# Patient Record
Sex: MALE | Race: WHITE | NOT HISPANIC OR LATINO | Employment: OTHER | URBAN - METROPOLITAN AREA
[De-identification: names, ages, dates, MRNs, and addresses within clinical notes are randomized per-mention and may not be internally consistent; named-entity substitution may affect disease eponyms.]

---

## 2018-02-23 ENCOUNTER — HOSPITAL ENCOUNTER (EMERGENCY)
Facility: HOSPITAL | Age: 83
Discharge: HOME | End: 2018-02-23
Attending: EMERGENCY MEDICINE
Payer: MEDICARE

## 2018-02-23 VITALS
HEART RATE: 70 BPM | DIASTOLIC BLOOD PRESSURE: 81 MMHG | RESPIRATION RATE: 20 BRPM | HEIGHT: 67 IN | OXYGEN SATURATION: 99 % | TEMPERATURE: 98 F | WEIGHT: 160 LBS | SYSTOLIC BLOOD PRESSURE: 169 MMHG | BODY MASS INDEX: 25.11 KG/M2

## 2018-02-23 DIAGNOSIS — L03.113 CELLULITIS OF RIGHT HAND: Primary | ICD-10-CM

## 2018-02-23 LAB
ALBUMIN SERPL BCP-MCNC: 3.4 G/DL
ALP SERPL-CCNC: 84 U/L
ALT SERPL W/O P-5'-P-CCNC: 8 U/L
ANION GAP SERPL CALC-SCNC: 9 MMOL/L
AST SERPL-CCNC: 13 U/L
BASOPHILS # BLD AUTO: 0 K/UL
BASOPHILS NFR BLD: 0.1 %
BILIRUB SERPL-MCNC: 0.8 MG/DL
BUN SERPL-MCNC: 24 MG/DL
CALCIUM SERPL-MCNC: 9 MG/DL
CHLORIDE SERPL-SCNC: 108 MMOL/L
CO2 SERPL-SCNC: 24 MMOL/L
CREAT SERPL-MCNC: 1.2 MG/DL
DIFFERENTIAL METHOD: ABNORMAL
EOSINOPHIL # BLD AUTO: 0.3 K/UL
EOSINOPHIL NFR BLD: 2.7 %
ERYTHROCYTE [DISTWIDTH] IN BLOOD BY AUTOMATED COUNT: 14.6 %
EST. GFR  (AFRICAN AMERICAN): >60 ML/MIN/1.73 M^2
EST. GFR  (NON AFRICAN AMERICAN): 56 ML/MIN/1.73 M^2
GLUCOSE SERPL-MCNC: 108 MG/DL
HCT VFR BLD AUTO: 40.6 %
HGB BLD-MCNC: 13.2 G/DL
LYMPHOCYTES # BLD AUTO: 1.3 K/UL
LYMPHOCYTES NFR BLD: 14.3 %
MCH RBC QN AUTO: 28.8 PG
MCHC RBC AUTO-ENTMCNC: 32.6 G/DL
MCV RBC AUTO: 88 FL
MONOCYTES # BLD AUTO: 0.6 K/UL
MONOCYTES NFR BLD: 6.7 %
NEUTROPHILS # BLD AUTO: 7.1 K/UL
NEUTROPHILS NFR BLD: 76.2 %
PLATELET # BLD AUTO: 252 K/UL
PMV BLD AUTO: 8 FL
POTASSIUM SERPL-SCNC: 4.1 MMOL/L
PROT SERPL-MCNC: 7.2 G/DL
RBC # BLD AUTO: 4.59 M/UL
SODIUM SERPL-SCNC: 141 MMOL/L
WBC # BLD AUTO: 9.4 K/UL

## 2018-02-23 PROCEDURE — 96365 THER/PROPH/DIAG IV INF INIT: CPT

## 2018-02-23 PROCEDURE — S0077 INJECTION, CLINDAMYCIN PHOSP: HCPCS | Performed by: EMERGENCY MEDICINE

## 2018-02-23 PROCEDURE — 36415 COLL VENOUS BLD VENIPUNCTURE: CPT

## 2018-02-23 PROCEDURE — 85025 COMPLETE CBC W/AUTO DIFF WBC: CPT

## 2018-02-23 PROCEDURE — 99283 EMERGENCY DEPT VISIT LOW MDM: CPT | Mod: 25

## 2018-02-23 PROCEDURE — 80053 COMPREHEN METABOLIC PANEL: CPT

## 2018-02-23 PROCEDURE — 25000003 PHARM REV CODE 250: Performed by: EMERGENCY MEDICINE

## 2018-02-23 PROCEDURE — 87040 BLOOD CULTURE FOR BACTERIA: CPT

## 2018-02-23 RX ORDER — POTASSIUM CHLORIDE 750 MG/1
10 TABLET, EXTENDED RELEASE ORAL
Status: ON HOLD | COMMUNITY
End: 2018-04-19 | Stop reason: HOSPADM

## 2018-02-23 RX ORDER — CLOPIDOGREL BISULFATE 75 MG/1
75 TABLET ORAL DAILY
COMMUNITY
End: 2018-10-28 | Stop reason: SDUPTHER

## 2018-02-23 RX ORDER — METOPROLOL SUCCINATE 100 MG/1
100 TABLET, EXTENDED RELEASE ORAL DAILY
COMMUNITY
End: 2018-08-02 | Stop reason: SDUPTHER

## 2018-02-23 RX ORDER — PANTOPRAZOLE SODIUM 40 MG/1
40 TABLET, DELAYED RELEASE ORAL DAILY PRN
COMMUNITY

## 2018-02-23 RX ORDER — DOXYCYCLINE HYCLATE 100 MG/1
100 TABLET, DELAYED RELEASE ORAL 2 TIMES DAILY
Status: ON HOLD | COMMUNITY
End: 2018-04-12 | Stop reason: HOSPADM

## 2018-02-23 RX ORDER — AMLODIPINE BESYLATE 5 MG/1
5 TABLET ORAL DAILY
Status: ON HOLD | COMMUNITY
End: 2018-04-19 | Stop reason: HOSPADM

## 2018-02-23 RX ORDER — ASPIRIN 81 MG/1
81 TABLET ORAL DAILY
COMMUNITY

## 2018-02-23 RX ORDER — LOSARTAN POTASSIUM 100 MG/1
100 TABLET ORAL DAILY
COMMUNITY
End: 2020-01-01 | Stop reason: SDUPTHER

## 2018-02-23 RX ORDER — FUROSEMIDE 20 MG/1
10 TABLET ORAL DAILY
COMMUNITY
End: 2020-01-01

## 2018-02-23 RX ORDER — CLINDAMYCIN PHOSPHATE 900 MG/50ML
900 INJECTION, SOLUTION INTRAVENOUS
Status: COMPLETED | OUTPATIENT
Start: 2018-02-23 | End: 2018-02-23

## 2018-02-23 RX ORDER — LOVASTATIN 40 MG/1
40 TABLET ORAL NIGHTLY
COMMUNITY
End: 2019-01-03 | Stop reason: SDUPTHER

## 2018-02-23 RX ORDER — COLCHICINE 0.6 MG/1
0.6 TABLET, FILM COATED ORAL DAILY
COMMUNITY
End: 2019-06-10 | Stop reason: ALTCHOICE

## 2018-02-23 RX ORDER — CLINDAMYCIN HYDROCHLORIDE 150 MG/1
300 CAPSULE ORAL 4 TIMES DAILY
Qty: 56 CAPSULE | Refills: 0 | Status: SHIPPED | OUTPATIENT
Start: 2018-02-23 | End: 2018-03-02

## 2018-02-23 RX ADMIN — CLINDAMYCIN IN 5 PERCENT DEXTROSE 900 MG: 18 INJECTION, SOLUTION INTRAVENOUS at 02:02

## 2018-02-23 NOTE — ED PROVIDER NOTES
Encounter Date: 2/23/2018    SCRIBE #1 NOTE: ISkyla, am scribing for, and in the presence of, Dr. Gtz.       History     Chief Complaint   Patient presents with    Hand Pain     swelling / redness increasing this week  / seen at [facility] x-ray , uric acid done        02/23/2018 1:00 PM     Chief complaint: Hand Pain      Frederick Kong Jr, Jr. is a 82 y.o. male with PMHx of gout, HTN, CAD, and hyperlipemia who presents to the ED with complaints of right hand redness and swelling that started Wednesday (2/21). Patient states symptoms started immediately after picking a leaves from the yard. Per spouse, they visited Dhiraj Dempsey who performed an x-ray on the patient's right hand. The patient was prescribed antibiotics, doxycycline, and has taken 1 dose of the PO antibiotics. He denies any known allergies. SHx includes cardiac surgery and brain surgery.       The history is provided by the patient.     Review of patient's allergies indicates:  No Known Allergies  Past Medical History:   Diagnosis Date    Cancer     Coronary artery disease     Gout     Hyperlipemia     Hypertension      Past Surgical History:   Procedure Laterality Date    APPENDECTOMY      BRAIN SURGERY      CARDIAC SURGERY      TONSILLECTOMY       History reviewed. No pertinent family history.  Social History   Substance Use Topics    Smoking status: Former Smoker    Smokeless tobacco: Not on file    Alcohol use No     Review of Systems   Constitutional: Negative for fever.   HENT: Negative for congestion.    Respiratory: Negative for cough and shortness of breath.    Cardiovascular: Negative for chest pain.   Gastrointestinal: Negative for abdominal distention and nausea.   Musculoskeletal: Negative for joint swelling.   Skin: Positive for color change (right hand).   Neurological: Negative for dizziness.   Hematological: Does not bruise/bleed easily.       Physical Exam     Initial Vitals [02/23/18 1236]   BP Pulse Resp Temp  SpO2   (!) 204/83 72 20 98.2 °F (36.8 °C) 97 %      MAP       123.33         Physical Exam    Nursing note and vitals reviewed.  Constitutional: He appears well-developed and well-nourished. He is not diaphoretic. No distress.   HENT:   Head: Normocephalic and atraumatic.   Right Ear: External ear normal.   Left Ear: External ear normal.   Mouth/Throat: Oropharynx is clear and moist. No oropharyngeal exudate.   Eyes: Conjunctivae and EOM are normal.   Neck: Normal range of motion. Neck supple.   Cardiovascular: Normal rate, regular rhythm and normal heart sounds. Exam reveals no gallop and no friction rub.    No murmur heard.  Pulmonary/Chest: Breath sounds normal. No respiratory distress. He has no wheezes. He has no rhonchi. He has no rales.   Abdominal: Soft.   Musculoskeletal: Normal range of motion.   Right hand redness, tenderness, and edema noted to dorsal right hand.    Neurological: He is alert and oriented to person, place, and time.   Skin: Skin is warm and dry.   Psychiatric: He has a normal mood and affect.         ED Course   Procedures  Labs Reviewed   CBC W/ AUTO DIFFERENTIAL - Abnormal; Notable for the following:        Result Value    RBC 4.59 (*)     Hemoglobin 13.2 (*)     RDW 14.6 (*)     MPV 8.0 (*)     Gran% 76.2 (*)     Lymph% 14.3 (*)     All other components within normal limits   CULTURE, BLOOD   COMPREHENSIVE METABOLIC PANEL             Medical Decision Making:   History:   Old Medical Records: I decided to obtain old medical records.  Initial Assessment:   82-year-old male presented with a chief complaint of right hand pain, edema, and redness.  Differential Diagnosis:   Initial differential diagnosis includes but is not limited to: cellulitis, abscess, and allergic reaction.  Clinical Tests:   Lab Tests: Reviewed and Ordered  ED Management:  The patient was emergently evaluated in the emergency Department, his evaluation was significant for an elderly male with right hand edema,  tenderness, and redness.  The patient's labs showed no acute abnormalities.  The patient was aggressively treated with a dose of IV clindamycin in the emergency Department, with improvement in his symptoms.  The patient has only had 1 dose of by mouth antibiotics as an outpatient, and has not yet failed outpatient antibiotic therapy.  The patient likely has a cellulitis of the hand.  I will change his antibiotics to by mouth clindamycin.  The patient has been instructed that if his hand is not better in the next 3 days or that he has severe diarrhea that he should return to the emergency department for likely admission for further IV antibiotics, otherwise I believe that he is stable for discharge to home with by mouth clindamycin.  He is also to follow-up with his PCP.  Other:   I have discussed this case with another health care provider.            Scribe Attestation:   Scribe #1: I performed the above scribed service and the documentation accurately describes the services I performed. I attest to the accuracy of the note.      I, Dr. Pardeep Gtz, personally performed the services described in this documentation. All medical record entries made by the scribe were at my direction and in my presence.  I have reviewed the chart and agree that the record reflects my personal performance and is accurate and complete. Pardeep Gtz MD.  3:05 PM 02/23/2018             Clinical Impression:   The encounter diagnosis was Cellulitis of right hand.                           Pardeep Gtz MD  02/23/18 [id number]

## 2018-02-28 LAB — BACTERIA BLD CULT: NORMAL

## 2018-04-07 ENCOUNTER — HOSPITAL ENCOUNTER (INPATIENT)
Facility: HOSPITAL | Age: 83
LOS: 5 days | Discharge: HOME HEALTH | DRG: 853 | End: 2018-04-12
Attending: EMERGENCY MEDICINE | Admitting: HOSPITALIST
Payer: MEDICARE

## 2018-04-07 DIAGNOSIS — J69.0 ASPIRATION PNEUMONIA OF BOTH LOWER LOBES, UNSPECIFIED ASPIRATION PNEUMONIA TYPE: ICD-10-CM

## 2018-04-07 DIAGNOSIS — J96.01 ACUTE RESPIRATORY FAILURE WITH HYPOXIA: ICD-10-CM

## 2018-04-07 DIAGNOSIS — R06.02 SOB (SHORTNESS OF BREATH): ICD-10-CM

## 2018-04-07 DIAGNOSIS — J96.21 ACUTE ON CHRONIC RESPIRATORY FAILURE WITH HYPOXIA: ICD-10-CM

## 2018-04-07 DIAGNOSIS — J96.00 ACUTE RESPIRATORY FAILURE, UNSPECIFIED WHETHER WITH HYPOXIA OR HYPERCAPNIA: Primary | ICD-10-CM

## 2018-04-07 DIAGNOSIS — R79.89 ELEVATED TROPONIN: ICD-10-CM

## 2018-04-07 DIAGNOSIS — R33.9 URINARY RETENTION: ICD-10-CM

## 2018-04-07 DIAGNOSIS — J44.0 CHRONIC OBSTRUCTIVE PULMONARY DISEASE WITH ACUTE LOWER RESPIRATORY INFECTION: ICD-10-CM

## 2018-04-07 DIAGNOSIS — N35.9 URETHRAL STRICTURE, UNSPECIFIED STRICTURE TYPE: ICD-10-CM

## 2018-04-07 DIAGNOSIS — I10 ESSENTIAL HYPERTENSION: ICD-10-CM

## 2018-04-07 PROBLEM — J96.02 ACUTE RESPIRATORY FAILURE WITH HYPOXIA AND HYPERCAPNIA: Status: ACTIVE | Noted: 2018-04-07

## 2018-04-07 PROBLEM — E87.20 METABOLIC ACIDOSIS, NORMAL ANION GAP (NAG): Status: ACTIVE | Noted: 2018-04-07

## 2018-04-07 PROBLEM — A41.9 SEVERE SEPSIS: Status: ACTIVE | Noted: 2018-04-07

## 2018-04-07 PROBLEM — Z95.2 H/O AORTIC VALVE REPLACEMENT: Status: ACTIVE | Noted: 2018-04-07

## 2018-04-07 PROBLEM — N17.9 AKI (ACUTE KIDNEY INJURY): Status: ACTIVE | Noted: 2018-04-07

## 2018-04-07 PROBLEM — R65.20 SEVERE SEPSIS: Status: ACTIVE | Noted: 2018-04-07

## 2018-04-07 LAB
ALBUMIN SERPL BCP-MCNC: 3.8 G/DL
ALLENS TEST: ABNORMAL
ALP SERPL-CCNC: 118 U/L
ALT SERPL W/O P-5'-P-CCNC: 19 U/L
ANION GAP SERPL CALC-SCNC: 13 MMOL/L
ANION GAP SERPL CALC-SCNC: 9 MMOL/L
AST SERPL-CCNC: 29 U/L
BASOPHILS # BLD AUTO: 0.1 K/UL
BASOPHILS NFR BLD: 0.6 %
BILIRUB SERPL-MCNC: 0.5 MG/DL
BNP SERPL-MCNC: 797 PG/ML
BUN SERPL-MCNC: 27 MG/DL
BUN SERPL-MCNC: 31 MG/DL
CALCIUM SERPL-MCNC: 8.3 MG/DL
CALCIUM SERPL-MCNC: 9.2 MG/DL
CHLORIDE SERPL-SCNC: 109 MMOL/L
CHLORIDE SERPL-SCNC: 114 MMOL/L
CO2 SERPL-SCNC: 17 MMOL/L
CO2 SERPL-SCNC: 22 MMOL/L
CREAT SERPL-MCNC: 1.6 MG/DL
CREAT SERPL-MCNC: 1.8 MG/DL
DELSYS: ABNORMAL
DIFFERENTIAL METHOD: ABNORMAL
EOSINOPHIL # BLD AUTO: 1 K/UL
EOSINOPHIL NFR BLD: 4.4 %
ERYTHROCYTE [DISTWIDTH] IN BLOOD BY AUTOMATED COUNT: 14.9 %
ERYTHROCYTE [SEDIMENTATION RATE] IN BLOOD BY WESTERGREN METHOD: 14 MM/H
ERYTHROCYTE [SEDIMENTATION RATE] IN BLOOD BY WESTERGREN METHOD: 22 MM/H
ERYTHROCYTE [SEDIMENTATION RATE] IN BLOOD BY WESTERGREN METHOD: 22 MM/H
EST. GFR  (AFRICAN AMERICAN): 40 ML/MIN/1.73 M^2
EST. GFR  (AFRICAN AMERICAN): 46 ML/MIN/1.73 M^2
EST. GFR  (NON AFRICAN AMERICAN): 34 ML/MIN/1.73 M^2
EST. GFR  (NON AFRICAN AMERICAN): 40 ML/MIN/1.73 M^2
FIO2: 50
GLUCOSE SERPL-MCNC: 127 MG/DL
GLUCOSE SERPL-MCNC: 246 MG/DL
HCO3 UR-SCNC: 21.3 MMOL/L (ref 24–28)
HCO3 UR-SCNC: 22.1 MMOL/L (ref 24–28)
HCO3 UR-SCNC: 22.9 MMOL/L (ref 24–28)
HCT VFR BLD AUTO: 49.9 %
HGB BLD-MCNC: 16.3 G/DL
LACTATE SERPL-SCNC: 1.2 MMOL/L
LACTATE SERPL-SCNC: 1.8 MMOL/L
LACTATE SERPL-SCNC: 2.9 MMOL/L
LYMPHOCYTES # BLD AUTO: 7.3 K/UL
LYMPHOCYTES NFR BLD: 33.6 %
MAGNESIUM SERPL-MCNC: 2.2 MG/DL
MCH RBC QN AUTO: 29.2 PG
MCHC RBC AUTO-ENTMCNC: 32.6 G/DL
MCV RBC AUTO: 90 FL
MODE: ABNORMAL
MONOCYTES # BLD AUTO: 2 K/UL
MONOCYTES NFR BLD: 9.1 %
NEUTROPHILS # BLD AUTO: 11.4 K/UL
NEUTROPHILS NFR BLD: 52.3 %
PCO2 BLDA: 35 MMHG (ref 35–45)
PCO2 BLDA: 38.4 MMHG (ref 35–45)
PCO2 BLDA: 58.5 MMHG (ref 35–45)
PEEP: 5
PH SMN: 7.2 [PH] (ref 7.35–7.45)
PH SMN: 7.35 [PH] (ref 7.35–7.45)
PH SMN: 7.41 [PH] (ref 7.35–7.45)
PHOSPHATE SERPL-MCNC: 3.1 MG/DL
PIP: 31
PLATELET # BLD AUTO: 403 K/UL
PMV BLD AUTO: 8.5 FL
PO2 BLDA: 107 MMHG (ref 80–100)
PO2 BLDA: 121 MMHG (ref 80–100)
PO2 BLDA: 90 MMHG (ref 80–100)
POC BE: -3 MMOL/L
POC BE: -4 MMOL/L
POC BE: -5 MMOL/L
POC SATURATED O2: 94 % (ref 95–100)
POC SATURATED O2: 98 % (ref 95–100)
POC SATURATED O2: 99 % (ref 95–100)
POC TCO2: 22 MMOL/L (ref 23–27)
POC TCO2: 23 MMOL/L (ref 23–27)
POC TCO2: 25 MMOL/L (ref 23–27)
POTASSIUM SERPL-SCNC: 4.5 MMOL/L
POTASSIUM SERPL-SCNC: 4.7 MMOL/L
PROT SERPL-MCNC: 8.1 G/DL
RBC # BLD AUTO: 5.58 M/UL
SAMPLE: ABNORMAL
SITE: ABNORMAL
SODIUM SERPL-SCNC: 140 MMOL/L
SODIUM SERPL-SCNC: 144 MMOL/L
TROPONIN I SERPL DL<=0.01 NG/ML-MCNC: 0.16 NG/ML
TROPONIN I SERPL DL<=0.01 NG/ML-MCNC: 0.21 NG/ML
VT: 500
WBC # BLD AUTO: 21.8 K/UL

## 2018-04-07 PROCEDURE — 80053 COMPREHEN METABOLIC PANEL: CPT

## 2018-04-07 PROCEDURE — 99900035 HC TECH TIME PER 15 MIN (STAT)

## 2018-04-07 PROCEDURE — 85025 COMPLETE CBC W/AUTO DIFF WBC: CPT

## 2018-04-07 PROCEDURE — 63600175 PHARM REV CODE 636 W HCPCS

## 2018-04-07 PROCEDURE — 27000221 HC OXYGEN, UP TO 24 HOURS

## 2018-04-07 PROCEDURE — 37799 UNLISTED PX VASCULAR SURGERY: CPT

## 2018-04-07 PROCEDURE — C9113 INJ PANTOPRAZOLE SODIUM, VIA: HCPCS | Performed by: HOSPITALIST

## 2018-04-07 PROCEDURE — 0T9B70Z DRAINAGE OF BLADDER WITH DRAINAGE DEVICE, VIA NATURAL OR ARTIFICIAL OPENING: ICD-10-PCS | Performed by: UROLOGY

## 2018-04-07 PROCEDURE — 87070 CULTURE OTHR SPECIMN AEROBIC: CPT

## 2018-04-07 PROCEDURE — 84484 ASSAY OF TROPONIN QUANT: CPT | Mod: 91

## 2018-04-07 PROCEDURE — 31500 INSERT EMERGENCY AIRWAY: CPT

## 2018-04-07 PROCEDURE — 63600175 PHARM REV CODE 636 W HCPCS: Performed by: EMERGENCY MEDICINE

## 2018-04-07 PROCEDURE — 94002 VENT MGMT INPAT INIT DAY: CPT

## 2018-04-07 PROCEDURE — 96365 THER/PROPH/DIAG IV INF INIT: CPT

## 2018-04-07 PROCEDURE — 82803 BLOOD GASES ANY COMBINATION: CPT

## 2018-04-07 PROCEDURE — 5A1945Z RESPIRATORY VENTILATION, 24-96 CONSECUTIVE HOURS: ICD-10-PCS | Performed by: HOSPITALIST

## 2018-04-07 PROCEDURE — 80048 BASIC METABOLIC PNL TOTAL CA: CPT

## 2018-04-07 PROCEDURE — 87040 BLOOD CULTURE FOR BACTERIA: CPT

## 2018-04-07 PROCEDURE — 99223 1ST HOSP IP/OBS HIGH 75: CPT | Mod: 25,,, | Performed by: UROLOGY

## 2018-04-07 PROCEDURE — 83735 ASSAY OF MAGNESIUM: CPT

## 2018-04-07 PROCEDURE — 0BH17EZ INSERTION OF ENDOTRACHEAL AIRWAY INTO TRACHEA, VIA NATURAL OR ARTIFICIAL OPENING: ICD-10-PCS | Performed by: HOSPITALIST

## 2018-04-07 PROCEDURE — 83605 ASSAY OF LACTIC ACID: CPT | Mod: 91

## 2018-04-07 PROCEDURE — 84100 ASSAY OF PHOSPHORUS: CPT

## 2018-04-07 PROCEDURE — 36415 COLL VENOUS BLD VENIPUNCTURE: CPT

## 2018-04-07 PROCEDURE — 51703 INSERT BLADDER CATH COMPLEX: CPT | Mod: ,,, | Performed by: UROLOGY

## 2018-04-07 PROCEDURE — 51798 US URINE CAPACITY MEASURE: CPT

## 2018-04-07 PROCEDURE — 63600175 PHARM REV CODE 636 W HCPCS: Performed by: HOSPITALIST

## 2018-04-07 PROCEDURE — 20000000 HC ICU ROOM

## 2018-04-07 PROCEDURE — 96366 THER/PROPH/DIAG IV INF ADDON: CPT

## 2018-04-07 PROCEDURE — 84484 ASSAY OF TROPONIN QUANT: CPT

## 2018-04-07 PROCEDURE — 83605 ASSAY OF LACTIC ACID: CPT

## 2018-04-07 PROCEDURE — 83880 ASSAY OF NATRIURETIC PEPTIDE: CPT

## 2018-04-07 PROCEDURE — 63600175 PHARM REV CODE 636 W HCPCS: Performed by: UROLOGY

## 2018-04-07 PROCEDURE — 93005 ELECTROCARDIOGRAM TRACING: CPT

## 2018-04-07 PROCEDURE — 96375 TX/PRO/DX INJ NEW DRUG ADDON: CPT

## 2018-04-07 PROCEDURE — 99285 EMERGENCY DEPT VISIT HI MDM: CPT | Mod: 25

## 2018-04-07 PROCEDURE — 25000003 PHARM REV CODE 250: Performed by: EMERGENCY MEDICINE

## 2018-04-07 PROCEDURE — 36620 INSERTION CATHETER ARTERY: CPT

## 2018-04-07 PROCEDURE — 25000003 PHARM REV CODE 250: Performed by: HOSPITALIST

## 2018-04-07 PROCEDURE — 0T7D3ZZ DILATION OF URETHRA, PERCUTANEOUS APPROACH: ICD-10-PCS | Performed by: UROLOGY

## 2018-04-07 PROCEDURE — 87205 SMEAR GRAM STAIN: CPT

## 2018-04-07 PROCEDURE — 94761 N-INVAS EAR/PLS OXIMETRY MLT: CPT

## 2018-04-07 PROCEDURE — 51702 INSERT TEMP BLADDER CATH: CPT

## 2018-04-07 PROCEDURE — 83036 HEMOGLOBIN GLYCOSYLATED A1C: CPT

## 2018-04-07 PROCEDURE — 36600 WITHDRAWAL OF ARTERIAL BLOOD: CPT

## 2018-04-07 PROCEDURE — 94770 HC EXHALED C02 TEST: CPT

## 2018-04-07 RX ORDER — MORPHINE SULFATE 4 MG/ML
INJECTION, SOLUTION INTRAMUSCULAR; INTRAVENOUS
Status: COMPLETED
Start: 2018-04-07 | End: 2018-04-07

## 2018-04-07 RX ORDER — LIDOCAINE HYDROCHLORIDE 20 MG/ML
INJECTION, SOLUTION EPIDURAL; INFILTRATION; INTRACAUDAL; PERINEURAL
Status: DISCONTINUED
Start: 2018-04-07 | End: 2018-04-07 | Stop reason: WASHOUT

## 2018-04-07 RX ORDER — IPRATROPIUM BROMIDE AND ALBUTEROL SULFATE 2.5; .5 MG/3ML; MG/3ML
3 SOLUTION RESPIRATORY (INHALATION) EVERY 6 HOURS
Status: DISCONTINUED | OUTPATIENT
Start: 2018-04-07 | End: 2018-04-07

## 2018-04-07 RX ORDER — ENOXAPARIN SODIUM 100 MG/ML
30 INJECTION SUBCUTANEOUS EVERY 24 HOURS
Status: DISCONTINUED | OUTPATIENT
Start: 2018-04-07 | End: 2018-04-09

## 2018-04-07 RX ORDER — MORPHINE SULFATE 4 MG/ML
4 INJECTION, SOLUTION INTRAMUSCULAR; INTRAVENOUS ONCE
Status: COMPLETED | OUTPATIENT
Start: 2018-04-07 | End: 2018-04-07

## 2018-04-07 RX ORDER — IPRATROPIUM BROMIDE AND ALBUTEROL SULFATE 2.5; .5 MG/3ML; MG/3ML
3 SOLUTION RESPIRATORY (INHALATION) EVERY 6 HOURS
Status: DISCONTINUED | OUTPATIENT
Start: 2018-04-07 | End: 2018-04-12 | Stop reason: HOSPADM

## 2018-04-07 RX ORDER — PANTOPRAZOLE SODIUM 40 MG/10ML
40 INJECTION, POWDER, LYOPHILIZED, FOR SOLUTION INTRAVENOUS DAILY
Status: DISCONTINUED | OUTPATIENT
Start: 2018-04-07 | End: 2018-04-09

## 2018-04-07 RX ORDER — ASPIRIN 81 MG/1
81 TABLET ORAL DAILY
Status: DISCONTINUED | OUTPATIENT
Start: 2018-04-07 | End: 2018-04-12 | Stop reason: HOSPADM

## 2018-04-07 RX ORDER — CLOPIDOGREL BISULFATE 75 MG/1
75 TABLET ORAL DAILY
Status: DISCONTINUED | OUTPATIENT
Start: 2018-04-07 | End: 2018-04-12 | Stop reason: HOSPADM

## 2018-04-07 RX ORDER — PROPOFOL 10 MG/ML
INJECTION, EMULSION INTRAVENOUS
Status: COMPLETED
Start: 2018-04-07 | End: 2018-04-07

## 2018-04-07 RX ORDER — PROPOFOL 10 MG/ML
5 INJECTION, EMULSION INTRAVENOUS DAILY
Status: DISCONTINUED | OUTPATIENT
Start: 2018-04-07 | End: 2018-04-09

## 2018-04-07 RX ORDER — GLUCAGON 1 MG
1 KIT INJECTION
Status: DISCONTINUED | OUTPATIENT
Start: 2018-04-07 | End: 2018-04-12 | Stop reason: HOSPADM

## 2018-04-07 RX ORDER — IBUPROFEN 200 MG
16 TABLET ORAL
Status: DISCONTINUED | OUTPATIENT
Start: 2018-04-07 | End: 2018-04-12 | Stop reason: HOSPADM

## 2018-04-07 RX ORDER — ACETAMINOPHEN 650 MG/1
650 SUPPOSITORY RECTAL EVERY 6 HOURS PRN
Status: DISCONTINUED | OUTPATIENT
Start: 2018-04-07 | End: 2018-04-09

## 2018-04-07 RX ORDER — LORAZEPAM 2 MG/ML
1 INJECTION INTRAMUSCULAR
Status: COMPLETED | OUTPATIENT
Start: 2018-04-07 | End: 2018-04-07

## 2018-04-07 RX ORDER — VANCOMYCIN HCL IN 5 % DEXTROSE 1G/250ML
15 PLASTIC BAG, INJECTION (ML) INTRAVENOUS
Status: DISCONTINUED | OUTPATIENT
Start: 2018-04-07 | End: 2018-04-07

## 2018-04-07 RX ORDER — VANCOMYCIN HCL IN 5 % DEXTROSE 1G/250ML
15 PLASTIC BAG, INJECTION (ML) INTRAVENOUS
Status: COMPLETED | OUTPATIENT
Start: 2018-04-07 | End: 2018-04-07

## 2018-04-07 RX ORDER — SODIUM CHLORIDE 9 MG/ML
INJECTION, SOLUTION INTRAVENOUS CONTINUOUS
Status: DISCONTINUED | OUTPATIENT
Start: 2018-04-07 | End: 2018-04-09

## 2018-04-07 RX ORDER — VANCOMYCIN HCL IN 5 % DEXTROSE 1G/250ML
15 PLASTIC BAG, INJECTION (ML) INTRAVENOUS ONCE
Status: COMPLETED | OUTPATIENT
Start: 2018-04-08 | End: 2018-04-08

## 2018-04-07 RX ORDER — IBUPROFEN 200 MG
24 TABLET ORAL
Status: DISCONTINUED | OUTPATIENT
Start: 2018-04-07 | End: 2018-04-12 | Stop reason: HOSPADM

## 2018-04-07 RX ADMIN — PIPERACILLIN SODIUM AND TAZOBACTAM SODIUM 4.5 G: 4; .5 INJECTION, POWDER, FOR SOLUTION INTRAVENOUS at 09:04

## 2018-04-07 RX ADMIN — ENOXAPARIN SODIUM 30 MG: 100 INJECTION SUBCUTANEOUS at 01:04

## 2018-04-07 RX ADMIN — PIPERACILLIN SODIUM AND TAZOBACTAM SODIUM 4.5 G: 4; .5 INJECTION, POWDER, LYOPHILIZED, FOR SOLUTION INTRAVENOUS at 05:04

## 2018-04-07 RX ADMIN — ASPIRIN 81 MG: 81 TABLET, COATED ORAL at 01:04

## 2018-04-07 RX ADMIN — PROPOFOL 35 MCG/KG/MIN: 10 INJECTION, EMULSION INTRAVENOUS at 08:04

## 2018-04-07 RX ADMIN — PROPOFOL 1000 MG: 10 INJECTION, EMULSION INTRAVENOUS at 08:04

## 2018-04-07 RX ADMIN — PROPOFOL 50 MCG/KG/MIN: 10 INJECTION, EMULSION INTRAVENOUS at 02:04

## 2018-04-07 RX ADMIN — MORPHINE SULFATE 4 MG: 4 INJECTION INTRAVENOUS at 08:04

## 2018-04-07 RX ADMIN — VANCOMYCIN HYDROCHLORIDE 1000 MG: 1 INJECTION, POWDER, LYOPHILIZED, FOR SOLUTION INTRAVENOUS at 09:04

## 2018-04-07 RX ADMIN — SODIUM CHLORIDE 2178 ML: 0.9 INJECTION, SOLUTION INTRAVENOUS at 08:04

## 2018-04-07 RX ADMIN — CLOPIDOGREL BISULFATE 75 MG: 75 TABLET, FILM COATED ORAL at 01:04

## 2018-04-07 RX ADMIN — PROPOFOL 1000 MG: 10 INJECTION, EMULSION INTRAVENOUS at 06:04

## 2018-04-07 RX ADMIN — SODIUM CHLORIDE: 0.9 INJECTION, SOLUTION INTRAVENOUS at 11:04

## 2018-04-07 RX ADMIN — PANTOPRAZOLE SODIUM 40 MG: 40 INJECTION, POWDER, FOR SOLUTION INTRAVENOUS at 01:04

## 2018-04-07 RX ADMIN — LORAZEPAM 1 MG: 2 INJECTION, SOLUTION INTRAMUSCULAR; INTRAVENOUS at 07:04

## 2018-04-07 NOTE — NURSING
1200  Eyes open and follows simple commands.  Moves all exts.   Gagging on ETT.   BP elevated now.  Resumed Diprivan.        1215  Dr. Gutierrez on unit.  Spoke with family at bedside.  New orders received.

## 2018-04-07 NOTE — CONSULTS
HISTORY OF PRESENT ILLNESS:  Mr. Kong is an 82-year-old gentleman who has had a   cough for the last 3-4 weeks.  It has been nonproductive and there has been no   fever, chills or diaphoresis associated with it.  This morning around 4:00 a.m.,   he got up and was coughing and instead of coming back to bed went to lie down   on the sofa.  When his wife awakened an hour later, he was lying on the sofa and   stated he was too short of breath to breathe.  He was diaphoretic at that time   and gasping for breath.  She called 911.  He was brought here and intubated.    PAST MEDICAL HISTORY:  Significant for bilateral pneumonia as an infant.  He had   a fall with rib fractures on the left and a traumatic pneumothorax requiring   thoracostomy tube drainage approximately 2 years ago.  In 2000, he had his   aortic valve replaced, the wife believes it was a mechanical valve and two   stents were placed at that time.  In 1975, he had a meningioma resected.  He is   also status post TURP, herniorrhaphy.  He has hypertension,   hypercholesterolemia.    SOCIAL HISTORY:  He is .  He is a retired .  He smoked from   age [age] 1 pack a day.  He drinks alcohol infrequently.    HOME MEDICATIONS:  Amlodipine, aspirin, Plavix, colchicine, doxycycline, Lasix,   Cozaar, Mevacor, Toprol-XL, Protonix and K-Dur.    ALLERGIES:  Listed as none.    PHYSICAL EXAMINATION:  GENERAL:  He is an older male in no acute distress.  VITAL SIGNS:  Show a temperature of 97.3, heart rate 60, respiratory rate 20,   blood pressure 156/65, sats of 100% on the ventilator.  He is 163 pounds or 74   kilos.  HEENT:  The pupils are 2 mm and reactive.  There is bilateral arcus senilis.    The pharynx is intubated with a 7.5 endotracheal tube and OG tube.  HEART:  Has a regular rate and rhythm.  LUNGS:  Clear to auscultation anteriorly and posteriorly.  ABDOMEN:  Soft, nontender.  GENITOURINARY:  There is a Condom catheter in place.  It should be  noted that   there were attempts to place a Leslie catheter, which were unsuccessful.  EXTREMITIES:  There is no cyanosis, clubbing or edema.  There are bilateral,   hammertoes.  The pulses are present with Doppler bilaterally distally.  SKIN:  Intact with no lesions or rashes.  NEUROLOGIC:  The patient is sedated.    LABORATORY DATA:  The white count is 21.8, hemoglobin 16.3, hematocrit 49.9,   platelets 403.  Chemistry shows sodium of 140, potassium 4.5, chloride 114, CO2   of 17, BUN 31, creatinine 1.6, glucose 127, calcium 8.3, albumin 3.8.  Troponin   0.21.  .  Venous lactate 1.2.  The ABG has a pH of 7.35, pCO2 of 38, pO2   of 107, bicarbonate of 21 and sat of 98% on an AC rate of 22, tidal volume 500,   FiO2 of 0.5 and PEEP of 5.  The chest x-ray shows the endotracheal tube below   the level of the clavicular head, prior CABG NG tube in the stomach.  Cardiac   silhouette is enlarged.  The patient was rotated.  There is COPD.  There is a   calcified granuloma in the mid lung zone.  There is fibrotic scarring in the   upper lobes bilaterally.  There is patchy segmental airspace opacity and acinar   pattern present in the right lower lung zone.  There are left posterior rib   fractures in the fourth and fifth ribs.  The echocardiogram shows a decreased EF   globally and some mitral and aortic valve changes.    IMPRESSION:  An 82-year-old gentleman with a right lower lobe pneumonia who has   elevated troponins and BNPs, abnormal renal function and oliguria.    PLAN:  The plan is to wean the ventilator as tolerated, continue his antibiotics   pending his culture results.  Cardiology is evaluating his cardiac function and   renal and urology have been consulted as well.  We will make sure he is   receiving PPIs and Lovenox for anticoagulation, judicious IV fluids and   bronchodilators.      ALFIE/IN  dd: 04/07/2018 15:09:37 (CDT)  td: 04/07/2018 16:44:46 (CDT)  Doc ID   #[id number]  Job ID #[id number]    CC:

## 2018-04-07 NOTE — NURSING
1105  Received from ED via stretcher.  ETT secure.  Vent settings: AC 22, Fio2 50%, Peep 5.  OGT in place.  Diprivan at 50mcgs.  Placed on hold for assessment.  SB on monitor.      1115  Pulls all exts away with nailbed pressure.  Not attempting to open eyes.  Condom cath placed.

## 2018-04-07 NOTE — CONSULTS
[facility]  Cardiology  Consult Note    Patient Name: Frederick Kong Jr.  MRN: [id number]  Admission Date: 4/7/2018  Hospital Length of Stay: 0 days  Code Status: Full Code   Attending Provider: Cheryl Gutierrez MD   Consulting Provider: Marisel Soliman MD  Primary Care Physician: Dhiraj Dempsey III, MD  Principal Problem:<principal problem not specified>    Patient information was obtained from past medical records and ER records.     Consults   Subjective:     Chief Complaint:  Shortness of breath     HPI: 82-year-old male with a past medical history significant for mechanical aortic valve replacement, coronary artery disease status post CABG was brought into the hospital secondary to shortness of breath. He reportedly was noted to have his saturations in the 60s and was intubated in the ER. He reportedly has been having cough for the past 3-4 weeks. This morning he started coughing and laid down on the sofa. His wife called 911 as he was gasping for breath. Currently the patient is intubated and no meaningful history could be obtained from the patient. History was obtained from review of records and from the nurse. Family was not available at the time of my interview.     Past Medical History:   Diagnosis Date    Cancer     Coronary artery disease     Gout     Hyperlipemia     Hypertension        Past Surgical History:   Procedure Laterality Date    APPENDECTOMY      BRAIN SURGERY      CARDIAC SURGERY      TONSILLECTOMY         Review of patient's allergies indicates:  No Known Allergies    No current facility-administered medications on file prior to encounter.      Current Outpatient Prescriptions on File Prior to Encounter   Medication Sig    amLODIPine (NORVASC) 5 MG tablet Take 5 mg by mouth once daily.    aspirin (ECOTRIN) 81 MG EC tablet Take 81 mg by mouth once daily.    clopidogrel (PLAVIX) 75 mg tablet Take 75 mg by mouth once daily.    colchicine 0.6 mg capsule/tablet  Take 0.6 mg by mouth once daily.    doxycycline (DORYX) 100 MG EC tablet Take 100 mg by mouth 2 (two) times daily.    furosemide (LASIX) 20 MG tablet Take 10 mg by mouth every other day.    losartan (COZAAR) 100 MG tablet Take 100 mg by mouth once daily.    lovastatin (MEVACOR) 40 MG tablet Take 40 mg by mouth every evening.    metoprolol succinate (TOPROL-XL) 100 MG 24 hr tablet Take 100 mg by mouth once daily.    pantoprazole (PROTONIX) 40 MG tablet Take 40 mg by mouth once daily.    potassium chloride SA (K-DUR,KLOR-CON) 10 MEQ tablet Take 10 mEq by mouth 2 (two) times daily.     Scheduled Meds:   albuterol-ipratropium 2.5mg-0.5mg/3mL  3 mL Nebulization Q6H    aspirin  81 mg Oral Daily    clopidogrel  75 mg Oral Daily    enoxaparin  30 mg Subcutaneous Daily    pantoprazole  40 mg Intravenous Daily    piperacillin-tazobactam (ZOSYN) IVPB  4.5 g Intravenous Q8H    propofol  5 mcg/kg/min (Dosing Weight) Intravenous Daily    [START ON 4/8/2018] vancomycin (VANCOCIN) IVPB  15 mg/kg (Dosing Weight) Intravenous Once     Continuous Infusions:   sodium chloride 0.9% 75 mL/hr at 04/07/18 1514     PRN Meds:.acetaminophen, dextrose 50%, dextrose 50%, glucagon (human recombinant), glucose, glucose      Family History     None        Social History Main Topics    Smoking status: Former Smoker    Smokeless tobacco: Not on file    Alcohol use No    Drug use: Unknown    Sexual activity: Not on file     ROS  Objective:     Vital Signs (Most Recent):  Temp: 98.3 °F (36.8 °C) (04/07/18 1600)  Pulse: 75 (04/07/18 1700)  Resp: (!) 22 (04/07/18 1700)  BP: (!) 142/65 (04/07/18 1700)  SpO2: 100 % (04/07/18 1700) Vital Signs (24h Range):  Temp:  [97.3 °F (36.3 °C)-98.3 °F (36.8 °C)] 98.3 °F (36.8 °C)  Pulse:  [] 75  Resp:  [20-30] 22  SpO2:  [96 %-100 %] 100 %  BP: ()/() 142/65     Weight: 73.9 kg (163 lb)  Body mass index is 25.53 kg/m².    SpO2: 100 %  O2 Device (Oxygen Therapy): ventilator    No  intake or output data in the 24 hours ending 04/07/18 1735    Lines/Drains/Airways     Drain                 NG/OG Tube 04/07/18 0642 Pratt sump 18 Fr. Left mouth less than 1 day          Airway                 Airway - Non-Surgical 04/07/18 0637 Endotracheal Tube less than 1 day          Arterial Line                 Arterial Line less than 1 day          Peripheral Intravenous Line                 Peripheral IV - Single Lumen Right Hand -- days         Peripheral IV - Single Lumen 04/07/18 0646 Left Antecubital less than 1 day                Physical Exam   HEENT: Normocephalic, atraumatic, PERRL, Conjunctiva pink, no scleral icterus. ET tube in place.  CVS: S1S2+, RRR, no murmurs, rubs or gallops, JVP: Normal. Click of AVR+  LUNGS: Clear  ABDOMEN: Soft, NT, BS+  EXTREMITIES: No cyanosis, clubbing or edema  NEURO: Intubated and does not obey simple commands       Significant Labs:   ABG:   Recent Labs  Lab 04/07/18  0824 04/07/18  1232   PH 7.200* 7.353   PCO2 58.5* 38.4   HCO3 22.9* 21.3*   POCSATURATED 94* 98   BE -5 -4   , Blood Culture: No results for input(s): LABBLOO in the last 48 hours., BMP:   Recent Labs  Lab 04/07/18  0630 04/07/18  1233   * 127*    140   K 4.7 4.5    114*   CO2 22* 17*   BUN 27* 31*   CREATININE 1.8* 1.6*   CALCIUM 9.2 8.3*   MG  --  2.2   , CMP   Recent Labs  Lab 04/07/18  0630 04/07/18  1233    140   K 4.7 4.5    114*   CO2 22* 17*   * 127*   BUN 27* 31*   CREATININE 1.8* 1.6*   CALCIUM 9.2 8.3*   PROT 8.1  --    ALBUMIN 3.8  --    BILITOT 0.5  --    ALKPHOS 118  --    AST 29  --    ALT 19  --    ANIONGAP 13 9   ESTGFRAFRICA 40* 46*   EGFRNONAA 34* 40*   , CBC   Recent Labs  Lab 04/07/18  0630   WBC 21.80*   HGB 16.3   HCT 49.9   *   , INR No results for input(s): INR, PROTIME in the last 48 hours., Lipid Panel No results for input(s): CHOL, HDL, LDLCALC, TRIG, CHOLHDL in the last 48 hours. and Troponin   Recent Labs  Lab 04/07/18  0630  04/07/18  1233   TROPONINI 0.159* 0.211*       Significant Imaging: X-Ray: CXR: X-Ray Chest 1 View (CXR):   Results for orders placed or performed during the hospital encounter of 04/07/18   X-Ray Chest 1 View    Narrative    EXAMINATION:  XR CHEST 1 VIEW    CLINICAL HISTORY:  pneumonia;    TECHNIQUE:  A portable semi upright AP view of the chest was acquired.    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  There is an endotracheal tube present with its tip below the level of the clavicular heads.  A nasogastric tube remains in place.  There is atherosclerotic calcification present within the aortic arch.   There has been no interval detrimental change in the radiographic appearance of the chest as compared to the previous examination with continued demonstration of patchy segmental airspace opacity/consolidative change in the right lower lung zone..  No pneumothorax.      Impression    No significant interval detrimental change in the imaging appearance of the chest when compared with the prior study.      Electronically signed by: Ozzie Simental MD  Date:    04/07/2018  Time:    13:58     Assessment and Plan:     Impression:  1. Acute hypoxemic hypercapnic respiratory failure. Currently on vent support. Likely secondary to right lower lobe pneumonia.  2. Mildly elevated troponin. Likely secondary to #1.  3. History of mechanical aortic valve prosthesis. Functioning well per last echocardiogram done in September 2017. Patient not therapeutically anticoagulated and was kept on only aspirin and Plavix reportedly secondary to high risk of bleeding and previous history of intracranial bleeds.  4. History of coronary artery disease status post CABG. Currently with mildly elevated troponin. No dynamic ECG changes.  5. History of atrial flutter. Paroxysmal.  6. Acute on chronic renal failure  7 History of hypertension.    Recommendations:  1. Decrease IV fluids to about  75 cc an hour for now.  2. Continue aspirin and Plavix for now.  3. Follow-up on the echocardiogram.  4. Further decisions to follow based upon the hospital course.  5. Discussed the case with Dr. Dunn and the patient's nurse     Thank you for your consult. I will follow-up with patient. Please contact us if you have any additional questions.    Marisel Soliman MD  Cardiology   [facility]

## 2018-04-07 NOTE — ED NOTES
Attempted Multiple times by multiple nurses Unsuccessfully to insert urinary catheter.  ER MD notified.

## 2018-04-07 NOTE — SIGNIFICANT EVENT
04/07/18 0824   Patient Assessment/Suction   Level of Consciousness (AVPU) responds to pain   PRE-TX-O2-ETCO2   O2 Device (Oxygen Therapy) ventilator   $ Is the patient on Low Flow Oxygen? Yes   Oxygen Concentration (%) 50   SpO2 100 %   Pulse 79   BP (!) 117/58       Airway - Non-Surgical 04/07/18 0637 Endotracheal Tube   Placement Date/Time: 04/07/18 0637   Present Prior to Hospital Arrival?: No  Method of Intubation: Direct laryngoscopy  Inserted by: MD  Staff/Resident Name(s): Dr. Balbuena  Airway Device: Endotracheal Tube  Mask Ventilation: Easy  Blade: Vickey #4 ...   Secured at 23 cm   Measured At Lips   Secured Location Center   Secured by Commercial tube olson   Bite Block none   Site Condition Cool;Dry   Status Intact;Secured;Patent   Site Assessment Clean;Dry   Cuff Pressure 30 cm H2O   Vent Select   Charged w/in last 24h YES   Preset Conventional Ventilator Settings   Vent Type    Ventilation Type VC   Vent Mode A/C   Humidity HME   Set Rate 14 bmp   Vt Set 500 mL   PEEP/CPAP 5 cmH20   Pressure Support 0 cmH20   Waveform RAMP   Peak Flow 60 L/min   Set Inspiratory Pressure 0 cmH20   Insp Time 0 Sec(s)   Plateau Set/Insp. Hold (sec) 0   Insp Rise Time  0 %   Trigger Sensitivity Flow/I-Trigger 2 L/min   P High 0 cm H2O   P Low 0 cm H2O   T High 0 sec   T Low 0 sec   Patient Ventilator Parameters   Resp Rate Total 15 br/min   Peak Airway Pressure 30 cmH2O   Mean Airway Pressure 10 cmH20   Plateau Pressure 0 cmH20   Exhaled Vt 537 mL   Total Ve 7.65 mL   Spont Ve 0 L   I:E Ratio Measured 1:3.80   Conventional Ventilator Alarms   Ve High Alarm 27 L/min   Resp Rate High Alarm 0 br/min   Press High Alarm 80 cmH2O   Apnea Rate 10   Apnea Volume (mL) 440 mL   Apnea Oxygen Concentration  100   Apnea Flow Rate (L/min) 52   T Apnea 20 sec(s)   Ready to Wean/Extubation Screen   FIO2<=50 (chart decimal) 0.5   MV<16L (chart vol.) 7.65   PEEP <=8 (chart #) 5   Labs   $ Was an ABG obtained? Arterial  Puncture;ISTAT - Blood gas   $ Labs Tech Time 15 min   Critical Value Communication   Notified Physician/Designee Dr. Sutherland   Date Result Received 04/07/18   Time Result Received 0824   Resulting Department of Critical Value resp   Who communicated critical value from resulting department? JOSE LUIS Alexis   Critical Test #1 pH   Critical Test #1 Result 7.20   Critical Test #2 PCO2   Critical Test #2 Result 58.5   Physician Directive increased Resp rate

## 2018-04-07 NOTE — ED PROVIDER NOTES
Encounter Date: 4/7/2018       History     Chief Complaint   Patient presents with    Shortness of Breath     arrived on CPAP; awoke this am with difficulty breathing     Chief complaint: Shortness of breath    History of present illness:Frederick Kong Jr. is a 82 y.o. male who presents with  acute onset of shortness of breath that began tonight.  He is unable to answer extensive questioning but denies any chest pain, fever or cough.  His past medical history is significant for hypertension, hyperlipidemia and coronary artery disease.          Review of patient's allergies indicates:  No Known Allergies  Past Medical History:   Diagnosis Date    Cancer     Coronary artery disease     Gout     Hyperlipemia     Hypertension      Past Surgical History:   Procedure Laterality Date    APPENDECTOMY      BRAIN SURGERY      CARDIAC SURGERY      TONSILLECTOMY       No family history on file.  Social History   Substance Use Topics    Smoking status: Former Smoker    Smokeless tobacco: Not on file    Alcohol use No     Review of Systems   Constitutional: Negative for activity change, appetite change, chills, fatigue and fever.   Eyes: Negative for visual disturbance.   Respiratory: Negative for apnea and shortness of breath.    Cardiovascular: Negative for chest pain and palpitations.   Gastrointestinal: Negative for abdominal distention and abdominal pain.   Genitourinary: Negative for difficulty urinating.   Musculoskeletal: Negative for neck pain.   Skin: Negative for pallor and rash.   Neurological: Negative for headaches.   Hematological: Does not bruise/bleed easily.   Psychiatric/Behavioral: Negative for agitation.       Physical Exam     Initial Vitals [04/07/18 0628]   BP Pulse Resp Temp SpO2   (!) 232/118 (!) 123 (!) 30 -- 100 %      MAP       156         Physical Exam    Nursing note and vitals reviewed.  Constitutional: He appears well-developed and well-nourished.   HENT:   Head: Normocephalic and  atraumatic.   Eyes: Conjunctivae are normal.   Neck: Normal range of motion. Neck supple.   Cardiovascular: Normal rate, regular rhythm and normal heart sounds. Exam reveals no gallop and no friction rub.    No murmur heard.  Pulmonary/Chest: He is in respiratory distress. He has wheezes. He has rhonchi. He has no rales.   Abdominal: Soft. Bowel sounds are normal. He exhibits no distension. There is no tenderness. There is no rebound.   Hepatomegaly   Musculoskeletal: Normal range of motion.   Neurological: He is alert and oriented to person, place, and time.   Skin: Skin is warm and dry.   Psychiatric: He has a normal mood and affect.         ED Course   Intubation  Date/Time: 4/7/2018 6:50 AM  Performed by: ANIA HENSLEY III  Authorized by: ANIA HENSLEY III   Consent Done: Emergent Situation  Indications: respiratory failure  Intubation method: direct  Patient status: paralyzed (RSI)  Sedatives: propofol  Paralytic: succinylcholine  Laryngoscope size: Mac 4  Tube size: 7.5 mm  Tube type: cuffed  Number of attempts: 1  Cords visualized: yes  Breath sounds: rales/crackles  Cuff inflated: yes  ETT to lip: 22 cm  Tube secured with: adhesive tape  Chest x-ray interpreted by me.    Critical Care  Date/Time: 4/7/2018 7:39 PM  Performed by: ANIA HENSLEY III  Authorized by: ALONZO ZAPATA   Direct patient critical care time: 30 minutes  Additional history critical care time: 5 minutes  Ordering / reviewing critical care time: 5 minutes  Documentation critical care time: 10 minutes  Total critical care time (exclusive of procedural time) : 50 minutes  Critical care was necessary to treat or prevent imminent or life-threatening deterioration of the following conditions: respiratory failure.  Critical care was time spent personally by me on the following activities: review of old charts, pulse oximetry, ordering and review of laboratory studies, obtaining history from patient or surrogate, evaluation of  patient's response to treatment, ventilator management, re-evaluation of patient's condition, ordering and review of radiographic studies, ordering and performing treatments and interventions, examination of patient and development of treatment plan with patient or surrogate.  Subsequent provider of critical care: I assumed direction of critical care for this patient from another provider of my specialty.        Labs Reviewed   B-TYPE NATRIURETIC PEPTIDE - Abnormal; Notable for the following:        Result Value     (*)     All other components within normal limits   CBC W/ AUTO DIFFERENTIAL - Abnormal; Notable for the following:     WBC 21.80 (*)     RDW 14.9 (*)     Platelets 403 (*)     MPV 8.5 (*)     Gran # (ANC) 11.4 (*)     Lymph # 7.3 (*)     Mono # 2.0 (*)     Eos # 1.0 (*)     All other components within normal limits   COMPREHENSIVE METABOLIC PANEL - Abnormal; Notable for the following:     CO2 22 (*)     Glucose 246 (*)     BUN, Bld 27 (*)     Creatinine 1.8 (*)     eGFR if  40 (*)     eGFR if non  34 (*)     All other components within normal limits   TROPONIN I - Abnormal; Notable for the following:     Troponin I 0.159 (*)     All other components within normal limits   LACTIC ACID, PLASMA - Abnormal; Notable for the following:     Lactate (Lactic Acid) 2.9 (*)     All other components within normal limits   ISTAT PROCEDURE - Abnormal; Notable for the following:     POC PH 7.200 (*)     POC PCO2 58.5 (*)     POC HCO3 22.9 (*)     POC SATURATED O2 94 (*)     All other components within normal limits   LACTIC ACID, PLASMA     EKG Readings: (Independently Interpreted)   Rhythm: Atrial Fibrillation. Heart Rate: 124. Ectopy: No Ectopy. ST Segments: Non-Specific ST Segment Depression. T Waves: Normal. Axis: Normal. Clinical Impression: Atrial Fibrillation with RVR          Medical Decision Making:   Independently Interpreted Test(s):   I have ordered and independently  interpreted X-rays - see summary below.       <> Summary of X-Ray Reading(s): I x-ray independently interpreted by me demonstrates good distal endotracheal tube placement.  There is a predominantly right lower lobe infiltrate with a differential that includes pulmonary edema, pneumonia or intrinsic lung disease  Clinical Tests:   Lab Tests: Ordered and Reviewed  ED Management:  Frederick Kong Jr. is a 82 y.o. male who presents with  abrupt onset of shortness of breath with severe respiratory distress that required intubation.  Physical exam is suggestive of congestive heart failure.  EKG fails to demonstrate any evidence of STEMI.  His workup was incomplete at 7 AM; therefore, his care was turned over to Dr. Gtz.                      Clinical Impression:   The primary encounter diagnosis was Acute respiratory failure, unspecified whether with hypoxia or hypercapnia. Diagnoses of SOB (shortness of breath) and Elevated troponin were also pertinent to this visit.                           Gustavo Balbuena III, MD  04/07/18 1941

## 2018-04-07 NOTE — NURSING
1555  Consult called to Dr Kyle's office.  Dr. Jerry on call.  Informed of inability to place henderson cath this am in ED.  Condom cath on, No urine output.  Bladder scan done at 1300 with 140cc urine noted.   States to attempt to place #10F silicone or #12F silicone henderson.    1620  #10F silcone cath placed with only 3 cc of bloody urine return once balloon inflated with 4cc.   Flushed slowly with 10cc sterile saline, scant return.  Will monitor.     1710  Spoke with Dr. Jerry regarding henderson placement and scant bloody return.   States to leave in place and she will be in soon assess.

## 2018-04-07 NOTE — HPI
82-year-old male presented with a chief complaint of shortness of breath.  Per history taken from his wife he got up in the middle of the night and when he didn't return to bed she found him suddenly struggling to breath so called the ambulance and when they arrived his oxygen sat was in the 60's. On arrival to ER evaluation revealed   The patient's chest x-ray was significant for a large  infiltrate on the right and required intubation.  .  The patient's labs were significant for an elevated white blood cell count, elevated troponin, elevated creatinine, elevated BNP, and elevated lactic acid.  The patient's EKG showed no acute abnormalities per my independent interpretation.  The patient likely has a mixed picture causing his respiratory decline.  The patient was aggressively treated per sepsis protocol with  IV fluids and broad-spectrum IV antibiotics for pneumonia --likely aspiration   He has hx of stent placement at the time of his aortic valve replacement in 2001 and was on coumadin for many years however has been changed to asa and plavix within the last year. His cardiologist is Dr Gonzalez. He has never had an MI or heart failure. He has a remote history of smoking and is otherwise alert and active.

## 2018-04-07 NOTE — NURSING
1450  Dr. Tucker on unit.   States Dr Finn is on call for Dr. Gonzalez (pt's cardiologist) and to notify Dr. Finn of consult.      1510  Dr. Finn on unit.  Aware of consult.  No new orders at present.      1520  Dr. Dunn on unit.  Spoke with family at bedside.  New orders received.  IVF's decreased to 75cc hour.   Rupal RT notified of need for ABG at 1700.

## 2018-04-07 NOTE — CONSULTS
Date: 4/7/2018   Frederick Kong Jr. [id number] is a 82 y.o. male who has been consulted for vancomycin dosing.    The patient has the following labs:     Creatinine (mg/dl)  WBC Count  Serum creatinine: 1.8 mg/dL (H) 04/07/18 0630  Estimated creatinine clearance: 29.6 mL/min (A)  Lab Results  Component  Value  Date  WBC  21.80 (H)  04/07/2018    Current weight is 74 kg (163 lb 2.3 oz)      Vancomycin being given for pneumonia.    Pt's renal function is not stable.  Pharmacy will dose by level.  Vanc 1000 mg given initially 04/07 at 0950.  Target goal is 15-20 mg/dL.   A vancomycin level will be ordered on 04/08 at 0600.        Patient will be followed by pharmacy for changes in renal function, toxicity, and efficacy.    Thank you for allowing us to participate in this patient's care.     Shruthi Dutton, OsielD

## 2018-04-07 NOTE — PROVIDER PROGRESS NOTES - EMERGENCY DEPT.
Encounter Date: 4/7/2018    ED Physician Progress Notes        Physician Note:   82-year-old male presented with a chief complaint of shortness of breath.  The patient was received from Dr. Rousseau at the change of shift.  The patient's chest x-ray was significant for a likely infiltrate on the right.  The patient's labs were significant for an elevated white blood cell count, elevated troponin, elevated creatinine, elevated BNP, and elevated lactic acid.  The patient's EKG showed no acute abnormalities per my independent interpretation.  The patient likely has a mixed picture causing his respiratory decline.  The patient was aggressively treated with IV fluids and broad-spectrum IV antibiotics.  He will be admitted to the hospitalist service for further care.  I have discussed the case with the hospitalist on-call, Dr. Gutierrez.  She has accepted the patient for admission.

## 2018-04-08 PROBLEM — R31.0 GROSS HEMATURIA: Status: ACTIVE | Noted: 2018-04-08

## 2018-04-08 PROBLEM — N35.919 URETHRAL STRICTURE, UNSPECIFIED: Status: ACTIVE | Noted: 2018-04-08

## 2018-04-08 LAB
ALBUMIN SERPL BCP-MCNC: 2.6 G/DL
ALLENS TEST: ABNORMAL
ALLENS TEST: ABNORMAL
ALP SERPL-CCNC: 70 U/L
ALT SERPL W/O P-5'-P-CCNC: 13 U/L
ANION GAP SERPL CALC-SCNC: 7 MMOL/L
AST SERPL-CCNC: 17 U/L
BASOPHILS # BLD AUTO: 0 K/UL
BASOPHILS NFR BLD: 0.2 %
BILIRUB SERPL-MCNC: 0.7 MG/DL
BUN SERPL-MCNC: 32 MG/DL
CALCIUM SERPL-MCNC: 8 MG/DL
CHLORIDE SERPL-SCNC: 113 MMOL/L
CO2 SERPL-SCNC: 20 MMOL/L
CREAT SERPL-MCNC: 1.3 MG/DL
DELSYS: ABNORMAL
DELSYS: ABNORMAL
DIFFERENTIAL METHOD: ABNORMAL
EOSINOPHIL # BLD AUTO: 0.1 K/UL
EOSINOPHIL NFR BLD: 1.1 %
ERYTHROCYTE [DISTWIDTH] IN BLOOD BY AUTOMATED COUNT: 14.9 %
ERYTHROCYTE [SEDIMENTATION RATE] IN BLOOD BY WESTERGREN METHOD: 22 MM/H
EST. GFR  (AFRICAN AMERICAN): 59 ML/MIN/1.73 M^2
EST. GFR  (NON AFRICAN AMERICAN): 51 ML/MIN/1.73 M^2
ESTIMATED AVG GLUCOSE: 103 MG/DL
ETCO2: 20
FIO2: 35
FIO2: 50
GLUCOSE SERPL-MCNC: 125 MG/DL
HBA1C MFR BLD HPLC: 5.2 %
HCO3 UR-SCNC: 22 MMOL/L (ref 24–28)
HCO3 UR-SCNC: 23.7 MMOL/L (ref 24–28)
HCT VFR BLD AUTO: 34.3 %
HGB BLD-MCNC: 11.4 G/DL
LYMPHOCYTES # BLD AUTO: 0.9 K/UL
LYMPHOCYTES NFR BLD: 9.6 %
MAGNESIUM SERPL-MCNC: 2 MG/DL
MCH RBC QN AUTO: 29 PG
MCHC RBC AUTO-ENTMCNC: 33.1 G/DL
MCV RBC AUTO: 88 FL
MIN VOL: 11.4
MODE: ABNORMAL
MODE: ABNORMAL
MONOCYTES # BLD AUTO: 0.6 K/UL
MONOCYTES NFR BLD: 6.4 %
NEUTROPHILS # BLD AUTO: 7.8 K/UL
NEUTROPHILS NFR BLD: 82.7 %
PCO2 BLDA: 34.4 MMHG (ref 35–45)
PCO2 BLDA: 37.9 MMHG (ref 35–45)
PEEP: 5
PEEP: 5
PH SMN: 7.37 [PH] (ref 7.35–7.45)
PH SMN: 7.45 [PH] (ref 7.35–7.45)
PHOSPHATE SERPL-MCNC: 2.7 MG/DL
PIP: 23
PLATELET # BLD AUTO: 194 K/UL
PLATELET BLD QL SMEAR: ABNORMAL
PMV BLD AUTO: 7.9 FL
PO2 BLDA: 108 MMHG (ref 80–100)
PO2 BLDA: 155 MMHG (ref 80–100)
POC BE: -3 MMOL/L
POC BE: 0 MMOL/L
POC SATURATED O2: 98 % (ref 95–100)
POC SATURATED O2: 99 % (ref 95–100)
POC TCO2: 23 MMOL/L (ref 23–27)
POC TCO2: 25 MMOL/L (ref 23–27)
POCT GLUCOSE: 108 MG/DL (ref 70–110)
POCT GLUCOSE: 170 MG/DL (ref 70–110)
POTASSIUM SERPL-SCNC: 3.8 MMOL/L
PROT SERPL-MCNC: 5.4 G/DL
RBC # BLD AUTO: 3.91 M/UL
SAMPLE: ABNORMAL
SAMPLE: ABNORMAL
SITE: ABNORMAL
SITE: ABNORMAL
SODIUM SERPL-SCNC: 140 MMOL/L
SP02: 100
SPONT RATE: 14
VANCOMYCIN SERPL-MCNC: 8.6 UG/ML
VT: 500
WBC # BLD AUTO: 9.4 K/UL

## 2018-04-08 PROCEDURE — 37799 UNLISTED PX VASCULAR SURGERY: CPT

## 2018-04-08 PROCEDURE — 51702 INSERT TEMP BLADDER CATH: CPT

## 2018-04-08 PROCEDURE — 51701 INSERT BLADDER CATHETER: CPT

## 2018-04-08 PROCEDURE — 25000003 PHARM REV CODE 250: Performed by: INTERNAL MEDICINE

## 2018-04-08 PROCEDURE — C9113 INJ PANTOPRAZOLE SODIUM, VIA: HCPCS | Performed by: HOSPITALIST

## 2018-04-08 PROCEDURE — 94770 HC EXHALED C02 TEST: CPT

## 2018-04-08 PROCEDURE — 27000221 HC OXYGEN, UP TO 24 HOURS

## 2018-04-08 PROCEDURE — 25000003 PHARM REV CODE 250

## 2018-04-08 PROCEDURE — 51798 US URINE CAPACITY MEASURE: CPT

## 2018-04-08 PROCEDURE — 80053 COMPREHEN METABOLIC PANEL: CPT

## 2018-04-08 PROCEDURE — 63600175 PHARM REV CODE 636 W HCPCS: Performed by: INTERNAL MEDICINE

## 2018-04-08 PROCEDURE — 63600175 PHARM REV CODE 636 W HCPCS: Performed by: HOSPITALIST

## 2018-04-08 PROCEDURE — 94640 AIRWAY INHALATION TREATMENT: CPT

## 2018-04-08 PROCEDURE — 94003 VENT MGMT INPAT SUBQ DAY: CPT

## 2018-04-08 PROCEDURE — 99900026 HC AIRWAY MAINTENANCE (STAT)

## 2018-04-08 PROCEDURE — 25000003 PHARM REV CODE 250: Performed by: EMERGENCY MEDICINE

## 2018-04-08 PROCEDURE — 87086 URINE CULTURE/COLONY COUNT: CPT

## 2018-04-08 PROCEDURE — 20000000 HC ICU ROOM

## 2018-04-08 PROCEDURE — 25000242 PHARM REV CODE 250 ALT 637 W/ HCPCS: Performed by: HOSPITALIST

## 2018-04-08 PROCEDURE — 63600175 PHARM REV CODE 636 W HCPCS

## 2018-04-08 PROCEDURE — 82803 BLOOD GASES ANY COMBINATION: CPT

## 2018-04-08 PROCEDURE — 36415 COLL VENOUS BLD VENIPUNCTURE: CPT

## 2018-04-08 PROCEDURE — 83735 ASSAY OF MAGNESIUM: CPT

## 2018-04-08 PROCEDURE — 25000003 PHARM REV CODE 250: Performed by: HOSPITALIST

## 2018-04-08 PROCEDURE — 99900035 HC TECH TIME PER 15 MIN (STAT)

## 2018-04-08 PROCEDURE — 84100 ASSAY OF PHOSPHORUS: CPT

## 2018-04-08 PROCEDURE — 85025 COMPLETE CBC W/AUTO DIFF WBC: CPT

## 2018-04-08 PROCEDURE — 80202 ASSAY OF VANCOMYCIN: CPT

## 2018-04-08 PROCEDURE — 63600175 PHARM REV CODE 636 W HCPCS: Performed by: EMERGENCY MEDICINE

## 2018-04-08 RX ORDER — METOPROLOL SUCCINATE 50 MG/1
100 TABLET, EXTENDED RELEASE ORAL DAILY
Status: DISCONTINUED | OUTPATIENT
Start: 2018-04-09 | End: 2018-04-12 | Stop reason: HOSPADM

## 2018-04-08 RX ORDER — LOSARTAN POTASSIUM 25 MG/1
100 TABLET ORAL DAILY
Status: DISCONTINUED | OUTPATIENT
Start: 2018-04-09 | End: 2018-04-12 | Stop reason: HOSPADM

## 2018-04-08 RX ORDER — METOPROLOL TARTRATE 1 MG/ML
INJECTION, SOLUTION INTRAVENOUS
Status: COMPLETED
Start: 2018-04-08 | End: 2018-04-08

## 2018-04-08 RX ORDER — METOPROLOL TARTRATE 50 MG/1
50 TABLET ORAL 2 TIMES DAILY
Status: ACTIVE | OUTPATIENT
Start: 2018-04-08 | End: 2018-04-08

## 2018-04-08 RX ORDER — METOPROLOL SUCCINATE 50 MG/1
100 TABLET, EXTENDED RELEASE ORAL DAILY
Status: DISCONTINUED | OUTPATIENT
Start: 2018-04-08 | End: 2018-04-08

## 2018-04-08 RX ORDER — METOPROLOL TARTRATE 1 MG/ML
5 INJECTION, SOLUTION INTRAVENOUS EVERY 6 HOURS PRN
Status: DISCONTINUED | OUTPATIENT
Start: 2018-04-08 | End: 2018-04-09

## 2018-04-08 RX ORDER — HYDRALAZINE HYDROCHLORIDE 20 MG/ML
10 INJECTION INTRAMUSCULAR; INTRAVENOUS EVERY 4 HOURS PRN
Status: DISCONTINUED | OUTPATIENT
Start: 2018-04-08 | End: 2018-04-12 | Stop reason: HOSPADM

## 2018-04-08 RX ORDER — AMLODIPINE BESYLATE 5 MG/1
5 TABLET ORAL DAILY
Status: DISCONTINUED | OUTPATIENT
Start: 2018-04-08 | End: 2018-04-12 | Stop reason: HOSPADM

## 2018-04-08 RX ADMIN — PANTOPRAZOLE SODIUM 40 MG: 40 INJECTION, POWDER, FOR SOLUTION INTRAVENOUS at 09:04

## 2018-04-08 RX ADMIN — METOPROLOL TARTRATE 5 MG: 5 INJECTION, SOLUTION INTRAVENOUS at 02:04

## 2018-04-08 RX ADMIN — METOPROLOL TARTRATE 50 MG: 50 TABLET ORAL at 10:04

## 2018-04-08 RX ADMIN — ASPIRIN 81 MG: 81 TABLET, COATED ORAL at 09:04

## 2018-04-08 RX ADMIN — PIPERACILLIN SODIUM AND TAZOBACTAM SODIUM 4.5 G: 4; .5 INJECTION, POWDER, LYOPHILIZED, FOR SOLUTION INTRAVENOUS at 06:04

## 2018-04-08 RX ADMIN — PIPERACILLIN SODIUM AND TAZOBACTAM SODIUM 4.5 G: 4; .5 INJECTION, POWDER, LYOPHILIZED, FOR SOLUTION INTRAVENOUS at 11:04

## 2018-04-08 RX ADMIN — ENOXAPARIN SODIUM 30 MG: 100 INJECTION SUBCUTANEOUS at 09:04

## 2018-04-08 RX ADMIN — CLOPIDOGREL BISULFATE 75 MG: 75 TABLET, FILM COATED ORAL at 09:04

## 2018-04-08 RX ADMIN — IPRATROPIUM BROMIDE AND ALBUTEROL SULFATE 3 ML: .5; 3 SOLUTION RESPIRATORY (INHALATION) at 07:04

## 2018-04-08 RX ADMIN — SODIUM CHLORIDE: 0.9 INJECTION, SOLUTION INTRAVENOUS at 11:04

## 2018-04-08 RX ADMIN — PIPERACILLIN SODIUM AND TAZOBACTAM SODIUM 4.5 G: 4; .5 INJECTION, POWDER, LYOPHILIZED, FOR SOLUTION INTRAVENOUS at 02:04

## 2018-04-08 RX ADMIN — HYDRALAZINE HYDROCHLORIDE 10 MG: 20 INJECTION INTRAMUSCULAR; INTRAVENOUS at 10:04

## 2018-04-08 RX ADMIN — IPRATROPIUM BROMIDE AND ALBUTEROL SULFATE 3 ML: .5; 3 SOLUTION RESPIRATORY (INHALATION) at 12:04

## 2018-04-08 RX ADMIN — HYDRALAZINE HYDROCHLORIDE 10 MG: 20 INJECTION INTRAMUSCULAR; INTRAVENOUS at 06:04

## 2018-04-08 RX ADMIN — IPRATROPIUM BROMIDE AND ALBUTEROL SULFATE 3 ML: .5; 3 SOLUTION RESPIRATORY (INHALATION) at 01:04

## 2018-04-08 RX ADMIN — PROPOFOL 50 MCG/KG/MIN: 10 INJECTION, EMULSION INTRAVENOUS at 09:04

## 2018-04-08 RX ADMIN — AMLODIPINE BESYLATE 5 MG: 5 TABLET ORAL at 06:04

## 2018-04-08 RX ADMIN — PROPOFOL 5 MCG/KG/MIN: 10 INJECTION, EMULSION INTRAVENOUS at 03:04

## 2018-04-08 RX ADMIN — VANCOMYCIN HYDROCHLORIDE 1000 MG: 1 INJECTION, POWDER, LYOPHILIZED, FOR SOLUTION INTRAVENOUS at 09:04

## 2018-04-08 RX ADMIN — METOPROLOL TARTRATE 5 MG: 1 INJECTION, SOLUTION INTRAVENOUS at 02:04

## 2018-04-08 NOTE — H&P
[facility] Hospital Medicine  History & Physical    Patient Name: Frederick Kong Jr.  MRN: [id number]  Admission Date: 4/7/2018  Attending Physician: Cheryl Gutierrez MD   Primary Care Provider: Dhiraj Dempsey III, MD         Patient information was obtained from spouse/SO, past medical records and ER records.     Subjective:     Principal Problem:Acute respiratory failure with hypoxia    Chief Complaint:   Chief Complaint   Patient presents with    Shortness of Breath     arrived on CPAP; awoke this am with difficulty breathing        HPI: 82-year-old male presented with a chief complaint of shortness of breath.   Per history taken from his wife he got up in the middle of the night and when he didn't return to bed she found him suddenly struggling to breath so called the ambulance and when they arrived his oxygen sat was in the 60's. On arrival to ER evaluation revealed   The patient's chest x-ray was significant for a large  infiltrate on the right and required intubation.  .  The patient's labs were significant for an elevated white blood cell count, elevated troponin, elevated creatinine, elevated BNP, and elevated lactic acid.  The patient's EKG showed no acute abnormalities per my independent interpretation.  The patient likely has a mixed picture causing his respiratory decline.  The patient was aggressively treated per sepsis protocol with  IV fluids and broad-spectrum IV antibiotics for pneumonia --likely aspiration   He has hx of stent placement at the time of his aortic valve replacement in 2001 and was on coumadin for many years however has been changed to asa and plavix within the last year. His cardiologist is Dr Gonzalez. He has never had an MI or heart failure. He has a remote history of smoking and is otherwise alert and active.     Past Medical History:   Diagnosis Date    Cancer     Coronary artery disease     Gout     Hyperlipemia     Hypertension        Past Surgical History:    Procedure Laterality Date    APPENDECTOMY      BRAIN SURGERY      CARDIAC SURGERY      TONSILLECTOMY         Review of patient's allergies indicates:  No Known Allergies    No current facility-administered medications on file prior to encounter.      Current Outpatient Prescriptions on File Prior to Encounter   Medication Sig    amLODIPine (NORVASC) 5 MG tablet Take 5 mg by mouth once daily.    aspirin (ECOTRIN) 81 MG EC tablet Take 81 mg by mouth once daily.    clopidogrel (PLAVIX) 75 mg tablet Take 75 mg by mouth once daily.    colchicine 0.6 mg capsule/tablet Take 0.6 mg by mouth once daily.    doxycycline (DORYX) 100 MG EC tablet Take 100 mg by mouth 2 (two) times daily.    furosemide (LASIX) 20 MG tablet Take 10 mg by mouth every other day.    losartan (COZAAR) 100 MG tablet Take 100 mg by mouth once daily.    lovastatin (MEVACOR) 40 MG tablet Take 40 mg by mouth every evening.    metoprolol succinate (TOPROL-XL) 100 MG 24 hr tablet Take 100 mg by mouth once daily.    pantoprazole (PROTONIX) 40 MG tablet Take 40 mg by mouth once daily.    potassium chloride SA (K-DUR,KLOR-CON) 10 MEQ tablet Take 10 mEq by mouth 2 (two) times daily.     Family History     None        Social History Main Topics    Smoking status: Former Smoker    Smokeless tobacco: Not on file    Alcohol use No    Drug use: Unknown    Sexual activity: Not on file     Review of Systems   Unable to perform ROS: Intubated     Objective:     Vital Signs (Most Recent):  Temp: 98.3 °F (36.8 °C) (04/07/18 1600)  Pulse: 61 (04/07/18 1915)  Resp: (!) 22 (04/07/18 1915)  BP: (!) 147/65 (04/07/18 1915)  SpO2: 100 % (04/07/18 1915) Vital Signs (24h Range):  Temp:  [97.3 °F (36.3 °C)-98.3 °F (36.8 °C)] 98.3 °F (36.8 °C)  Pulse:  [] 61  Resp:  [19-30] 22  SpO2:  [96 %-100 %] 100 %  BP: ()/() 147/65     Weight: 73.9 kg (163 lb)  Body mass index is 25.53 kg/m².    Physical Exam   Constitutional: He appears well-developed  and well-nourished.   Intubated, sedated.      HENT:   Head: Normocephalic and atraumatic.   Right Ear: External ear normal.   Left Ear: External ear normal.   Mouth/Throat: No oropharyngeal exudate.   ogtube in place    Eyes: Conjunctivae and EOM are normal. Right eye exhibits no discharge. Left eye exhibits no discharge. No scleral icterus.   Neck: Normal range of motion. Neck supple. No thyromegaly present.   Cardiovascular: Normal rate, regular rhythm, normal heart sounds and intact distal pulses.  Exam reveals no gallop and no friction rub.    No murmur heard.  Pulmonary/Chest: No respiratory distress. He has no wheezes. He has rales in the right lower field.   On mech vent    Abdominal: Soft. Normal appearance. He exhibits no distension and no mass. Bowel sounds are decreased. There is no tenderness. There is no rebound and no guarding.   Genitourinary: Penis normal.   Genitourinary Comments: Condom cath  Bladder scan 175 cc mid afternoon    Musculoskeletal: Normal range of motion. He exhibits no edema or tenderness.   Lymphadenopathy:     He has no cervical adenopathy.   Neurological: No cranial nerve deficit. Coordination normal.   Awakens and follows commands/moves all extrem with sedation vacation    Skin: Skin is warm and dry. Capillary refill takes less than 2 seconds. No rash noted. No erythema.   Psychiatric:   Unable to assess -no hx per wife    Nursing note and vitals reviewed.        CRANIAL NERVES     CN III, IV, VI   Extraocular motions are normal.        Significant Labs:   CBC:   Recent Labs  Lab 04/07/18  0630   WBC 21.80*   HGB 16.3   HCT 49.9   *     CMP:   Recent Labs  Lab 04/07/18  0630 04/07/18  1233    140   K 4.7 4.5    114*   CO2 22* 17*   * 127*   BUN 27* 31*   CREATININE 1.8* 1.6*   CALCIUM 9.2 8.3*   PROT 8.1  --    ALBUMIN 3.8  --    BILITOT 0.5  --    ALKPHOS 118  --    AST 29  --    ALT 19  --    ANIONGAP 13 9   EGFRNONAA 34* 40*     Cardiac Markers:    Recent Labs  Lab 04/07/18  0630   *     Lactic Acid:   Recent Labs  Lab 04/07/18  0630 04/07/18  0956 04/07/18  1233   LACTATE 2.9* 1.8 1.2     Magnesium:   Recent Labs  Lab 04/07/18  1233   MG 2.2       Recent Labs  Lab 04/07/18  1233   TROPONINI 0.211*     Significant Imaging: CXR: I have reviewed all pertinent results/findings within the past 24 hours and my personal findings are:  Rll infiltrate; cardiomegaly     Assessment/Plan:     * Acute respiratory failure with hypoxia    Acute -due to RLL   Patient's vent settings, CXR and last ABG were reviewed.    Vent Mode: A/C  Oxygen Concentration (%):  [50] 50  Resp Rate Total:  [14 br/min-23 br/min] 22 br/min  Vt Set:  [500 mL] 500 mL  PEEP/CPAP:  [5 cmH20] 5 cmH20  Pressure Support:  [0 cmH20] 0 cmH20  Mean Airway Pressure:  [10 uoL16-48 cmH20] 12 cmH20    ABG    Recent Labs  Lab 04/07/18  1823   PH 7.408   PO2 121*   PCO2 35.0   HCO3 22.1*   BE -3       Will adjust vent management as follows- abg daily  Wean oxygen  Daily wean trial   Consult to pulm         Urinary retention      Acute, likely some chronic  Leslie -required urology placement  Add flomax   Check ua/culture  Renal ultrasound am         H/O aortic valve replacement    Per cardiology note     . History of mechanical aortic valve prosthesis. Functioning well per last echocardiogram done in September 2017. Patient not therapeutically anticoagulated and was kept on only aspirin and Plavix reportedly secondary to high risk of bleeding and previous history of intracranial bleeds.  Echo done         Metabolic acidosis, normal anion gap (NAG)    Acute, likely volume depletion tho has melly; will recheck after hydration   Trend -no need for bicarb           Severe sepsis      This patient does have evidence of infective focus  My overall impression is severe sepsis.  Antibiotics given-   Antibiotics     Start     Stop Route Frequency Ordered    04/08/18 1000  vancomycin in dextrose 5 % 1 gram/250 mL  IVPB 1,000 mg      -- IV Once 04/07/18 1217    04/07/18 1800  piperacillin-tazobactam 4.5 g in dextrose 5 % 100 mL IVPB (ready to mix system)      -- IV Every 8 hours (non-standard times) 04/07/18 1135          Latest lactate reviewed, they are-    Recent Labs  Lab 04/07/18  0630 04/07/18  0956 04/07/18  1233   LACTATE 2.9* 1.8 1.2       Organ dysfunction indicated by Acute kidney injury and Acute respiratory failure  Source- pneumonia  [facility] ivabx, nebulizer , ivvr  Treatment of elevated troponin  Need ua  Blood cult  Sputum  Urine culture         Elevated troponin      Acute, no ekg changes   Likely due to sepsis  Trend, echo, cardiology consult  Continuing b blocker, asa, plavix -see AVR           YUAN (acute kidney injury)      Avoid non-essential nephrotoxins and  dose medications according to GFR  Avoid aceI, Monitor I/O, daily weight/keep Map >65   Check urine sodium, creatinine ,-if no improvement after ivvr     Check renal us if indicated  Check C3, C4, CHRISTIAN, and urine eosinophils if indicated.             VTE Risk Mitigation         Ordered     enoxaparin injection 30 mg  Daily     Route:  Subcutaneous        04/07/18 1235     IP VTE HIGH RISK PATIENT  Once      04/07/18 1235     Place CJ hose  Until discontinued      04/07/18 1135     Place sequential compression device  Until discontinued      04/07/18 1135        Critical care time spent on the evaluation and treatment of severe organ dysfunction, review of pertinent labs and imaging studies, discussions with consulting providers and discussions with patient/family: 48  minutes.     Cheryl Gutierrez MD  Department of Hospital Medicine   [facility]

## 2018-04-08 NOTE — ASSESSMENT & PLAN NOTE
Avoid non-essential nephrotoxins and  dose medications according to GFR  Avoid aceI, Monitor I/O, daily weight/keep Map >65   Check urine sodium, creatinine ,-if no improvement after ivvr     Check renal us if indicated  Check C3, C4, CHRISTIAN, and urine eosinophils if indicated.

## 2018-04-08 NOTE — PROGRESS NOTES
04/08/18 0126   Patient Assessment/Suction   Level of Consciousness (AVPU) responds to voice   All Lung Fields Breath Sounds diminished   Suction Method in-line suction catheter (closed)   $ Suction Charges Inline Suction Procedure Stat Charge   Sputum Amount scant   Sputum Color clear   Sputum Consistency thin   PRE-TX-O2-ETCO2   O2 Device (Oxygen Therapy) ventilator   Oxygen Concentration (%) 50   SpO2 100 %   Pulse Oximetry Type Continuous   Pulse (!) 56   Resp (!) 22   Aerosol Therapy   $ Aerosol Therapy Charges Aerosol Treatment   Respiratory Treatment Status given   SVN/Inhaler Treatment Route in-line;ET tube   Position During Treatment HOB at 30 degrees   Patient Tolerance good   Post-Treatment   Post-treatment Heart Rate (beats/min) 58   Post-treatment Resp Rate (breaths/min) 22   All Fields Breath Sounds unchanged   Ready to Wean/Extubation Screen   FIO2<=50 (chart decimal) 0.5

## 2018-04-08 NOTE — PLAN OF CARE
Problem: Patient Care Overview  Goal: Plan of Care Review  Outcome: Ongoing (interventions implemented as appropriate)  CPAP trial this afternoon.  Tolerated well.  Drowsy but easily aroused and follows commands. Extubated and placed on nasal cannula.  Respirations unlabored.  SBP elevated with sedation off.  Home bp meds reviewed and ordered.   Leslie intact.  Urine clear yellow.  Generalized edema.  Rotation bed in use. Safety maintained.

## 2018-04-08 NOTE — PROGRESS NOTES
[facility]  Cardiology  Progress Note    Patient Name: Frederick Kong Jr.  MRN: [id number]  Admission Date: 4/7/2018  Hospital Length of Stay: 1 days  Code Status: Full Code   Attending Physician: Cheryl Gutierrez MD   Primary Care Physician: Dhiraj Dempsey III, MD  Expected Discharge Date:   Principal Problem:Acute respiratory failure with hypoxia    Subjective:     Hospital Course: Patient was extubated this afternoon.     Interval History:   Denies any significant pain. Shortness of breath is better.     ROS   No significant headaches or sore throat or runny nose.   No recent changes in vision.   No recent changes in hearing.  No dysphagia or odynophagia.  Reports cough    Denies any abdominal pain, nausea, vomiting, diarrhea or constipation.   Denies any dysuria or polyuria.       Objective:     Vital Signs (Most Recent):  Temp: 97.3 °F (36.3 °C) (04/08/18 0800)  Pulse: 91 (04/08/18 1610)  Resp: (!) 31 (04/08/18 1610)  BP: (!) 163/73 (04/08/18 1610)  SpO2: 100 % (04/08/18 1610) Vital Signs (24h Range):  Temp:  [97.3 °F (36.3 °C)-97.5 °F (36.4 °C)] 97.3 °F (36.3 °C)  Pulse:  [52-91] 91  Resp:  [14-31] 31  SpO2:  [100 %] 100 %  BP: ()/(57-82) 163/73  Arterial Line BP: (130-206)/(44-70) 197/64     Weight: 76.1 kg (167 lb 12.3 oz)  Body mass index is 26.28 kg/m².    SpO2: 100 %  O2 Device (Oxygen Therapy): ventilator      Intake/Output Summary (Last 24 hours) at 04/08/18 1619  Last data filed at 04/08/18 1300   Gross per 24 hour   Intake          2173.55 ml   Output              652 ml   Net          1521.55 ml       Lines/Drains/Airways     Drain                 NG/OG Tube 04/07/18 0642 [place] sump 18 Fr. Left mouth 1 day         Urethral Catheter 04/07/18 2100 less than 1 day          Airway                 Airway - Non-Surgical 04/07/18 0637 Endotracheal Tube 1 day          Arterial Line                 Arterial Line less than 1 day          Peripheral Intravenous Line                  Peripheral IV - Single Lumen Right Hand -- days         Peripheral IV - Single Lumen 04/07/18 0646 Left Antecubital 1 day         Peripheral IV - Single Lumen 04/07/18 1230 Left Antecubital 1 day                Physical Exam  HEENT: Normocephalic, atraumatic, PERRL, Conjunctiva pink, no scleral icterus.   CVS: S1S2+, RRR, no murmurs, rubs or gallops, JVP: Normal.  LUNGS: +Wheezes  ABDOMEN: Soft, NT, BS+  EXTREMITIES: No cyanosis, clubbing or edema  NEURO: AAO X 3.       Significant Labs:   Blood Culture:   Recent Labs  Lab 04/07/18  0722 04/07/18  0723   LABBLOO No Growth to date  No Growth to date No Growth to date  No Growth to date   , BMP:   Recent Labs  Lab 04/07/18  0630 04/07/18  1233 04/08/18  0510   * 127* 125*    140 140   K 4.7 4.5 3.8    114* 113*   CO2 22* 17* 20*   BUN 27* 31* 32*   CREATININE 1.8* 1.6* 1.3   CALCIUM 9.2 8.3* 8.0*   MG  --  2.2 2.0   , CMP   Recent Labs  Lab 04/07/18  0630 04/07/18  1233 04/08/18  0510    140 140   K 4.7 4.5 3.8    114* 113*   CO2 22* 17* 20*   * 127* 125*   BUN 27* 31* 32*   CREATININE 1.8* 1.6* 1.3   CALCIUM 9.2 8.3* 8.0*   PROT 8.1  --  5.4*   ALBUMIN 3.8  --  2.6*   BILITOT 0.5  --  0.7   ALKPHOS 118  --  70   AST 29  --  17   ALT 19  --  13   ANIONGAP 13 9 7*   ESTGFRAFRICA 40* 46* 59*   EGFRNONAA 34* 40* 51*   , CBC   Recent Labs  Lab 04/07/18  0630 04/08/18  0510   WBC 21.80* 9.40   HGB 16.3 11.4*   HCT 49.9 34.3*   * 194   , INR No results for input(s): INR, PROTIME in the last 48 hours., Lipid Panel No results for input(s): CHOL, HDL, LDLCALC, TRIG, CHOLHDL in the last 48 hours. and Troponin   Recent Labs  Lab 04/07/18  0630 04/07/18  1233   TROPONINI 0.159* 0.211*       Significant Imaging: X-Ray: CXR: X-Ray Chest 1 View (CXR):   Results for orders placed or performed during the hospital encounter of 04/07/18   X-Ray Chest 1 View    Narrative    EXAMINATION:  XR CHEST 1 VIEW    CLINICAL  HISTORY:  pneumonia;    TECHNIQUE:  A portable semi upright AP view of the chest was acquired.    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  There is an endotracheal tube present with its tip below the level of the clavicular heads.  A nasogastric tube remains in place.  There is atherosclerotic calcification present within the aortic arch.   There has been no interval detrimental change in the radiographic appearance of the chest as compared to the previous examination with continued demonstration of patchy segmental airspace opacity/consolidative change in the right lower lung zone..  No pneumothorax.      Impression    No significant interval detrimental change in the imaging appearance of the chest when compared with the prior study.      Electronically signed by: Ozzie Simental MD  Date:    04/07/2018  Time:    13:58     Assessment and Plan:     Impression:  1. Acute hypoxemic hypercapnic respiratory failure. Extubated on 4/8/2018. Likely secondary to right lower lobe pneumonia.  2. Mildly elevated troponin. Likely secondary to #1.  3. History of mechanical aortic valve prosthesis. Functioning well per last echocardiogram done in September 2017. Patient not therapeutically anticoagulated and was kept on only aspirin and Plavix reportedly secondary to high risk of bleeding and previous history of intracranial bleeds.  4. History of coronary artery disease status post CABG. Currently with mildly elevated troponin. No dynamic ECG changes.  5. History of atrial flutter. Paroxysmal.  6. Acute on chronic renal failure  7 History of hypertension. Not well controlled.      Recommendations:  1. Resume norvasc.   2. If  Creatinine remains stable will resume losartan tomrrow.   3. Use hydaralazine PRN.   4. Continue current regimen otherwise.   5. Discussed with Dr Dunn, family and nurse.      Thank you for your consult. I will follow-up with patient. Please contact  us if you have any additional questions.  Marisel Soliman MD  Cardiology  [facility]

## 2018-04-08 NOTE — ASSESSMENT & PLAN NOTE
This patient does have evidence of infective focus  My overall impression is severe sepsis.  Antibiotics given-   Antibiotics     Start     Stop Route Frequency Ordered    04/08/18 1000  vancomycin in dextrose 5 % 1 gram/250 mL IVPB 1,000 mg      -- IV Once 04/07/18 1217    04/07/18 1800  piperacillin-tazobactam 4.5 g in dextrose 5 % 100 mL IVPB (ready to mix system)      -- IV Every 8 hours (non-standard times) 04/07/18 1135          Latest lactate reviewed, they are-    Recent Labs  Lab 04/07/18  0630 04/07/18  0956 04/07/18  1233   LACTATE 2.9* 1.8 1.2       Organ dysfunction indicated by Acute kidney injury and Acute respiratory failure  Source- pneumonia  Source control Achieved by- mech vent, ivabx, nebulizer , ivvr  Treatment of elevated troponin  Need ua  Blood cult  Sputum  Urine culture

## 2018-04-08 NOTE — ASSESSMENT & PLAN NOTE
Acute -due to RLL   Patient's vent settings, CXR and last ABG were reviewed.    Vent Mode: A/C  Oxygen Concentration (%):  [50] 50  Resp Rate Total:  [14 br/min-23 br/min] 22 br/min  Vt Set:  [500 mL] 500 mL  PEEP/CPAP:  [5 cmH20] 5 cmH20  Pressure Support:  [0 cmH20] 0 cmH20  Mean Airway Pressure:  [10 vpI49-13 cmH20] 12 cmH20    ABG    Recent Labs  Lab 04/07/18  1823   PH 7.408   PO2 121*   PCO2 35.0   HCO3 22.1*   BE -3       Will adjust vent management as follows- abg daily  Wean oxygen  Daily wean trial   Consult to pulm

## 2018-04-08 NOTE — CONSULTS
[facility] Consult Note - [place]  Staff: MD Rosalino    Referring provider and please cc: Matt  PCP: [facility]    Chief Complaint: difficult henderson placement      Subjective:        History obtained from wife.       HPI: Frederick Kong Jr. is a 82 y.o. male presents with h/o CAD, htn, DLD and SOB admitted today for sepsis secondary to pneumonia. They attempted to place multiple catheters in the ER but were unable to. He was admitted to ICU and intubated. Urology consulted for henderson placement to monitor UOP.     His PVR was 140cc and I asked them to place 10fr silicone henderson which went easily but minimal output. Repeat PVR 90cc. He's had no catheters in the past 5 years.     He was a previous patient of , has not see him for over 5 years. He had BPH and Green light laser TURP. Per his wife pt does not have a weak stream, nocturia 2x a night, denies straining, no gross hematuria, no UTIs.     ECOG Status: 0    REVIEW OF SYSTEMS:  General ROS: no fevers, no chills  Psychological ROS: no depression  Endocrine ROS: no heat or cold  Respiratory ROS: + SOB  Cardiovascular ROS: no CP  Gastrointestinal ROS: no abdominal pain, no constipation, no diarrhea, no BRBPR  Musculoskeletal ROS: no muscle pain  Neurological ROS: no headaches  Dermatological ROS: no rashes  HEENT: glasses, no sinus   ROS: per HPI     PMHx:  Past Medical History:   Diagnosis Date    Cancer, skin     Coronary artery disease     Gout     Hyperlipemia     Hypertension    Brain Tumor (meningioma) s/p resection with no residual deficits.  Kidney stones: No    PSHx:  Past Surgical History:   Procedure Laterality Date    APPENDECTOMY      BRAIN SURGERY      CARDIAC SURGERY      TONSILLECTOMY     Aortic Valve Replacement and 2 cardiac stents 2001 -   LLE bypass for LE clot > 20 years ago    Stents/Valves/Foreign Bodies: Yes - 2 placed in 2001  Cardiac Evaluation: Yes -      Screening Studies  Colonoscopy: 10 years ago, normal     Fam Hx:   malignancies: No  . Gyn malignancies: none.   kidney stones: No     Soc Hx:  Former tobacco, quit 20 years ago.  1pk per day x 30-40 year  Social alcohol  Lives in [place]  :yes, wife is Cristin- history obtained from her  Children: 2  Occupation:retired  with [employer]     Allergies:  Patient has no known allergies.    Medications: reviewed   Anticoagulation: Yes - plavix for stents 2001, asa 81mg     Objective:     Vitals:    04/07/18 1845   BP: (!) 146/65   Pulse: 62   Resp: 19   Temp:        General:WDWN in NAD  Eyes: PERRLA, normal conjunctiva  Respiratory: No increased work on breathing.   Cardiovascular: No obvious extremity edema. Warm and well perfused.   GI: palpation of masses. No tenderness. No hepatosplenomegaly to palpation.  Musculoskeletal: normal range of motion of bilateral upper extremities. Normal muscle strength and tone.  Skin: no obvious rashes or lesions. No tightening of skin noted.  Neurologic: CN grossly normal. Normal sensation.   Psychiatric: awake, alert and oriented x 3. Mood and affect normal. Cooperative.    :  Inspection of anus normal  No scrotal rashes, cysts or lesions  Epididymis normal in size, no tenderness  Testes normal and size, equal size bilaterally  Urethral meatus normal without blood at meatus. 10fr silicone catheter without any fluid.   Penis is circumcised   DUC: deferred    LABS REVIEW:  UA today: none  UCx:  none    Cr:   Lab Results   Component Value Date    CREATININE 1.6 (H) 04/07/2018   baseline 1.2    PSA:   Lab Results   Component Value Date    PSA 1.7 05/23/2011     Testosterone: No results found for: TESTOSTERONE    PATHOLOGY REVIEW:  none    RADIOGRAPHIC REVIEW:  none      Assessment:       1. Acute respiratory failure, unspecified whether with hypoxia or hypercapnia    2. SOB (shortness of breath)    3. Elevated troponin          Difficult henderson  catheter placement   Urethral stricture    Plan:     Likely has scar tissue from his previous laser turp. Explained we do not want to do anything to invasive while he is here septic from his pneumonia. He is on plavix. He is emptying mostly (pvr only 90cc right now).  If unable to place and pt able to empty may keep condom catheter for now and schedule outpt dilation once pt stable.     See separate procedure note for urethral dilation and catheter placement.       Nurse to irrigate bladder with 120cc or more until clear every 3 hours. Once urine remains clear for 2 irrigations can stop.     DO NOT REMOVE GAMINO UNTIL 4/15/18. DIFFICULT GAMINO PLACEMENT AND DILATION OF STRICTURE PERFORMED ON 4/7/18. Call  prior to discharge or if pt still here on 4/15/18 for further directions.       1. Gamino will remain x 1 week (4/15/18)- we  Will arrange f/u for him on 4/16/18 for nurse visit fill and pull in am. And then f/u with me in 4 to 6 weeks.   2. Scar tissue likely to return unless he does CIC or has a definitve treatment, explained this to wife.  3. Expect hematuria for next few days.  4. Follow -up with me in 3 to 4 weeks.       Kimmie Jerry MD

## 2018-04-08 NOTE — CONSULTS
Frederick Kong Jr. [id number] is a 82 y.o. male who has been consulted for vancomycin dosing.    The patient has the following labs:     Date Creatinine (mg/dl)    BUN WBC Count   4/8/2018 Estimated Creatinine Clearance: 41 mL/min (based on SCr of 1.3 mg/dL). Lab Results   Component Value Date    BUN 32 (H) 04/08/2018     Lab Results   Component Value Date    WBC 9.40 04/08/2018        Current weight is 76.1 kg (167 lb 12.3 oz)    Pt is receiving vancomycin 1000 mg one time dose.  Vancomycin trough from 04/08/2017 at 0316 was 8.6 mg/dL.  Target trough range is 15-20 mg/dL.   Trough was drawn on time  and anticipate it is subtherapeutic. Pharmacy will increase current dose.   The patient will be changed to a vancomycin dose of 1250 mg every 24 hours. A vancomycin trough has been ordered prior to 3rf dose due 04/10/2018 at 0930.      Patient will be followed by pharmacy for changes in renal function, toxicity, and efficacy.  Thank you for allowing us to participate in this patient's care.     Ava Marino

## 2018-04-08 NOTE — ASSESSMENT & PLAN NOTE
Per cardiology note     . History of mechanical aortic valve prosthesis. Functioning well per last echocardiogram done in September 2017. Patient not therapeutically anticoagulated and was kept on only aspirin and Plavix reportedly secondary to high risk of bleeding and previous history of intracranial bleeds.  Echo done

## 2018-04-08 NOTE — PLAN OF CARE
04/08/18 0726   Patient Assessment/Suction   Level of Consciousness (AVPU) responds to voice   Respiratory Effort Unlabored   All Lung Fields Breath Sounds clear   Rhythm/Pattern, Respiratory ventilator assisted   Suction Method in-line suction catheter (closed)   $ Suction Charges Inline Suction Procedure Stat Charge   Sputum Amount scant   Sputum Color clear   Sputum Consistency thin   PRE-TX-O2-ETCO2   O2 Device (Oxygen Therapy) ventilator   $ Is the patient on Low Flow Oxygen? Yes   Oxygen Concentration (%) 50   SpO2 100 %   ETCO2 (mmHg) 19 mmHg   $ ETCO2 Charge Exhaled CO2 Monitoring   $ ETCO2 Usage Currently wearing   Pulse (!) 53   Resp (!) 22   Aerosol Therapy   $ Aerosol Therapy Charges Aerosol Treatment   Respiratory Treatment Status given   SVN/Inhaler Treatment Route in-line   Position During Treatment HOB at 30 degrees   Patient Tolerance good   Post-Treatment   Post-treatment Heart Rate (beats/min) 57   Post-treatment Resp Rate (breaths/min) 20   All Fields Breath Sounds unchanged       Airway - Non-Surgical 04/07/18 0637 Endotracheal Tube   Placement Date/Time: 04/07/18 0637   Present Prior to Hospital Arrival?: No  Method of Intubation: Direct laryngoscopy  Inserted by: MD  Staff/Resident Name(s): Dr. Balbuena  Airway Device: Endotracheal Tube  Mask Ventilation: Easy  Blade: Vickey #4 ...   Secured at 23 cm   Measured At Lips   Secured Location Center   Secured by Commercial tube olson   Bite Block none   Site Condition Cool;Dry   Status Secured;Intact   Site Assessment Clean;Dry   Cuff Pressure 30 cm H2O   Respiratory Interventions   Airway/Ventilation Management airway patency maintained   Vent Select   $ Ventilator Subsequent 1   Charged w/in last 24h YES   IHI Ventilator Associated Pneumonia Bundle   Head of Bed Elevated  HOB 30   Oral Care Mouth suctioned   Preset Conventional Ventilator Settings   Vent Type    Ventilation Type VC   Vent Mode A/C   Humidity HME   Set Rate 22 bmp   Vt  Set 500 mL   PEEP/CPAP 5 cmH20   Pressure Support 0 cmH20   Waveform RAMP   Peak Flow 60 L/min   Set Inspiratory Pressure 0 cmH20   Insp Time 0 Sec(s)   Plateau Set/Insp. Hold (sec) 0   Insp Rise Time  0 %   Trigger Sensitivity Flow/I-Trigger 2 L/min   P High 0 cm H2O   P Low 0 cm H2O   T High 0 sec   T Low 0 sec   Patient Ventilator Parameters   Resp Rate Total 22 br/min   Peak Airway Pressure 23 cmH2O   Mean Airway Pressure 11 cmH20   Plateau Pressure 0 cmH20   Exhaled Vt 504 mL   Total Ve 11.1 mL   Spont Ve 0 L   I:E Ratio Measured 1:2.00   Conventional Ventilator Alarms   Ve High Alarm 27 L/min   Resp Rate High Alarm 0 br/min   Press High Alarm 70 cmH2O   Apnea Rate 10   Apnea Volume (mL) 440 mL   Apnea Oxygen Concentration  100   Apnea Flow Rate (L/min) 52   T Apnea 20 sec(s)   Ready to Wean/Extubation Screen   FIO2<=50 (chart decimal) 0.5   MV<16L (chart vol.) 11.1   PEEP <=8 (chart #) 5   Ready to Wean Parameters   F/VT Ratio<105 (RSBI) (!) 43.65

## 2018-04-08 NOTE — SUBJECTIVE & OBJECTIVE
Interval History:   Remains intubated and sedated. -bp becomes quite elevated when trying to wean sedation so nurse unable to complete sedation vacation     Urology had to place henderson catheter yesterday with difficulty  -had small amt blood but after irrigation with 300cc saline no furrther blood since       Review of Systems   Unable to perform ROS: Intubated     Objective:     Vital Signs (Most Recent):  Temp: 97.4 °F (36.3 °C) (04/08/18 0300)  Pulse: (!) 53 (04/08/18 0726)  Resp: (!) 22 (04/08/18 0726)  BP: (!) 167/74 (04/08/18 0500)  SpO2: 100 % (04/08/18 0726) Vital Signs (24h Range):  Temp:  [97.3 °F (36.3 °C)-98.3 °F (36.8 °C)] 97.4 °F (36.3 °C)  Pulse:  [53-78] 53  Resp:  [19-26] 22  SpO2:  [100 %] 100 %  BP: ()/(48-84) 167/74  Arterial Line BP: (130-179)/(48-65) 178/60     Weight: 76.1 kg (167 lb 12.3 oz)  Body mass index is 26.28 kg/m².    Intake/Output Summary (Last 24 hours) at 04/08/18 0925  Last data filed at 04/08/18 0500   Gross per 24 hour   Intake          2173.55 ml   Output              503 ml   Net          1670.55 ml      Physical Exam   Constitutional: He appears well-developed and well-nourished.   Intubated, sedated.      HENT:   Head: Normocephalic and atraumatic.   Right Ear: External ear normal.   Left Ear: External ear normal.   Mouth/Throat: No oropharyngeal exudate.   ogtube in place    Eyes: Conjunctivae and EOM are normal. Right eye exhibits no discharge. Left eye exhibits no discharge. No scleral icterus.   Neck: Normal range of motion. Neck supple. No thyromegaly present.   Cardiovascular: Normal rate, regular rhythm, normal heart sounds and intact distal pulses.  Exam reveals no gallop and no friction rub.    No murmur heard.  Pulmonary/Chest: No respiratory distress. He has no wheezes. He has rales in the right lower field.   On mech vent    Abdominal: Soft. Normal appearance. He exhibits no distension and no mass. Bowel sounds are decreased. There is no tenderness. There  is no rebound and no guarding.   Genitourinary: Penis normal.   Genitourinary Comments: Condom cath  Bladder scan 175 cc mid afternoon    Musculoskeletal: Normal range of motion. He exhibits no edema or tenderness.   Lymphadenopathy:     He has no cervical adenopathy.   Neurological: No cranial nerve deficit. Coordination normal.   Awakens and follows commands/moves all extrem with sedation vacation    Skin: Skin is warm and dry. Capillary refill takes less than 2 seconds. No rash noted. No erythema.   Psychiatric:   Unable to assess -no hx per wife    Nursing note and vitals reviewed.      Significant Labs:   BMP:   Recent Labs  Lab 04/09/18  0433   *      K 4.0   *   CO2 20*   BUN 23   CREATININE 1.4   CALCIUM 8.4*   MG 2.1     CBC:   Recent Labs  Lab 04/08/18  0510 04/09/18  0433   WBC 9.40 10.80   HGB 11.4* 11.8*   HCT 34.3* 35.6*    254       Significant Imaging: CXR: I have reviewed all pertinent results/findings within the past 24 hours and my personal findings are:  stable, improved

## 2018-04-08 NOTE — PROGRESS NOTES
04/08/18 1606   Patient Assessment/Suction   Level of Consciousness (AVPU) alert   Respiratory Effort Unlabored   PRE-TX-O2-ETCO2   O2 Device (Oxygen Therapy) nasal cannula   $ Is the patient on Low Flow Oxygen? Yes   Flow (L/min) 4   Oxygen Concentration (%) 36   SpO2 100 %   Pulse Oximetry Type Continuous   ETCO2 (mmHg) 15 mmHg   Pulse 89   Resp (!) 35   Ready to Wean/Extubation Screen   FIO2<=50 (chart decimal) 0.36   Ready to Wean Parameters   $ Extubation Tech Time Tech Time 15 min   Extubated? Yes   Reason for Extubation Extubated   Ventilator Discontinued Yes

## 2018-04-08 NOTE — NURSING
New henderson inserted per Dr Jerry with 250 of pink tinged urine with clots noted. Tolerated well.

## 2018-04-08 NOTE — SUBJECTIVE & OBJECTIVE
Past Medical History:   Diagnosis Date    Cancer     Coronary artery disease     Gout     Hyperlipemia     Hypertension        Past Surgical History:   Procedure Laterality Date    APPENDECTOMY      BRAIN SURGERY      CARDIAC SURGERY      TONSILLECTOMY         Review of patient's allergies indicates:  No Known Allergies    No current facility-administered medications on file prior to encounter.      Current Outpatient Prescriptions on File Prior to Encounter   Medication Sig    amLODIPine (NORVASC) 5 MG tablet Take 5 mg by mouth once daily.    aspirin (ECOTRIN) 81 MG EC tablet Take 81 mg by mouth once daily.    clopidogrel (PLAVIX) 75 mg tablet Take 75 mg by mouth once daily.    colchicine 0.6 mg capsule/tablet Take 0.6 mg by mouth once daily.    doxycycline (DORYX) 100 MG EC tablet Take 100 mg by mouth 2 (two) times daily.    furosemide (LASIX) 20 MG tablet Take 10 mg by mouth every other day.    losartan (COZAAR) 100 MG tablet Take 100 mg by mouth once daily.    lovastatin (MEVACOR) 40 MG tablet Take 40 mg by mouth every evening.    metoprolol succinate (TOPROL-XL) 100 MG 24 hr tablet Take 100 mg by mouth once daily.    pantoprazole (PROTONIX) 40 MG tablet Take 40 mg by mouth once daily.    potassium chloride SA (K-DUR,KLOR-CON) 10 MEQ tablet Take 10 mEq by mouth 2 (two) times daily.     Family History     None        Social History Main Topics    Smoking status: Former Smoker    Smokeless tobacco: Not on file    Alcohol use No    Drug use: Unknown    Sexual activity: Not on file     Review of Systems   Unable to perform ROS: Intubated     Objective:     Vital Signs (Most Recent):  Temp: 98.3 °F (36.8 °C) (04/07/18 1600)  Pulse: 61 (04/07/18 1915)  Resp: (!) 22 (04/07/18 1915)  BP: (!) 147/65 (04/07/18 1915)  SpO2: 100 % (04/07/18 1915) Vital Signs (24h Range):  Temp:  [97.3 °F (36.3 °C)-98.3 °F (36.8 °C)] 98.3 °F (36.8 °C)  Pulse:  [] 61  Resp:  [19-30] 22  SpO2:  [96 %-100 %] 100  %  BP: ()/() 147/65     Weight: 73.9 kg (163 lb)  Body mass index is 25.53 kg/m².    Physical Exam   Constitutional: He appears well-developed and well-nourished.   Intubated, sedated.      HENT:   Head: Normocephalic and atraumatic.   Right Ear: External ear normal.   Left Ear: External ear normal.   Mouth/Throat: No oropharyngeal exudate.   ogtube in place    Eyes: Conjunctivae and EOM are normal. Right eye exhibits no discharge. Left eye exhibits no discharge. No scleral icterus.   Neck: Normal range of motion. Neck supple. No thyromegaly present.   Cardiovascular: Normal rate, regular rhythm, normal heart sounds and intact distal pulses.  Exam reveals no gallop and no friction rub.    No murmur heard.  Pulmonary/Chest: No respiratory distress. He has no wheezes. He has rales in the right lower field.   On mech vent    Abdominal: Soft. Normal appearance. He exhibits no distension and no mass. Bowel sounds are decreased. There is no tenderness. There is no rebound and no guarding.   Genitourinary: Penis normal.   Genitourinary Comments: Condom cath  Bladder scan 175 cc mid afternoon    Musculoskeletal: Normal range of motion. He exhibits no edema or tenderness.   Lymphadenopathy:     He has no cervical adenopathy.   Neurological: No cranial nerve deficit. Coordination normal.   Awakens and follows commands/moves all extrem with sedation vacation    Skin: Skin is warm and dry. Capillary refill takes less than 2 seconds. No rash noted. No erythema.   Psychiatric:   Unable to assess -no hx per wife    Nursing note and vitals reviewed.        CRANIAL NERVES     CN III, IV, VI   Extraocular motions are normal.        Significant Labs:   CBC:   Recent Labs  Lab 04/07/18  0630   WBC 21.80*   HGB 16.3   HCT 49.9   *     CMP:   Recent Labs  Lab 04/07/18  0630 04/07/18  1233    140   K 4.7 4.5    114*   CO2 22* 17*   * 127*   BUN 27* 31*   CREATININE 1.8* 1.6*   CALCIUM 9.2 8.3*   PROT  8.1  --    ALBUMIN 3.8  --    BILITOT 0.5  --    ALKPHOS 118  --    AST 29  --    ALT 19  --    ANIONGAP 13 9   EGFRNONAA 34* 40*     Cardiac Markers:   Recent Labs  Lab 04/07/18  0630   *     Lactic Acid:   Recent Labs  Lab 04/07/18  0630 04/07/18  0956 04/07/18  1233   LACTATE 2.9* 1.8 1.2     Magnesium:   Recent Labs  Lab 04/07/18  1233   MG 2.2       Recent Labs  Lab 04/07/18  1233   TROPONINI 0.211*     Significant Imaging: CXR: I have reviewed all pertinent results/findings within the past 24 hours and my personal findings are:  Rll infiltrate; cardiomegaly

## 2018-04-08 NOTE — PROCEDURES
[facility]  Operative Note    Date: 04/07/2018    Pre-Op Diagnosis: urethral stricture    Post-Op Diagnosis: same    Procedure(s) Performed:   1.  Urethral dilation   2.  Difficult henderson placement    Specimen(s): none    Staff Surgeon: Kimmie Jerry MD    Anesthesia: General endotracheal anesthesia    Indications:.Frederick Kong Jr. is a 82 y.o. male admitted with SOB and found to have sepsis secondary to pneumonia, now in ICU intubated.   Primary team and ER had difficulty placing difficult henderson catheters. He has h/o Greenlight laser. No difficulty voiding prior to this admission per wife.     Findings:   1.  10fr silicone catheter which had been placed in ICU was not draining. Attemped to place 12fr silicone and met resistance, came out bloody. Attempted 14fr coude and 16fr coude without success. Suspected scar tissue.  2. Able to place wire into bladder and dilated to 18fr. Could not dilate to 20fr huyen. Too difficult.   3. Placed 18fr [place] tip coude and 260cc red urine drained. Prior to placement bladder scan showed 250cc was in bladder.   4. Irrigated to clear with 300cc easily.      Estimated Blood Loss: min    Drains: 18fr [place] tip catheter placed.     Procedure in Detail:   The patient was prepped and draped in the normal sterile fashion.      I advanced a glidewire into the urethra and this went easily. I then placed a 5 [place] open ended ureteral catheter over the glidewire and removed the glidewire. I then aspirated urine to confirm I was in the correct position. I then placed an amplatz super stiff through the 5 [place] open ended ureteral catheter and removed the 5 [place] open ended ureteral catheter. I then dilated the urethra over the amplatz using Huyen dilators from 10fr to 18fr. You could feel the tissue popping through the stricture. When I tried to advance with 20fr huyen dilator over the wire could feel the wire bending and the dilator would not advance. I  removed the huyen and tried to place an 18fr [place] tip which would not go. I then placed a 5 Nigerien open ended ureteral catheter over the wire and removed the super stiff, which was bent. I confirmed I was in the bladder still by aspirating fluid.  I then put a new super stiff wire through the 5 Nigerien open ended ureteral catheter and removed the 5 Nigerien open ended ureteral catheter. I then redilated with 16 and 18fr huyen and then placed an 18fr [place] tip henderson over the wire.  The wire was removed and pink red urine was seen draining.   10cc water was placed in balloon.   250cc urine drained.     I then irrigated the bladder to clear.    Disposition:      Nurse to irrigate bladder with 120cc or more until clear every 3 hours. Once urine remains clear for 2 irrigations can stop.     DO NOT REMOVE HENDERSON UNTIL 4/15/18. DIFFICULT HENDERSON PLACEMENT AND DILATION OF STRICTURE PERFORMED ON 4/7/18. Call  prior to discharge or if pt still here on 4/15/18 for further directions.       1. Henderson will remain x 1 week  2. Scar tissue likely to return unless he does CIC or has a definitve treatment, explained this to wife.  3. Expect hematuria for next few days.  4. Follow -up with me in 3 to 4 weeks.     Kimmie Jerry MD

## 2018-04-08 NOTE — ASSESSMENT & PLAN NOTE
Acute, likely volume depletion tho has melly; will recheck after hydration   Trend -no need for bicarb

## 2018-04-08 NOTE — NURSING
SB 50's on the monitor.  's now.  Weaning Diprivan down.  Leslie intact with clear yellow urine.  Small amount of bloody drainage around meatus.  Gauze in place.  Family at bedside and aware of plan of care.

## 2018-04-08 NOTE — ASSESSMENT & PLAN NOTE
Acute, no ekg changes   Likely due to sepsis  Trend, echo, cardiology consult  Continuing b blocker, asa, plavix -see AVR

## 2018-04-08 NOTE — NURSING
[id number]   Remains intubated and sedated.  Diprivan placed on hold for assessment.  SBP elevated to 190's-200.   Resumed sedation.  Continue to monitor.    0920  Dr. Gutierrez on unit.  Informed of elevated BP with attempts to wean sedation   Home meds reviewed and plans to resume metoprolol.

## 2018-04-08 NOTE — ASSESSMENT & PLAN NOTE
Acute, likely some chronic  Leslie -required urology placement  Add flomax   Check ua/culture  Renal ultrasound am

## 2018-04-08 NOTE — PLAN OF CARE
Problem: Patient Care Overview  Goal: Plan of Care Review  Intubated and sedated.  Follows commands and moves all exts with sedation on hold.  No urine output.  Unable to insert henderson in ED.  Dr Jerry consulted.  Orders to place # 10F silicon cath.  Placed this pm with only 3cc scant bloody urine.  Cardiology and pulmonology consulted also.  Safety maintained.

## 2018-04-08 NOTE — PLAN OF CARE
Problem: Patient Care Overview  Goal: Plan of Care Review  Outcome: Ongoing (interventions implemented as appropriate)  Patient free of falls and injuries this shift.Dr Jerry dilated him and was able to obtain a 16fr catheter. Some bloody drainage noted with small clots. Irrigated catheter X3 with minimal pink tinged urine noted, no clots. Tolerated well

## 2018-04-08 NOTE — NURSING
1440  Dr. Dunn on unit.  Informed of elevated bp with sedation off.  Lopressor 2.5 mg IV given x 2 doses per orders.  SBP remains 160-180's.   Placed on CPAP trial.  RR 20's.  Continue to monitor.     [id number]  Remains on CPAP.  ABG done.  Opens eyes and follows simple commands.  Extubated and placed on NC.  No distress noted.

## 2018-04-08 NOTE — NURSING
Irrigated bladder with 120ml of sterile water. No clots noted. Clear, yellow urine noted. Tapering off last couple hours. See flow sheet/I&O's

## 2018-04-08 NOTE — PROGRESS NOTES
Progress Note  Pulmonary/Critical Care Medicine    Admit Date: 4/7/2018    SUBJECTIVE:     Follow-up For:  RLL pneumonia with respiratory failure    Review of Systems:  Review of systems not obtained due to patient factors intubated and sedated.    OBJECTIVE:     Vital Signs (Most Recent)  Temp: 97.3 °F (36.3 °C) (04/08/18 0800)  Pulse: 73 (04/08/18 0900)  Resp: (!) 22 (04/08/18 0900)  BP: (!) 154/66 (04/08/18 0900)  SpO2: 100 % (04/08/18 0900)    I & O (Last 24H):  Intake/Output Summary (Last 24 hours) at 04/08/18 1014  Last data filed at 04/08/18 0500   Gross per 24 hour   Intake          2173.55 ml   Output              503 ml   Net          1670.55 ml       Physical Exam:  GENERAL:  He is an older male in no acute distress, resting on the ventilator.  HEENT:  The pupils are 2 mm and reactive.  There is bilateral arcus senilis.    The pharynx is intubated with a 7.5 endotracheal tube and OG tube.  HEART:  Has a regular rate and rhythm.  LUNGS:  Clear to auscultation anteriorly and posteriorly.  ABDOMEN:  Soft, nontender.BS+.  GENITOURINARY:  There is a henderson catheter in place.    EXTREMITIES:  There is no cyanosis, clubbing or edema.  There are bilateral,   hammertoes.  The pulses are present with Doppler bilaterally distally.  SKIN:  Intact with no lesions or rashes.  NEUROLOGIC:  The patient is sedated    Laboratory:  CBC:    Recent Labs  Lab 04/08/18  0510   WBC 9.40   RBC 3.91*   HGB 11.4*   HCT 34.3*      MCV 88   MCH 29.0   MCHC 33.1     CMP    Recent Labs  Lab 04/08/18  0510   CALCIUM 8.0*   ALBUMIN 2.6*   PROT 5.4*      K 3.8   CO2 20*   *   BUN 32*   CREATININE 1.3   ALKPHOS 70   ALT 13   AST 17   BILITOT 0.7     ABG    Recent Labs  Lab 04/08/18  0547   PH 7.446   PCO2 34.4*   PO2 155*   HCO3 23.7*   POCSATURATED 99   BE 0        Vent Mode: A/C  Oxygen Concentration (%):  [50] 50  Resp Rate Total:  [22 br/min-23 br/min] 22 br/min  Vt Set:  [500 mL] 500 mL  PEEP/CPAP:  [5 cmH20] 5  cmH20  Pressure Support:  [0 cmH20] 0 cmH20  Mean Airway Pressure:  [11 cvN61-02 cmH20] 11 cmH20    Blood no growth  Sputum stain:  Gram Stain (Respiratory) <10 epithelial cells per low power field.     Gram Stain (Respiratory) Rare WBC's    Gram Stain (Respiratory) Rare Gram positive cocci    Gram Stain (Respiratory) Rare Gram positive rods         Diagnostic Results:  Labs: Reviewed  X-Ray: Reviewed  Interval improvement in aeration of the right lower lung zone as compared to the previous study  ASSESSMENT/PLAN:   RLL pneumonia  Respiratory failure with mechanical ventilation   HTN, uncontrolled    Awaken and d/c from ventilator  Continue broad spectrum antibiotics pending culture results  Lopressor for hypertension

## 2018-04-08 NOTE — NURSING
Dr Jerry here at bedside. Henderson inserted at bedside per MD. Irrigated and flushed per MD, henderson with some clots noted.

## 2018-04-09 ENCOUNTER — TELEPHONE (OUTPATIENT)
Dept: UROLOGY | Facility: CLINIC | Age: 83
End: 2018-04-09

## 2018-04-09 PROBLEM — J69.0 ASPIRATION PNEUMONIA OF BOTH LOWER LOBES: Status: ACTIVE | Noted: 2018-04-09

## 2018-04-09 PROBLEM — R65.20 SEVERE SEPSIS: Status: RESOLVED | Noted: 2018-04-07 | Resolved: 2018-04-09

## 2018-04-09 PROBLEM — E87.20 METABOLIC ACIDOSIS, NORMAL ANION GAP (NAG): Status: RESOLVED | Noted: 2018-04-07 | Resolved: 2018-04-09

## 2018-04-09 PROBLEM — I10 ESSENTIAL HYPERTENSION: Status: ACTIVE | Noted: 2018-04-09

## 2018-04-09 PROBLEM — R31.0 GROSS HEMATURIA: Status: RESOLVED | Noted: 2018-04-08 | Resolved: 2018-04-09

## 2018-04-09 PROBLEM — A41.9 SEVERE SEPSIS: Status: RESOLVED | Noted: 2018-04-07 | Resolved: 2018-04-09

## 2018-04-09 LAB
ALBUMIN SERPL BCP-MCNC: 2.7 G/DL
ALP SERPL-CCNC: 68 U/L
ALT SERPL W/O P-5'-P-CCNC: 11 U/L
ANION GAP SERPL CALC-SCNC: 9 MMOL/L
AST SERPL-CCNC: 15 U/L
BACTERIA #/AREA URNS HPF: ABNORMAL /HPF
BACTERIA UR CULT: NO GROWTH
BASOPHILS # BLD AUTO: 0 K/UL
BASOPHILS NFR BLD: 0.2 %
BILIRUB SERPL-MCNC: 0.7 MG/DL
BILIRUB UR QL STRIP: NEGATIVE
BUN SERPL-MCNC: 23 MG/DL
CALCIUM SERPL-MCNC: 8.4 MG/DL
CHLORIDE SERPL-SCNC: 114 MMOL/L
CLARITY UR: ABNORMAL
CO2 SERPL-SCNC: 20 MMOL/L
COLOR UR: YELLOW
CREAT SERPL-MCNC: 1.4 MG/DL
DIFFERENTIAL METHOD: ABNORMAL
EOSINOPHIL # BLD AUTO: 0 K/UL
EOSINOPHIL NFR BLD: 0.1 %
ERYTHROCYTE [DISTWIDTH] IN BLOOD BY AUTOMATED COUNT: 14.7 %
EST. GFR  (AFRICAN AMERICAN): 54 ML/MIN/1.73 M^2
EST. GFR  (NON AFRICAN AMERICAN): 46 ML/MIN/1.73 M^2
GLUCOSE SERPL-MCNC: 123 MG/DL
GLUCOSE UR QL STRIP: NEGATIVE
HCT VFR BLD AUTO: 35.6 %
HGB BLD-MCNC: 11.8 G/DL
HGB UR QL STRIP: ABNORMAL
HYALINE CASTS #/AREA URNS LPF: 0 /LPF
KETONES UR QL STRIP: NEGATIVE
LEUKOCYTE ESTERASE UR QL STRIP: NEGATIVE
LYMPHOCYTES # BLD AUTO: 0.8 K/UL
LYMPHOCYTES NFR BLD: 7.1 %
MAGNESIUM SERPL-MCNC: 2.1 MG/DL
MCH RBC QN AUTO: 29 PG
MCHC RBC AUTO-ENTMCNC: 33.1 G/DL
MCV RBC AUTO: 88 FL
MICROSCOPIC COMMENT: ABNORMAL
MONOCYTES # BLD AUTO: 1 K/UL
MONOCYTES NFR BLD: 9.5 %
NEUTROPHILS # BLD AUTO: 9 K/UL
NEUTROPHILS NFR BLD: 83.1 %
NITRITE UR QL STRIP: NEGATIVE
NON-SQ EPI CELLS #/AREA URNS HPF: 2 /HPF
PH UR STRIP: 6 [PH] (ref 5–8)
PHOSPHATE SERPL-MCNC: 3 MG/DL
PLATELET # BLD AUTO: 254 K/UL
PMV BLD AUTO: 8.5 FL
POTASSIUM SERPL-SCNC: 4 MMOL/L
PROT SERPL-MCNC: 5.8 G/DL
PROT UR QL STRIP: ABNORMAL
RBC # BLD AUTO: 4.07 M/UL
RBC #/AREA URNS HPF: >100 /HPF (ref 0–4)
SODIUM SERPL-SCNC: 143 MMOL/L
SP GR UR STRIP: 1.02 (ref 1–1.03)
URATE CRY URNS QL MICRO: ABNORMAL
URN SPEC COLLECT METH UR: ABNORMAL
UROBILINOGEN UR STRIP-ACNC: NEGATIVE EU/DL
WBC # BLD AUTO: 10.8 K/UL
WBC #/AREA URNS HPF: 3 /HPF (ref 0–5)

## 2018-04-09 PROCEDURE — 20000000 HC ICU ROOM

## 2018-04-09 PROCEDURE — 94640 AIRWAY INHALATION TREATMENT: CPT

## 2018-04-09 PROCEDURE — 27000221 HC OXYGEN, UP TO 24 HOURS

## 2018-04-09 PROCEDURE — 63600175 PHARM REV CODE 636 W HCPCS: Performed by: HOSPITALIST

## 2018-04-09 PROCEDURE — 63600175 PHARM REV CODE 636 W HCPCS: Performed by: EMERGENCY MEDICINE

## 2018-04-09 PROCEDURE — 92610 EVALUATE SWALLOWING FUNCTION: CPT

## 2018-04-09 PROCEDURE — 84100 ASSAY OF PHOSPHORUS: CPT

## 2018-04-09 PROCEDURE — 80053 COMPREHEN METABOLIC PANEL: CPT

## 2018-04-09 PROCEDURE — 36415 COLL VENOUS BLD VENIPUNCTURE: CPT

## 2018-04-09 PROCEDURE — 25000003 PHARM REV CODE 250: Performed by: NURSE PRACTITIONER

## 2018-04-09 PROCEDURE — G8996 SWALLOW CURRENT STATUS: HCPCS | Mod: CI

## 2018-04-09 PROCEDURE — G8997 SWALLOW GOAL STATUS: HCPCS | Mod: CH

## 2018-04-09 PROCEDURE — 25000242 PHARM REV CODE 250 ALT 637 W/ HCPCS: Performed by: HOSPITALIST

## 2018-04-09 PROCEDURE — 83735 ASSAY OF MAGNESIUM: CPT

## 2018-04-09 PROCEDURE — 99232 SBSQ HOSP IP/OBS MODERATE 35: CPT | Mod: ,,, | Performed by: INTERNAL MEDICINE

## 2018-04-09 PROCEDURE — 25000003 PHARM REV CODE 250: Performed by: HOSPITALIST

## 2018-04-09 PROCEDURE — C9113 INJ PANTOPRAZOLE SODIUM, VIA: HCPCS | Performed by: HOSPITALIST

## 2018-04-09 PROCEDURE — 85025 COMPLETE CBC W/AUTO DIFF WBC: CPT

## 2018-04-09 PROCEDURE — 87086 URINE CULTURE/COLONY COUNT: CPT

## 2018-04-09 PROCEDURE — 94761 N-INVAS EAR/PLS OXIMETRY MLT: CPT

## 2018-04-09 PROCEDURE — 25000003 PHARM REV CODE 250: Performed by: INTERNAL MEDICINE

## 2018-04-09 PROCEDURE — 81000 URINALYSIS NONAUTO W/SCOPE: CPT

## 2018-04-09 RX ORDER — ACETAMINOPHEN 650 MG/20.3ML
650 LIQUID ORAL EVERY 6 HOURS PRN
Status: DISCONTINUED | OUTPATIENT
Start: 2018-04-09 | End: 2018-04-12 | Stop reason: HOSPADM

## 2018-04-09 RX ORDER — ENOXAPARIN SODIUM 100 MG/ML
40 INJECTION SUBCUTANEOUS EVERY 24 HOURS
Status: DISCONTINUED | OUTPATIENT
Start: 2018-04-10 | End: 2018-04-10

## 2018-04-09 RX ORDER — METOPROLOL TARTRATE 1 MG/ML
5 INJECTION, SOLUTION INTRAVENOUS EVERY 6 HOURS PRN
Status: DISCONTINUED | OUTPATIENT
Start: 2018-04-09 | End: 2018-04-12 | Stop reason: HOSPADM

## 2018-04-09 RX ADMIN — VANCOMYCIN HYDROCHLORIDE 1250 MG: 1 INJECTION, POWDER, LYOPHILIZED, FOR SOLUTION INTRAVENOUS at 11:04

## 2018-04-09 RX ADMIN — METOPROLOL TARTRATE 5 MG: 5 INJECTION, SOLUTION INTRAVENOUS at 02:04

## 2018-04-09 RX ADMIN — PIPERACILLIN SODIUM AND TAZOBACTAM SODIUM 4.5 G: 4; .5 INJECTION, POWDER, LYOPHILIZED, FOR SOLUTION INTRAVENOUS at 05:04

## 2018-04-09 RX ADMIN — ENOXAPARIN SODIUM 30 MG: 100 INJECTION SUBCUTANEOUS at 10:04

## 2018-04-09 RX ADMIN — AMLODIPINE BESYLATE 5 MG: 5 TABLET ORAL at 10:04

## 2018-04-09 RX ADMIN — CLOPIDOGREL BISULFATE 75 MG: 75 TABLET, FILM COATED ORAL at 10:04

## 2018-04-09 RX ADMIN — PANTOPRAZOLE SODIUM 40 MG: 40 INJECTION, POWDER, FOR SOLUTION INTRAVENOUS at 10:04

## 2018-04-09 RX ADMIN — IPRATROPIUM BROMIDE AND ALBUTEROL SULFATE 3 ML: .5; 3 SOLUTION RESPIRATORY (INHALATION) at 12:04

## 2018-04-09 RX ADMIN — ACETAMINOPHEN 650 MG: 650 SOLUTION ORAL at 01:04

## 2018-04-09 RX ADMIN — PIPERACILLIN SODIUM AND TAZOBACTAM SODIUM 4.5 G: 4; .5 INJECTION, POWDER, LYOPHILIZED, FOR SOLUTION INTRAVENOUS at 10:04

## 2018-04-09 RX ADMIN — LOSARTAN POTASSIUM 100 MG: 25 TABLET, FILM COATED ORAL at 10:04

## 2018-04-09 RX ADMIN — IPRATROPIUM BROMIDE AND ALBUTEROL SULFATE 3 ML: .5; 3 SOLUTION RESPIRATORY (INHALATION) at 07:04

## 2018-04-09 RX ADMIN — PIPERACILLIN SODIUM AND TAZOBACTAM SODIUM 4.5 G: 4; .5 INJECTION, POWDER, LYOPHILIZED, FOR SOLUTION INTRAVENOUS at 02:04

## 2018-04-09 RX ADMIN — ASPIRIN 81 MG: 81 TABLET, COATED ORAL at 10:04

## 2018-04-09 RX ADMIN — METOPROLOL SUCCINATE 100 MG: 50 TABLET, EXTENDED RELEASE ORAL at 10:04

## 2018-04-09 NOTE — NURSING
Awake and oriented to person and place.  Respirations unlabored.  Frequent productive cough, thick white secretions.  Sometimes slow to swallow secretions.  Continue to monitor.

## 2018-04-09 NOTE — ASSESSMENT & PLAN NOTE
Due to multiple henderson attempts -finally henderson placed by urology and hematuria resolved after irrigation -none since; fu urine culture

## 2018-04-09 NOTE — PROGRESS NOTES
[facility]  Cardiology  Progress Note    Patient Name: Frederick Kong Jr.  MRN: [id number]  Admission Date: 4/7/2018  Hospital Length of Stay: 2 days  Code Status: Full Code   Attending Physician: Harsha Dan MD   Primary Care Physician: Dhiraj Dempsey III, MD  Expected Discharge Date:   Principal Problem:Acute respiratory failure with hypoxia    Subjective:     Hospital Course: Patient was extubated this afternoon.     Interval History:   Denies any significant pain. Shortness of breath is better.     ROS   No significant headaches or sore throat or runny nose.   No recent changes in vision.   No recent changes in hearing.  No dysphagia or odynophagia.  Reports cough    Denies any abdominal pain, nausea, vomiting, diarrhea or constipation.   Denies any dysuria or polyuria.       Objective:     Vital Signs (Most Recent):  Temp: (!) 100.4 °F (38 °C) (04/09/18 1323)  Pulse: 93 (04/09/18 1300)  Resp: 19 (04/09/18 1300)  BP: (!) 150/67 (04/09/18 1300)  SpO2: 100 % (04/09/18 1300) Vital Signs (24h Range):  Temp:  [97.3 °F (36.3 °C)-100.4 °F (38 °C)] 100.4 °F (38 °C)  Pulse:  [] 93  Resp:  [14-43] 19  SpO2:  [99 %-100 %] 100 %  BP: (145-187)/(65-79) 150/67  Arterial Line BP: (115-206)/(34-73) 136/34     Weight: 76.1 kg (167 lb 12.3 oz)  Body mass index is 26.28 kg/m².    SpO2: 100 %  O2 Device (Oxygen Therapy): nasal cannula      Intake/Output Summary (Last 24 hours) at 04/09/18 1404  Last data filed at 04/09/18 1200   Gross per 24 hour   Intake          2663.51 ml   Output             1875 ml   Net           788.51 ml       Lines/Drains/Airways     Drain                 Urethral Catheter 04/07/18 2100 1 day          Arterial Line                 Arterial Line 1 day          Peripheral Intravenous Line                 Peripheral IV - Single Lumen Right Hand -- days         Peripheral IV - Single Lumen 04/07/18 0646 Left Antecubital 2 days         Peripheral IV - Single Lumen 04/07/18 1230 Left  Antecubital 2 days              Scheduled Meds:   albuterol-ipratropium 2.5mg-0.5mg/3mL  3 mL Nebulization Q6H    amLODIPine  5 mg Oral Daily    aspirin  81 mg Oral Daily    clopidogrel  75 mg Oral Daily    enoxaparin  30 mg Subcutaneous Daily    losartan  100 mg Oral Daily    metoprolol succinate  100 mg Oral Daily    pantoprazole  40 mg Intravenous Daily    piperacillin-tazobactam (ZOSYN) IVPB  4.5 g Intravenous Q8H    propofol  5 mcg/kg/min (Dosing Weight) Intravenous Daily    vancomycin (VANCOCIN) IVPB  1,250 mg Intravenous Q24H     Continuous Infusions:   sodium chloride 0.9% 75 mL/hr at 04/08/18 1100     PRN Meds:.acetaminophen, dextrose 50%, dextrose 50%, glucagon (human recombinant), glucose, glucose, hydrALAZINE, metoprolol      Physical Exam  HEENT: Normocephalic, atraumatic, PERRL, Conjunctiva pink, no scleral icterus.   CVS: S1S2+, RRR, no murmurs, rubs or gallops, JVP: Normal.  LUNGS: +Wheezes  ABDOMEN: Soft, NT, BS+  EXTREMITIES: No cyanosis, clubbing or edema  NEURO: AAO X 3.       Significant Labs:   Blood Culture: No results for input(s): LABBLOO in the last 48 hours., BMP:     Recent Labs  Lab 04/08/18  0510 04/09/18  0433   * 123*    143   K 3.8 4.0   * 114*   CO2 20* 20*   BUN 32* 23   CREATININE 1.3 1.4   CALCIUM 8.0* 8.4*   MG 2.0 2.1   , CMP     Recent Labs  Lab 04/08/18  0510 04/09/18  0433    143   K 3.8 4.0   * 114*   CO2 20* 20*   * 123*   BUN 32* 23   CREATININE 1.3 1.4   CALCIUM 8.0* 8.4*   PROT 5.4* 5.8*   ALBUMIN 2.6* 2.7*   BILITOT 0.7 0.7   ALKPHOS 70 68   AST 17 15   ALT 13 11   ANIONGAP 7* 9   ESTGFRAFRICA 59* 54*   EGFRNONAA 51* 46*   , CBC     Recent Labs  Lab 04/08/18  0510 04/09/18  0433   WBC 9.40 10.80   HGB 11.4* 11.8*   HCT 34.3* 35.6*    254   , INR No results for input(s): INR, PROTIME in the last 48 hours., Lipid Panel No results for input(s): CHOL, HDL, LDLCALC, TRIG, CHOLHDL in the last 48 hours. and Troponin No  results for input(s): TROPONINI in the last 48 hours.    Significant Imaging: X-Ray: CXR: X-Ray Chest 1 View (CXR):   Results for orders placed or performed during the hospital encounter of 04/07/18   X-Ray Chest 1 View    Narrative    EXAMINATION:  XR CHEST 1 VIEW    CLINICAL HISTORY:  pneumonia;    TECHNIQUE:  A portable semi upright AP view of the chest was acquired.    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  There is an endotracheal tube present with its tip below the level of the clavicular heads.  A nasogastric tube remains in place.  There is atherosclerotic calcification present within the aortic arch.   There has been no interval detrimental change in the radiographic appearance of the chest as compared to the previous examination with continued demonstration of patchy segmental airspace opacity/consolidative change in the right lower lung zone..  No pneumothorax.      Impression    No significant interval detrimental change in the imaging appearance of the chest when compared with the prior study.      Electronically signed by: Ozzie Simental MD  Date:    04/07/2018  Time:    13:58     Assessment and Plan:     Impression:  1. Acute hypoxemic hypercapnic respiratory failure. Extubated on 4/8/2018. Likely secondary to right lower lobe pneumonia.  2. Mildly elevated troponin. Likely secondary to #1.  3. History of mechanical aortic valve prosthesis. Functioning well per last echocardiogram done in September 2017. Patient not therapeutically anticoagulated and was kept on only aspirin and Plavix reportedly secondary to high risk of bleeding and previous history of intracranial bleeds.  4. History of coronary artery disease status post CABG. Currently with mildly elevated troponin. No dynamic ECG changes.  5. History of atrial flutter. Paroxysmal.  6. Acute on chronic renal failure  7 History of hypertension. Not well controlled.   8. 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      Recommendations:  1. Continue current management.   2. He will need repeat limited echo to assess LVEF in a few days (hypoxemic cardiomyopathy is expected to improve).   3. I will be out of town till Thursday. Please call Dr Gonzalez if needed. If discharged before then follow up with Dr Gonzalez in clinic in 1 week.      Thank you for your consult. I will follow-up with patient. Please contact us if you have any additional questions.  Marisel Soliman MD  Cardiology  [facility]

## 2018-04-09 NOTE — PROGRESS NOTES
04/08/18 1939   Patient Assessment/Suction   Level of Consciousness (AVPU) alert   Respiratory Effort Normal;Unlabored   All Lung Fields Breath Sounds coarse;wheezes, expiratory   PRE-TX-O2-ETCO2   O2 Device (Oxygen Therapy) nasal cannula   Flow (L/min) 4   Oxygen Concentration (%) 36   SpO2 100 %   Pulse Oximetry Type Continuous   Pulse 104   Resp 20   Aerosol Therapy   $ Aerosol Therapy Charges Aerosol Treatment   Respiratory Treatment Status given   SVN/Inhaler Treatment Route mask   Position During Treatment HOB at 45 degrees   Patient Tolerance good   Post-Treatment   Post-treatment Heart Rate (beats/min) 102   Post-treatment Resp Rate (breaths/min) 22   All Fields Breath Sounds unchanged   Ready to Wean/Extubation Screen   FIO2<=50 (chart decimal) 0.36

## 2018-04-09 NOTE — ASSESSMENT & PLAN NOTE
Chronic  bp meds held on admit for sepsis and pt sedated  Resume lopressor -  May need other medicaaitons -prn meds to wean sedation     Hypertension Medications             amLODIPine (NORVASC) 5 MG tablet Take 5 mg by mouth once daily.    furosemide (LASIX) 20 MG tablet Take 10 mg by mouth every other day.    losartan (COZAAR) 100 MG tablet Take 100 mg by mouth once daily.    metoprolol succinate (TOPROL-XL) 100 MG 24 hr tablet Take 100 mg by mouth once daily.      Hospital Medications             amLODIPine tablet 5 mg Take 1 tablet (5 mg total) by mouth once daily.    hydrALAZINE injection 10 mg Inject 0.5 mLs (10 mg total) into the vein every 4 (four) hours as needed for SBP greater than (SBP . 160).    losartan tablet 100 mg Take 4 tablets (100 mg total) by mouth once daily.    metoprolol injection 5 mg Inject 5 mLs (5 mg total) into the vein every 6 (six) hours as needed for SBP greater than (>160).    metoprolol succinate (TOPROL-XL) 24 hr tablet 100 mg Take 2 tablets (100 mg total) by mouth once daily.    metoprolol tartrate (LOPRESSOR) tablet 50 mg Take 1 tablet (50 mg total) by mouth 2 (two) times daily.    metoprolol injection 5 mg (Discontinued) Inject 5 mLs (5 mg total) into the vein every 6 (six) hours as needed for SBP greater than (>160).    metoprolol succinate (TOPROL-XL) 24 hr tablet 100 mg (Discontinued) Take 2 tablets (100 mg total) by mouth once daily.

## 2018-04-09 NOTE — PROGRESS NOTES
[facility] Hospital Medicine  Progress Note    Patient Name: Frederick Kong Jr.  MRN: [id number]  Patient Class: IP- Inpatient   Admission Date: 4/7/2018  Length of Stay: 2 days  Attending Physician: Harsha Dan MD  Primary Care Provider: Dhiraj Dempsey III, MD        Subjective:     Principal Problem:Acute respiratory failure with hypoxia    HPI:  82-year-old male presented with a chief complaint of shortness of breath.   Per history taken from his wife he got up in the middle of the night and when he didn't return to bed she found him suddenly struggling to breath so called the ambulance and when they arrived his oxygen sat was in the 60's. On arrival to ER evaluation revealed   The patient's chest x-ray was significant for a large  infiltrate on the right and required intubation.  .  The patient's labs were significant for an elevated white blood cell count, elevated troponin, elevated creatinine, elevated BNP, and elevated lactic acid.  The patient's EKG showed no acute abnormalities per my independent interpretation.  The patient likely has a mixed picture causing his respiratory decline.  The patient was aggressively treated per sepsis protocol with  IV fluids and broad-spectrum IV antibiotics for pneumonia --likely aspiration   He has hx of stent placement at the time of his aortic valve replacement in 2001 and was on coumadin for many years however has been changed to asa and plavix within the last year. His cardiologist is Dr Gonzalez. He has never had an MI or heart failure. He has a remote history of smoking and is otherwise alert and active.     Hospital Course:  No notes on file    Interval History:   Remains intubated and sedated. -bp becomes quite elevated when trying to wean sedation so nurse unable to complete sedation vacation     Urology had to place henderson catheter yesterday with difficulty  -had small amt blood but after irrigation with 300cc saline no furrther blood since        Review of Systems   Unable to perform ROS: Intubated     Objective:     Vital Signs (Most Recent):  Temp: 97.4 °F (36.3 °C) (04/08/18 0300)  Pulse: (!) 53 (04/08/18 0726)  Resp: (!) 22 (04/08/18 0726)  BP: (!) 167/74 (04/08/18 0500)  SpO2: 100 % (04/08/18 0726) Vital Signs (24h Range):  Temp:  [97.3 °F (36.3 °C)-98.3 °F (36.8 °C)] 97.4 °F (36.3 °C)  Pulse:  [53-78] 53  Resp:  [19-26] 22  SpO2:  [100 %] 100 %  BP: ()/(48-84) 167/74  Arterial Line BP: (130-179)/(48-65) 178/60     Weight: 76.1 kg (167 lb 12.3 oz)  Body mass index is 26.28 kg/m².    Intake/Output Summary (Last 24 hours) at 04/08/18 0925  Last data filed at 04/08/18 0500   Gross per 24 hour   Intake          2173.55 ml   Output              503 ml   Net          1670.55 ml      Physical Exam   Constitutional: He appears well-developed and well-nourished.   Intubated, sedated.      HENT:   Head: Normocephalic and atraumatic.   Right Ear: External ear normal.   Left Ear: External ear normal.   Mouth/Throat: No oropharyngeal exudate.   ogtube in place    Eyes: Conjunctivae and EOM are normal. Right eye exhibits no discharge. Left eye exhibits no discharge. No scleral icterus.   Neck: Normal range of motion. Neck supple. No thyromegaly present.   Cardiovascular: Normal rate, regular rhythm, normal heart sounds and intact distal pulses.  Exam reveals no gallop and no friction rub.    No murmur heard.  Pulmonary/Chest: No respiratory distress. He has no wheezes. He has rales in the right lower field.   [facility]    Abdominal: Soft. Normal appearance. He exhibits no distension and no mass. Bowel sounds are decreased. There is no tenderness. There is no rebound and no guarding.   Genitourinary: Penis normal.   Genitourinary Comments: Condom cath  Bladder scan 175 cc mid afternoon    Musculoskeletal: Normal range of motion. He exhibits no edema or tenderness.   Lymphadenopathy:     He has no cervical adenopathy.   Neurological: No cranial nerve  deficit. Coordination normal.   Awakens and follows commands/moves all extrem with sedation vacation    Skin: Skin is warm and dry. Capillary refill takes less than 2 seconds. No rash noted. No erythema.   Psychiatric:   Unable to assess -no hx per wife    Nursing note and vitals reviewed.      Significant Labs:   BMP:   Recent Labs  Lab 04/09/18  0433   *      K 4.0   *   CO2 20*   BUN 23   CREATININE 1.4   CALCIUM 8.4*   MG 2.1     CBC:   Recent Labs  Lab 04/08/18  0510 04/09/18  0433   WBC 9.40 10.80   HGB 11.4* 11.8*   HCT 34.3* 35.6*    254       Significant Imaging: CXR: I have reviewed all pertinent results/findings within the past 24 hours and my personal findings are:  stable, improved    Assessment/Plan:      * Acute respiratory failure with hypoxia    Acute -due to RLL   Patient's vent settings, CXR and last ABG were reviewed.-much improved     Vent Mode: A/C  Oxygen Concentration (%):  [32-50] 32  Resp Rate Total:  [18 br/min-26 br/min] 19 br/min  Vt Set:  [500 mL] 500 mL  PEEP/CPAP:  [5 cmH20] 5 cmH20  Pressure Support:  [0 cmH20] 0 cmH20  Mean Airway Pressure:  [11 cmH20] 11 cmH20    ABG    Recent Labs  Lab 04/08/18  1557   PH 7.372   PO2 108*   PCO2 37.9   HCO3 22.0*   BE -3       Will adjust vent management as follows- abg daily  Wean oxygen  Daily wean trial   Consult to pulm         Gross hematuria      Due to multiple henderson attempts -finally henderson placed by urology and hematuria resolved after irrigation -none since; fu urine culture         Urinary retention      Acute, likely some chronic  Henderson -required urology placement  Add flomax   Check ua/culture  Renal ultrasound am         H/O aortic valve replacement    Per cardiology note     . History of mechanical aortic valve prosthesis. Functioning well per last echocardiogram done in September 2017. Patient not therapeutically anticoagulated and was kept on only aspirin and Plavix reportedly secondary to high risk of  bleeding and previous history of intracranial bleeds.  Echo done         Metabolic acidosis, normal anion gap (NAG)    Acute, likely volume depletion as  has yuan; improved  after hydration and yuan resolved   Trend -no need for bicarb           Severe sepsis      This patient does have evidence of infective focus  My overall impression is severe sepsis.  Antibiotics given-   Antibiotics     Start     Stop Route Frequency Ordered    04/09/18 1000  vancomycin (VANCOCIN) 1,250 mg in sodium chloride 0.9% 250 mL IVPB      -- IV Every 24 hours (non-standard times) 04/08/18 1428    04/07/18 1800  piperacillin-tazobactam 4.5 g in dextrose 5 % 100 mL IVPB (ready to mix system)      -- IV Every 8 hours (non-standard times) 04/07/18 1135          Latest lactate reviewed, they are-  No results for input(s): LACTATE in the last 24 hours.    Organ dysfunction indicated by Acute kidney injury and Acute respiratory failure  Source- pneumonia  Source control Achieved by- mech vent, ivabx, nebulizer , ivvr  Treatment of elevated troponin    Blood cult ng  Sputum cult pend; few gm +cocci  Urine culture -pend        Elevated troponin      Acute, no ekg changes   Likely due to sepsis/demand ischemia  Trend, echo,-pending report -   cardiology following   Continuing b blocker, asa, plavix -see AVR           YUAN (acute kidney injury)    Improved after IVVR- egfr c/w ckd3?   Had urethral stricture -difficult henderson placed by urology -bladder scan did not otherwise indicated severe urinary retention     Avoid non-essential nephrotoxins and  dose medications according to GFR  Avoid aceI, Monitor I/O, daily weight/keep Map >65   Check urine sodium, creatinine ,-if no improvement after ivvr   Check renal us if indicated-no need   Continue to trend            VTE Risk Mitigation         Ordered     enoxaparin injection 30 mg  Daily     Route:  Subcutaneous        04/07/18 1235     IP VTE HIGH RISK PATIENT  Once      04/07/18 1235     [facility]  hose  Until discontinued      04/07/18 1135     Place sequential compression device  Until discontinued      04/07/18 1135          Cheryl Gutierrez MD  Department of Hospital Medicine   [facility]

## 2018-04-09 NOTE — PT/OT/SLP EVAL
Speech Language Pathology Evaluation  Bedside Swallow    Patient Name:  Frederick Kong Jr.   MRN:  [id number]  Admitting Diagnosis: Acute respiratory failure with hypoxia    Recommendations:                 General Recommendations:  ongoing BSS  Diet recommendations:  Regular (no fresh or canned fruit except bananas), Nectar Thick   Aspiration Precautions: Assistance with meals and Assistance with thickening liquids, Chin tuck, Meds whole buried in puree, Monitor for s/s of aspiration and Standard aspiration precautions   General Precautions: Standard, aspiration, hearing impaired, nectar thick  Communication strategies:  provide increased time to answer    History:     Past Medical History:   Diagnosis Date    Cancer     Coronary artery disease     Gout     Hyperlipemia     Hypertension        Past Surgical History:   Procedure Laterality Date    APPENDECTOMY      BRAIN SURGERY      CARDIAC SURGERY      TONSILLECTOMY         Social History: Patient lives with spouse.    Prior Intubation HX:  Extubated yesterday after 1 day    Modified Barium Swallow: none in EPIC hx    Chest X-Rays: Bilateral lower lobe infiltrates right greater than left.    Prior diet: regular textures & liquids.      Subjective     My wife is Cristin.  Patient goals: get better       Objective:   Pt seen for BSS due to extubation, pneumonia & noted throat clearing after thin liquids.  Per RN, he ate applesauce & ice this morning without s/s pharyngeal dysphagia.  Pt a questionable historian, stated no hx neuro, pulmonary or GI diagnoses or neck/throat surgery.    Oral Musculature Evaluation  · Oral Musculature: WNL  · Dentition: present and adequate (missing a few molars lower left)  · Mucosal Quality: adequate  · Mandibular Strength and Mobility: WNL  · Oral Labial Strength and Mobility: WNL  · Lingual Strength and Mobility: WNL  · Velar Elevation: WNL  · Buccal Strength and Mobility: WNL  · Voice Prior to PO Intake: wet, cleared given cues  to perform throat clear   · DDK/AMRs - imprecise artic, voiced all voiceless consonants    Bedside Swallow Eval:   Consistencies Assessed:  · Thin liquids via ice chip & cup sip  · Nectar thick liquids via cup sip & single straw sip  · Puree via spoon  · Mixed consistencies peach piece with & without thin juice  · Solids 1/4 cookie     Oral Phase:   · WFL, mild coating on tongue after swallow of cookie    Pharyngeal Phase:   · Throat clearing & wet vocal quality after thin liquids, peaches & nectar thick liquids; cough after cup sip of thin liquids.   · No s/s pharyngeal dysphagia or aspiration on puree or cookie    Compensatory Strategies  · Chin tuck with straight straw eliminated throat clearing; cued throat clear eliminated wet voicing     Treatment: Swallowing Guidelines posted, reviewed with pt.    Assessment:     Frederick Kong Jr. is a 82 y.o. male with an SLP diagnosis of Dysphagia.  He presented with s/s pharyngeal dysphagia on liquids.  Recommend regular textures & nectar thick liquids, chin tuck using straight straw for nectar thick liquids, meds whole in puree, thicken all liquids; no fresh or canned fruit except bananas.  Will follow.     Goals:    SLP Goals        Problem: SLP Goal    Goal Priority Disciplines Outcome   SLP Goal     SLP    Description:  Consume regular textures & liquids with no s/s pharyngeal dysphagia                    Plan:     · Patient to be seen:  5 x/week   · Plan of Care expires:     · Plan of Care reviewed with:  patient   · SLP Follow-Up:  Yes       Discharge recommendations:      Barriers to Discharge:  None    Time Tracking:     SLP Treatment Date:   04/09/18  Speech Start Time:  1102  Speech Stop Time:  1117     Speech Total Time (min):  15 min    Billable Minutes: Eval Swallow and Oral Function 15    Myrna Dykes CCC-SLP/A  04/09/2018

## 2018-04-09 NOTE — ASSESSMENT & PLAN NOTE
Acute -due to RLL   Patient's vent settings, CXR and last ABG were reviewed.-much improved ; likely extubate today   Needs bp control off sedaton     Vent Mode: A/C  Oxygen Concentration (%):  [32-50] 32  Resp Rate Total:  [18 br/min-26 br/min] 19 br/min  Vt Set:  [500 mL] 500 mL  PEEP/CPAP:  [5 cmH20] 5 cmH20  Pressure Support:  [0 cmH20] 0 cmH20  Mean Airway Pressure:  [11 cmH20] 11 cmH20    ABG    Recent Labs  Lab 04/08/18  1557   PH 7.372   PO2 108*   PCO2 37.9   HCO3 22.0*   BE -3       Will adjust vent management as follows- abg daily  Wean oxygen  Daily wean trial   Consult to pulm

## 2018-04-09 NOTE — ASSESSMENT & PLAN NOTE
Acute, no ekg changes   Likely due to sepsis/demand ischemia  Trend, echo,-pending report -   cardiology following   Continuing b blocker, asa, plavix -see AVR

## 2018-04-09 NOTE — TELEPHONE ENCOUNTER
----- Message from Kimmie Jerry MD sent at 4/7/2018  8:56 PM CDT -----  Pt can f/u on 4/16/18 for fill and pull nurse visit, 9am. No pm visit needed for pvr.    F/u with me in 4-6 weeks at next avail

## 2018-04-09 NOTE — ASSESSMENT & PLAN NOTE
Improved after IVVR- egfr c/w ckd3?   Had urethral stricture -difficult henderson placed by urology -bladder scan did not otherwise indicated severe urinary retention     Avoid non-essential nephrotoxins and  dose medications according to GFR  Avoid aceI, Monitor I/O, daily weight/keep Map >65   Check urine sodium, creatinine ,-if no improvement after ivvr   Check renal us if indicated-no need   Continue to trend

## 2018-04-09 NOTE — PLAN OF CARE
Problem: Patient Care Overview  Goal: Plan of Care Review  Outcome: Ongoing (interventions implemented as appropriate)  Patient free of falls and injuries this shift. Tolerating O2 nasal cannula well. Coughing up thick, clear to white secretions. Leslie remains patent to gravity with no clots noted.

## 2018-04-09 NOTE — NURSING
[id number]  Took Norvasc po in applesauce without difficulty.  Gave ice chips with slight cough after.   Speech therapy consulted for bedside swallow study in am.   Hydralazine 10 mg ivp given for elevated bp.  Report to oncoming shift.

## 2018-04-09 NOTE — PROGRESS NOTES
Progress Note  PULMONARY    Admit Date: 4/7/2018 04/09/2018      SUBJECTIVE:     April 9, blank look and slow to interact      PFSH and Allergies reviewed.    OBJECTIVE:     Vitals (Most recent):  Vitals:    04/09/18 0746   BP:    Pulse: 110   Resp: (!) 21   Temp:        Vitals (24 hour range):  Temp:  [97.3 °F (36.3 °C)-99.5 °F (37.5 °C)]   Pulse:  []   Resp:  [14-43]   BP: (145-200)/(65-81)   SpO2:  [99 %-100 %]   Arterial Line BP: (134-206)/(34-73)       Intake/Output Summary (Last 24 hours) at 04/09/18 0823  Last data filed at 04/09/18 0600   Gross per 24 hour   Intake          2603.51 ml   Output             1789 ml   Net           814.51 ml          Physical Exam:  The patient's neuro status (alertness,orientation,cognitive function,motor skills,), pharyngeal exam (oral lesions, hygiene, abn dentition,), Neck (jvd,mass,thyroid,nodes in neck and above/below clavicle),RESPIRATORY(symmetry,effort,fremitus,percussion,auscultation),  Cor(rhythm,heart tones including gallops,perfusion,edema)ABD(distention,hepatic&splenomegaly,tenderness,masses), Skin(rash,cyanosis),Psyc(affect,judgement,).  Exam negative except for these pertinent findings:    Pharynx good, voice good, slow and not interacting well. Chest clear with some bronchial sounds post right, no edema     Radiographs reviewed: view by direct vision  Right lung better  Results for orders placed during the hospital encounter of 04/07/18   X-Ray Chest 1 View    Narrative EXAMINATION:  XR CHEST 1 VIEW    CLINICAL HISTORY:  pneumonia;    TECHNIQUE:  A portable semi upright AP view of the chest was acquired.    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  There is an endotracheal tube present with its tip below the level of the clavicular heads.  A nasogastric tube remains in place.  There is atherosclerotic calcification present within the aortic arch.   There has been no interval detrimental change in  the radiographic appearance of the chest as compared to the previous examination with continued demonstration of patchy segmental airspace opacity/consolidative change in the right lower lung zone..  No pneumothorax.      Impression No significant interval detrimental change in the imaging appearance of the chest when compared with the prior study.      Electronically signed by: Ozzie Simental MD  Date:    04/07/2018  Time:    13:58   ]    Labs       Recent Labs  Lab 04/09/18  0433   WBC 10.80   HGB 11.8*   HCT 35.6*        Recent Labs  Lab 04/09/18  0433      K 4.0   *   CO2 20*   BUN 23   CREATININE 1.4   *   CALCIUM 8.4*   MG 2.1   PHOS 3.0   AST 15   ALT 11   ALKPHOS 68   BILITOT 0.7   PROT 5.8*   ALBUMIN 2.7*     Recent Labs  Lab 04/08/18  1557   PH 7.372   PCO2 37.9   PO2 108*   HCO3 22.0*     Microbiology Results (last 7 days)     Procedure Component Value Units Date/Time    Blood culture [[id number]] Collected:  04/07/18 0723    Order Status:  Completed Specimen:  Blood Updated:  04/08/18 1212     Blood Culture, Routine No Growth to date     Blood Culture, Routine No Growth to date    Blood culture [[id number]] Collected:  04/07/18 0722    Order Status:  Completed Specimen:  Blood Updated:  04/08/18 1212     Blood Culture, Routine No Growth to date     Blood Culture, Routine No Growth to date    Urine culture [[id number]] Collected:  04/08/18 0424    Order Status:  Sent Specimen:  Urine from Urine, Catheterized Updated:  04/08/18 1157    Culture, Respiratory with Gram Stain [[id number]] Collected:  04/07/18 1240    Order Status:  Completed Specimen:  Respiratory from Endotracheal Aspirate Updated:  04/08/18 0549     Gram Stain (Respiratory) <10 epithelial cells per low power field.     Gram Stain (Respiratory) Rare WBC's     Gram Stain (Respiratory) Rare Gram positive cocci     Gram Stain (Respiratory) Rare Gram positive rods          Impression:  Active Hospital Problems    Diagnosis   POA    *Acute respiratory failure with hypoxia [J96.01]  Yes    Essential hypertension [I10]  Yes    Urethral stricture, unspecified [N35.9]  Yes    Gross hematuria [R31.0]  Yes     Due to uretheral stricture       YUAN (acute kidney injury) [N17.9]  Yes    Elevated troponin [R74.8]  Yes    Severe sepsis [A41.9, R65.20]  Yes    Metabolic acidosis, normal anion gap (NAG) [E87.2]  Yes    H/O aortic valve replacement [Z95.2]  Not Applicable     ? Mechanical      Urinary retention [R33.9]  Yes      Resolved Hospital Problems    Diagnosis Date Resolved POA   No resolved problems to display.               Plan:   April 9, 2018  Wbc 10.8 from 21.8 4/7, sputum gpc and gpr,  cxr clearing rll  Expect aspiration part of problem with dementia and presentation ..   Could go to floor.                                    .

## 2018-04-09 NOTE — PLAN OF CARE
Met with pt and his wife who was at bedside.  Wife stated that pt did not have his hearing aides in and had difficulty hearing.  Pt's wife provided the information for the assessment.  Pt, who is independent with his self care, lives with his wife and the wife provides transportation.  Pt has a rolling walker that he uses PRN, a home nebulizer and built in shower chair.  Pt did not have home health services prior to his hospitalization but did have a history of services with [facility] and wife would like pt to discharge with their services.  Pt' sPCP is Dr. Dhiraj Dempsey, 111, cardiologist is Dr. Aguila and insurance is [facility].  Pt's discharge disposition is home with [facility].  Obtained signature for the disclosure form.      04/09/18 4274   Discharge Assessment   Assessment Type Discharge Planning Assessment   Confirmed/corrected address and phone number on facesheet? Yes   Assessment information obtained from? Other  (Pt's wife who was at bedside.)   Prior to hospitilization cognitive status: Alert/Oriented   Prior to hospitalization functional status: Independent   Current Functional Status: Independent   Lives With spouse   Able to Return to Prior Arrangements yes   Is patient able to care for self after discharge? Unable to determine at this time (comments)   Who are your caregiver(s) and their phone number(s)? (wife Cristin Kong, cell [phone] [phone])   Patient's perception of discharge disposition home health   Readmission Within The Last 30 Days no previous admission in last 30 days   Patient currently being followed by outpatient case management? No   Patient currently receives any other outside agency services? No   Equipment Currently Used at Home nebulizer;walker, rolling;other (see comments)  (built in shower chair)   Do you have any problems affording any of your prescribed medications? No  (pharmacy is [facility] on [address])   Is the patient taking medications as prescribed?  yes   Does the patient have transportation home? Yes   Transportation Available family or friend will provide   Does the patient receive services at the [facility]? No   Discharge Plan A Home Health   Patient/Family In Agreement With Plan yes

## 2018-04-09 NOTE — CONSULTS
Frederick Winchesterjuan Fernando. [id number] is a 82 y.o. male who has been consulted for vancomycin dosing.    Pharmacy consult for vancomycin dosing in no longer required.  Vancomycin was discontinued.    Thank you for allowing us to participate in this patient's care.     Jarocho Rizo, Pharmacist

## 2018-04-09 NOTE — ASSESSMENT & PLAN NOTE
Acute, likely volume depletion as  has melly; improved  after hydration and melly resolved   Trend -no need for bicarb

## 2018-04-09 NOTE — ASSESSMENT & PLAN NOTE
This patient does have evidence of infective focus  My overall impression is severe sepsis.  Antibiotics given-   Antibiotics     Start     Stop Route Frequency Ordered    04/09/18 1000  vancomycin (VANCOCIN) 1,250 mg in sodium chloride 0.9% 250 mL IVPB      -- IV Every 24 hours (non-standard times) 04/08/18 1428    04/07/18 1800  piperacillin-tazobactam 4.5 g in dextrose 5 % 100 mL IVPB (ready to mix system)      -- IV Every 8 hours (non-standard times) 04/07/18 1135          Latest lactate reviewed, they are-  No results for input(s): LACTATE in the last 24 hours.    Organ dysfunction indicated by Acute kidney injury and Acute respiratory failure  Source- pneumonia  Source control Achieved by- mech vent, ivabx, nebulizer , ivvr  Treatment of elevated troponin    Blood cult ng  Sputum cult pend; few gm +cocci  Urine culture -pend

## 2018-04-09 NOTE — PLAN OF CARE
04/09/18 0746   Patient Assessment/Suction   Level of Consciousness (AVPU) alert   Respiratory Effort Unlabored   ANASTASIA Breath Sounds clear   LLL Breath Sounds clear;diminished   RUL Breath Sounds clear   RML Breath Sounds diminished   RLL Breath Sounds crackles fine;diminished   PRE-TX-O2-ETCO2   O2 Device (Oxygen Therapy) nasal cannula   $ Is the patient on Low Flow Oxygen? Yes   Flow (L/min) 3   Oxygen Concentration (%) 32   SpO2 100 %   Pulse Oximetry Type Continuous   $ Pulse Oximetry - Multiple Charge Pulse Oximetry - Multiple   Pulse 110   Resp (!) 21   Aerosol Therapy   $ Aerosol Therapy Charges Aerosol Treatment   Respiratory Treatment Status given   SVN/Inhaler Treatment Route mask   Position During Treatment HOB at 45 degrees   Patient Tolerance good   Post-Treatment   Post-treatment Heart Rate (beats/min) 112   Post-treatment Resp Rate (breaths/min) 24   All Fields Breath Sounds unchanged   Ready to Wean/Extubation Screen   FIO2<=50 (chart decimal) 0.32

## 2018-04-10 PROBLEM — R41.0 DELIRIUM: Status: RESOLVED | Noted: 2018-04-10 | Resolved: 2018-04-10

## 2018-04-10 PROBLEM — R41.0 DELIRIUM: Status: ACTIVE | Noted: 2018-04-10

## 2018-04-10 LAB
ALBUMIN SERPL BCP-MCNC: 2.5 G/DL
ALP SERPL-CCNC: 61 U/L
ALT SERPL W/O P-5'-P-CCNC: 14 U/L
ANION GAP SERPL CALC-SCNC: 8 MMOL/L
AST SERPL-CCNC: 18 U/L
BACTERIA SPEC AEROBE CULT: NORMAL
BACTERIA UR CULT: NO GROWTH
BILIRUB SERPL-MCNC: 0.7 MG/DL
BUN SERPL-MCNC: 20 MG/DL
CALCIUM SERPL-MCNC: 8.3 MG/DL
CHLORIDE SERPL-SCNC: 115 MMOL/L
CO2 SERPL-SCNC: 21 MMOL/L
CREAT SERPL-MCNC: 1.3 MG/DL
EST. GFR  (AFRICAN AMERICAN): 59 ML/MIN/1.73 M^2
EST. GFR  (NON AFRICAN AMERICAN): 51 ML/MIN/1.73 M^2
GLUCOSE SERPL-MCNC: 104 MG/DL
GRAM STN SPEC: NORMAL
POTASSIUM SERPL-SCNC: 3.8 MMOL/L
PROT SERPL-MCNC: 5.6 G/DL
SODIUM SERPL-SCNC: 144 MMOL/L

## 2018-04-10 PROCEDURE — 25000003 PHARM REV CODE 250: Performed by: INTERNAL MEDICINE

## 2018-04-10 PROCEDURE — G8988 SELF CARE GOAL STATUS: HCPCS | Mod: CJ

## 2018-04-10 PROCEDURE — 63600175 PHARM REV CODE 636 W HCPCS: Performed by: HOSPITALIST

## 2018-04-10 PROCEDURE — 99232 SBSQ HOSP IP/OBS MODERATE 35: CPT | Mod: ,,, | Performed by: INTERNAL MEDICINE

## 2018-04-10 PROCEDURE — 97530 THERAPEUTIC ACTIVITIES: CPT

## 2018-04-10 PROCEDURE — 94640 AIRWAY INHALATION TREATMENT: CPT

## 2018-04-10 PROCEDURE — 63600175 PHARM REV CODE 636 W HCPCS: Performed by: EMERGENCY MEDICINE

## 2018-04-10 PROCEDURE — 63600175 PHARM REV CODE 636 W HCPCS: Performed by: INTERNAL MEDICINE

## 2018-04-10 PROCEDURE — 94760 N-INVAS EAR/PLS OXIMETRY 1: CPT

## 2018-04-10 PROCEDURE — 97535 SELF CARE MNGMENT TRAINING: CPT

## 2018-04-10 PROCEDURE — 97165 OT EVAL LOW COMPLEX 30 MIN: CPT

## 2018-04-10 PROCEDURE — 25000003 PHARM REV CODE 250: Performed by: HOSPITALIST

## 2018-04-10 PROCEDURE — 80053 COMPREHEN METABOLIC PANEL: CPT

## 2018-04-10 PROCEDURE — 94761 N-INVAS EAR/PLS OXIMETRY MLT: CPT

## 2018-04-10 PROCEDURE — 27000221 HC OXYGEN, UP TO 24 HOURS

## 2018-04-10 PROCEDURE — G8987 SELF CARE CURRENT STATUS: HCPCS | Mod: CK

## 2018-04-10 PROCEDURE — 25000242 PHARM REV CODE 250 ALT 637 W/ HCPCS: Performed by: HOSPITALIST

## 2018-04-10 PROCEDURE — 25000003 PHARM REV CODE 250: Performed by: EMERGENCY MEDICINE

## 2018-04-10 PROCEDURE — 97162 PT EVAL MOD COMPLEX 30 MIN: CPT

## 2018-04-10 PROCEDURE — 11000001 HC ACUTE MED/SURG PRIVATE ROOM

## 2018-04-10 RX ORDER — ENOXAPARIN SODIUM 100 MG/ML
40 INJECTION SUBCUTANEOUS EVERY 24 HOURS
Status: DISCONTINUED | OUTPATIENT
Start: 2018-04-10 | End: 2018-04-12 | Stop reason: HOSPADM

## 2018-04-10 RX ADMIN — ENOXAPARIN SODIUM 40 MG: 100 INJECTION SUBCUTANEOUS at 05:04

## 2018-04-10 RX ADMIN — METOPROLOL SUCCINATE 100 MG: 50 TABLET, EXTENDED RELEASE ORAL at 09:04

## 2018-04-10 RX ADMIN — ASPIRIN 81 MG: 81 TABLET, COATED ORAL at 09:04

## 2018-04-10 RX ADMIN — IPRATROPIUM BROMIDE AND ALBUTEROL SULFATE 3 ML: .5; 3 SOLUTION RESPIRATORY (INHALATION) at 12:04

## 2018-04-10 RX ADMIN — LOSARTAN POTASSIUM 100 MG: 25 TABLET, FILM COATED ORAL at 09:04

## 2018-04-10 RX ADMIN — IPRATROPIUM BROMIDE AND ALBUTEROL SULFATE 3 ML: .5; 3 SOLUTION RESPIRATORY (INHALATION) at 07:04

## 2018-04-10 RX ADMIN — AMLODIPINE BESYLATE 5 MG: 5 TABLET ORAL at 09:04

## 2018-04-10 RX ADMIN — IPRATROPIUM BROMIDE AND ALBUTEROL SULFATE 3 ML: .5; 3 SOLUTION RESPIRATORY (INHALATION) at 01:04

## 2018-04-10 RX ADMIN — PIPERACILLIN SODIUM AND TAZOBACTAM SODIUM 4.5 G: 4; .5 INJECTION, POWDER, LYOPHILIZED, FOR SOLUTION INTRAVENOUS at 01:04

## 2018-04-10 RX ADMIN — PIPERACILLIN SODIUM AND TAZOBACTAM SODIUM 4.5 G: 4; .5 INJECTION, POWDER, LYOPHILIZED, FOR SOLUTION INTRAVENOUS at 12:04

## 2018-04-10 RX ADMIN — CLOPIDOGREL BISULFATE 75 MG: 75 TABLET, FILM COATED ORAL at 09:04

## 2018-04-10 RX ADMIN — PIPERACILLIN SODIUM AND TAZOBACTAM SODIUM 4.5 G: 4; .5 INJECTION, POWDER, LYOPHILIZED, FOR SOLUTION INTRAVENOUS at 05:04

## 2018-04-10 RX ADMIN — HYDRALAZINE HYDROCHLORIDE 10 MG: 20 INJECTION INTRAMUSCULAR; INTRAVENOUS at 06:04

## 2018-04-10 RX ADMIN — HYDRALAZINE HYDROCHLORIDE 10 MG: 20 INJECTION INTRAMUSCULAR; INTRAVENOUS at 01:04

## 2018-04-10 NOTE — PROGRESS NOTES
" [facility]  Adult Nutrition  Progress Note    SUMMARY       Recommendations  Recommendation/Intervention: 1.) Continue with Adult regular diet, texture per SLP 2.) Recommend pudding TID  Goals: 1.) PO intake >/= 50% while admitted  Nutrition Goal Status: new  Communication of RD Recs: reviewed with RN     1. Acute respiratory failure, unspecified whether with hypoxia or hypercapnia    2. SOB (shortness of breath)    3. Elevated troponin    4. Urinary retention    5. Urethral stricture, unspecified stricture type    6. Acute respiratory failure with hypoxia      Past Medical History:   Diagnosis Date    Cancer     Coronary artery disease     Gout     Hyperlipemia     Hypertension          Reason for Assessment  Reason for Assessment: identified at risk by screening criteria  Interdisciplinary Rounds: attended  General Information Comments: Admits with acute respiratory failure. Alert, wife at bedside. Consumed approx 50% at breakfast. Tolerated well. Denies nausea/vomiting.     Nutrition Risk Screen  Nutrition Risk Screen: tube feeding or parenteral nutrition    Nutrition/Diet History  Do you have any cultural, spiritual, Mormon conflicts, given your current situation?: RENE  Food Allergies: NKFA  Factors Affecting Nutritional Intake: None identified at this time    Anthropometrics  Temp: 98.8 °F (37.1 °C)  Height Method: Stated  Height: 5' 7"  Height (inches): 67 in  Weight Method: Bed Scale  Weight: 77.1 kg (169 lb 15.6 oz)  Weight (lb): 169.98 lb  Ideal Body Weight (IBW), Male: 148 lb  % Ideal Body Weight, Male (lb): 114.85 lb  BMI (Calculated): 26.7  BMI Grade: 25 - 29.9 - overweight  Usual Body Weight (UBW), k.72 kg  % Usual Body Weight: 106.24  % Weight Change From Usual Weight: 6.02 %      Lab/Procedures/Meds  Pertinent Labs Reviewed: reviewed  BMP  Lab Results   Component Value Date     04/10/2018    K 3.8 04/10/2018     (H) 04/10/2018    CO2 21 (L) 04/10/2018    " BUN 20 04/10/2018    CREATININE 1.3 04/10/2018    CALCIUM 8.3 (L) 04/10/2018    ANIONGAP 8 04/10/2018    ESTGFRAFRICA 59 (A) 04/10/2018    EGFRNONAA 51 (A) 04/10/2018     Lab Results   Component Value Date    ALBUMIN 2.5 (L) 04/10/2018     Lab Results   Component Value Date    CALCIUM 8.3 (L) 04/10/2018    PHOS 3.0 04/09/2018     No results for input(s): POCTGLUCOSE in the last 24 hours.    Pertinent Medications Reviewed: reviewed  Scheduled Meds:   albuterol-ipratropium 2.5mg-0.5mg/3mL  3 mL Nebulization Q6H    amLODIPine  5 mg Oral Daily    aspirin  81 mg Oral Daily    clopidogrel  75 mg Oral Daily    enoxaparin  40 mg Subcutaneous Daily    losartan  100 mg Oral Daily    metoprolol succinate  100 mg Oral Daily    piperacillin-tazobactam (ZOSYN) IVPB  4.5 g Intravenous Q8H         Physical Findings/Assessment  Oral/Mouth Cavity: tooth/teeth missing  Skin: intact (aletha score 14)    Estimated/Assessed Needs  Weight Used For Calorie Calculations: 77.1 kg (169 lb 15.6 oz)  Energy Calorie Requirements (kcal): 9188-9605  Energy Need Method: [place] (x1.2)  Protein Requirements: 77-92  Weight Used For Protein Calculations: 77.1 kg (169 lb 15.6 oz) (1-1.2 g/kg)  Fluid Need Method: RDA Method  RDA Method (mL): 1700      Nutrition Prescription Ordered  Current Diet Order: Adult regular diet   Nutrition Order Comments: nectar thick liquids    Evaluation of Received Nutrient/Fluid Intake  Oral Fluid (mL): 610  Tolerance: tolerating  % Intake of Estimated Energy Needs: 25 - 50 %  % Meal Intake: 25 - 50 %    Nutrition Risk  Level of Risk/Frequency of Follow-up:  (x2 weekly)     Assessment and Plan  Nutrition Problem  Inadequate energy intake    Related to (etiology):   Difficulty swallowing     Signs and Symptoms (as evidenced by):   Nectar thick liquids     Interventions/Recommendations (treatment strategy):  Continue with diet     Nutrition Diagnosis Status:   New         Monitor and Evaluation  Food and  Nutrient Intake: energy intake, food and beverage intake  Food and Nutrient Adminstration: diet order  Anthropometric Measurements: weight, weight change, body mass index  Biochemical Data, Medical Tests and Procedures: electrolyte and renal panel, glucose/endocrine profile, lipid profile  Nutrition-Focused Physical Findings: overall appearance     Nutrition Follow-Up  RD Follow-up?: Yes    Nutrition discharge planning: regular diet , texture per SLP

## 2018-04-10 NOTE — ASSESSMENT & PLAN NOTE
"Was present on admission.  On following abx:  Antibiotics     Start     Stop Route Frequency Ordered    04/07/18 1800  piperacillin-tazobactam 4.5 g in dextrose 5 % 100 mL IVPB (ready to mix system)      -- IV Every 8 hours (non-standard times) 04/07/18 1135      Follow SLP's recommendations:  "Diet recommendations:  Regular (no fresh or canned fruit except bananas), Nectar Thick    Aspiration Precautions: Assistance with meals and Assistance with thickening liquids, Chin tuck, Meds whole buried in puree, Monitor for s/s of aspiration and Standard aspiration precautions"    "

## 2018-04-10 NOTE — PT/OT/SLP EVAL
"Physical Therapy Evaluation    Patient Name:  Frederick Kong Jr.   MRN:  [id number]    Recommendations:     Discharge Recommendations:  nursing facility, skilled   Discharge Equipment Recommendations: none   Barriers to discharge: Decreased caregiver support    Assessment:     Frederick Kong Jr. is a 82 y.o. male admitted with a medical diagnosis of Acute respiratory failure with hypoxia.  He presents with the following impairments/functional limitations:  weakness, impaired endurance, impaired functional mobilty, gait instability, impaired balance, decreased upper extremity function, decreased lower extremity function, impaired cardiopulmonary response to activity. Pt is deconditioned from hospital stay and immobility and requires assist for all mobility. He would benefit from SNF at discharge.    Rehab Prognosis:  good; patient would benefit from acute skilled PT services to address these deficits and reach maximum level of function.      Recent Surgery: * No surgery found *      Plan:     During this hospitalization, patient to be seen 6 x/week to address the above listed problems via gait training, therapeutic activities, therapeutic exercises  · Plan of Care Expires:  05/09/18   Plan of Care Reviewed with: patient, spouse    Subjective     Communicated with RN prior to session.  Patient found supine in bed upon PT entry to room, agreeable to evaluation.      Chief Complaint: Need to cough  Patient comments/goals: "I feel weak."  Pain/Comfort:  · Pain Rating 1: 0/10    Patients cultural, spiritual, Anglican conflicts given the current situation: None    Living Environment:  Pt lives with his wife in a [facility] with 2 MACIEL in front, a threshold in the garage.   Prior to admission, patients level of function was independent, occasionally using a rolling walker.  Patient has the following equipment: walker, rolling, cane, straight, grab bar. Upon discharge, patient will have assistance from her .    Objective: "     Patient found with: oxygen, pulse ox (continuous), telemetry, blood pressure cuff, arterial line, henderson catheter, peripheral IV     General Precautions: Standard, fall, nectar thick, hearing impaired, aspiration   Orthopedic Precautions:N/A   Braces: N/A     Exams:  · Cognitive Exam:  Patient is oriented to Person, Place, Time (year) and Situation   · Gross Motor Coordination:  WFL  · Postural Exam:  Patient presented with the following abnormalities:    · -       Rounded shoulders  · -       Forward head  · -       Posterior pelvic tilt  · Sensation:    · -       Intact  · RLE ROM: WFL  · RLE Strength: 3+/5 throughout  · LLE ROM: WFL  · LLE Strength: 3+/5 throughout    Functional Mobility:  · Bed Mobility:  · Scooting: contact guard assistance seated EOB  · Supine to Sit: minimum assistance  · Transfers  · Sit to Stand:  moderate assistance with rolling walker  · Bed to Chair: moderate assistance with  rolling walker  using  Stand Pivot   · Cues for upright posture and anterior weight shift    AM-PAC 6 CLICK MOBILITY  Total Score:10     Patient left up in chair with all lines intact, call button in reach and RN notified.    GOALS:    Physical Therapy Goals        Problem: Physical Therapy Goal    Goal Priority Disciplines Outcome Goal Variances Interventions   Physical Therapy Goal     PT/OT, PT Ongoing (interventions implemented as appropriate)     Description:  Goals to be met by: 2018     Patient will increase functional independence with mobility by performin. Supine to sit with Stand-by Assistance  2. Sit to supine with Stand-by Assistance  3. Sit to stand transfer with Contact Guard Assistance  4. Bed to chair transfer with Contact Guard Assistance using Rolling Walker  5. Gait  x 50 feet with Contact Guard Assistance using Rolling Walker.   6. Lower extremity exercise program x10 reps, with supervision                      History:     Past Medical History:   Diagnosis Date    Cancer      Coronary artery disease     Gout     Hyperlipemia     Hypertension        Past Surgical History:   Procedure Laterality Date    APPENDECTOMY      BRAIN SURGERY      CARDIAC SURGERY      TONSILLECTOMY         Clinical Decision Making:     History  Co-morbidities and personal factors that may impact the plan of care Examination  Body Structures and Functions, activity limitations and participation restrictions that may impact the plan of care Clinical Presentation   Decision Making/ Complexity Score   Co-morbidities:   [] Time since onset of injury / illness / exacerbation  [] Status of current condition  []Patient's cognitive status and safety concerns    [] Multiple Medical Problems (see med hx)  Personal Factors:   [] Patient's age  [] Prior Level of function   [] Patient's home situation (environment and family support)  [] Patient's level of motivation  [] Expected progression of patient      HISTORY:(criteria)    [] 45450 - no personal factors/history    [] 93759 - has 1-2 personal factor/comorbidity     [] 73984 - has >3 personal factor/comorbidity     Body Regions:  [] Objective examination findings  [] Head     []  Neck  [] Trunk   [] Upper Extremity  [] Lower Extremity    Body Systems:  [] For communication ability, affect, cognition, language, and learning style: the assessment of the ability to make needs known, consciousness, orientation (person, place, and time), expected emotional /behavioral responses, and learning preferences (eg, learning barriers, education  needs)  [] For the neuromuscular system: a general assessment of gross coordinated movement (eg, balance, gait, locomotion, transfers, and transitions) and motor function  (motor control and motor learning)  [] For the musculoskeletal system: the assessment of gross symmetry, gross range of motion, gross strength, height, and weight  [] For the integumentary system: the assessment of pliability(texture), presence of scar formation, skin  color, and skin integrity  [] For cardiovascular/pulmonary system: the assessment of heart rate, respiratory rate, blood pressure, and edema     Activity limitations:    [] Patient's cognitive status and saf ety concerns          [] Status of current condition      [] Weight bearing restriction  [] Cardiopulmunary Restriction    Participation Restrictions:   [] Goals and goal agreement with the patient     [] Rehab potential (prognosis) and probable outcome      Examination of Body System: (criteria)    [] 26283 - addressing 1-2 elements    [] 18081 - addressing a total of 3 or more elements     [] 16317 -  Addressing a total of 4 or more elements         Clinical Presentation: (criteria)  Choose one     On examination of body system using standardized tests and measures patient presents with (CHOOSE ONE) elements from any of the following: body structures and functions, activity limitations, and/or participation restrictions.  Leading to a clinical presentation that is considered (CHOOSE ONE)                              Clinical Decision Making  (Eval Complexity):  Choose One     Time Tracking:     PT Received On: 04/10/18  PT Start Time: 1131     PT Stop Time: 1153  PT Total Time (min): 22 min     Billable Minutes: Evaluation 12 and Therapeutic Activity 10      Jessica LeJeune, PT, DPT

## 2018-04-10 NOTE — PT/OT/SLP EVAL
"Occupational Therapy   Evaluation    Name: Frederick Kong Jr.  MRN: [id number]  Admitting Diagnosis:  Acute respiratory failure with hypoxia      Recommendations:     Discharge Recommendations: nursing facility, skilled  Discharge Equipment Recommendations:  none  Barriers to discharge:  None    History:     Occupational Profile:  Living Environment: Pt lives with wife in a [facility] with 2 MACIEL.   Previous level of function: Pt was independent with ADLs and functional mobility.  Equipment Owned:  nebulizer, other (see comments), walker, rolling, cane, straight, grab bar (built in shower chair)  Assistance upon Discharge: Pt will receive assistance from wife and home health nurse.    Past Medical History:   Diagnosis Date    Cancer     Coronary artery disease     Gout     Hyperlipemia     Hypertension        Past Surgical History:   Procedure Laterality Date    APPENDECTOMY      BRAIN SURGERY      CARDIAC SURGERY      TONSILLECTOMY         Subjective     Chief Complaint: SOB upon exertion.   Patient comments/goals: "I had no problems getting around at home."  Communicated with: nurse [place] prior to session.  Pain/Comfort:  · Pain Rating 1: 0/10  · Pain Rating Post-Intervention 1: 0/10    Patients cultural, spiritual, Jewish conflicts given the current situation:      Objective:     Patient found with: SCD, oxygen, peripheral IV, arterial line, henderson catheter, telemetry, pulse ox (continuous)    General Precautions: Standard, aspiration, hearing impaired, fall, nectar thick   Orthopedic Precautions:N/A   Braces: N/A     Occupational Performance:    Bed Mobility:    · Patient completed Scooting/Bridging with contact guard assistance  · Patient completed Supine to Sit with moderate assistance with UB & LB support  · Patient completed Sit to Supine with moderate assistance with UB & LB support    Functional Mobility/Transfers:  · Patient completed Sit <> Stand Transfer with minimum assistance  with  rolling walker "   · Functional Mobility: Pt stood from EOB with min A & Rw. Pt demonstrated some instability with static standing, requiring min A to maintain balance. Pt took 6 steps forward & backward with RW from EOB with minimal LOB, requiring CGA > min A for balance during ambulation.     Activities of Daily Living:  · LB Dressing: maximal assistance to don socks while supine.    Cognitive/Visual Perceptual:  Cognitive/Psychosocial Skills:     -       Oriented to: Person, Place and Time (pt knew year, but not month)  -       Follows Commands/attention:Follows two-step commands  -       Communication: clear/fluent; pt slightly K[place]  -       Safety awareness/insight to disability: impaired   -       Mood/Affect/Coping skills/emotional control: Appropriate to situation  Visual/Perceptual:      -Intact    Physical Exam:  Postural examination/scapula alignment:    -       Rounded shoulders  -       Forward head  -       Posterior pelvic tilt  Upper Extremity Range of Motion:     -       Right Upper Extremity: WFL  -       Left Upper Extremity: WFL  Upper Extremity Strength:    -       Right Upper Extremity: 3+/5  -       Left Upper Extremity: 3+/5   Strength:    -       Right Upper Extremity: WFL  -       Left Upper Extremity: WFL  Fine Motor Coordination:    -       Intact  Gross motor coordination:   WFL    Patient left HOB elevated with all lines intact, nurse  notified and wife present    AMPA 6 Click:  AMPA Total Score: 16    Treatment & Education:  OT educated patient on safety with walker use for functional mobility with cues for hand placement & sequencing.   OT educated patient on energy conservation techniques to reduce demand on cardiovascular system with performance of ADL tasks. Pt required rest breaks after supine>sit and ambulation.   After moving sit>supine, patient required total A x 2 to reposition to HOB.        Education:    Assessment:     Frederick Kong Jr. is a 82 y.o. male with a medical diagnosis of  "Acute respiratory failure with hypoxia.  He presents with decreased activity tolerance with bed mobility and ambulation 2/2 respiratory impairment.  Performance deficits affecting function are weakness, impaired endurance, impaired self care skills, gait instability, impaired functional mobilty, impaired balance, decreased safety awareness, impaired cardiopulmonary response to activity, decreased lower extremity function.      Rehab Prognosis:  good; patient would benefit from acute skilled OT services to address these deficits and reach maximum level of function.         Clinical Decision Makin.  OT Low:  "Pt evaluation falls under low complexity for evaluation coding due to performance deficits noted in 1-3 areas as stated above and 0 co-morbities affecting current functional status. Data obtained from problem focused assessments. No modifications or assistance was required for completion of evaluation. Only brief occupational profile and history review completed."     Plan:     Patient to be seen 4 x/week to address the above listed problems via self-care/home management, therapeutic activities, therapeutic exercises  · Plan of Care Expires: 18  · Plan of Care Reviewed with: patient, spouse    This Plan of care has been discussed with the patient who was involved in its development and understands and is in agreement with the identified goals and treatment plan    GOALS:    Occupational Therapy Goals        Problem: Occupational Therapy Goal    Goal Priority Disciplines Outcome Interventions   Occupational Therapy Goal     OT, PT/OT Ongoing (interventions implemented as appropriate)    Description:  Goals to be met by: 2018     Patient will increase functional independence with ADLs by performing:    UE Dressing with Contact Guard Assistance.  LE Dressing with Contact Guard Assistance.  Grooming while standing at sink with Contact Guard Assistance.  Toileting from toilet with Stand-by " Assistance for hygiene and clothing management.   Supine to sit with Contact Guard Assistance.  Stand pivot transfers with Contact Guard Assistance.  Toilet transfer to toilet with Contact Guard Assistance.  Upper extremity exercise program x15 reps, with supervision.                      Time Tracking:     OT Date of Treatment: 04/10/18  OT Start Time: 0822  OT Stop Time: 0900  OT Total Time (min): 38 min    Billable Minutes:Evaluation 10  Self Care/Home Management 20  Therapeutic Activity 8    Juan Manuel Jesus, OT  4/10/2018

## 2018-04-10 NOTE — PROGRESS NOTES
[facility] Hospital Medicine  Progress Note    Patient Name: Frederick Kong Jr.  MRN: [id number]  Patient Class: IP- Inpatient   Admission Date: 4/7/2018  Length of Stay: 2 days  Attending Physician: Harsha Dan MD  Primary Care Provider: Dhiraj Dempsey III, MD        Subjective:     Principal Problem:Acute respiratory failure with hypoxia    HPI:  82-year-old male presented with a chief complaint of shortness of breath.   Per history taken from his wife he got up in the middle of the night and when he didn't return to bed she found him suddenly struggling to breath so called the ambulance and when they arrived his oxygen sat was in the 60's. On arrival to ER evaluation revealed   The patient's chest x-ray was significant for a large  infiltrate on the right and required intubation.  .  The patient's labs were significant for an elevated white blood cell count, elevated troponin, elevated creatinine, elevated BNP, and elevated lactic acid.  The patient's EKG showed no acute abnormalities per my independent interpretation.  The patient likely has a mixed picture causing his respiratory decline.  The patient was aggressively treated per sepsis protocol with  IV fluids and broad-spectrum IV antibiotics for pneumonia --likely aspiration   He has hx of stent placement at the time of his aortic valve replacement in 2001 and was on coumadin for many years however has been changed to asa and plavix within the last year. His cardiologist is Dr Gonzalez. He has never had an MI or heart failure. He has a remote history of smoking and is otherwise alert and active.     Hospital Course:  No notes on file    Interval History:  Has a blunt affect.  Low grade temps today.  Frequent coughing with white secretions.  Breathing is improved.    Review of Systems   Constitutional: Negative for chills and fever.   Respiratory: Positive for cough and shortness of breath.    Cardiovascular: Negative for chest pain.    Gastrointestinal: Negative for abdominal pain.     Objective:     Vital Signs (Most Recent):  Temp: 99.6 °F (37.6 °C) (04/09/18 1600)  Pulse: 93 (04/09/18 2145)  Resp: (!) 22 (04/09/18 2145)  BP: (!) 172/72 (04/09/18 2100)  SpO2: 100 % (04/09/18 2145) Vital Signs (24h Range):  Temp:  [98.7 °F (37.1 °C)-100.4 °F (38 °C)] 99.6 °F (37.6 °C)  Pulse:  [] 93  Resp:  [18-35] 22  SpO2:  [100 %] 100 %  BP: (150-186)/(67-90) 172/72  Arterial Line BP: (115-171)/(33-50) 150/40     Weight: 76.1 kg (167 lb 12.3 oz)  Body mass index is 26.28 kg/m².    Intake/Output Summary (Last 24 hours) at 04/09/18 2221  Last data filed at 04/09/18 1726   Gross per 24 hour   Intake           1772.5 ml   Output             1875 ml   Net           -102.5 ml      Physical Exam   Constitutional: He is oriented to person, place, and time. No distress.   HENT:   Mouth/Throat: Oropharynx is clear and moist.   Eyes: Right eye exhibits no discharge. Left eye exhibits no discharge. No scleral icterus.   Neck: Neck supple. No JVD present.   Cardiovascular: Normal rate and regular rhythm.    Pulmonary/Chest: Effort normal. No stridor. He has decreased breath sounds in the right lower field. He has rhonchi in the right lower field.   Abdominal: Soft. Bowel sounds are normal. He exhibits no distension. There is no tenderness.   Musculoskeletal: He exhibits no edema or deformity.   Neurological: He is alert and oriented to person, place, and time.   Skin: Skin is warm and dry. No rash noted. He is not diaphoretic.   Psychiatric: His affect is blunt. His speech is delayed. He is slowed. Cognition and memory are impaired.       Significant Labs: All pertinent labs within the past 24 hours have been reviewed.    Significant Imaging: I have reviewed all pertinent imaging results/findings within the past 24 hours.    Assessment/Plan:      * Acute respiratory failure with hypoxia    Due to pneumonia.  Improving.  Monitor o2 sats and effort of  "breathing.  Pulmonologist following.  Extubated 4/8.          Aspiration pneumonia of both lower lobes    Was present on admission.  On following abx:  Antibiotics     Start     Stop Route Frequency Ordered    04/07/18 1800  piperacillin-tazobactam 4.5 g in dextrose 5 % 100 mL IVPB (ready to mix system)      -- IV Every 8 hours (non-standard times) 04/07/18 1135      Follow SLP's recommendations:  "Diet recommendations:  Regular (no fresh or canned fruit except bananas), Nectar Thick    Aspiration Precautions: Assistance with meals and Assistance with thickening liquids, Chin tuck, Meds whole buried in puree, Monitor for s/s of aspiration and Standard aspiration precautions"          Essential hypertension    Chronic, controlled.  Monitor BP closely.  Will continue BP medications as needed for sustained BP control.  Hypertension Medications             amLODIPine (NORVASC) 5 MG tablet Take 5 mg by mouth once daily.    furosemide (LASIX) 20 MG tablet Take 10 mg by mouth every other day.    losartan (COZAAR) 100 MG tablet Take 100 mg by mouth once daily.    metoprolol succinate (TOPROL-XL) 100 MG 24 hr tablet Take 100 mg by mouth once daily.      Hospital Medications             amLODIPine tablet 5 mg Take 1 tablet (5 mg total) by mouth once daily.    hydrALAZINE injection 10 mg Inject 0.5 mLs (10 mg total) into the vein every 4 (four) hours as needed for SBP greater than (SBP . 160).    losartan tablet 100 mg Take 4 tablets (100 mg total) by mouth once daily.    metoprolol injection 5 mg Inject 5 mLs (5 mg total) into the vein every 6 (six) hours as needed for SBP greater than (>160).    metoprolol succinate (TOPROL-XL) 24 hr tablet 100 mg Take 1 tablet (100 mg total) by mouth once daily.    metoprolol injection 5 mg (Discontinued) Inject 5 mLs (5 mg total) into the vein every 6 (six) hours as needed for SBP greater than (>160).                      Urethral stricture, unspecified    See "urinary retention"        " "  Urinary retention    Acute.  Required dilation of ureter by series of Leslie insertions of increasing diameter.  Will maintain Leslie for now.  Dr. Jerry made following rec's:  "1.  Leslie will remain x 1 week (4/15/18)- we  Will arrange f/u for him on 4/16/18 for nurse visit fill and pull in am. And then f/u with me in 4 to 6 weeks.    2. Scar tissue likely to return unless he does CIC or has a definitve treatment, explained this to wife."        H/O aortic valve replacement    Has mechanical AV.  Only on antiplatelets; not anticoagulants.  He has history of bleeding.  High risk of intracranial bleeding.        Elevated troponin    Due to severe sepsis.  Not NSTEMI.          YUAN (acute kidney injury)    Due to severe sepsis.  Improved as patient has been given IVF.  Monitor cr and UOP.  Lab Results   Component Value Date    CREATININE 1.4 04/09/2018                 VTE Risk Mitigation         Ordered     enoxaparin injection 40 mg  Daily     Route:  Subcutaneous        04/09/18 1501     IP VTE HIGH RISK PATIENT  Once      04/07/18 1235     Place CJ hose  Until discontinued      04/07/18 1135     Place sequential compression device  Until discontinued      04/07/18 1135          Harsha Dan MD  Department of Hospital Medicine   [facility]  "

## 2018-04-10 NOTE — PLAN OF CARE
Problem: Patient Care Overview  Goal: Plan of Care Review  Outcome: Ongoing (interventions implemented as appropriate)  Recommendations  Recommendation/Intervention: 1.) Continue with Adult regular diet, texture per SLP 2.) Recommend pudding TID  Goals: 1.) PO intake >/= 50% while admitted  Nutrition Goal Status: new  Communication of RD Recs: reviewed with RN

## 2018-04-10 NOTE — ASSESSMENT & PLAN NOTE
Due to severe sepsis.  Improved as patient has been given IVF.  Monitor cr and UOP.  Lab Results   Component Value Date    CREATININE 1.4 04/09/2018

## 2018-04-10 NOTE — PLAN OF CARE
04/10/18 0748   Patient Assessment/Suction   Level of Consciousness (AVPU) alert   All Lung Fields Breath Sounds coarse;crackles   PRE-TX-O2-ETCO2   O2 Device (Oxygen Therapy) nasal cannula   Flow (L/min) 3   Oxygen Concentration (%) 32   SpO2 100 %   Pulse Oximetry Type Continuous   $ Pulse Oximetry - Single Charge Pulse Oximetry - Single   Pulse 98   Resp 18   Aerosol Therapy   $ Aerosol Therapy Charges Aerosol Treatment   Respiratory Treatment Status given   SVN/Inhaler Treatment Route mask   Position During Treatment HOB at 45 degrees   Patient Tolerance good   Post-Treatment   Post-treatment Heart Rate (beats/min) 101   Post-treatment Resp Rate (breaths/min) 18   All Fields Breath Sounds unchanged   Ready to Wean/Extubation Screen   FIO2<=50 (chart decimal) 0.32

## 2018-04-10 NOTE — PLAN OF CARE
04/09/18 1956   Patient Assessment/Suction   Level of Consciousness (AVPU) alert   Respiratory Effort Unlabored   All Lung Fields Breath Sounds crackles   ANASTASIA Breath Sounds crackles   LLL Breath Sounds crackles   RUL Breath Sounds crackles   RML Breath Sounds crackles   RLL Breath Sounds crackles   PRE-TX-O2-ETCO2   O2 Device (Oxygen Therapy) nasal cannula w/ humidification   $ Is the patient on Low Flow Oxygen? Yes   Flow (L/min) 3   SpO2 100 %   Pulse Oximetry Type Continuous   $ Pulse Oximetry - Multiple Charge Pulse Oximetry - Multiple   Pulse 90   Resp 18   Aerosol Therapy   $ Aerosol Therapy Charges Aerosol Treatment   Respiratory Treatment Status given   SVN/Inhaler Treatment Route with oxygen;mask   Position During Treatment HOB at 45 degrees   Patient Tolerance good   Post-Treatment   Post-treatment Heart Rate (beats/min) 87   Post-treatment Resp Rate (breaths/min) 20   All Fields Breath Sounds unchanged

## 2018-04-10 NOTE — ASSESSMENT & PLAN NOTE
"Acute.  Required dilation of ureter by series of Leslie insertions of increasing diameter.  Will maintain Leslie for now.  Dr. Jerry made following rec's:  "1.  Leslie will remain x 1 week (4/15/18)- we  Will arrange f/u for him on 4/16/18 for nurse visit fill and pull in am. And then f/u with me in 4 to 6 weeks.    2. Scar tissue likely to return unless he does CIC or has a definitve treatment, explained this to wife."  "

## 2018-04-10 NOTE — PLAN OF CARE
"Problem: Patient Care Overview  Goal: Plan of Care Review  Outcome: Ongoing (interventions implemented as appropriate)  Patient free of falls or injury this shift. Coughing up secretions and with assistance able to use yanker to assist evacuation of secrections. Leslie remains patent with good urine output and no clots noted. Frequent PVC noted. Did not sleep well last night states "Don't sleep well in hospital, not sleeping with my wife". PRN antihypertensives given,restarted po antihypertensive home meds.      "

## 2018-04-10 NOTE — ASSESSMENT & PLAN NOTE
Has mechanical AV.  Only on antiplatelets; not anticoagulants.  He has history of bleeding.  High risk of intracranial bleeding.

## 2018-04-10 NOTE — PLAN OF CARE
Problem: Occupational Therapy Goal  Goal: Occupational Therapy Goal  Goals to be met by: 4/24/2018     Patient will increase functional independence with ADLs by performing:    UE Dressing with Contact Guard Assistance.  LE Dressing with Contact Guard Assistance.  Grooming while standing at sink with Contact Guard Assistance.  Toileting from toilet with Stand-by Assistance for hygiene and clothing management.   Supine to sit with Contact Guard Assistance.  Stand pivot transfers with Contact Guard Assistance.  Toilet transfer to toilet with Contact Guard Assistance.  Upper extremity exercise program x15 reps, with supervision.    Outcome: Ongoing (interventions implemented as appropriate)  OT evaluation completed today. Goals & care plan established.    JULIO Goodman  4/10/2018

## 2018-04-10 NOTE — ASSESSMENT & PLAN NOTE
Chronic, controlled.  Monitor BP closely.  Will continue BP medications as needed for sustained BP control.  Hypertension Medications             amLODIPine (NORVASC) 5 MG tablet Take 5 mg by mouth once daily.    furosemide (LASIX) 20 MG tablet Take 10 mg by mouth every other day.    losartan (COZAAR) 100 MG tablet Take 100 mg by mouth once daily.    metoprolol succinate (TOPROL-XL) 100 MG 24 hr tablet Take 100 mg by mouth once daily.      Hospital Medications             amLODIPine tablet 5 mg Take 1 tablet (5 mg total) by mouth once daily.    hydrALAZINE injection 10 mg Inject 0.5 mLs (10 mg total) into the vein every 4 (four) hours as needed for SBP greater than (SBP . 160).    losartan tablet 100 mg Take 4 tablets (100 mg total) by mouth once daily.    metoprolol injection 5 mg Inject 5 mLs (5 mg total) into the vein every 6 (six) hours as needed for SBP greater than (>160).    metoprolol succinate (TOPROL-XL) 24 hr tablet 100 mg Take 1 tablet (100 mg total) by mouth once daily.    metoprolol injection 5 mg (Discontinued) Inject 5 mLs (5 mg total) into the vein every 6 (six) hours as needed for SBP greater than (>160).

## 2018-04-10 NOTE — PROGRESS NOTES
Progress Note  PULMONARY    Admit Date: 4/7/2018   04/10/2018      SUBJECTIVE:     April 9, blank look and slow to interact  April 10,more animated and interactive,no c/o.    PFSH and Allergies reviewed.    OBJECTIVE:     Vitals (Most recent):  Vitals:    04/10/18 0748   BP:    Pulse: 98   Resp: 18   Temp:        Vitals (24 hour range):  Temp:  [98.7 °F (37.1 °C)-100.4 °F (38 °C)]   Pulse:  []   Resp:  [18-35]   BP: (150-190)/()   SpO2:  [100 %]   Arterial Line BP: (115-184)/(30-50)       Intake/Output Summary (Last 24 hours) at 04/10/18 0823  Last data filed at 04/10/18 0500   Gross per 24 hour   Intake              810 ml   Output             1225 ml   Net             -415 ml          Physical Exam:  The patient's neuro status (alertness,orientation,cognitive function,motor skills,), pharyngeal exam (oral lesions, hygiene, abn dentition,), Neck (jvd,mass,thyroid,nodes in neck and above/below clavicle),RESPIRATORY(symmetry,effort,fremitus,percussion,auscultation),  Cor(rhythm,heart tones including gallops,perfusion,edema)ABD(distention,hepatic&splenomegaly,tenderness,masses), Skin(rash,cyanosis),Psyc(affect,judgement,).  Exam negative except for these pertinent findings:    Pharynx good, voice good, interacting well. Chest clear with some bronchial sounds post right, no edema     Radiographs reviewed: view by direct vision  Right lung better  Results for orders placed during the hospital encounter of 04/07/18   X-Ray Chest 1 View    Narrative EXAMINATION:  XR CHEST 1 VIEW    CLINICAL HISTORY:  pneumonia;    TECHNIQUE:  A portable semi upright AP view of the chest was acquired.    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  There is an endotracheal tube present with its tip below the level of the clavicular heads.  A nasogastric tube remains in place.  There is atherosclerotic calcification present within the aortic arch.   There has been no interval  detrimental change in the radiographic appearance of the chest as compared to the previous examination with continued demonstration of patchy segmental airspace opacity/consolidative change in the right lower lung zone..  No pneumothorax.      Impression No significant interval detrimental change in the imaging appearance of the chest when compared with the prior study.      Electronically signed by: Ozzie Simental MD  Date:    04/07/2018  Time:    13:58   ]    Labs     No results for input(s): WBC, HGB, HCT, PLT, BAND, METAMYELOCYT, MYELOPCT, HGBA1C in the last 24 hours.    Recent Labs  Lab 04/10/18  0345      K 3.8   *   CO2 21*   BUN 20   CREATININE 1.3      CALCIUM 8.3*   AST 18   ALT 14   ALKPHOS 61   BILITOT 0.7   PROT 5.6*   ALBUMIN 2.5*   No results for input(s): PH, PCO2, PO2, HCO3 in the last 24 hours.  Microbiology Results (last 7 days)     Procedure Component Value Units Date/Time    Urine culture [[id number]] Collected:  04/09/18 1631    Order Status:  Sent Specimen:  Urine from Urine, Catheterized Updated:  04/09/18 2140    Urine culture [[id number]] Collected:  04/08/18 0424    Order Status:  Completed Specimen:  Urine from Urine, Catheterized Updated:  04/09/18 1408     Urine Culture, Routine No growth    Blood culture [[id number]] Collected:  04/07/18 0723    Order Status:  Completed Specimen:  Blood Updated:  04/09/18 1212     Blood Culture, Routine No Growth to date     Blood Culture, Routine No Growth to date     Blood Culture, Routine No Growth to date    Blood culture [[id number]] Collected:  04/07/18 0722    Order Status:  Completed Specimen:  Blood Updated:  04/09/18 1212     Blood Culture, Routine No Growth to date     Blood Culture, Routine No Growth to date     Blood Culture, Routine No Growth to date    Culture, Respiratory with Gram Stain [[id number]] Collected:  04/07/18 1240    Order Status:  Completed Specimen:  Respiratory from Endotracheal Aspirate Updated:  04/09/18  1023     Respiratory Culture Normal respiratory marcelo     Gram Stain (Respiratory) <10 epithelial cells per low power field.     Gram Stain (Respiratory) Rare WBC's     Gram Stain (Respiratory) Rare Gram positive cocci     Gram Stain (Respiratory) Rare Gram positive rods          Impression:  Active Hospital Problems    Diagnosis  POA    *Acute respiratory failure with hypoxia [J96.01]  Yes    Delirium [R41.0]  Yes    Essential hypertension [I10]  Yes    Aspiration pneumonia of both lower lobes [J69.0]  Yes    Urethral stricture, unspecified [N35.9]  Yes    YUAN (acute kidney injury) [N17.9]  Yes    Elevated troponin [R74.8]  Yes    H/O aortic valve replacement [Z95.2]  Not Applicable     ? Mechanical      Urinary retention [R33.9]  Yes      Resolved Hospital Problems    Diagnosis Date Resolved POA    Gross hematuria [R31.0] 04/09/2018 Yes     Due to uretheral stricture       Severe sepsis [A41.9, R65.20] 04/09/2018 Yes    Metabolic acidosis, normal anion gap (NAG) [E87.2] 04/09/2018 Yes               Plan:   April 9, 2018  Wbc 10.8 from 21.8 4/7, sputum gpc and gpr,  cxr clearing rll  Expect aspiration part of problem with dementia and presentation ..   Could go to floor.    April 10, cxr slow better-  Delirium better,    Sputum nl marcelo.  To floor ,  Arrange disposition would be good.                                .

## 2018-04-10 NOTE — PLAN OF CARE
Problem: Physical Therapy Goal  Goal: Physical Therapy Goal  Goals to be met by: 2018     Patient will increase functional independence with mobility by performin. Supine to sit with Stand-by Assistance  2. Sit to supine with Stand-by Assistance  3. Sit to stand transfer with Contact Guard Assistance  4. Bed to chair transfer with Contact Guard Assistance using Rolling Walker  5. Gait  x 50 feet with Contact Guard Assistance using Rolling Walker.   6. Lower extremity exercise program x10 reps, with supervision    Outcome: Ongoing (interventions implemented as appropriate)  PT evaluation complete. Recommend SNF at discharge.

## 2018-04-10 NOTE — SUBJECTIVE & OBJECTIVE
Interval History:  Has a blunt affect.  Low grade temps today.  Frequent coughing with white secretions.  Breathing is improved.    Review of Systems   Constitutional: Negative for chills and fever.   Respiratory: Positive for cough and shortness of breath.    Cardiovascular: Negative for chest pain.   Gastrointestinal: Negative for abdominal pain.     Objective:     Vital Signs (Most Recent):  Temp: 99.6 °F (37.6 °C) (04/09/18 1600)  Pulse: 93 (04/09/18 2145)  Resp: (!) 22 (04/09/18 2145)  BP: (!) 172/72 (04/09/18 2100)  SpO2: 100 % (04/09/18 2145) Vital Signs (24h Range):  Temp:  [98.7 °F (37.1 °C)-100.4 °F (38 °C)] 99.6 °F (37.6 °C)  Pulse:  [] 93  Resp:  [18-35] 22  SpO2:  [100 %] 100 %  BP: (150-186)/(67-90) 172/72  Arterial Line BP: (115-171)/(33-50) 150/40     Weight: 76.1 kg (167 lb 12.3 oz)  Body mass index is 26.28 kg/m².    Intake/Output Summary (Last 24 hours) at 04/09/18 2221  Last data filed at 04/09/18 1726   Gross per 24 hour   Intake           1772.5 ml   Output             1875 ml   Net           -102.5 ml      Physical Exam   Constitutional: He is oriented to person, place, and time. No distress.   HENT:   Mouth/Throat: Oropharynx is clear and moist.   Eyes: Right eye exhibits no discharge. Left eye exhibits no discharge. No scleral icterus.   Neck: Neck supple. No JVD present.   Cardiovascular: Normal rate and regular rhythm.    Pulmonary/Chest: Effort normal. No stridor. He has decreased breath sounds in the right lower field. He has rhonchi in the right lower field.   Abdominal: Soft. Bowel sounds are normal. He exhibits no distension. There is no tenderness.   Musculoskeletal: He exhibits no edema or deformity.   Neurological: He is alert and oriented to person, place, and time.   Skin: Skin is warm and dry. No rash noted. He is not diaphoretic.   Psychiatric: His affect is blunt. His speech is delayed. He is slowed. Cognition and memory are impaired.       Significant Labs: All  pertinent labs within the past 24 hours have been reviewed.    Significant Imaging: I have reviewed all pertinent imaging results/findings within the past 24 hours.

## 2018-04-10 NOTE — ASSESSMENT & PLAN NOTE
Due to pneumonia.  Improving.  Monitor o2 sats and effort of breathing.  Pulmonologist following.  Extubated 4/8.

## 2018-04-11 PROBLEM — R79.89 ELEVATED TROPONIN: Status: RESOLVED | Noted: 2018-04-07 | Resolved: 2018-04-11

## 2018-04-11 LAB
ALBUMIN SERPL BCP-MCNC: 2.6 G/DL
ALP SERPL-CCNC: 57 U/L
ALT SERPL W/O P-5'-P-CCNC: 15 U/L
ANION GAP SERPL CALC-SCNC: 7 MMOL/L
AST SERPL-CCNC: 16 U/L
BILIRUB SERPL-MCNC: 0.5 MG/DL
BUN SERPL-MCNC: 22 MG/DL
CALCIUM SERPL-MCNC: 8.7 MG/DL
CHLORIDE SERPL-SCNC: 113 MMOL/L
CO2 SERPL-SCNC: 23 MMOL/L
CREAT SERPL-MCNC: 1.3 MG/DL
EST. GFR  (AFRICAN AMERICAN): 59 ML/MIN/1.73 M^2
EST. GFR  (NON AFRICAN AMERICAN): 51 ML/MIN/1.73 M^2
GLUCOSE SERPL-MCNC: 122 MG/DL
POTASSIUM SERPL-SCNC: 4 MMOL/L
PROT SERPL-MCNC: 6 G/DL
SODIUM SERPL-SCNC: 143 MMOL/L

## 2018-04-11 PROCEDURE — G8998 SWALLOW D/C STATUS: HCPCS | Mod: CI

## 2018-04-11 PROCEDURE — 99232 SBSQ HOSP IP/OBS MODERATE 35: CPT | Mod: ,,, | Performed by: INTERNAL MEDICINE

## 2018-04-11 PROCEDURE — 97116 GAIT TRAINING THERAPY: CPT

## 2018-04-11 PROCEDURE — 97110 THERAPEUTIC EXERCISES: CPT

## 2018-04-11 PROCEDURE — 25000003 PHARM REV CODE 250: Performed by: HOSPITALIST

## 2018-04-11 PROCEDURE — 97535 SELF CARE MNGMENT TRAINING: CPT

## 2018-04-11 PROCEDURE — 94761 N-INVAS EAR/PLS OXIMETRY MLT: CPT

## 2018-04-11 PROCEDURE — 80053 COMPREHEN METABOLIC PANEL: CPT

## 2018-04-11 PROCEDURE — G8988 SELF CARE GOAL STATUS: HCPCS | Mod: CK

## 2018-04-11 PROCEDURE — 25000003 PHARM REV CODE 250: Performed by: INTERNAL MEDICINE

## 2018-04-11 PROCEDURE — 92610 EVALUATE SWALLOWING FUNCTION: CPT

## 2018-04-11 PROCEDURE — 97165 OT EVAL LOW COMPLEX 30 MIN: CPT

## 2018-04-11 PROCEDURE — 25000003 PHARM REV CODE 250: Performed by: EMERGENCY MEDICINE

## 2018-04-11 PROCEDURE — 27000221 HC OXYGEN, UP TO 24 HOURS

## 2018-04-11 PROCEDURE — 63600175 PHARM REV CODE 636 W HCPCS: Performed by: HOSPITALIST

## 2018-04-11 PROCEDURE — G8997 SWALLOW GOAL STATUS: HCPCS | Mod: CI

## 2018-04-11 PROCEDURE — 11000001 HC ACUTE MED/SURG PRIVATE ROOM

## 2018-04-11 PROCEDURE — 25000242 PHARM REV CODE 250 ALT 637 W/ HCPCS: Performed by: HOSPITALIST

## 2018-04-11 PROCEDURE — 99233 SBSQ HOSP IP/OBS HIGH 50: CPT | Mod: ,,, | Performed by: UROLOGY

## 2018-04-11 PROCEDURE — 36415 COLL VENOUS BLD VENIPUNCTURE: CPT

## 2018-04-11 PROCEDURE — 63600175 PHARM REV CODE 636 W HCPCS: Performed by: EMERGENCY MEDICINE

## 2018-04-11 PROCEDURE — 86580 TB INTRADERMAL TEST: CPT | Performed by: HOSPITALIST

## 2018-04-11 PROCEDURE — 94640 AIRWAY INHALATION TREATMENT: CPT

## 2018-04-11 PROCEDURE — G8996 SWALLOW CURRENT STATUS: HCPCS | Mod: CI

## 2018-04-11 PROCEDURE — G8987 SELF CARE CURRENT STATUS: HCPCS | Mod: CK

## 2018-04-11 RX ORDER — PANTOPRAZOLE SODIUM 40 MG/1
40 TABLET, DELAYED RELEASE ORAL DAILY
Status: DISCONTINUED | OUTPATIENT
Start: 2018-04-12 | End: 2018-04-12 | Stop reason: HOSPADM

## 2018-04-11 RX ADMIN — IPRATROPIUM BROMIDE AND ALBUTEROL SULFATE 3 ML: .5; 3 SOLUTION RESPIRATORY (INHALATION) at 02:04

## 2018-04-11 RX ADMIN — CLOPIDOGREL BISULFATE 75 MG: 75 TABLET, FILM COATED ORAL at 09:04

## 2018-04-11 RX ADMIN — IPRATROPIUM BROMIDE AND ALBUTEROL SULFATE 3 ML: .5; 3 SOLUTION RESPIRATORY (INHALATION) at 01:04

## 2018-04-11 RX ADMIN — AMLODIPINE BESYLATE 5 MG: 5 TABLET ORAL at 09:04

## 2018-04-11 RX ADMIN — IPRATROPIUM BROMIDE AND ALBUTEROL SULFATE 3 ML: .5; 3 SOLUTION RESPIRATORY (INHALATION) at 07:04

## 2018-04-11 RX ADMIN — METOPROLOL SUCCINATE 100 MG: 50 TABLET, EXTENDED RELEASE ORAL at 09:04

## 2018-04-11 RX ADMIN — ASPIRIN 81 MG: 81 TABLET, COATED ORAL at 08:04

## 2018-04-11 RX ADMIN — PIPERACILLIN SODIUM AND TAZOBACTAM SODIUM 4.5 G: 4; .5 INJECTION, POWDER, LYOPHILIZED, FOR SOLUTION INTRAVENOUS at 08:04

## 2018-04-11 RX ADMIN — Medication 5 UNITS: at 01:04

## 2018-04-11 RX ADMIN — ENOXAPARIN SODIUM 40 MG: 100 INJECTION SUBCUTANEOUS at 04:04

## 2018-04-11 RX ADMIN — LOSARTAN POTASSIUM 100 MG: 25 TABLET, FILM COATED ORAL at 09:04

## 2018-04-11 RX ADMIN — PIPERACILLIN SODIUM AND TAZOBACTAM SODIUM 4.5 G: 4; .5 INJECTION, POWDER, LYOPHILIZED, FOR SOLUTION INTRAVENOUS at 05:04

## 2018-04-11 RX ADMIN — PIPERACILLIN SODIUM AND TAZOBACTAM SODIUM 4.5 G: 4; .5 INJECTION, POWDER, LYOPHILIZED, FOR SOLUTION INTRAVENOUS at 01:04

## 2018-04-11 NOTE — PROGRESS NOTES
04/10/18 1945   Patient Assessment/Suction   Level of Consciousness (AVPU) alert   All Lung Fields Breath Sounds coarse;diminished   PRE-TX-O2-ETCO2   O2 Device (Oxygen Therapy) nasal cannula   Flow (L/min) 3   Oxygen Concentration (%) 32   SpO2 100 %   Pulse Oximetry Type Continuous   Pulse 91   Resp 20   Aerosol Therapy   $ Aerosol Therapy Charges Aerosol Treatment   Respiratory Treatment Status given   SVN/Inhaler Treatment Route mask   Position During Treatment HOB at 45 degrees   Patient Tolerance good   Post-Treatment   Post-treatment Heart Rate (beats/min) 91   Post-treatment Resp Rate (breaths/min) 20   All Fields Breath Sounds unchanged   Ready to Wean/Extubation Screen   FIO2<=50 (chart decimal) 0.32

## 2018-04-11 NOTE — ASSESSMENT & PLAN NOTE
Due to severe sepsis.  Improved as patient has been given IVF.  Monitor cr and UOP.  Lab Results   Component Value Date    CREATININE 1.3 04/10/2018

## 2018-04-11 NOTE — PROGRESS NOTES
Progress Note  PULMONARY    Admit Date: 4/7/2018 04/11/2018      SUBJECTIVE:     April 9, blank look and slow to interact  April 10,more animated and interactive,no c/o.  April 11, no c/o, jovial, appropriate    PFSH and Allergies reviewed.    OBJECTIVE:     Vitals (Most recent):  Vitals:    04/11/18 1609   BP: (!) 176/75   Pulse: 91   Resp: 20   Temp: 98.6 °F (37 °C)       Vitals (24 hour range):  Temp:  [97.4 °F (36.3 °C)-99.4 °F (37.4 °C)]   Pulse:  []   Resp:  [16-28]   BP: (132-197)/(63-84)   SpO2:  [95 %-100 %]       Intake/Output Summary (Last 24 hours) at 04/11/18 1700  Last data filed at 04/10/18 1800   Gross per 24 hour   Intake              340 ml   Output               50 ml   Net              290 ml          Physical Exam:  The patient's neuro status (alertness,orientation,cognitive function,motor skills,), pharyngeal exam (oral lesions, hygiene, abn dentition,), Neck (jvd,mass,thyroid,nodes in neck and above/below clavicle),RESPIRATORY(symmetry,effort,fremitus,percussion,auscultation),  Cor(rhythm,heart tones including gallops,perfusion,edema)ABD(distention,hepatic&splenomegaly,tenderness,masses), Skin(rash,cyanosis),Psyc(affect,judgement,).  Exam negative except for these pertinent findings:    Pharynx good, voice good, interacting well. Chest clear with some bronchial sounds post right, no edema     Radiographs reviewed: view by direct vision  Right lung better  Results for orders placed during the hospital encounter of 04/07/18   X-Ray Chest 1 View    Narrative EXAMINATION:  XR CHEST 1 VIEW    CLINICAL HISTORY:  pneumonia;    TECHNIQUE:  A portable semi upright AP view of the chest was acquired.    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  There is an endotracheal tube present with its tip below the level of the clavicular heads.  A nasogastric tube remains in place.  There is atherosclerotic calcification present within the aortic  arch.   There has been no interval detrimental change in the radiographic appearance of the chest as compared to the previous examination with continued demonstration of patchy segmental airspace opacity/consolidative change in the right lower lung zone..  No pneumothorax.      Impression No significant interval detrimental change in the imaging appearance of the chest when compared with the prior study.      Electronically signed by: Ozzie Simental MD  Date:    04/07/2018  Time:    13:58   ]    Labs     No results for input(s): WBC, HGB, HCT, PLT, BAND, METAMYELOCYT, MYELOPCT, HGBA1C in the last 24 hours.    Recent Labs  Lab 04/11/18  0450      K 4.0   *   CO2 23   BUN 22   CREATININE 1.3   *   CALCIUM 8.7   AST 16   ALT 15   ALKPHOS 57   BILITOT 0.5   PROT 6.0   ALBUMIN 2.6*   No results for input(s): PH, PCO2, PO2, HCO3 in the last 24 hours.  Microbiology Results (last 7 days)     Procedure Component Value Units Date/Time    Blood culture [[id number]] Collected:  04/07/18 0723    Order Status:  Completed Specimen:  Blood Updated:  04/11/18 1212     Blood Culture, Routine No Growth to date     Blood Culture, Routine No Growth to date     Blood Culture, Routine No Growth to date     Blood Culture, Routine No Growth to date     Blood Culture, Routine No Growth to date    Blood culture [[id number]] Collected:  04/07/18 0722    Order Status:  Completed Specimen:  Blood Updated:  04/11/18 1212     Blood Culture, Routine No Growth to date     Blood Culture, Routine No Growth to date     Blood Culture, Routine No Growth to date     Blood Culture, Routine No Growth to date     Blood Culture, Routine No Growth to date    Urine culture [[id number]] Collected:  04/09/18 1631    Order Status:  Completed Specimen:  Urine from Urine, Catheterized Updated:  04/10/18 2223     Urine Culture, Routine No growth    Culture, Respiratory with Gram Stain [[id number]] Collected:  04/07/18 1240    Order Status:  Completed  Specimen:  Respiratory from Endotracheal Aspirate Updated:  04/10/18 0925     Respiratory Culture Normal respiratory marcelo     Gram Stain (Respiratory) <10 epithelial cells per low power field.     Gram Stain (Respiratory) Rare WBC's     Gram Stain (Respiratory) Rare Gram positive cocci     Gram Stain (Respiratory) Rare Gram positive rods    Urine culture [[id number]] Collected:  04/08/18 0424    Order Status:  Completed Specimen:  Urine from Urine, Catheterized Updated:  04/09/18 1408     Urine Culture, Routine No growth          Impression:  Active Hospital Problems    Diagnosis  POA    *Acute respiratory failure with hypoxia [J96.01]  Yes    Essential hypertension [I10]  Yes    Aspiration pneumonia of both lower lobes [J69.0]  Yes    Urethral stricture, unspecified [N35.9]  Yes    UYAN (acute kidney injury) [N17.9]  Yes    Elevated troponin [R74.8]  Yes    H/O aortic valve replacement [Z95.2]  Not Applicable     ? Mechanical      Urinary retention [R33.9]  Yes      Resolved Hospital Problems    Diagnosis Date Resolved POA    Delirium [R41.0] 04/10/2018 Yes    Gross hematuria [R31.0] 04/09/2018 Yes     Due to uretheral stricture       Severe sepsis [A41.9, R65.20] 04/09/2018 Yes    Metabolic acidosis, normal anion gap (NAG) [E87.2] 04/09/2018 Yes               Plan:   April 9, 2018  Wbc 10.8 from 21.8 4/7, sputum gpc and gpr,  cxr clearing rll  Expect aspiration part of problem with dementia and presentation ..   Could go to floor.    April 10, cxr slow better-  Delirium better,    Sputum nl marcelo.  To floor ,  Arrange disposition would be good.      April 11,daily thriving and better, more animated, no c/o.  HH - could f/u office if needed.    Would use levaquin 500 /d x 7 at d/c - failed doxy                          .

## 2018-04-11 NOTE — PLAN OF CARE
Problem: Patient Care Overview  Goal: Plan of Care Review  Outcome: Ongoing (interventions implemented as appropriate)  Plan of care reviewed with patient. Patient verbalized complete understanding. Antibiotics administered as ordered. SR on telemetry with no ectopy or alarms noted. All fall precautions maintained. Bed in lowest position, locked, call light within reach. Side rails up x's 2. Slip resistant socks maintained.

## 2018-04-11 NOTE — PROGRESS NOTES
[facility] Hospital Medicine  Progress Note    Patient Name: Frederick Kong Jr.  MRN: [id number]  Patient Class: IP- Inpatient   Admission Date: 4/7/2018  Length of Stay: 3 days  Attending Physician: Harsha Dan MD  Primary Care Provider: Dhiraj Dempsey III, MD        Subjective:     Principal Problem:Acute respiratory failure with hypoxia    HPI:  82-year-old male presented with a chief complaint of shortness of breath.   Per history taken from his wife he got up in the middle of the night and when he didn't return to bed she found him suddenly struggling to breath so called the ambulance and when they arrived his oxygen sat was in the 60's. On arrival to ER evaluation revealed   The patient's chest x-ray was significant for a large  infiltrate on the right and required intubation.  .  The patient's labs were significant for an elevated white blood cell count, elevated troponin, elevated creatinine, elevated BNP, and elevated lactic acid.  The patient's EKG showed no acute abnormalities per my independent interpretation.  The patient likely has a mixed picture causing his respiratory decline.  The patient was aggressively treated per sepsis protocol with  IV fluids and broad-spectrum IV antibiotics for pneumonia --likely aspiration   He has hx of stent placement at the time of his aortic valve replacement in 2001 and was on coumadin for many years however has been changed to asa and plavix within the last year. His cardiologist is Dr Gonzalez. He has never had an MI or heart failure. He has a remote history of smoking and is otherwise alert and active.     Hospital Course:  No notes on file    Interval History:  More alert and interactive today.  Afebrile.    Review of Systems   Constitutional: Negative for chills and fever.   Respiratory: Positive for cough and shortness of breath.    Cardiovascular: Negative for chest pain.   Gastrointestinal: Negative for abdominal pain.     Objective:      Vital Signs (Most Recent):  Temp: 99.4 °F (37.4 °C) (04/10/18 1800)  Pulse: 94 (04/10/18 1900)  Resp: (!) 28 (04/10/18 1900)  BP: (!) 163/71 (04/10/18 1900)  SpO2: 100 % (04/10/18 1900) Vital Signs (24h Range):  Temp:  [98 °F (36.7 °C)-99.4 °F (37.4 °C)] 99.4 °F (37.4 °C)  Pulse:  [] 94  Resp:  [16-36] 28  SpO2:  [100 %] 100 %  BP: (150-196)/() 163/71  Arterial Line BP: (129-187)/(30-50) 187/50     Weight: 77.1 kg (169 lb 15.6 oz)  Body mass index is 26.62 kg/m².    Intake/Output Summary (Last 24 hours) at 04/10/18 1921  Last data filed at 04/10/18 0500   Gross per 24 hour   Intake                0 ml   Output              500 ml   Net             -500 ml      Physical Exam   Constitutional: He is oriented to person, place, and time. No distress.   HENT:   Mouth/Throat: Oropharynx is clear and moist.   Eyes: Right eye exhibits no discharge. Left eye exhibits no discharge. No scleral icterus.   Neck: Neck supple. No JVD present.   Cardiovascular: Normal rate and regular rhythm.    Pulmonary/Chest: Effort normal. No stridor. He has decreased breath sounds in the right lower field. He has rhonchi in the right lower field.   Abdominal: Soft. Bowel sounds are normal. He exhibits no distension. There is no tenderness.   Musculoskeletal: He exhibits no edema or deformity.   Neurological: He is alert and oriented to person, place, and time.   Skin: Skin is warm and dry. No rash noted. He is not diaphoretic.   Psychiatric: He has a normal mood and affect. His speech is normal. He is slowed. Cognition and memory are impaired.       Significant Labs: All pertinent labs within the past 24 hours have been reviewed.    Significant Imaging: I have reviewed all pertinent imaging results/findings within the past 24 hours.    Assessment/Plan:      * Acute respiratory failure with hypoxia    Due to pneumonia.  Improving.  Monitor o2 sats and effort of breathing.  Pulmonologist following.  Extubated 4/8.         "  Aspiration pneumonia of both lower lobes    Was present on admission.  Normal marcelo in sputum.  On following abx:  Antibiotics     Start     Stop Route Frequency Ordered    04/07/18 1800  piperacillin-tazobactam 4.5 g in dextrose 5 % 100 mL IVPB (ready to mix system)      -- IV Every 8 hours (non-standard times) 04/07/18 1135      Follow SLP's recommendations:  "Diet recommendations:  Regular (no fresh or canned fruit except bananas), Nectar Thick    Aspiration Precautions: Assistance with meals and Assistance with thickening liquids, Chin tuck, Meds whole buried in puree, Monitor for s/s of aspiration and Standard aspiration precautions"          Essential hypertension    Chronic, controlled.  Monitor BP closely.  Will continue BP medications as needed for sustained BP control.  Hypertension Medications             amLODIPine (NORVASC) 5 MG tablet Take 5 mg by mouth once daily.    furosemide (LASIX) 20 MG tablet Take 10 mg by mouth every other day.    losartan (COZAAR) 100 MG tablet Take 100 mg by mouth once daily.    metoprolol succinate (TOPROL-XL) 100 MG 24 hr tablet Take 100 mg by mouth once daily.      Hospital Medications             amLODIPine tablet 5 mg Take 1 tablet (5 mg total) by mouth once daily.    hydrALAZINE injection 10 mg Inject 0.5 mLs (10 mg total) into the vein every 4 (four) hours as needed for SBP greater than (SBP . 160).    losartan tablet 100 mg Take 4 tablets (100 mg total) by mouth once daily.    metoprolol injection 5 mg Inject 5 mLs (5 mg total) into the vein every 6 (six) hours as needed for SBP greater than (>160).    metoprolol succinate (TOPROL-XL) 24 hr tablet 100 mg Take 1 tablet (100 mg total) by mouth once daily.    metoprolol injection 5 mg (Discontinued) Inject 5 mLs (5 mg total) into the vein every 6 (six) hours as needed for SBP greater than (>160).                      Urethral stricture, unspecified    See "urinary retention"          Urinary retention    " "Acute.  Required dilation of ureter by series of Leslie insertions of increasing diameter.  Will maintain Leslie for now.  Dr. Jerry made following rec's:  "1.  Leslie will remain x 1 week (4/15/18)- we  Will arrange f/u for him on 4/16/18 for nurse visit fill and pull in am. And then f/u with me in 4 to 6 weeks.    2. Scar tissue likely to return unless he does CIC or has a definitve treatment, explained this to wife."        H/O aortic valve replacement    Has mechanical AV.  Only on antiplatelets; not anticoagulants.  He has history of bleeding.  High risk of intracranial bleeding.        Elevated troponin    Due to severe sepsis.  Not NSTEMI.          YUAN (acute kidney injury)    Due to severe sepsis.  Improved as patient has been given IVF.  Monitor cr and UOP.  Lab Results   Component Value Date    CREATININE 1.3 04/10/2018                 VTE Risk Mitigation         Ordered     enoxaparin injection 40 mg  Daily     Route:  Subcutaneous        04/10/18 0958     IP VTE HIGH RISK PATIENT  Once      04/07/18 1235     Place CJ hose  Until discontinued      04/07/18 1135     Place sequential compression device  Until discontinued      04/07/18 1135          Harsha Dan MD  Department of Hospital Medicine   [facility]  "

## 2018-04-11 NOTE — SUBJECTIVE & OBJECTIVE
Interval History:  More alert and interactive today.  Afebrile.    Review of Systems   Constitutional: Negative for chills and fever.   Respiratory: Positive for cough and shortness of breath.    Cardiovascular: Negative for chest pain.   Gastrointestinal: Negative for abdominal pain.     Objective:     Vital Signs (Most Recent):  Temp: 99.4 °F (37.4 °C) (04/10/18 1800)  Pulse: 94 (04/10/18 1900)  Resp: (!) 28 (04/10/18 1900)  BP: (!) 163/71 (04/10/18 1900)  SpO2: 100 % (04/10/18 1900) Vital Signs (24h Range):  Temp:  [98 °F (36.7 °C)-99.4 °F (37.4 °C)] 99.4 °F (37.4 °C)  Pulse:  [] 94  Resp:  [16-36] 28  SpO2:  [100 %] 100 %  BP: (150-196)/() 163/71  Arterial Line BP: (129-187)/(30-50) 187/50     Weight: 77.1 kg (169 lb 15.6 oz)  Body mass index is 26.62 kg/m².    Intake/Output Summary (Last 24 hours) at 04/10/18 1921  Last data filed at 04/10/18 0500   Gross per 24 hour   Intake                0 ml   Output              500 ml   Net             -500 ml      Physical Exam   Constitutional: He is oriented to person, place, and time. No distress.   HENT:   Mouth/Throat: Oropharynx is clear and moist.   Eyes: Right eye exhibits no discharge. Left eye exhibits no discharge. No scleral icterus.   Neck: Neck supple. No JVD present.   Cardiovascular: Normal rate and regular rhythm.    Pulmonary/Chest: Effort normal. No stridor. He has decreased breath sounds in the right lower field. He has rhonchi in the right lower field.   Abdominal: Soft. Bowel sounds are normal. He exhibits no distension. There is no tenderness.   Musculoskeletal: He exhibits no edema or deformity.   Neurological: He is alert and oriented to person, place, and time.   Skin: Skin is warm and dry. No rash noted. He is not diaphoretic.   Psychiatric: He has a normal mood and affect. His speech is normal. He is slowed. Cognition and memory are impaired.       Significant Labs: All pertinent labs within the past 24 hours have been  reviewed.    Significant Imaging: I have reviewed all pertinent imaging results/findings within the past 24 hours.

## 2018-04-11 NOTE — ASSESSMENT & PLAN NOTE
"Was present on admission.  Normal marcelo in sputum.  On following abx:  Antibiotics     Start     Stop Route Frequency Ordered    04/07/18 1800  piperacillin-tazobactam 4.5 g in dextrose 5 % 100 mL IVPB (ready to mix system)      -- IV Every 8 hours (non-standard times) 04/07/18 1135      Follow SLP's recommendations:  "Diet recommendations:  Regular (no fresh or canned fruit except bananas), Nectar Thick    Aspiration Precautions: Assistance with meals and Assistance with thickening liquids, Chin tuck, Meds whole buried in puree, Monitor for s/s of aspiration and Standard aspiration precautions"    "

## 2018-04-11 NOTE — PLAN OF CARE
Problem: Physical Therapy Goal  Goal: Physical Therapy Goal  Goals to be met by: 2018     Patient will increase functional independence with mobility by performin. Supine to sit with Stand-by Assistance  2. Sit to supine with Stand-by Assistance  3. Sit to stand transfer with Contact Guard Assistance  4. Bed to chair transfer with Contact Guard Assistance using Rolling Walker  5. Gait  x 100 feet with Standby Assistance using Rolling Walker.   6. Lower extremity exercise program x 20 reps, with supervision     Outcome: Revised  Revised Goals #5 & 6

## 2018-04-11 NOTE — PLAN OF CARE
Problem: Occupational Therapy Goal  Goal: Occupational Therapy Goal  Goals to be met by: 4/24/2018     Patient will increase functional independence with ADLs by performing:    UE Dressing with Contact Guard Assistance.  LE Dressing with Contact Guard Assistance.  Grooming while standing at sink with Contact Guard Assistance.  Toileting from toilet with Stand-by Assistance for hygiene and clothing management.   Supine to sit with Contact Guard Assistance.  Stand pivot transfers with Contact Guard Assistance.  Toilet transfer to toilet with Contact Guard Assistance.  Upper extremity exercise program x15 reps, with supervision.     Outcome: Ongoing (interventions implemented as appropriate)  Pt has met grooming goal. Pt is progressing toward remaining goals.    Revised grooming goal:  Grooming while standing at sink with Supervision.

## 2018-04-11 NOTE — PLAN OF CARE
Problem: Patient Care Overview  Goal: Plan of Care Review  Outcome: Ongoing (interventions implemented as appropriate)  Patient receiving aerosol tx via duoneb now and q6hr.  Hr 96 and 02 saturation 96% on nc at 1lpm.

## 2018-04-11 NOTE — PLAN OF CARE
Problem: SLP Goal  Goal: SLP Goal  Consume regular textures & liquids with no s/s pharyngeal dysphagia   Outcome: Outcome(s) achieved Date Met: 04/11/18  Pt consumed thin liquids & mixed bolus with no s/s oropharyngeal dysphagia.  Cued to use chin tuck.  Education to pt & wife re guidelines.

## 2018-04-11 NOTE — PROGRESS NOTES
Urology Consult Progress Note-[place]  Staff: Rosalino/Gary/Saroj/Ursula    Referring physician or department: Sy    Subjective:      Frederick Kong Jr. is a 82 y.o. male admitted for pneumonia with h/o BPH s/p laser turp 5 years ago and urethral stricture dilated by me at bedside on 4/7/18.     Leslie has been in place since then.     No more hematuria     Objective:     Temp:  [97.4 °F (36.3 °C)-99.4 °F (37.4 °C)] 98.6 °F (37 °C)  Pulse:  [] 91  Resp:  [16-28] 20  SpO2:  [95 %-100 %] 95 %  BP: (132-197)/(63-84) 176/75  I/O last 3 completed shifts:  In: 1020 [P.O.:720; IV Piggyback:300]  Out: 950 [Urine:950]  No intake/output data recorded.    UOP - 00/150/nr    Physical Exam   Nursing note and vitals reviewed.  Constitutional: Pt is oriented to person, place, and time. Pt appears well-developed and well-nourished.   HENT:   Head: Normocephalic and atraumatic.   Eyes: Pupils are equal, round, and reactive to light.   Cardiovascular: no pallor  Pulmonary/Chest: Effort normal.   Abdominal: no distension  Musculoskeletal: Normal range of motion.   Neurological: Pt is alert and oriented to person, place, and time.   Skin: Skin is intact.     Psychiatric: Pt has a normal mood and affect.     18 fr [place] tip Leslie with clear yellow urine    Labs:       Recent Labs  Lab 04/09/18  0433 04/10/18  0345 04/11/18  0450    144 143   K 4.0 3.8 4.0   * 115* 113*   CO2 20* 21* 23   BUN 23 20 22   CREATININE 1.4 1.3 1.3   CALCIUM 8.4* 8.3* 8.7       Recent Labs  Lab 04/07/18  0630 04/08/18  0510 04/09/18  0433   WBC 21.80* 9.40 10.80   HGB 16.3 11.4* 11.8*   HCT 49.9 34.3* 35.6*   * 194 254     Urine culture  4/9/18 Ng, cath: 3+ blood  4/8/18 Ng    Blood culture  4/7/18 ng    Assessment:     Frederick LIBRADO Kong Jr. is a 82 y.o. male admitted for pneumonia with h/o BPH s/p laser turp 5 years ago and urethral stricture dilated by me at bedside on 4/7/18.        Plan:     1. Plan for voiding trial in  my clinic on 4/16/18 and then f/u 4 to 6 weeks after this. Currently on Zosyn.   2. Explained scar tissue likely to recur to him and his wife who is here with him today. Would benefit from CIC.   3. He may need CIC       Kimmie Jerry MD  [facility] ([facility])   Office: [phone]

## 2018-04-11 NOTE — PT/OT/SLP EVAL
Speech Language Pathology Evaluation  Bedside Swallow    Patient Name:  Frederick Kong Jr.   MRN:  [id number]  Admitting Diagnosis: Acute respiratory failure with hypoxia    Recommendations:                 General Recommendations:  Follow-up not indicated  Diet recommendations:  Regular, Thin (use chin tuck)   Aspiration Precautions: Chin tuck   General Precautions: Standard, fall, hearing impaired, aspiration  Communication strategies:  provide increased time to answer    History:     Past Medical History:   Diagnosis Date    Cancer     Coronary artery disease     Gout     Hyperlipemia     Hypertension        Past Surgical History:   Procedure Laterality Date    APPENDECTOMY      BRAIN SURGERY      CARDIAC SURGERY      TONSILLECTOMY         Social History: Patient lives with spouse    Prior diet: regular textures & liquids.      Subjective     I remember you  Patient goals: home         Objective:     Oral Musculature Evaluation  · Oral Musculature: WNL  · Dentition: present and adequate (missing a few molars lower left)  · Mucosal Quality: adequate  · Mandibular Strength and Mobility: WNL  · Oral Labial Strength and Mobility: WNL  · Lingual Strength and Mobility: WNL  · Velar Elevation: WNL  · Buccal Strength and Mobility: WNL  · Voice Prior to PO Intake: clear    Bedside Swallow Eval:   Consistencies Assessed:  · Thin liquids via cup & straw sips & 3 oz water challenge via straw  · Nectar thick liquids via straw  · Mixed consistencies self-fed peaches in thin juice     Oral Phase:   · WNL    Pharyngeal Phase:   · no overt clinical signs/symptoms of aspiration  · no overt clinical signs/symptoms of pharyngeal dysphagia    Compensatory Strategies  · Chin tuck    Treatment: Education to pt & wife re inpressions & recommendations    Assessment:     Frederick Kong Jr. is a 82 y.o. male with an SLP diagnosis of no s/s oropharyngeal dysphagia when using chin tuck while drinking liquids. Recommend diet change to  regular textures & liquids, perform chin tuck while swallowing thin liquids.  No tx.    Goals:    SLP Goals     Not on file          Multidisciplinary Problems (Resolved)        Problem: SLP Goal    Goal Priority Disciplines Outcome   SLP Goal   (Resolved)     SLP Outcome(s) achieved   Description:  Consume regular textures & liquids with no s/s pharyngeal dysphagia                    Plan:     · Plan of Care reviewed with:  patient, wife  · SLP Follow-Up:  No       Discharge recommendations:      Barriers to Discharge:  None    Time Tracking:     SLP Treatment Date:   04/11/18  Speech Start Time:  1602  Speech Stop Time:  1614     Speech Total Time (min):  12 min    Billable Minutes: Eval Swallow and Oral Function 12    Mynra Dykes CCC-SLP/A  04/11/2018

## 2018-04-11 NOTE — PT/OT/SLP PROGRESS
"Physical Therapy Treatment    Patient Name:  Frederick Kong Jr.   MRN:  [id number]    Recommendations:     Discharge Recommendations:  nursing facility, skilled   Discharge Equipment Recommendations: none   Barriers to discharge: Decreased caregiver support    Assessment:     Frederick Kong Jr. is a 82 y.o. male admitted with a medical diagnosis of Acute respiratory failure with hypoxia.  He presents with the following impairments/functional limitations:  weakness, impaired endurance, impaired functional mobilty, decreased lower extremity function, impaired coordination, decreased safety awareness, impaired cardiopulmonary response to activity, impaired balance, gait instability.    Rehab Prognosis:  Good; patient would benefit from acute skilled PT services to address these deficits and reach maximum level of function.      Recent Surgery: * No surgery found *      Plan:     During this hospitalization, patient to be seen 6 x/week to address the above listed problems via gait training, therapeutic activities, therapeutic exercises, neuromuscular re-education  · Plan of Care Expires:  05/09/18   Plan of Care Reviewed with: patient, spouse    Subjective     Communicated with RN prior to session.  Patient found supine in bed with wife at bedside upon PT entry to room, agreeable to treatment.      Chief Complaint: weakness  Patient comments/goals: "I don't feel tired, I feel weak"  Pain/Comfort:  · Pain Rating 1: 0/10    Patients cultural, spiritual, Roman Catholic conflicts given the current situation: none    Objective:     Patient found with: peripheral IV, telemetry, oxygen     General Precautions: Standard, fall, nectar thick, hearing impaired, aspiration   Orthopedic Precautions:N/A   Braces: N/A     Functional Mobility:  · Bed Mobility:     · Rolling Right: minimum assistance  · Supine to Sit: minimum assistance  · Transfers:     · Sit to Stand:  minimum assistance with rolling walker  · Gait: 60 ft with min/CGA, stooped " posture, min labored      AM-PAC 6 CLICK MOBILITY  Turning over in bed (including adjusting bedclothes, sheets and blankets)?: 3  Sitting down on and standing up from a chair with arms (e.g., wheelchair, bedside commode, etc.): 2  Moving from lying on back to sitting on the side of the bed?: 3  Moving to and from a bed to a chair (including a wheelchair)?: 2  Need to walk in hospital room?: 3  Climbing 3-5 steps with a railing?: 1  Total Score: 14       Therapeutic Activities and Exercises:   bed mob and transfers as above; performed standing ex: High marching in place, 20 reps each side; mini squats, 20 reps; standing, hip abd, 20 reps each LE.     Patient left up in chair with all lines intact, call button in reach, RN notified and Doctor and wife present..    GOALS:    Physical Therapy Goals        Problem: Physical Therapy Goal    Goal Priority Disciplines Outcome Goal Variances Interventions   Physical Therapy Goal     PT/OT, PT Revised     Description:  Goals to be met by: 2018     Patient will increase functional independence with mobility by performin. Supine to sit with Stand-by Assistance  2. Sit to supine with Stand-by Assistance  3. Sit to stand transfer with Contact Guard Assistance  4. Bed to chair transfer with Contact Guard Assistance using Rolling Walker  5. Gait  x 100 feet with Standby Assistance using Rolling Walker.   6. Lower extremity exercise program x 20 reps, with supervision                       Time Tracking:     PT Received On: 18  PT Start Time: 1556     PT Stop Time: 1628  PT Total Time (min): 32 min     Billable Minutes: Gait Training 16 and Therapeutic Exercise 16    Treatment Type: Treatment  PT/PTA: PT           Garret Spann, PT  2018

## 2018-04-11 NOTE — PLAN OF CARE
Received CM consult for SNF.  Met with pt and pt's spouse to discuss the referral. Pt's wife stated that they would prefer for pt to discharge home with [facility], the agency pt currently has and continue PT and OT at home.  The wife stated that she discussed it with Dr. Dan and that she feels she can care for pt at home secondary to pt getting stronger daily.       04/11/18 1203   Discharge Reassessment   Assessment Type Discharge Planning Reassessment

## 2018-04-11 NOTE — PT/OT/SLP PROGRESS
Occupational Therapy   Treatment    Name: Frederick Kong Jr.  MRN: [id number]  Admitting Diagnosis:  Acute respiratory failure with hypoxia       Recommendations:     Discharge Recommendations: nursing facility, skilled  Discharge Equipment Recommendations:  none  Barriers to discharge:  None    Subjective   Pt supine and awake upon arrival.   Pt agreeable to OT treatment.    Communicated with: nurse Porras prior to session.  Pain/Comfort:  · Pain Rating 1: 0/10  · Pain Rating Post-Intervention 1: 0/10    Patients cultural, spiritual, Moravian conflicts given the current situation:      Objective:     Patient found with: peripheral IV, telemetry, oxygen    General Precautions: Standard, fall, nectar thick, hearing impaired, aspiration   Orthopedic Precautions:N/A   Braces: N/A     Occupational Performance:    Bed Mobility:    · Patient completed Scooting/Bridging with stand by assistance  · Patient completed Supine to Sit with minimum assistance  · Patient completed Sit to Supine with minimum assistance     Functional Mobility/Transfers:  · Patient completed Sit <> Stand Transfer with minimum assistance  with  rolling walker   · Patient completed Bed <> Chair Transfer using Stand Pivot technique with minimum assistance with rolling walker  · Patient completed Bedside Commode transfer using Stand Pivot technique with minimum assistance with  rolling walker  · Functional Mobility:   · Pt ambulated from EOB to sink with CGA and RW, then to BSC 4 ft from bed, and back to EOB.   · Pt exhibited minimal LOB posteriorly when moving sit>stand from EOB, requiring min A to regain balance.  · Prior to sit>stand from EOB, pt's Sp02 was 96 with NC. OT removed NC prior to ambulating to sink. Once at sink, pt's Sp02 dropped to 90. Pt's NC was re-applied and Sp02 increased to 93.    Activities of Daily Living:  · Grooming: contact guard assistance with hair grooming at sink. No LOB while standing at sink.    Patient left HOB elevated  with wife present    [facility] 6 Click:  [facility] Total Score: 16    Treatment & Education:  OT ed patient on safety with walker use for functional mobility with cues for hand placement & sequencing.     OT educated patient on energy conservation techniques to reduce demand on cardiovascular system with performance of ADL tasks. OT issued an instructional handout to patient & focused on activity pacing, environmental modifications, work simplification & use of adaptive equipment to reduce fatigue & SOB.     OT issued yellow theraband to pt & educated pt on B UE resistive HEP. Pt performed B UE resistive therapeutic exercise x 5 reps each horizontal ad/abduction, shoulder flexion/extension & elbow flexion/extension with rest breaks taken between sets. Pt able to perform all exercises with Min A & cues after demonstration provided.      Education:    Assessment:     Frederick Kong Jr. is a 82 y.o. male with a medical diagnosis of Acute respiratory failure with hypoxia.  He presents with some unsteadiness with functional mobility and impaired respiration, limiting participation in ADLs.  Performance deficits affecting function are weakness, impaired endurance, impaired self care skills, gait instability, impaired functional mobilty, impaired balance, decreased lower extremity function, decreased upper extremity function, decreased safety awareness, impaired cardiopulmonary response to activity.      Rehab Prognosis:  fair; patient would benefit from acute skilled OT services to address these deficits and reach maximum level of function.       Plan:     Patient to be seen 4 x/week to address the above listed problems via self-care/home management, therapeutic activities, therapeutic exercises  · Plan of Care Expires: 04/24/18  · Plan of Care Reviewed with: patient    This Plan of care has been discussed with the patient who was involved in its development and understands and is in agreement with the identified goals and  treatment plan    GOALS:    Occupational Therapy Goals        Problem: Occupational Therapy Goal    Goal Priority Disciplines Outcome Interventions   Occupational Therapy Goal     OT, PT/OT Ongoing (interventions implemented as appropriate)    Description:  Goals to be met by: 4/24/2018     Patient will increase functional independence with ADLs by performing:    UE Dressing with Contact Guard Assistance.  LE Dressing with Contact Guard Assistance.  Grooming while standing at sink with Contact Guard Assistance.  Toileting from toilet with Stand-by Assistance for hygiene and clothing management.   Supine to sit with Contact Guard Assistance.  Stand pivot transfers with Contact Guard Assistance.  Toilet transfer to toilet with Contact Guard Assistance.  Upper extremity exercise program x15 reps, with supervision.                      Time Tracking:     OT Date of Treatment: 04/11/18  OT Start Time: 1047  OT Stop Time: 1120  OT Total Time (min): 33 min    Billable Minutes:Self Care/Home Management 23  Therapeutic Exercise 10    Juan Manuel Jesus, ELVIN  4/11/2018

## 2018-04-11 NOTE — PLAN OF CARE
Problem: Patient Care Overview  Goal: Plan of Care Review  Outcome: Ongoing (interventions implemented as appropriate)  Patient AAO, VSS. Pt intermittently confused but easily redirected.  IV abx infusing as ordered.  Pt in NAD.  Pt verbalized understanding of POC.  Purposeful rounding done during shift to promote patient safety. Patient free from falls and injury during shift.  Non skid socks on when OOB.  Bed in lowest position, brakes locked, and call light within reach.  Will continue to monitor.

## 2018-04-11 NOTE — NURSING
Full report called to [facility] 3rd floor. Transferred to floor bed with some assistance.Tele box applied.

## 2018-04-12 ENCOUNTER — TELEPHONE (OUTPATIENT)
Dept: PULMONOLOGY | Facility: CLINIC | Age: 83
End: 2018-04-12

## 2018-04-12 VITALS
HEART RATE: 90 BPM | BODY MASS INDEX: 26.68 KG/M2 | RESPIRATION RATE: 18 BRPM | OXYGEN SATURATION: 96 % | DIASTOLIC BLOOD PRESSURE: 78 MMHG | SYSTOLIC BLOOD PRESSURE: 197 MMHG | WEIGHT: 170 LBS | TEMPERATURE: 98 F | HEIGHT: 67 IN

## 2018-04-12 PROBLEM — J44.0 CHRONIC OBSTRUCTIVE PULMONARY DISEASE WITH ACUTE LOWER RESPIRATORY INFECTION: Status: ACTIVE | Noted: 2018-04-12

## 2018-04-12 PROBLEM — J96.21 ACUTE ON CHRONIC RESPIRATORY FAILURE WITH HYPOXIA: Status: ACTIVE | Noted: 2018-04-07

## 2018-04-12 PROBLEM — N17.9 AKI (ACUTE KIDNEY INJURY): Status: RESOLVED | Noted: 2018-04-07 | Resolved: 2018-04-12

## 2018-04-12 LAB
BACTERIA BLD CULT: NORMAL
BACTERIA BLD CULT: NORMAL

## 2018-04-12 PROCEDURE — 25000003 PHARM REV CODE 250: Performed by: HOSPITALIST

## 2018-04-12 PROCEDURE — 25000242 PHARM REV CODE 250 ALT 637 W/ HCPCS: Performed by: HOSPITALIST

## 2018-04-12 PROCEDURE — 25000003 PHARM REV CODE 250: Performed by: EMERGENCY MEDICINE

## 2018-04-12 PROCEDURE — G8987 SELF CARE CURRENT STATUS: HCPCS | Mod: CK

## 2018-04-12 PROCEDURE — 97110 THERAPEUTIC EXERCISES: CPT

## 2018-04-12 PROCEDURE — G8988 SELF CARE GOAL STATUS: HCPCS | Mod: CJ

## 2018-04-12 PROCEDURE — 97165 OT EVAL LOW COMPLEX 30 MIN: CPT

## 2018-04-12 PROCEDURE — 94761 N-INVAS EAR/PLS OXIMETRY MLT: CPT

## 2018-04-12 PROCEDURE — 97535 SELF CARE MNGMENT TRAINING: CPT

## 2018-04-12 PROCEDURE — 25000003 PHARM REV CODE 250: Performed by: INTERNAL MEDICINE

## 2018-04-12 PROCEDURE — 94640 AIRWAY INHALATION TREATMENT: CPT

## 2018-04-12 PROCEDURE — 97116 GAIT TRAINING THERAPY: CPT

## 2018-04-12 PROCEDURE — 63600175 PHARM REV CODE 636 W HCPCS: Performed by: EMERGENCY MEDICINE

## 2018-04-12 PROCEDURE — 27000221 HC OXYGEN, UP TO 24 HOURS

## 2018-04-12 RX ORDER — AMOXICILLIN AND CLAVULANATE POTASSIUM 875; 125 MG/1; MG/1
1 TABLET, FILM COATED ORAL 2 TIMES DAILY
Qty: 10 TABLET | Refills: 0 | Status: ON HOLD | OUTPATIENT
Start: 2018-04-12 | End: 2018-04-19 | Stop reason: HOSPADM

## 2018-04-12 RX ADMIN — LOSARTAN POTASSIUM 100 MG: 25 TABLET, FILM COATED ORAL at 08:04

## 2018-04-12 RX ADMIN — CLOPIDOGREL BISULFATE 75 MG: 75 TABLET, FILM COATED ORAL at 08:04

## 2018-04-12 RX ADMIN — AMLODIPINE BESYLATE 5 MG: 5 TABLET ORAL at 08:04

## 2018-04-12 RX ADMIN — IPRATROPIUM BROMIDE AND ALBUTEROL SULFATE 3 ML: .5; 3 SOLUTION RESPIRATORY (INHALATION) at 07:04

## 2018-04-12 RX ADMIN — PIPERACILLIN SODIUM AND TAZOBACTAM SODIUM 4.5 G: 4; .5 INJECTION, POWDER, LYOPHILIZED, FOR SOLUTION INTRAVENOUS at 05:04

## 2018-04-12 RX ADMIN — METOPROLOL SUCCINATE 100 MG: 50 TABLET, EXTENDED RELEASE ORAL at 08:04

## 2018-04-12 RX ADMIN — PIPERACILLIN SODIUM AND TAZOBACTAM SODIUM 4.5 G: 4; .5 INJECTION, POWDER, LYOPHILIZED, FOR SOLUTION INTRAVENOUS at 12:04

## 2018-04-12 RX ADMIN — IPRATROPIUM BROMIDE AND ALBUTEROL SULFATE 3 ML: .5; 3 SOLUTION RESPIRATORY (INHALATION) at 01:04

## 2018-04-12 RX ADMIN — PANTOPRAZOLE SODIUM 40 MG: 40 TABLET, DELAYED RELEASE ORAL at 08:04

## 2018-04-12 RX ADMIN — IPRATROPIUM BROMIDE AND ALBUTEROL SULFATE 3 ML: .5; 3 SOLUTION RESPIRATORY (INHALATION) at 12:04

## 2018-04-12 NOTE — PLAN OF CARE
04/11/18 1923   Patient Assessment/Suction   Level of Consciousness (AVPU) alert   Respiratory Effort Normal;Unlabored   Expansion/Accessory Muscles/Retractions no retractions;no use of accessory muscles   All Lung Fields Breath Sounds diminished   PRE-TX-O2-ETCO2   O2 Device (Oxygen Therapy) nasal cannula   Flow (L/min) 1   SpO2 99 %   Pulse Oximetry Type Intermittent   Pulse 91   Resp 19   Aerosol Therapy   $ Aerosol Therapy Charges Aerosol Treatment   Respiratory Treatment Status given   SVN/Inhaler Treatment Route mask   Position During Treatment HOB at 30 degrees   Patient Tolerance good   Post-Treatment   Post-treatment Heart Rate (beats/min) 95   Post-treatment Resp Rate (breaths/min) 19   All Fields Breath Sounds unchanged

## 2018-04-12 NOTE — PT/OT/SLP PROGRESS
Occupational Therapy   Treatment    Name: Frederick Kong Jr.  MRN: [id number]  Admitting Diagnosis:  Acute respiratory failure with hypoxia       Recommendations:     Discharge Recommendations: home health PT  Discharge Equipment Recommendations:  shower chair  Barriers to discharge:  None    Subjective   Pt was seated in chair with wife present upon arrival.  Pt agreeable to OT treatment.    Communicated with: nurse Carole prior to session.  Pain/Comfort:  · Pain Rating 1: 0/10  · Pain Rating Post-Intervention 1: 0/10    Patients cultural, spiritual, Druze conflicts given the current situation:      Objective:     Patient found with: peripheral IV, oxygen, telemetry    General Precautions: Standard, fall, hearing impaired   Orthopedic Precautions:N/A   Braces: N/A     Occupational Performance:    Functional Mobility/Transfers:  · Patient completed Sit <> Stand Transfer with minimum assistance  with  rolling walker   · Patient completed Bed <> Chair Transfer using Stand Pivot technique with minimum assistance with rolling walker  · Patient completed Toilet Transfer Stand Pivot technique with minimum assistance with  rolling walker  · Functional Mobility: Pt ambulated with CGA and RW from the chair to the sink, bathroom, bed and back to chair. No LOB noted. OT attached an extension to pt's NC prior to ambulation. Pt's O2 was at 1L.    Activities of Daily Living:  · Grooming: stand by assistance with oral & facial hygiene while standing at sink. No LOB.    Patient left up in chair with all lines intact and wife present    [facility] 6 Click:  [facility] Total Score: 17    Treatment & Education:  OT issued yellow theraband to pt & educated pt on B UE resistive HEP. Pt performed B UE resistive therapeutic exercise x 10 reps each horizontal ad/abduction, shoulder flexion/extension & elbow flexion/extension with rest breaks taken between sets. Pt able to perform all exercises with Min A & cues after demonstration  provided.  Education:    Assessment:     Frederick Kong Jr. is a 82 y.o. male with a medical diagnosis of Acute respiratory failure with hypoxia.  He presents with increased independence with balance during static standing and ambulation with RW. Performance deficits affecting function are weakness, impaired endurance, impaired self care skills, gait instability, impaired cardiopulmonary response to activity.      Rehab Prognosis:  good; patient would benefit from acute skilled OT services to address these deficits and reach maximum level of function.       Plan:     Patient to be seen 4 x/week to address the above listed problems via self-care/home management, therapeutic activities, therapeutic exercises  · Plan of Care Expires: 04/24/18  · Plan of Care Reviewed with: patient, spouse    This Plan of care has been discussed with the patient who was involved in its development and understands and is in agreement with the identified goals and treatment plan    GOALS:    Occupational Therapy Goals        Problem: Occupational Therapy Goal    Goal Priority Disciplines Outcome Interventions   Occupational Therapy Goal     OT, PT/OT Ongoing (interventions implemented as appropriate)    Description:  Goals to be met by: 4/24/2018     Patient will increase functional independence with ADLs by performing:    UE Dressing with Contact Guard Assistance.  LE Dressing with Contact Guard Assistance.  Grooming while standing at sink with Contact Guard Assistance.  Toileting from toilet with Stand-by Assistance for hygiene and clothing management.   Supine to sit with Contact Guard Assistance.  Stand pivot transfers with Contact Guard Assistance.  Toilet transfer to toilet with Contact Guard Assistance.  Upper extremity exercise program x15 reps, with supervision.                      Time Tracking:     OT Date of Treatment: 04/12/18  OT Start Time: 1020  OT Stop Time: 1053  OT Total Time (min): 33 min    Billable Minutes:Self  Care/Home Management 24  Therapeutic Exercise 11    Juan Manuel Jesus, OT  4/12/2018

## 2018-04-12 NOTE — PLAN OF CARE
Problem: Occupational Therapy Goal  Goal: Occupational Therapy Goal  Goals to be met by: 4/24/2018     Patient will increase functional independence with ADLs by performing:    UE Dressing with Contact Guard Assistance.  LE Dressing with Contact Guard Assistance.  Grooming while standing at sink with Contact Guard Assistance.  Toileting from toilet with Stand-by Assistance for hygiene and clothing management.   Supine to sit with Contact Guard Assistance.  Stand pivot transfers with Contact Guard Assistance.  Toilet transfer to toilet with Contact Guard Assistance.  Upper extremity exercise program x15 reps, with supervision.     Outcome: Ongoing (interventions implemented as appropriate)  Pt has met grooming, UE exercise, and transfer goals. Pt is progressing toward remaining goals.    Revised grooming and transfer goals:  Stand pivot transfers with Supervision.  Toilet transfer to toilet with Supervision.

## 2018-04-12 NOTE — PLAN OF CARE
Problem: Patient Care Overview  Goal: Plan of Care Review  Outcome: Ongoing (interventions implemented as appropriate)  Plan of care reviewed with patient. IV antibiotics given as per orders. SR on telemetry. Leslie catheter intact & patent. No complaints of pain during night. Remains free from falls/injury. Instructed to call for assistance as needed during night. Verbalized understanding. Call light in reach. Bed in low & locked position. Bed alarm in use.        15

## 2018-04-12 NOTE — PROGRESS NOTES
04/12/18 1422   Home Oxygen Qualification   Room Air SpO2 At Rest 93 %   Room Air SpO2 on Exertion (!) 87 %   SpO2 During Exertion on O2 94 %   Heart Rate on O2 89 bpm   Exertion O2 LPM 4 LPM   SpO2 on Recovery 94 %   Recovery Heart Rate 85 bpm   Recovery O2 LPM 3 LPM

## 2018-04-12 NOTE — SUBJECTIVE & OBJECTIVE
Interval History:  Still doing well.  Working with PT.    Review of Systems   Constitutional: Negative for chills and fever.   Respiratory: Positive for cough and shortness of breath.    Cardiovascular: Negative for chest pain.   Gastrointestinal: Negative for abdominal pain.     Objective:     Vital Signs (Most Recent):  Temp: 96.5 °F (35.8 °C) (04/11/18 2016)  Pulse: 94 (04/11/18 2016)  Resp: 18 (04/11/18 2016)  BP: (!) 171/73 (04/11/18 2016)  SpO2: 98 % (04/11/18 2016) Vital Signs (24h Range):  Temp:  [96.5 °F (35.8 °C)-98.7 °F (37.1 °C)] 96.5 °F (35.8 °C)  Pulse:  [] 94  Resp:  [16-24] 18  SpO2:  [95 %-100 %] 98 %  BP: (152-197)/(69-84) 171/73     Weight: 77.1 kg (169 lb 15.6 oz)  Body mass index is 26.62 kg/m².  No intake or output data in the 24 hours ending 04/11/18 2030   Physical Exam   Constitutional: He is oriented to person, place, and time. No distress.   HENT:   Mouth/Throat: Oropharynx is clear and moist.   Eyes: Right eye exhibits no discharge. Left eye exhibits no discharge. No scleral icterus.   Neck: Neck supple. No JVD present.   Cardiovascular: Normal rate and regular rhythm.    Pulmonary/Chest: Effort normal. No stridor. He has decreased breath sounds in the right lower field. He has rhonchi in the right lower field.   Abdominal: Soft. Bowel sounds are normal. He exhibits no distension. There is no tenderness.   Musculoskeletal: He exhibits no edema or deformity.   Neurological: He is alert and oriented to person, place, and time.   Skin: Skin is warm and dry. No rash noted. He is not diaphoretic.   Psychiatric: He has a normal mood and affect. His speech is normal. He is slowed. Cognition and memory are impaired.       Significant Labs: All pertinent labs within the past 24 hours have been reviewed.    Significant Imaging: none

## 2018-04-12 NOTE — PLAN OF CARE
Pending home health orders, sent h&p, pt/ot evals, and avs to [facility] via [employer] system for processing    . The referral for the patient in [facility], room 316, bed 316 A admitted at 4/7/2018 6:25 AM has been updated by [email or web address].  Update: Accepted by [facility]

## 2018-04-12 NOTE — PROGRESS NOTES
SSC acknowledge home O2 evaluation orders for patient.  SSC awaiting results.  SSC informed patient's RN.SURI hernandez    3:06 per Susana with [facility] [phone] patient do not have qualifying diagnosis.  SSC informed TN Kacie.      3:45 Per Susana she will submit for authorization to patient's insurance.  She will return call once she receives and answer.  SSC informed TN Kacie.    4:25pm Per Kacie request this [facility] [place] patient information (facesheet, orders, h&p, home O2 evaluation) to [facility] [phone].  Fax confirmation received.SURI Hernandez    4:40pm per Shelby with [facility] [phone], they are not in network with patient's insurance.  [facility] faxed patient information to [facility] [phone].  Fax confirmation received.  SURI Hernandez    5:07pm This [facility] faxed CMN to [facility].  Fax confirmation received.  [facility] contacted [facility] for ETA.  SSC awaiting return call.SURI Hernandez    5:19pm SSC spoke to Aretha with [facility] 30 mins.  SSC informed Carole patient's nurse.SURI Hernandez

## 2018-04-12 NOTE — PLAN OF CARE
Problem: Physical Therapy Goal  Goal: Physical Therapy Goal  Goals to be met by: 2018     Patient will increase functional independence with mobility by performin. Supine to sit with Stand-by Assistance  2. Sit to supine with Stand-by Assistance  3. Sit to stand transfer with Contact Guard Assistance  4. Bed to chair transfer with Contact Guard Assistance using Rolling Walker  5. Gait  x 100 feet with Standby Assistance using Rolling Walker.   6. Lower extremity exercise program x 20 reps, with supervision      Outcome: Ongoing (interventions implemented as appropriate)  Pt is progressing toward goals.

## 2018-04-12 NOTE — ASSESSMENT & PLAN NOTE
Was present on admission.  Normal marcelo in sputum.  On following abx:  Antibiotics     Start     Stop Route Frequency Ordered    04/07/18 1800  piperacillin-tazobactam 4.5 g in dextrose 5 % 100 mL IVPB (ready to mix system)      -- IV Every 8 hours (non-standard times) 04/07/18 1135      On discharge will use levofloxacin.  Follow SLP's recommendations:  Regular with thin liquids.

## 2018-04-12 NOTE — PT/OT/SLP PROGRESS
"Physical Therapy Treatment    Patient Name:  Frederick Kong Jr.   MRN:  [id number]    Recommendations:     Discharge Recommendations:  home health PT   Discharge Equipment Recommendations: none   Barriers to discharge: None    Assessment:     Frederick Kong Jr. is a 82 y.o. male admitted with a medical diagnosis of Acute respiratory failure with hypoxia.  He presents with the following impairments/functional limitations:  weakness, impaired endurance, impaired functional mobilty, gait instability, impaired balance, impaired self care skills, impaired cardiopulmonary response to activity. Pt has shown significant improvements with transfer independence and gait endurance. He would benefit from continued PT to progress mobility.    Rehab Prognosis:  good; patient would benefit from acute skilled PT services to address these deficits and reach maximum level of function.      Recent Surgery: * No surgery found *      Plan:     During this hospitalization, patient to be seen 6 x/week to address the above listed problems via gait training, therapeutic activities, therapeutic exercises  · Plan of Care Expires:  05/09/18   Plan of Care Reviewed with: patient, spouse    Subjective     Communicated with RN prior to session.  Patient found seated in chair upon PT entry to room, agreeable to treatment.      Chief Complaint: Fatigue with gait  Patient comments/goals: "I am feeling stronger."  Pain/Comfort:  · Pain Rating 1: 0/10    Patients cultural, spiritual, Sikh conflicts given the current situation: none    Objective:     Patient found with: peripheral IV, telemetry, oxygen     General Precautions: Standard, fall, hearing impaired   Orthopedic Precautions:N/A   Braces: N/A     Functional Mobility:  · Transfers  · Sit to Stand:  minimum assistance with rolling walker from bedside chair; CGA from elevated EOB  · Bed to Chair: contact guard assistance with  rolling walker  using  Stand Pivot  · Gait:  · Pt ambulated 100 feet " with contact guard assistance using a rolling walker  · Pt with slow gait speed and decreased step length bilaterally    Therapeutic Activities and Exercises:   LE exercises including:  - STS from elevated EOB x 5 using walker  - Seated marching, LAQ and ankle pumps x 10 each (cues to slow speed)    Patient left up in chair with all lines intact and call button in reach. Pt and wife educated re: role of HHPT and using a pillow or folded blanket to elevated chair heights so pt can be more independent with standing from a chair.     GOALS:    Physical Therapy Goals        Problem: Physical Therapy Goal    Goal Priority Disciplines Outcome Goal Variances Interventions   Physical Therapy Goal     PT/OT, PT Ongoing (interventions implemented as appropriate)     Description:  Goals to be met by: 2018     Patient will increase functional independence with mobility by performin. Supine to sit with Stand-by Assistance  2. Sit to supine with Stand-by Assistance  3. Sit to stand transfer with Contact Guard Assistance  4. Bed to chair transfer with Contact Guard Assistance using Rolling Walker  5. Gait  x 100 feet with Standby Assistance using Rolling Walker.   6. Lower extremity exercise program x 20 reps, with supervision                       Time Tracking:     PT Received On: 18  PT Start Time: 1100     PT Stop Time: 1123  PT Total Time (min): 23 min     Billable Minutes: Gait Training 15 and Therapeutic Exercise 8    Treatment Type: Treatment  PT/PTA: PT           Jessica LeJeune, PT, DPT

## 2018-04-12 NOTE — PROGRESS NOTES
[facility] Hospital Medicine  Progress Note    Patient Name: Frederick Kong Jr.  MRN: [id number]  Patient Class: IP- Inpatient   Admission Date: 4/7/2018  Length of Stay: 4 days  Attending Physician: Harsha Dan MD  Primary Care Provider: Dhiraj Dempsey III, MD        Subjective:     Principal Problem:Acute respiratory failure with hypoxia    HPI:  82-year-old male presented with a chief complaint of shortness of breath.   Per history taken from his wife he got up in the middle of the night and when he didn't return to bed she found him suddenly struggling to breath so called the ambulance and when they arrived his oxygen sat was in the 60's. On arrival to ER evaluation revealed   The patient's chest x-ray was significant for a large  infiltrate on the right and required intubation.  .  The patient's labs were significant for an elevated white blood cell count, elevated troponin, elevated creatinine, elevated BNP, and elevated lactic acid.  The patient's EKG showed no acute abnormalities per my independent interpretation.  The patient likely has a mixed picture causing his respiratory decline.  The patient was aggressively treated per sepsis protocol with  IV fluids and broad-spectrum IV antibiotics for pneumonia --likely aspiration   He has hx of stent placement at the time of his aortic valve replacement in 2001 and was on coumadin for many years however has been changed to asa and plavix within the last year. His cardiologist is Dr Gonzalez. He has never had an MI or heart failure. He has a remote history of smoking and is otherwise alert and active.     Hospital Course:  No notes on file    Interval History:  Still doing well.  Working with PT.    Review of Systems   Constitutional: Negative for chills and fever.   Respiratory: Positive for cough and shortness of breath.    Cardiovascular: Negative for chest pain.   Gastrointestinal: Negative for abdominal pain.     Objective:     Vital  Signs (Most Recent):  Temp: 96.5 °F (35.8 °C) (04/11/18 2016)  Pulse: 94 (04/11/18 2016)  Resp: 18 (04/11/18 2016)  BP: (!) 171/73 (04/11/18 2016)  SpO2: 98 % (04/11/18 2016) Vital Signs (24h Range):  Temp:  [96.5 °F (35.8 °C)-98.7 °F (37.1 °C)] 96.5 °F (35.8 °C)  Pulse:  [] 94  Resp:  [16-24] 18  SpO2:  [95 %-100 %] 98 %  BP: (152-197)/(69-84) 171/73     Weight: 77.1 kg (169 lb 15.6 oz)  Body mass index is 26.62 kg/m².  No intake or output data in the 24 hours ending 04/11/18 2030   Physical Exam   Constitutional: He is oriented to person, place, and time. No distress.   HENT:   Mouth/Throat: Oropharynx is clear and moist.   Eyes: Right eye exhibits no discharge. Left eye exhibits no discharge. No scleral icterus.   Neck: Neck supple. No JVD present.   Cardiovascular: Normal rate and regular rhythm.    Pulmonary/Chest: Effort normal. No stridor. He has decreased breath sounds in the right lower field. He has rhonchi in the right lower field.   Abdominal: Soft. Bowel sounds are normal. He exhibits no distension. There is no tenderness.   Musculoskeletal: He exhibits no edema or deformity.   Neurological: He is alert and oriented to person, place, and time.   Skin: Skin is warm and dry. No rash noted. He is not diaphoretic.   Psychiatric: He has a normal mood and affect. His speech is normal. He is slowed. Cognition and memory are impaired.       Significant Labs: All pertinent labs within the past 24 hours have been reviewed.    Significant Imaging: none    Assessment/Plan:      * Acute respiratory failure with hypoxia    Due to pneumonia.  Improving.  Monitor o2 sats and effort of breathing.  Pulmonologist following.  Extubated 4/8.          Aspiration pneumonia of both lower lobes    Was present on admission.  Normal marcelo in sputum.  On following abx:  Antibiotics     Start     Stop Route Frequency Ordered    04/07/18 1800  piperacillin-tazobactam 4.5 g in dextrose 5 % 100 mL IVPB (ready to mix system)       "-- IV Every 8 hours (non-standard times) 04/07/18 1135      On discharge will use levofloxacin.  Follow SLP's recommendations:  Regular with thin liquids.        Essential hypertension    Chronic, controlled.  Monitor BP closely.  Will continue BP medications as needed for sustained BP control.  Hypertension Medications             amLODIPine (NORVASC) 5 MG tablet Take 5 mg by mouth once daily.    furosemide (LASIX) 20 MG tablet Take 10 mg by mouth every other day.    losartan (COZAAR) 100 MG tablet Take 100 mg by mouth once daily.    metoprolol succinate (TOPROL-XL) 100 MG 24 hr tablet Take 100 mg by mouth once daily.      Hospital Medications             amLODIPine tablet 5 mg Take 1 tablet (5 mg total) by mouth once daily.    hydrALAZINE injection 10 mg Inject 0.5 mLs (10 mg total) into the vein every 4 (four) hours as needed for SBP greater than (SBP . 160).    losartan tablet 100 mg Take 4 tablets (100 mg total) by mouth once daily.    metoprolol injection 5 mg Inject 5 mLs (5 mg total) into the vein every 6 (six) hours as needed for SBP greater than (>160).    metoprolol succinate (TOPROL-XL) 24 hr tablet 100 mg Take 1 tablet (100 mg total) by mouth once daily.    metoprolol injection 5 mg (Discontinued) Inject 5 mLs (5 mg total) into the vein every 6 (six) hours as needed for SBP greater than (>160).                      Urethral stricture, unspecified    See "urinary retention"          Urinary retention    Acute.  Required dilation of ureter by series of Leslie insertions of increasing diameter.  Will maintain Leslie for now.  Dr. Jerry made following rec's:  "1.  Leslie will remain x 1 week (4/15/18)- we  Will arrange f/u for him on 4/16/18 for nurse visit fill and pull in am. And then f/u with me in 4 to 6 weeks.    2. Scar tissue likely to return unless he does CIC or has a definitve treatment, explained this to wife."        H/O aortic valve replacement    Has mechanical AV.  Only on antiplatelets; " not anticoagulants.  He has history of bleeding.  High risk of intracranial bleeding.        YUAN (acute kidney injury)    Resolved.              VTE Risk Mitigation         Ordered     enoxaparin injection 40 mg  Daily     Route:  Subcutaneous        04/10/18 0958     IP VTE HIGH RISK PATIENT  Once      04/07/18 1235     Place CJ hose  Until discontinued      04/07/18 1135     Place sequential compression device  Until discontinued      04/07/18 1135              Harsha Dan MD  Department of Hospital Medicine   [facility]

## 2018-04-12 NOTE — PLAN OF CARE
04/12/18 1120   Final Note   Assessment Type Final Discharge Note   Discharge Disposition Home-Health  ([facility] )   Hospital Follow Up  Appt(s) scheduled? Yes

## 2018-04-12 NOTE — DISCHARGE SUMMARY
[facility] Hospital Medicine  Discharge Summary      Patient Name: Frederick Kong Jr.  MRN: [id number]  Admission Date: 4/7/2018  Hospital Length of Stay: 5 days  Discharge Date and Time:  04/12/2018 6:16 PM  Attending Physician: Harsha Dan MD   Discharging Provider: Harsha Dan MD  Primary Care Provider: Dhiraj Dempsey III, MD        HPI: 82-year-old male presented with a chief complaint of shortness of breath.   Per history taken from his wife he got up in the middle of the night and when he didn't return to bed she found him suddenly struggling to breath so called the ambulance and when they arrived his oxygen sat was in the 60's. On arrival to ER evaluation revealed   The patient's chest x-ray was significant for a large  infiltrate on the right and required intubation.  The patient's labs were significant for an elevated white blood cell count, elevated troponin, elevated creatinine, elevated BNP, and elevated lactic acid.  The patient's EKG showed no acute abnormalities per my independent interpretation.  The patient likely has a mixed picture causing his respiratory decline.  The patient was aggressively treated per sepsis protocol with  IV fluids and broad-spectrum IV antibiotics for pneumonia --likely aspiration.  He has hx of stent placement at the time of his aortic valve replacement in 2001 and was on coumadin for many years however has been changed to asa and plavix within the last year. His cardiologist is Dr Gonzalez. He has never had an MI or heart failure. He has a remote history of smoking and is otherwise alert and active.     * No surgery found *      Hospital Course:   Pt was admitted to ICU intubated.  Diagnosed with aspiration pneumonia and severe sepsis.  Pt had YUAN and a troponinemia.  Pt had urinary retention --  saw in consultation -- Leslie was placed.  Pulmonologist consulted -- diagnosed pt with RLL pna -- rec'd trying to wean ventilator.  Cardiologist  "consulted -- troponin elevation d/t acute hypoxemic resp failure.  Pt was extubated on 4/8.  Sputum culture showed normal marcelo.  Pt continued to improve from there forward -- renal function came to baseline.  After about 5 days, pt was discharged home with a referral to home health and order for home oxygen.  Dr. Jerry's final recommendations were as follows:  "1. Plan for voiding trial in my clinic on 4/16/18 and then f/u 4 to 6 weeks after this. Currently on Zosyn.   2. Explained scar tissue likely to recur to him and his wife who is here with him today. Would benefit from CIC.   3. He may need CIC "    PE:  Constitutional: He is oriented to person, place, and time. No distress.    Neck: Neck supple. No JVD present.   Cardiovascular: Normal rate and regular rhythm.    Pulmonary/Chest: Effort normal. No stridor. He has decreased breath sounds in the right lower field. He has rhonchi in the right lower field.   Abdominal: Soft. Bowel sounds are normal. He exhibits no distension. There is no tenderness.         Consults:   Consults         Status Ordering Provider     Inpatient consult to Cardiology  Once     Provider:  Go Gonzalez MD    Completed ALONZO ZAPATA     Inpatient consult to Pulmonology  Once     Provider:  Rita Dunn MD    Acknowledged ALONZO ZAPATA.     Inpatient consult to Social Work/Case Management  Once     Provider:  (Not yet assigned)    REGGIE Landa     Inpatient consult to Urology  Once     Provider:  Desmond Kyle MD    Completed ALONZO ZAPATA          Final Active Diagnoses:    Diagnosis Date Noted POA    PRINCIPAL PROBLEM:  Acute on chronic respiratory failure with hypoxia [J96.21] 04/07/2018 Yes    Chronic obstructive pulmonary disease with acute lower respiratory infection [J44.0] 04/12/2018 Yes    Essential hypertension [I10] 04/09/2018 Yes    Aspiration pneumonia of both lower lobes [J69.0] 04/09/2018 Yes    Urethral stricture, unspecified " "[N35.9] 04/08/2018 Yes    H/O aortic valve replacement [Z95.2] 04/07/2018 Not Applicable    Urinary retention [R33.9] 04/07/2018 Yes      Problems Resolved During this Admission:    Diagnosis Date Noted Date Resolved POA    Delirium [R41.0] 04/10/2018 04/10/2018 Yes    Gross hematuria [R31.0] 04/08/2018 04/09/2018 Yes    YUAN (acute kidney injury) [N17.9] 04/07/2018 04/12/2018 Yes    Elevated troponin [R74.8] 04/07/2018 04/11/2018 Yes    Severe sepsis [A41.9, R65.20] 04/07/2018 04/09/2018 Yes    Metabolic acidosis, normal anion gap (NAG) [E87.2] 04/07/2018 04/09/2018 Yes      Discharged Condition: good    Disposition: Home-Health Care Svc    Follow Up:  Follow-up Information     [facility].    Specialty:  Home Health Services  Why:  Home Health  Contact information:  [phone]             Toby Garcia MD In 2 weeks.    Specialty:  Pulmonary Disease  Why:  Hospital f/u.  Joce  spoke with Cherie in scheduling, she sent a message to the nurse to schedule and notify pt of appointment  Contact information:  [address]  [address]  [address]  [place] [address]  [phone]             keep your appointment next week with the urology nurse On 4/16/2018.           Dhiraj Dempsey III, MD On 4/25/2018.    Specialty:  Family Medicine  Why:  Hospital f/u on Wednesday, 4/25/18 @ 9:20am  Contact information:  [address]  [address]  [place] [address]  [phone]             [facility].    Specialty:  DME Provider  Why:  DME-home 02 w/portable tank  Contact information:  [address] [facility] [address]  [phone]                 Patient Instructions:     OXYGEN FOR HOME USE   Order Specific Question Answer Comments   Liter Flow 4    Duration With activity    Qualifying SpO2: 87    Testing done at: Exercise/Activity    Device home concentrator with portable concentrator   Length of need (in months): 3 mos    Patient condition with qualifying saturation COPD    Height: 5' 7" (1.702 " m)    Weight: 77.1 kg (169 lb 15.6 oz)    Does patient have medical equipment at home? walker, rolling    Does patient have medical equipment at home? grab bar    Does patient have medical equipment at home? cane, straight    Alternative treatment measures have been tried or considered and deemed clinically ineffective. Yes      Referral to Home health   Referral Priority: Routine Referral Type: Home Health   Referral Reason: Specialty Services Required    Referred to Provider: CONCETTA LUDWIG/PEDRO Requested Specialty: Home Health Services   Number of Visits Requested: 1      Diet Adult Regular     Activity as tolerated       Medications:  Reconciled Home Medications:      Medication List      START taking these medications    amoxicillin-clavulanate 875-125mg 875-125 mg per tablet  Commonly known as:  AUGMENTIN  Take 1 tablet by mouth 2 (two) times daily.        CONTINUE taking these medications    amLODIPine 5 MG tablet  Commonly known as:  NORVASC  Take 5 mg by mouth once daily.     aspirin 81 MG EC tablet  Commonly known as:  ECOTRIN  Take 81 mg by mouth once daily.     clopidogrel 75 mg tablet  Commonly known as:  PLAVIX  Take 75 mg by mouth once daily.     colchicine 0.6 mg capsule/tablet  Take 0.6 mg by mouth once daily.     furosemide 20 MG tablet  Commonly known as:  LASIX  Take 10 mg by mouth every other day.     losartan 100 MG tablet  Commonly known as:  COZAAR  Take 100 mg by mouth once daily.     lovastatin 40 MG tablet  Commonly known as:  MEVACOR  Take 40 mg by mouth every evening.     metoprolol succinate 100 MG 24 hr tablet  Commonly known as:  TOPROL-XL  Take 100 mg by mouth once daily.     pantoprazole 40 MG tablet  Commonly known as:  PROTONIX  Take 40 mg by mouth once daily.     potassium chloride SA 10 MEQ tablet  Commonly known as:  K-DUR,KLOR-CON  Take 10 mEq by mouth 2 (two) times daily.        STOP taking these medications    doxycycline 100 MG EC tablet  Commonly known as:  DORYX             Significant Diagnostic Studies:   BMP  Lab Results   Component Value Date     04/11/2018    K 4.0 04/11/2018     (H) 04/11/2018    CO2 23 04/11/2018    BUN 22 04/11/2018    CREATININE 1.3 04/11/2018    CALCIUM 8.7 04/11/2018    ANIONGAP 7 (L) 04/11/2018    ESTGFRAFRICA 59 (A) 04/11/2018    EGFRNONAA 51 (A) 04/11/2018     Lab Results   Component Value Date    WBC 10.80 04/09/2018    HGB 11.8 (L) 04/09/2018    HCT 35.6 (L) 04/09/2018    MCV 88 04/09/2018     04/09/2018         Pending Diagnostic Studies:     Procedure Component Value Units Date/Time    EKG 12-lead [[id number]]     Order Status:  Sent Lab Status:  No result     Transthoracic echo (TTE) complete [[id number]]  (Abnormal) Resulted:  04/07/18 1622    Order Status:  Sent Lab Status:  In process Updated:  04/07/18 1622     AV mean gradient 4.65 mmHg      Ao peak kehinde 1.33 m/s      Ao VTI 0.31 cm      IVRT 0.12 msec      IVS 0.88 cm      LA size 3.51 cm      LVIDD 5.18 cm      LVIDS 4.44 (A) cm      LVOT diameter 2.01 cm      LVOT peak VTI 0.16 cm      PW 0.92 cm      MV Peak A Kehinde 0.93 m/s      E wave decelartion time 282.20 msec      MV Peak E Kehinde 0.77 m/s      RVDD 2.98 cm      TR Max Kehinde 2.45 m/s      Ao root annulus 2.91 cm      AORTIC VALVE CUSP SEPERATION 1.95 cm      PV PEAK VELOCITY 0.89 cm/s      PV peak gradient 3.20 mmHg      Triscuspid Valve Regurgitation Peak Gradient 24.01 mmHg      MV stenosis pressure 1/2 time 81.84 ms      FS 14 %      Left Ventricle Relative Wall Thickness 0.35 cm      AV valve area 1.64 cm2      MV valve area p 1/2 method 2.69 cm2      E/A ratio 0.83     LVOT area 3.17 cm2      LVOT stroke volume 0.51 cm3      BSA 1.87 m2      TDI SEPTAL 5.00     LV LATERAL E/E' RATIO 0.19     LV SEPTAL E/E' RATIO 0.15     TDI LATERAL 4.00     TDI 4.50     E/E' ratio 0.17        Indwelling Lines/Drains at time of discharge:   Lines/Drains/Airways     Drain                 Urethral Catheter 04/07/18 2100 4 days                 Time spent on the discharge of patient: 33 minutes  Patient was seen and examined on the date of discharge and determined to be suitable for discharge.         Harsha Dan MD  Department of Hospital Medicine  [facility]

## 2018-04-12 NOTE — TELEPHONE ENCOUNTER
Please advise the patient appointment for 4/30/2018 at 2:15 pm.-      ---- Message from Tosin Garcia sent at 4/12/2018 11:15 AM CDT -----  Contact:  from [facility]  Soila is calling in regards to getting pt appointment access for 2 week follow up with doctor. Pt can be reached at [phone] (wmsn)

## 2018-04-12 NOTE — PLAN OF CARE
Problem: Patient Care Overview  Goal: Plan of Care Review  Outcome: Ongoing (interventions implemented as appropriate)  Patient receiving aerosol tx via duoneb now and q6hr.  Hr 90 and 02 saturation 95% on nc at 1lpm.

## 2018-04-12 NOTE — NURSING
Patient discharged home. Discharge instructions, follow up instructions and prescriptions reviewed, no questions at this time, patients IV removed in tact. Telemetry monitor removed and returned to nurses station. Patient transported off of floor with home oxygen and education on care of indwelling henderson catheter. Care relinquished at this time.

## 2018-04-12 NOTE — PLAN OF CARE
Joce spoke with MARY Lam to update her on the pt getting oxygen.  Still no success.  SURI Olivares is sending to [facility].  Waiting on approval.  Joce informed Dr. Dan about Oxygen pending.

## 2018-04-13 ENCOUNTER — TELEPHONE (OUTPATIENT)
Dept: MEDSURG UNIT | Facility: HOSPITAL | Age: 83
End: 2018-04-13

## 2018-04-14 ENCOUNTER — HOSPITAL ENCOUNTER (INPATIENT)
Facility: HOSPITAL | Age: 83
LOS: 5 days | Discharge: HOME HEALTH | DRG: 871 | End: 2018-04-19
Attending: EMERGENCY MEDICINE | Admitting: HOSPITALIST
Payer: MEDICARE

## 2018-04-14 ENCOUNTER — OUTSIDE PLACE OF SERVICE (OUTPATIENT)
Dept: PULMONOLOGY | Facility: CLINIC | Age: 83
End: 2018-04-14
Payer: MEDICARE

## 2018-04-14 DIAGNOSIS — J96.21 ACUTE ON CHRONIC RESPIRATORY FAILURE WITH HYPOXIA: Primary | ICD-10-CM

## 2018-04-14 DIAGNOSIS — J96.22 ACUTE ON CHRONIC RESPIRATORY FAILURE WITH HYPOXIA AND HYPERCAPNIA: ICD-10-CM

## 2018-04-14 DIAGNOSIS — I50.9 CHF (CONGESTIVE HEART FAILURE): ICD-10-CM

## 2018-04-14 DIAGNOSIS — I50.31 ACUTE DIASTOLIC CONGESTIVE HEART FAILURE: ICD-10-CM

## 2018-04-14 DIAGNOSIS — R06.02 SOB (SHORTNESS OF BREATH): ICD-10-CM

## 2018-04-14 DIAGNOSIS — J69.0 ASPIRATION PNEUMONIA OF BOTH LOWER LOBES, UNSPECIFIED ASPIRATION PNEUMONIA TYPE: ICD-10-CM

## 2018-04-14 DIAGNOSIS — R06.2 WHEEZES: ICD-10-CM

## 2018-04-14 DIAGNOSIS — J96.21 ACUTE ON CHRONIC RESPIRATORY FAILURE WITH HYPOXIA AND HYPERCAPNIA: ICD-10-CM

## 2018-04-14 DIAGNOSIS — Z95.2 H/O AORTIC VALVE REPLACEMENT: ICD-10-CM

## 2018-04-14 DIAGNOSIS — R06.2 WHEEZE: ICD-10-CM

## 2018-04-14 DIAGNOSIS — J15.69 GRAM-NEGATIVE PNEUMONIA: ICD-10-CM

## 2018-04-14 DIAGNOSIS — N35.9 URETHRAL STRICTURE, UNSPECIFIED STRICTURE TYPE: ICD-10-CM

## 2018-04-14 DIAGNOSIS — A41.9 SEPSIS, DUE TO UNSPECIFIED ORGANISM: ICD-10-CM

## 2018-04-14 PROBLEM — J18.9 BILATERAL PNEUMONIA: Status: ACTIVE | Noted: 2018-04-14

## 2018-04-14 PROBLEM — N18.30 CHRONIC RENAL INSUFFICIENCY, STAGE 3 (MODERATE): Status: ACTIVE | Noted: 2018-04-07

## 2018-04-14 PROBLEM — J96.02 ACUTE RESPIRATORY ACIDOSIS: Status: ACTIVE | Noted: 2018-04-14

## 2018-04-14 PROBLEM — I16.0 HYPERTENSIVE URGENCY: Status: ACTIVE | Noted: 2018-04-14

## 2018-04-14 PROBLEM — E87.5 HYPERKALEMIA: Status: ACTIVE | Noted: 2018-04-14

## 2018-04-14 LAB
ALBUMIN SERPL BCP-MCNC: 3.2 G/DL
ALLENS TEST: ABNORMAL
ALLENS TEST: ABNORMAL
ALP SERPL-CCNC: 86 U/L
ALT SERPL W/O P-5'-P-CCNC: 15 U/L
ANION GAP SERPL CALC-SCNC: 10 MMOL/L
AST SERPL-CCNC: 22 U/L
BACTERIA #/AREA URNS HPF: ABNORMAL /HPF
BASOPHILS # BLD AUTO: 0.1 K/UL
BASOPHILS NFR BLD: 0.5 %
BILIRUB SERPL-MCNC: 0.4 MG/DL
BILIRUB UR QL STRIP: NEGATIVE
BNP SERPL-MCNC: 1242 PG/ML
BUN SERPL-MCNC: 28 MG/DL
CALCIUM SERPL-MCNC: 9.1 MG/DL
CHLORIDE SERPL-SCNC: 108 MMOL/L
CLARITY UR: CLEAR
CO2 SERPL-SCNC: 23 MMOL/L
COLOR UR: YELLOW
CREAT SERPL-MCNC: 1.5 MG/DL
DELSYS: ABNORMAL
DELSYS: ABNORMAL
DIFFERENTIAL METHOD: ABNORMAL
EOSINOPHIL # BLD AUTO: 0.9 K/UL
EOSINOPHIL NFR BLD: 5 %
ERYTHROCYTE [DISTWIDTH] IN BLOOD BY AUTOMATED COUNT: 14.8 %
ERYTHROCYTE [SEDIMENTATION RATE] IN BLOOD BY WESTERGREN METHOD: 16 MM/H
ERYTHROCYTE [SEDIMENTATION RATE] IN BLOOD BY WESTERGREN METHOD: 20 MM/H
EST. GFR  (AFRICAN AMERICAN): 49 ML/MIN/1.73 M^2
EST. GFR  (NON AFRICAN AMERICAN): 43 ML/MIN/1.73 M^2
ETCO2: 26
FIO2: 100
FIO2: 50
GLUCOSE SERPL-MCNC: 215 MG/DL
GLUCOSE UR QL STRIP: NEGATIVE
HCO3 UR-SCNC: 26.6 MMOL/L (ref 24–28)
HCO3 UR-SCNC: 31.1 MMOL/L (ref 24–28)
HCT VFR BLD AUTO: 40.1 %
HGB BLD-MCNC: 13.4 G/DL
HGB UR QL STRIP: ABNORMAL
HYALINE CASTS #/AREA URNS LPF: 8 /LPF
INR PPP: 1
KETONES UR QL STRIP: NEGATIVE
LACTATE SERPL-SCNC: 0.9 MMOL/L
LACTATE SERPL-SCNC: 1.1 MMOL/L
LEUKOCYTE ESTERASE UR QL STRIP: ABNORMAL
LYMPHOCYTES # BLD AUTO: 4.8 K/UL
LYMPHOCYTES NFR BLD: 27.8 %
MCH RBC QN AUTO: 29.5 PG
MCHC RBC AUTO-ENTMCNC: 33.4 G/DL
MCV RBC AUTO: 88 FL
MICROSCOPIC COMMENT: ABNORMAL
MIN VOL: 10.2
MODE: ABNORMAL
MODE: ABNORMAL
MONOCYTES # BLD AUTO: 1.1 K/UL
MONOCYTES NFR BLD: 6.5 %
NEUTROPHILS # BLD AUTO: 10.4 K/UL
NEUTROPHILS NFR BLD: 60.2 %
NITRITE UR QL STRIP: NEGATIVE
PCO2 BLDA: 41.3 MMHG (ref 35–45)
PCO2 BLDA: 77.1 MMHG (ref 35–45)
PEEP: 5
PEEP: 5
PH SMN: 7.21 [PH] (ref 7.35–7.45)
PH SMN: 7.42 [PH] (ref 7.35–7.45)
PH UR STRIP: 6 [PH] (ref 5–8)
PIP: 26
PLATELET # BLD AUTO: 477 K/UL
PLATELET BLD QL SMEAR: ABNORMAL
PMV BLD AUTO: 7.4 FL
PO2 BLDA: 139 MMHG (ref 80–100)
PO2 BLDA: 593 MMHG (ref 80–100)
POC BE: 2 MMOL/L
POC BE: 3 MMOL/L
POC SATURATED O2: 100 % (ref 95–100)
POC SATURATED O2: 99 % (ref 95–100)
POC TCO2: 28 MMOL/L (ref 23–27)
POC TCO2: 33 MMOL/L (ref 23–27)
POTASSIUM SERPL-SCNC: 5.2 MMOL/L
PROCALCITONIN SERPL IA-MCNC: 0.32 NG/ML
PROT SERPL-MCNC: 7.5 G/DL
PROT UR QL STRIP: NEGATIVE
PROTHROMBIN TIME: 10 SEC
RBC # BLD AUTO: 4.54 M/UL
RBC #/AREA URNS HPF: 38 /HPF (ref 0–4)
SAMPLE: ABNORMAL
SAMPLE: ABNORMAL
SITE: ABNORMAL
SITE: ABNORMAL
SODIUM SERPL-SCNC: 141 MMOL/L
SP GR UR STRIP: 1.02 (ref 1–1.03)
SP02: 100
TROPONIN I SERPL DL<=0.01 NG/ML-MCNC: 0.04 NG/ML
TROPONIN I SERPL DL<=0.01 NG/ML-MCNC: 0.1 NG/ML
TROPONIN I SERPL DL<=0.01 NG/ML-MCNC: 0.17 NG/ML
URN SPEC COLLECT METH UR: ABNORMAL
UROBILINOGEN UR STRIP-ACNC: NEGATIVE EU/DL
VT: 500
VT: 500
WBC # BLD AUTO: 17.2 K/UL
WBC #/AREA URNS HPF: 7 /HPF (ref 0–5)

## 2018-04-14 PROCEDURE — 87040 BLOOD CULTURE FOR BACTERIA: CPT | Mod: 59

## 2018-04-14 PROCEDURE — 25000003 PHARM REV CODE 250: Performed by: EMERGENCY MEDICINE

## 2018-04-14 PROCEDURE — 99900035 HC TECH TIME PER 15 MIN (STAT)

## 2018-04-14 PROCEDURE — 99291 CRITICAL CARE FIRST HOUR: CPT | Mod: ,,, | Performed by: INTERNAL MEDICINE

## 2018-04-14 PROCEDURE — 63600175 PHARM REV CODE 636 W HCPCS: Performed by: EMERGENCY MEDICINE

## 2018-04-14 PROCEDURE — 25000242 PHARM REV CODE 250 ALT 637 W/ HCPCS: Performed by: HOSPITALIST

## 2018-04-14 PROCEDURE — 5A1945Z RESPIRATORY VENTILATION, 24-96 CONSECUTIVE HOURS: ICD-10-PCS | Performed by: EMERGENCY MEDICINE

## 2018-04-14 PROCEDURE — 99291 CRITICAL CARE FIRST HOUR: CPT | Mod: 25

## 2018-04-14 PROCEDURE — 0BH18EZ INSERTION OF ENDOTRACHEAL AIRWAY INTO TRACHEA, VIA NATURAL OR ARTIFICIAL OPENING ENDOSCOPIC: ICD-10-PCS | Performed by: EMERGENCY MEDICINE

## 2018-04-14 PROCEDURE — 84484 ASSAY OF TROPONIN QUANT: CPT | Mod: 91

## 2018-04-14 PROCEDURE — 31500 INSERT EMERGENCY AIRWAY: CPT

## 2018-04-14 PROCEDURE — 87070 CULTURE OTHR SPECIMN AEROBIC: CPT

## 2018-04-14 PROCEDURE — 85025 COMPLETE CBC W/AUTO DIFF WBC: CPT

## 2018-04-14 PROCEDURE — 36415 COLL VENOUS BLD VENIPUNCTURE: CPT

## 2018-04-14 PROCEDURE — C9113 INJ PANTOPRAZOLE SODIUM, VIA: HCPCS | Performed by: NURSE PRACTITIONER

## 2018-04-14 PROCEDURE — 63600175 PHARM REV CODE 636 W HCPCS

## 2018-04-14 PROCEDURE — 96374 THER/PROPH/DIAG INJ IV PUSH: CPT

## 2018-04-14 PROCEDURE — 94770 HC EXHALED C02 TEST: CPT

## 2018-04-14 PROCEDURE — 87086 URINE CULTURE/COLONY COUNT: CPT

## 2018-04-14 PROCEDURE — 83605 ASSAY OF LACTIC ACID: CPT

## 2018-04-14 PROCEDURE — 81000 URINALYSIS NONAUTO W/SCOPE: CPT

## 2018-04-14 PROCEDURE — 85610 PROTHROMBIN TIME: CPT

## 2018-04-14 PROCEDURE — 94761 N-INVAS EAR/PLS OXIMETRY MLT: CPT

## 2018-04-14 PROCEDURE — 99900026 HC AIRWAY MAINTENANCE (STAT)

## 2018-04-14 PROCEDURE — 27000221 HC OXYGEN, UP TO 24 HOURS

## 2018-04-14 PROCEDURE — 25000003 PHARM REV CODE 250: Performed by: NURSE PRACTITIONER

## 2018-04-14 PROCEDURE — 80053 COMPREHEN METABOLIC PANEL: CPT

## 2018-04-14 PROCEDURE — 63600175 PHARM REV CODE 636 W HCPCS: Performed by: INTERNAL MEDICINE

## 2018-04-14 PROCEDURE — 20000000 HC ICU ROOM

## 2018-04-14 PROCEDURE — 84145 PROCALCITONIN (PCT): CPT

## 2018-04-14 PROCEDURE — 63600175 PHARM REV CODE 636 W HCPCS: Performed by: NURSE PRACTITIONER

## 2018-04-14 PROCEDURE — 94640 AIRWAY INHALATION TREATMENT: CPT

## 2018-04-14 PROCEDURE — 94002 VENT MGMT INPAT INIT DAY: CPT

## 2018-04-14 PROCEDURE — 25000003 PHARM REV CODE 250

## 2018-04-14 PROCEDURE — 82803 BLOOD GASES ANY COMBINATION: CPT

## 2018-04-14 PROCEDURE — 83880 ASSAY OF NATRIURETIC PEPTIDE: CPT

## 2018-04-14 PROCEDURE — 83605 ASSAY OF LACTIC ACID: CPT | Mod: 91

## 2018-04-14 PROCEDURE — 96375 TX/PRO/DX INJ NEW DRUG ADDON: CPT

## 2018-04-14 PROCEDURE — 63600175 PHARM REV CODE 636 W HCPCS: Performed by: HOSPITALIST

## 2018-04-14 PROCEDURE — 87205 SMEAR GRAM STAIN: CPT | Mod: 59

## 2018-04-14 PROCEDURE — 87070 CULTURE OTHR SPECIMN AEROBIC: CPT | Mod: 59

## 2018-04-14 PROCEDURE — 36600 WITHDRAWAL OF ARTERIAL BLOOD: CPT

## 2018-04-14 RX ORDER — AMOXICILLIN 250 MG
1 CAPSULE ORAL 2 TIMES DAILY
Status: DISCONTINUED | OUTPATIENT
Start: 2018-04-14 | End: 2018-04-16

## 2018-04-14 RX ORDER — LOSARTAN POTASSIUM 25 MG/1
100 TABLET ORAL DAILY
Status: DISCONTINUED | OUTPATIENT
Start: 2018-04-14 | End: 2018-04-14

## 2018-04-14 RX ORDER — ACETAMINOPHEN 325 MG/1
650 TABLET ORAL EVERY 8 HOURS PRN
Status: DISCONTINUED | OUTPATIENT
Start: 2018-04-14 | End: 2018-04-16

## 2018-04-14 RX ORDER — ETOMIDATE 2 MG/ML
20 INJECTION INTRAVENOUS
Status: COMPLETED | OUTPATIENT
Start: 2018-04-14 | End: 2018-04-14

## 2018-04-14 RX ORDER — AMLODIPINE BESYLATE 5 MG/1
5 TABLET ORAL DAILY
Status: DISCONTINUED | OUTPATIENT
Start: 2018-04-14 | End: 2018-04-16

## 2018-04-14 RX ORDER — METOPROLOL TARTRATE 50 MG/1
50 TABLET ORAL 2 TIMES DAILY
Status: DISCONTINUED | OUTPATIENT
Start: 2018-04-14 | End: 2018-04-16

## 2018-04-14 RX ORDER — ONDANSETRON 2 MG/ML
4 INJECTION INTRAMUSCULAR; INTRAVENOUS EVERY 8 HOURS PRN
Status: DISCONTINUED | OUTPATIENT
Start: 2018-04-14 | End: 2018-04-19 | Stop reason: HOSPADM

## 2018-04-14 RX ORDER — ALBUTEROL SULFATE 2.5 MG/.5ML
2.5 SOLUTION RESPIRATORY (INHALATION) EVERY 4 HOURS PRN
Status: DISCONTINUED | OUTPATIENT
Start: 2018-04-14 | End: 2018-04-19 | Stop reason: HOSPADM

## 2018-04-14 RX ORDER — IPRATROPIUM BROMIDE AND ALBUTEROL SULFATE 2.5; .5 MG/3ML; MG/3ML
3 SOLUTION RESPIRATORY (INHALATION) EVERY 6 HOURS
Status: DISCONTINUED | OUTPATIENT
Start: 2018-04-14 | End: 2018-04-19 | Stop reason: HOSPADM

## 2018-04-14 RX ORDER — CLOPIDOGREL BISULFATE 75 MG/1
75 TABLET ORAL DAILY
Status: DISCONTINUED | OUTPATIENT
Start: 2018-04-14 | End: 2018-04-16

## 2018-04-14 RX ORDER — PROPOFOL 10 MG/ML
5 INJECTION, EMULSION INTRAVENOUS CONTINUOUS
Status: DISCONTINUED | OUTPATIENT
Start: 2018-04-14 | End: 2018-04-15

## 2018-04-14 RX ORDER — ENOXAPARIN SODIUM 100 MG/ML
40 INJECTION SUBCUTANEOUS EVERY 24 HOURS
Status: DISCONTINUED | OUTPATIENT
Start: 2018-04-14 | End: 2018-04-19 | Stop reason: HOSPADM

## 2018-04-14 RX ORDER — FUROSEMIDE 10 MG/ML
40 INJECTION INTRAMUSCULAR; INTRAVENOUS
Status: COMPLETED | OUTPATIENT
Start: 2018-04-14 | End: 2018-04-14

## 2018-04-14 RX ORDER — PROCHLORPERAZINE EDISYLATE 5 MG/ML
5 INJECTION INTRAMUSCULAR; INTRAVENOUS EVERY 6 HOURS PRN
Status: DISCONTINUED | OUTPATIENT
Start: 2018-04-14 | End: 2018-04-19 | Stop reason: HOSPADM

## 2018-04-14 RX ORDER — ASPIRIN 81 MG/1
81 TABLET ORAL DAILY
Status: DISCONTINUED | OUTPATIENT
Start: 2018-04-14 | End: 2018-04-19 | Stop reason: HOSPADM

## 2018-04-14 RX ORDER — IPRATROPIUM BROMIDE AND ALBUTEROL SULFATE 2.5; .5 MG/3ML; MG/3ML
3 SOLUTION RESPIRATORY (INHALATION) EVERY 6 HOURS
Status: DISCONTINUED | OUTPATIENT
Start: 2018-04-14 | End: 2018-04-14

## 2018-04-14 RX ORDER — PROPOFOL 10 MG/ML
INJECTION, EMULSION INTRAVENOUS
Status: COMPLETED
Start: 2018-04-14 | End: 2018-04-14

## 2018-04-14 RX ORDER — SODIUM CHLORIDE 0.9 % (FLUSH) 0.9 %
3 SYRINGE (ML) INJECTION
Status: DISCONTINUED | OUTPATIENT
Start: 2018-04-14 | End: 2018-04-19 | Stop reason: HOSPADM

## 2018-04-14 RX ORDER — ACETAMINOPHEN 325 MG/1
650 TABLET ORAL EVERY 4 HOURS PRN
Status: DISCONTINUED | OUTPATIENT
Start: 2018-04-14 | End: 2018-04-16

## 2018-04-14 RX ORDER — SUCCINYLCHOLINE CHLORIDE 20 MG/ML
100 INJECTION INTRAMUSCULAR; INTRAVENOUS
Status: COMPLETED | OUTPATIENT
Start: 2018-04-14 | End: 2018-04-14

## 2018-04-14 RX ORDER — SODIUM CHLORIDE 9 MG/ML
INJECTION, SOLUTION INTRAVENOUS CONTINUOUS
Status: DISCONTINUED | OUTPATIENT
Start: 2018-04-14 | End: 2018-04-14

## 2018-04-14 RX ORDER — FUROSEMIDE 10 MG/ML
40 INJECTION INTRAMUSCULAR; INTRAVENOUS ONCE
Status: COMPLETED | OUTPATIENT
Start: 2018-04-14 | End: 2018-04-14

## 2018-04-14 RX ORDER — PANTOPRAZOLE SODIUM 40 MG/10ML
40 INJECTION, POWDER, LYOPHILIZED, FOR SOLUTION INTRAVENOUS DAILY
Status: DISCONTINUED | OUTPATIENT
Start: 2018-04-14 | End: 2018-04-15

## 2018-04-14 RX ADMIN — VANCOMYCIN HYDROCHLORIDE 1500 MG: 1 INJECTION, POWDER, LYOPHILIZED, FOR SOLUTION INTRAVENOUS at 10:04

## 2018-04-14 RX ADMIN — IPRATROPIUM BROMIDE AND ALBUTEROL SULFATE 3 ML: .5; 3 SOLUTION RESPIRATORY (INHALATION) at 12:04

## 2018-04-14 RX ADMIN — STANDARDIZED SENNA CONCENTRATE AND DOCUSATE SODIUM 1 TABLET: 8.6; 5 TABLET, FILM COATED ORAL at 01:04

## 2018-04-14 RX ADMIN — ETOMIDATE 20 MG: 2 INJECTION INTRAVENOUS at 08:04

## 2018-04-14 RX ADMIN — METOPROLOL TARTRATE 50 MG: 50 TABLET ORAL at 01:04

## 2018-04-14 RX ADMIN — PANTOPRAZOLE SODIUM 40 MG: 40 INJECTION, POWDER, FOR SOLUTION INTRAVENOUS at 01:04

## 2018-04-14 RX ADMIN — LOSARTAN POTASSIUM 100 MG: 25 TABLET, FILM COATED ORAL at 01:04

## 2018-04-14 RX ADMIN — METOPROLOL TARTRATE 50 MG: 50 TABLET ORAL at 09:04

## 2018-04-14 RX ADMIN — IPRATROPIUM BROMIDE AND ALBUTEROL SULFATE 3 ML: .5; 3 SOLUTION RESPIRATORY (INHALATION) at 07:04

## 2018-04-14 RX ADMIN — FUROSEMIDE 40 MG: 10 INJECTION, SOLUTION INTRAMUSCULAR; INTRAVENOUS at 05:04

## 2018-04-14 RX ADMIN — PROPOFOL 20 MCG/KG/MIN: 10 INJECTION, EMULSION INTRAVENOUS at 01:04

## 2018-04-14 RX ADMIN — STANDARDIZED SENNA CONCENTRATE AND DOCUSATE SODIUM 1 TABLET: 8.6; 5 TABLET, FILM COATED ORAL at 09:04

## 2018-04-14 RX ADMIN — ENOXAPARIN SODIUM 40 MG: 100 INJECTION SUBCUTANEOUS at 01:04

## 2018-04-14 RX ADMIN — AZITHROMYCIN MONOHYDRATE 500 MG: 500 INJECTION, POWDER, LYOPHILIZED, FOR SOLUTION INTRAVENOUS at 12:04

## 2018-04-14 RX ADMIN — PIPERACILLIN SODIUM AND TAZOBACTAM SODIUM 4.5 G: 4; .5 INJECTION, POWDER, FOR SOLUTION INTRAVENOUS at 05:04

## 2018-04-14 RX ADMIN — SUCCINYLCHOLINE CHLORIDE 100 MG: 20 INJECTION, SOLUTION INTRAMUSCULAR; INTRAVENOUS at 08:04

## 2018-04-14 RX ADMIN — FUROSEMIDE 40 MG: 10 INJECTION, SOLUTION INTRAMUSCULAR; INTRAVENOUS at 09:04

## 2018-04-14 RX ADMIN — PROPOFOL 20 MCG/KG/MIN: 10 INJECTION, EMULSION INTRAVENOUS at 05:04

## 2018-04-14 RX ADMIN — PROPOFOL 1000 MG: 10 INJECTION, EMULSION INTRAVENOUS at 08:04

## 2018-04-14 RX ADMIN — CLOPIDOGREL BISULFATE 75 MG: 75 TABLET ORAL at 01:04

## 2018-04-14 NOTE — PLAN OF CARE
Problem: Patient Care Overview  Goal: Plan of Care Review  Outcome: Ongoing (interventions implemented as appropriate)  Pt remains sedated and intubated. Vital signs stable. Suctioning clear mucus from E.T. Tube. Afebrile this shift. 1700 ml of urine output after total of 80 mg of IV Lasix. Dr. Finn and Dr. Menon consulted. Bed rotating patient every 10 min. Safety maintained.

## 2018-04-14 NOTE — ASSESSMENT & PLAN NOTE
Currently intubated on vent due to acute respiratory failure  Pneumonia identified; continue IV vancomycin, IV azithromycin, and IV zosyn which were initiated in Ed.  Bronchodilators  Consult pulmonary for vent management.

## 2018-04-14 NOTE — SIGNIFICANT EVENT
Results for BEN MADSEN JR. (MRN [id number]) as of 4/14/2018 08:54   Ref. Range 4/14/2018 08:43   POC PH Latest Ref Range: 7.35 - 7.45  7.214 (LL)   POC PCO2 Latest Ref Range: 35 - 45 mmHg 77.1 (HH)   POC PO2 Latest Ref Range: 80 - 100 mmHg 593 (H)   POC BE Latest Ref Range: -2 to 2 mmol/L 3   POC HCO3 Latest Ref Range: 24 - 28 mmol/L 31.1 (H)   POC SATURATED O2 Latest Ref Range: 95 - 100 % 100   POC TCO2 Latest Ref Range: 23 - 27 mmol/L 33 (H)   FiO2 Unknown 100   Vt Unknown 500   PEEP Unknown 5   Sample Unknown ARTERIAL   DelSys Unknown Adult Vent   Allens Test Unknown Pass   Site Unknown LR   Mode Unknown AC/PRVC   Rate Unknown 16   Reported to Dr. Rowell decreased to 50%

## 2018-04-14 NOTE — ASSESSMENT & PLAN NOTE
Continue IV abx  Consult pharmacy for vanc dosing  Currently intubated-bronchodilators ordered  Obtain sputum culture  Procalcitonin pending

## 2018-04-14 NOTE — CONSULTS
Pulmonary/Critical Care Consult      Patient name: Frederick Kong Jr.  MRN: [id number]  Date: 04/14/2018    Admit Date: 4/14/2018  Consult Requested By: Harsha Dan MD    Reason for Consult: Respiratory failure, acute    HPI:    4/14/2018 - 83 yo male just DC from hospital on 4/12/2018 after admission for respiratory failure.  According to wife he was doing well at home, developed some increased nasal drainage yesterday, this AM had some increased respiratory difficulties, was noted to have sats into the 70's, EMS was called and pt brought to ER.  In the ER he had to be intubated and placed and ventilator.  I saw pt uin the ER and he was then moved to ICU.  Pt not able to provide any history.  ROS was taken from his wife at bedside.    Review of Systems    Review of Systems   Constitutional: Positive for malaise/fatigue. Negative for chills, diaphoresis, fever and weight loss.   HENT: Negative for congestion.         Some nasal drainage   Eyes: Negative for pain.   Respiratory: Positive for cough and shortness of breath. Negative for hemoptysis, sputum production, wheezing and stridor.    Cardiovascular: Negative for chest pain, palpitations, orthopnea, claudication, leg swelling and PND.   Gastrointestinal: Negative for abdominal pain, constipation, diarrhea, heartburn, nausea and vomiting.   Genitourinary: Negative for dysuria, frequency and urgency.   Musculoskeletal: Negative for falls and myalgias.   Skin: Negative for itching and rash.   Neurological: Positive for weakness. Negative for sensory change and focal weakness.   Psychiatric/Behavioral: Negative for depression. The patient is not nervous/anxious.        Past Medical History    Past Medical History:   Diagnosis Date    Cancer     CHF (congestive heart failure)     Coronary artery disease     Gout     Hyperlipemia     Hypertension      Pneumothorax  AoVR  Meningioma resection  TURP    Past Surgical History    Past Surgical History:   Procedure  "Laterality Date    APPENDECTOMY      BRAIN SURGERY      CARDIAC SURGERY      TONSILLECTOMY         Medications (scheduled):      albuterol-ipratropium 2.5mg-0.5mg/3mL  3 mL Nebulization Q6H    amLODIPine  5 mg Per OG tube Daily    aspirin  81 mg Oral Daily    azithromycin  500 mg Intravenous ED 1 Time    [START ON 4/15/2018] azithromycin  500 mg Intravenous Q24H    clopidogrel  75 mg Per OG tube Daily    enoxaparin  40 mg Subcutaneous Daily    furosemide  40 mg Intravenous Once    losartan  100 mg Per OG tube Daily    metoprolol tartrate  50 mg Per OG tube BID    pantoprazole  40 mg Intravenous Daily    piperacillin-tazobactam (ZOSYN) IVPB  4.5 g Intravenous Q8H    senna-docusate 8.6-50 mg  1 tablet Per OG tube BID    vancomycin (VANCOCIN) IVPB  20 mg/kg Intravenous Q24H       Medications (infusions):      sodium chloride 0.9%         Medications (prn):     acetaminophen, acetaminophen, ondansetron, prochlorperazine, sodium chloride 0.9%    Family History: History reviewed. No pertinent family history.    Social History: Tobacco:   History   Smoking Status    Former Smoker   Smokeless Tobacco    Not on file                                EtOH:   History   Alcohol Use No                                Drugs:   History   Drug use: Unknown                                Occupation: retired                              Asbestos exposure: no    Physical Exam    Vital signs:  Temp:  [98.3 °F (36.8 °C)]   Pulse:  []   Resp:  [17-40]   BP: (120-247)/()   SpO2:  [82 %-100 %]     Intake/Output: No intake or output data in the 24 hours ending 04/14/18 1211     BMI: Estimated body mass index is 25.83 kg/m² as calculated from the following:    Height as of this encounter: 5' 7" (1.702 m).    Weight as of this encounter: 74.8 kg (164 lb 14.5 oz).    Physical Exam   Constitutional: He is well-developed, well-nourished, and in no distress. No distress.   Intubated, sedated   HENT: "   Head: Normocephalic and atraumatic.   intubated   Eyes: Conjunctivae are normal. Pupils are equal, round, and reactive to light.   Neck: Normal range of motion. Neck supple. No JVD present. No tracheal deviation present. No thyromegaly present.   Cardiovascular: Normal rate, regular rhythm, normal heart sounds and intact distal pulses.  Exam reveals no gallop and no friction rub.    No murmur heard.  Pulmonary/Chest: Effort normal and breath sounds normal. No stridor. No respiratory distress. He has no wheezes. He has no rales. He exhibits no tenderness.   ventilated   Abdominal: Soft. Bowel sounds are normal. He exhibits no distension. There is no tenderness. There is no rebound and no guarding.   Genitourinary: Penis normal.   Musculoskeletal: Normal range of motion. He exhibits edema. He exhibits no tenderness.   trace   Lymphadenopathy:     He has no cervical adenopathy.   Neurological:   Sedated, harrington   Skin: He is not diaphoretic.   Vitals reviewed.      Laboratory      Recent Labs  Lab 04/14/18  0840   WBC 17.20*   RBC 4.54*   HGB 13.4*   HCT 40.1   *   MCV 88   MCH 29.5   MCHC 33.4         Recent Labs  Lab 04/14/18  0840   CALCIUM 9.1   PROT 7.5      K 5.2*   CO2 23      BUN 28*   CREATININE 1.5*   ALKPHOS 86   ALT 15   AST 22   BILITOT 0.4         Recent Labs  Lab 04/14/18  0840   INR 1.0         Recent Labs  Lab 04/14/18  0840   TROPONINI 0.103*       Additional labs: BNP - 1242    Microbiology:       Microbiology Results (last 7 days)     Procedure Component Value Units Date/Time    Culture, Respiratory with Gram Stain [[id number]] Collected:  04/14/18 1153    Order Status:  Sent Specimen:  Respiratory from Tracheal Aspirate Updated:  04/14/18 1155    Blood Culture #1 [[id number]] Collected:  04/14/18 0955    Order Status:  Sent Specimen:  Blood Updated:  04/14/18 0955    Blood Culture #2 [[id number]] Collected:  04/14/18 0945    Order Status:  Sent Specimen:  Blood Updated:  04/14/18  0946    Culture, Respiratory with Gram Stain [[id number]] Collected:  04/14/18 0854    Order Status:  Sent Specimen:  Respiratory from BAL, LLL Updated:  04/14/18 0854          Radiology    Imaging Results          X-Ray Chest AP Portable (SOB) (Final result)  Result time 04/14/18 09:16:05    Final result by Lillian Cuevas MD (04/14/18 09:16:05)                 Impression:      Increased right lung infiltrate and interval development of mild left perihilar infiltrate.  Positions of lines and tubes described.      Electronically signed by: Lillian Cuevas MD  Date:    04/14/2018  Time:    09:16             Narrative:    EXAMINATION:  XR CHEST AP PORTABLE    CLINICAL HISTORY:  SOB;    TECHNIQUE:  Single frontal view of the chest was performed.    COMPARISON:  4/10/2018    FINDINGS:  Endotracheal tube is been inserted with the tip appearing a few above the tobias in satisfactory position.  NG tube is been inserted traversing the esophagus extending beneath the level of the diaphragms.  There is increased right lung infiltrate most confluent perihilar-infrahilar and there is mild prominence of the left perihilar markings with mild left perihilar infiltrate today as well.  Findings suggest CHF and pulmonary edema.  Differential includes pneumonia combination of the tube.  There is no pleural effusion.                                Additional Studies    EKG - no ST segment changes by report    Ventilator Information    Vent Mode: A/C  Oxygen Concentration (%):  [] 50  Resp Rate Total:  [19 br/min] 19 br/min  Vt Set:  [500 mL] 500 mL  PEEP/CPAP:  [5 cmH20] 5 cmH20  Pressure Support:  [0 cmH20] 0 cmH20  Mean Airway Pressure:  [12 cmH20] 12 cmH20           Recent Labs  Lab 04/14/18  0843   PH 7.214*   PCO2 77.1*   PO2 593*   HCO3 31.1*   POCSATURATED 100   BE 3       Impression/Plan      ICD-10-CM ICD-9-CM   1. SOB (shortness of breath) R06.02 786.05     Respiratory failure, acute and chronic hypoxemic and  "hypercapnic  - will recheck ABG and adjust ventilator as needed  - no weaning today  - ? Etiology - had recent "pneumonia" but this seems to be an acute decompensation and CXR and elevated BNP concerning for CHF/pulmonary edema  - continue antibiotics  - await cultures  - check procalcitonin  - diurese  - cardiac evaluation    Sepsis  - WBC elevated  - culture, antibiotics, follow    + troponin  - low +, follow  - EKG ok by report  - cardiology consult    Critical Care Time    I have spent > 35 minutes providing critical care services for this pt for the above diagnoses.  These services have included pt evaluation, pt exam, ventilator assessment, discussions with staff, chart review, data review, note preparation and .      Thank you for this consult.  I will follow with you while the patient is hospitalized.  Please call ([phone]) if you have any questions.    Angel Riggs MD        "

## 2018-04-14 NOTE — SIGNIFICANT EVENT
Results for BEN MADSEN JR. (MRN [id number]) as of 4/14/2018 14:36   Ref. Range 4/14/2018 14:24   POC PH Latest Ref Range: 7.35 - 7.45  7.418   POC PCO2 Latest Ref Range: 35 - 45 mmHg 41.3   POC PO2 Latest Ref Range: 80 - 100 mmHg 139 (H)   POC BE Latest Ref Range: -2 to 2 mmol/L 2   POC HCO3 Latest Ref Range: 24 - 28 mmol/L 26.6   POC SATURATED O2 Latest Ref Range: 95 - 100 % 99   POC TCO2 Latest Ref Range: 23 - 27 mmol/L 28 (H)   FiO2 Unknown 50   Vt Unknown 500   PiP Unknown 26   PEEP Unknown 5   Sample Unknown ARTERIAL   DelSys Unknown Adult Vent   Allens Test Unknown Pass   Site Unknown RR   Mode Unknown AC/PRVC   Rate Unknown 20   ETCO2 Unknown 26   Min Vol Unknown 10.2   Sp02 Unknown 100

## 2018-04-14 NOTE — PROGRESS NOTES
04/14/18 1110   Patient Assessment/Suction   Level of Consciousness (AVPU) alert   Suction Method endotracheal tube;in-line suction catheter (closed)   Sputum Amount moderate   Sputum Color clear   Sputum Consistency thick   PRE-TX-O2-ETCO2   O2 Device (Oxygen Therapy) ventilator   $ Is the patient on Low Flow Oxygen? Yes   Oxygen Concentration (%) 50   SpO2 100 %   Pulse Oximetry Type Continuous   $ Pulse Oximetry - Multiple Charge Pulse Oximetry - Multiple   ETCO2 (mmHg) 40 mmHg   $ ETCO2 Charge Exhaled CO2 Monitoring   $ ETCO2 Usage Currently wearing   Pulse 80   Resp 17   BP (!) 177/74       Airway - Non-Surgical 04/14/18 0832 Endotracheal Tube   Placement Date/Time: 04/14/18 0832   Present Prior to Hospital Arrival?: No  Method of Intubation: Direct laryngoscopy  Inserted by: MD  Staff/Resident Name(s): Sorin  Airway Device: Endotracheal Tube  Airway Device Size: 7.0  Style: Cuffed  Cuff In...   Secured at 22 cm   Measured At Lips   Secured Location Right   Secured by Commercial tube olson   Bite Block none   Site Condition Dry   Status Intact;Secured;Patent   Site Assessment Clean;Dry   Vent Select   Conventional Vent Y   Charged w/in last 24h YES   Preset Conventional Ventilator Settings   Vent Type    Ventilation Type VC   Vent Mode A/C   Humidity HME   Set Rate 16 bmp   Vt Set 500 mL   PEEP/CPAP 5 cmH20   Pressure Support 0 cmH20   Waveform RAMP   Peak Flow 65 L/min   Set Inspiratory Pressure 0 cmH20   Insp Time 0 Sec(s)   Plateau Set/Insp. Hold (sec) 0   Insp Rise Time  0 %   Trigger Sensitivity Flow/I-Trigger 2 L/min   P High 0 cm H2O   P Low 0 cm H2O   T High 0 sec   T Low 0 sec   Patient Ventilator Parameters   Resp Rate Total 19 br/min   Peak Airway Pressure 53 cmH2O   Mean Airway Pressure 12 cmH20   Plateau Pressure 0 cmH20   Exhaled Vt 500 mL   Total Ve 8.73 mL   Spont Ve 0 L   I:E Ratio Measured 1:3.50   Conventional Ventilator Alarms   Ve High Alarm 27 L/min   Resp Rate High Alarm 0  br/min   Press High Alarm 70 cmH2O   Apnea Rate 10   Apnea Volume (mL) 440 mL   Apnea Oxygen Concentration  100   Apnea Flow Rate (L/min) 52   T Apnea 20 sec(s)   Ready to Wean/Extubation Screen   FIO2<=50 (chart decimal) 0.5   MV<16L (chart vol.) 8.73   PEEP <=8 (chart #) 5   Ready to Wean Parameters   F/VT Ratio<105 (RSBI) (!) 34

## 2018-04-14 NOTE — ASSESSMENT & PLAN NOTE
BUN 28/Creat 1.5  Slight increase today BUN 33/creat 1.6 after receiving IV lasix  Continue to monitor renal function

## 2018-04-14 NOTE — SUBJECTIVE & OBJECTIVE
Past Medical History:   Diagnosis Date    Cancer     CHF (congestive heart failure)     Coronary artery disease     Gout     Hyperlipemia     Hypertension        Past Surgical History:   Procedure Laterality Date    APPENDECTOMY      BRAIN SURGERY      CARDIAC SURGERY      TONSILLECTOMY         Review of patient's allergies indicates:  No Known Allergies    No current facility-administered medications on file prior to encounter.      Current Outpatient Prescriptions on File Prior to Encounter   Medication Sig    amLODIPine (NORVASC) 5 MG tablet Take 5 mg by mouth once daily.    amoxicillin-clavulanate 875-125mg (AUGMENTIN) 875-125 mg per tablet Take 1 tablet by mouth 2 (two) times daily.    aspirin (ECOTRIN) 81 MG EC tablet Take 81 mg by mouth once daily.    clopidogrel (PLAVIX) 75 mg tablet Take 75 mg by mouth once daily.    colchicine 0.6 mg capsule/tablet Take 0.6 mg by mouth once daily.    furosemide (LASIX) 20 MG tablet Take 10 mg by mouth every other day.    losartan (COZAAR) 100 MG tablet Take 100 mg by mouth once daily.    lovastatin (MEVACOR) 40 MG tablet Take 40 mg by mouth every evening.    metoprolol succinate (TOPROL-XL) 100 MG 24 hr tablet Take 100 mg by mouth once daily.    pantoprazole (PROTONIX) 40 MG tablet Take 40 mg by mouth once daily.    potassium chloride SA (K-DUR,KLOR-CON) 10 MEQ tablet Take 10 mEq by mouth 2 (two) times daily.     Family History     None        Social History Main Topics    Smoking status: Former Smoker    Smokeless tobacco: Not on file    Alcohol use No    Drug use: Unknown    Sexual activity: Not on file     Review of Systems   Unable to perform ROS: Intubated     Objective:     Vital Signs (Most Recent):  Temp: 98.3 °F (36.8 °C) (04/14/18 1108)  Pulse: 66 (04/14/18 1212)  Resp: 16 (04/14/18 1212)  BP: (!) 177/74 (04/14/18 1110)  SpO2: 100 % (04/14/18 1212) Vital Signs (24h Range):  Temp:  [98.3 °F (36.8 °C)] 98.3 °F (36.8 °C)  Pulse:  []  66  Resp:  [16-40] 16  SpO2:  [82 %-100 %] 100 %  BP: (120-247)/() 177/74     Weight: 74.8 kg (164 lb 14.5 oz)  Body mass index is 25.83 kg/m².    Physical Exam   Constitutional: He appears well-developed and well-nourished.   HENT:   Head: Normocephalic and atraumatic.   Eyes: Conjunctivae and EOM are normal. Pupils are equal, round, and reactive to light.   Neck: Normal range of motion. Neck supple. No tracheal deviation present.   Cardiovascular: Normal rate, regular rhythm and intact distal pulses.    Pulmonary/Chest: He has rales.   Currently intubated, sedated.  Lung sounds are coarse; some scattered wheezing noted.   Abdominal: Soft. Bowel sounds are normal. He exhibits no distension.   Genitourinary:   Genitourinary Comments: Leslie catheter draining clear yellow urine.   Musculoskeletal: Normal range of motion. He exhibits edema (Mild BLE).   Neurological:   sedated   Skin: Skin is warm and dry.         CRANIAL NERVES     CN III, IV, VI   Pupils are equal, round, and reactive to light.  Extraocular motions are normal.        Significant Labs:   CBC:   Recent Labs  Lab 04/14/18  0840   WBC 17.20*   HGB 13.4*   HCT 40.1   *     CMP:   Recent Labs  Lab 04/14/18  0840      K 5.2*      CO2 23   *   BUN 28*   CREATININE 1.5*   CALCIUM 9.1   PROT 7.5   ALBUMIN 3.2*   BILITOT 0.4   ALKPHOS 86   AST 22   ALT 15   ANIONGAP 10   EGFRNONAA 43*     Cardiac Markers:   Recent Labs  Lab 04/14/18  0840   BNP 1,242*     Lactic Acid:   Recent Labs  Lab 04/14/18  0840 04/14/18  1234   LACTATE 1.1 0.9     Troponin:   Recent Labs  Lab 04/14/18  0840 04/14/18  1247   TROPONINI 0.103* 0.043*     Significant Imaging:   X-Ray Chest AP Portable (SOB) [[id number]] Resulted: 04/14/18 0916   Order Status: Completed Updated: 04/14/18 0918   Narrative:     EXAMINATION:  XR CHEST AP PORTABLE    CLINICAL HISTORY:  SOB;    TECHNIQUE:  Single frontal view of the chest was  performed.    COMPARISON:  4/10/2018    FINDINGS:  Endotracheal tube is been inserted with the tip appearing a few above the tobias in satisfactory position.  NG tube is been inserted traversing the esophagus extending beneath the level of the diaphragms.  There is increased right lung infiltrate most confluent perihilar-infrahilar and there is mild prominence of the left perihilar markings with mild left perihilar infiltrate today as well.  Findings suggest CHF and pulmonary edema.  Differential includes pneumonia combination of the tube.  There is no pleural effusion.   Impression:       Increased right lung infiltrate and interval development of mild left perihilar infiltrate.  Positions of lines and tubes described.

## 2018-04-14 NOTE — ED PROVIDER NOTES
Encounter Date: 4/14/2018    SCRIBE #1 NOTE: I, Jori Verdin, am scribing for, and in the presence of, Dr. Rowell.       History     Chief Complaint   Patient presents with    Respiratory Distress       04/14/2018 8:45 AM     Chief complaint: Respiratory distress      Frederick Kong Jr. is a 82 y.o. male with a history of HTN, CAD, Ca, and HLD who presents to the ED via EMS with an onset of respiratory distress with associated shortness of breath. The shortness of breath began last night and worsened this morning. The patient was picked up at his house in respiratory distress. Per EMS, he uses 3L nasal cannula home oxygen and was slightly hypotensive. History from the patient is unobtainable secondary to distress.      The history is provided by the EMS personnel. The history is limited by the condition of the patient.     Review of patient's allergies indicates:  No Known Allergies  Past Medical History:   Diagnosis Date    Cancer     Coronary artery disease     Gout     Hyperlipemia     Hypertension      Past Surgical History:   Procedure Laterality Date    APPENDECTOMY      BRAIN SURGERY      CARDIAC SURGERY      TONSILLECTOMY       History reviewed. No pertinent family history.  Social History   Substance Use Topics    Smoking status: Former Smoker    Smokeless tobacco: Not on file    Alcohol use No     Review of Systems   Unable to perform ROS: Acuity of condition       Physical Exam     Vitals:    04/14/18 0827   BP: (!) 247/108   Pulse: 105   Resp: (!) 40       Physical Exam    Nursing note and vitals reviewed.  Constitutional: He appears well-developed and well-nourished. He appears lethargic. He has a sickly appearance. He appears ill. He appears distressed.   HENT:   Head: Normocephalic and atraumatic.   Eyes: EOM are normal.   Neck: Normal range of motion. Neck supple. Normal range of motion present. No neck rigidity.   Cardiovascular: Normal rate, regular rhythm and normal heart sounds.  Exam reveals no gallop and no friction rub.    No murmur heard.  Pulmonary/Chest: He is in respiratory distress (severe). He has no wheezes. He has no rhonchi. He has rales (bilateral).   Impending respiratory failure with retractions.   Neurological: He is oriented to person, place, and time. He appears lethargic.   Decreased level of responsiveness.   No response to commands.   Skin: Skin is warm and dry. No rash noted.         ED Course   Critical Care  Date/Time: 4/14/2018 1:25 PM  Performed by: CAROLINE GATES  Authorized by: REGGIE DAVALOS   Direct patient critical care time: 15 minutes  Additional history critical care time: 8 minutes  Ordering / reviewing critical care time: 7 minutes  Documentation critical care time: 7 minutes  Consulting other physicians critical care time: 5 minutes  Consult with family critical care time: 10 minutes  Total critical care time (exclusive of procedural time) : 52 minutes  Critical care was necessary to treat or prevent imminent or life-threatening deterioration of the following conditions: respiratory failure.  Critical care was time spent personally by me on the following activities: review of old charts, pulse oximetry, ordering and review of laboratory studies, obtaining history from patient or surrogate, evaluation of patient's response to treatment, ventilator management, re-evaluation of patient's condition, ordering and review of radiographic studies, ordering and performing treatments and interventions and examination of patient.    Intubation  Date/Time: 4/14/2018 1:25 PM  Performed by: CAROLINE GATES  Authorized by: REGGIE ADVALOS   Consent Done: Not Needed  Indications: respiratory failure  Intubation method: direct  Patient status: paralyzed (RSI)  Preoxygenation: mask  Sedatives: etomidate  Paralytic: succinylcholine  Laryngoscope size: Mac 4  Tube size: 7.0 mm  Tube type: cuffed  Number of attempts: 2 (unable to pass 7.0 tube first  attempt)  Ventilation between attempts: BVM  Cricoid pressure: yes  Cords visualized: yes  Post-procedure assessment: chest rise,  ETCO2 monitor and CO2 detector  Breath sounds: rales/crackles  Cuff inflated: yes  Tube secured with: ETT olson  Chest x-ray interpreted by me.  Chest x-ray findings: endotracheal tube in appropriate position  Patient tolerance: Patient tolerated the procedure well with no immediate complications  Complications: No  Specimens: No  Implants: No        Labs Reviewed   CBC W/ AUTO DIFFERENTIAL   COMPREHENSIVE METABOLIC PANEL   LACTIC ACID, PLASMA   TROPONIN I   B-TYPE NATRIURETIC PEPTIDE   PROTIME-INR        Imaging Results    None            Medical Decision Making:   History:   Old Medical Records: I decided to obtain old medical records.  Clinical Tests:   Lab Tests: Ordered and Reviewed  Radiological Study: Ordered and Reviewed  Medical Tests: Ordered and Reviewed            Scribe Attestation:   Scribe #1: I performed the above scribed service and the documentation accurately describes the services I performed. I attest to the accuracy of the note.    I, Dr. Rowell, personally performed the services described in this documentation. All medical record entries made by the scribe were at my direction and in my presence.  I have reviewed the chart and agree that the record reflects my personal performance and is accurate and complete.1:26 PM 04/14/2018            ED Course as of Apr 14 1001   Sat Apr 14, 2018   0844 BP: (!) 247/108 [EF]   0844 Resp: (!) 40 [EF]   0844 Intubated for airway protection SpO2: (!) 91 % [EF]   0916 BNP: (!) 1,242 [EF]   0916 WBC: (!) 17.20 [EF]   0916 POC PH: (!!) 7.214 [EF]   0916 POC PCO2: (!!) 77.1 [EF]   0916 Potassium: (!) 5.2 [EF]   0916 BUN, Bld: (!) 28 [EF]   0916 Creatinine: (!) 1.5 [EF]   0919 Sepsis considered, orders added on.  [EF]   0926 Dr franco to admit  [EF]   0931 82-year-old presents in severe respiratory distress.  Impending respiratory  failure.  Case discussed with Dr. Dan of [facility] medicine for admission to the ICU.  Picture is probably more suggestive of pulmonary edema at this time but he has been covered with antibiotics for pneumonia.  [EF]      ED Course User Index  [EF] Shalom Rowell MD     Clinical Impression:     1. SOB (shortness of breath)          Disposition:   Disposition: Admitted                        Shalom Rowell MD  04/14/18 4116

## 2018-04-14 NOTE — H&P
[facility] Medicine  History & Physical    Patient Name: Frederick Kong Jr.  MRN: [id number]  Admission Date: 4/14/2018  Attending Physician: Harsha Dan MD   Primary Care Provider: Dhiraj Dempsey III, MD         Patient information was obtained from spouse/SO, past medical records and ER records.     Subjective:     Principal Problem:Bilateral pneumonia    Chief Complaint:   Chief Complaint   Patient presents with    Respiratory Distress        HPI: Frederick Kong Jr. is a 82 y.o. male with a history of HTN, CAD, Ca, and HLD who presents to the ED via EMS with an onset of respiratory distress which began last night, worsening this morning.  Mr. Kong did require intubation in the emergency department.  His wife accompanies him and provides some history.  He has hx of stent placement at the time of his aortic valve replacement in 2001 and was on coumadin for many years however has been changed to asa and plavix within the last year due to frequent falls. His cardiologist is Dr Gonzalez. He has never had an MI or heart failure. He has a remote history of smoking and is otherwise alert and active. He was recently admitted to this facility and treated for severe sepsis 2/2 bilateral aspiration pneumonia and was discharged home 2 days ago with a prescription for augmentin to further treat the pneumonia.  He began to have shortness of breath last night, and this escalated this morning.  Denies incidence of fever, confusion, N/V or diarrhea.  Mr. Kong is admitted to the service of hospital medicine for further medical management.      Past Medical History:   Diagnosis Date    Cancer     CHF (congestive heart failure)     Coronary artery disease     Gout     Hyperlipemia     Hypertension        Past Surgical History:   Procedure Laterality Date    APPENDECTOMY      BRAIN SURGERY      CARDIAC SURGERY      TONSILLECTOMY         Review of patient's allergies indicates:  No Known  Allergies    No current facility-administered medications on file prior to encounter.      Current Outpatient Prescriptions on File Prior to Encounter   Medication Sig    amLODIPine (NORVASC) 5 MG tablet Take 5 mg by mouth once daily.    amoxicillin-clavulanate 875-125mg (AUGMENTIN) 875-125 mg per tablet Take 1 tablet by mouth 2 (two) times daily.    aspirin (ECOTRIN) 81 MG EC tablet Take 81 mg by mouth once daily.    clopidogrel (PLAVIX) 75 mg tablet Take 75 mg by mouth once daily.    colchicine 0.6 mg capsule/tablet Take 0.6 mg by mouth once daily.    furosemide (LASIX) 20 MG tablet Take 10 mg by mouth every other day.    losartan (COZAAR) 100 MG tablet Take 100 mg by mouth once daily.    lovastatin (MEVACOR) 40 MG tablet Take 40 mg by mouth every evening.    metoprolol succinate (TOPROL-XL) 100 MG 24 hr tablet Take 100 mg by mouth once daily.    pantoprazole (PROTONIX) 40 MG tablet Take 40 mg by mouth once daily.    potassium chloride SA (K-DUR,KLOR-CON) 10 MEQ tablet Take 10 mEq by mouth 2 (two) times daily.     Family History     None        Social History Main Topics    Smoking status: Former Smoker    Smokeless tobacco: Not on file    Alcohol use No    Drug use: Unknown    Sexual activity: Not on file     Review of Systems   Unable to perform ROS: Intubated     Objective:     Vital Signs (Most Recent):  Temp: 98.3 °F (36.8 °C) (04/14/18 1108)  Pulse: 66 (04/14/18 1212)  Resp: 16 (04/14/18 1212)  BP: (!) 177/74 (04/14/18 1110)  SpO2: 100 % (04/14/18 1212) Vital Signs (24h Range):  Temp:  [98.3 °F (36.8 °C)] 98.3 °F (36.8 °C)  Pulse:  [] 66  Resp:  [16-40] 16  SpO2:  [82 %-100 %] 100 %  BP: (120-247)/() 177/74     Weight: 74.8 kg (164 lb 14.5 oz)  Body mass index is 25.83 kg/m².    Physical Exam   Constitutional: He appears well-developed and well-nourished.   HENT:   Head: Normocephalic and atraumatic.   Eyes: Conjunctivae and EOM are normal. Pupils are equal, round, and reactive to  light.   Neck: Normal range of motion. Neck supple. No tracheal deviation present.   Cardiovascular: Normal rate, regular rhythm and intact distal pulses.    Pulmonary/Chest: He has rales.   Currently intubated, sedated.  Lung sounds are coarse; some scattered wheezing noted.   Abdominal: Soft. Bowel sounds are normal. He exhibits no distension.   Genitourinary:   Genitourinary Comments: Leslie catheter draining clear yellow urine.   Musculoskeletal: Normal range of motion. He exhibits edema (Mild BLE).   Neurological:   sedated   Skin: Skin is warm and dry.         CRANIAL NERVES     CN III, IV, VI   Pupils are equal, round, and reactive to light.  Extraocular motions are normal.        Significant Labs:   CBC:   Recent Labs  Lab 04/14/18  0840   WBC 17.20*   HGB 13.4*   HCT 40.1   *     CMP:   Recent Labs  Lab 04/14/18  0840      K 5.2*      CO2 23   *   BUN 28*   CREATININE 1.5*   CALCIUM 9.1   PROT 7.5   ALBUMIN 3.2*   BILITOT 0.4   ALKPHOS 86   AST 22   ALT 15   ANIONGAP 10   EGFRNONAA 43*     Cardiac Markers:   Recent Labs  Lab 04/14/18  0840   BNP 1,242*     Lactic Acid:   Recent Labs  Lab 04/14/18  0840 04/14/18  1234   LACTATE 1.1 0.9     Troponin:   Recent Labs  Lab 04/14/18  0840 04/14/18  1247   TROPONINI 0.103* 0.043*     Significant Imaging:   X-Ray Chest AP Portable (SOB) [[id number]] Resulted: 04/14/18 0916   Order Status: Completed Updated: 04/14/18 0918   Narrative:     EXAMINATION:  XR CHEST AP PORTABLE    CLINICAL HISTORY:  SOB;    TECHNIQUE:  Single frontal view of the chest was performed.    COMPARISON:  4/10/2018    FINDINGS:  Endotracheal tube is been inserted with the tip appearing a few above the tobias in satisfactory position.  NG tube is been inserted traversing the esophagus extending beneath the level of the diaphragms.  There is increased right lung infiltrate most confluent perihilar-infrahilar and there is mild prominence of the left perihilar markings with  mild left perihilar infiltrate today as well.  Findings suggest CHF and pulmonary edema.  Differential includes pneumonia combination of the tube.  There is no pleural effusion.   Impression:       Increased right lung infiltrate and interval development of mild left perihilar infiltrate.  Positions of lines and tubes described.         Assessment/Plan:     * Bilateral pneumonia    Continue IV abx  Consult pharmacy for vanc dosing  Currently intubated-bronchodilators ordered  Obtain sputum culture  Procalcitonin pending        Hypertensive urgency    Presenting bp 247/108  BNP elevated; troponin at baseline  ? Pulmonary edema; pt was in acute respiratory failure  Intubated in Ed; monitored in ICU  Continue to monitor telemetry  Received lasix; will repeat x 1 this evening  Sedated at this time; bp currently controlled.  Monitor clinically.          Chronic obstructive pulmonary disease with acute lower respiratory infection    Currently intubated on vent due to acute respiratory failure  Pneumonia identified; continue IV vancomycin, IV azithromycin, and IV zosyn which were initiated in Ed.  Bronchodilators  Consult pulmonary for vent management.          Urinary retention    Onset during past admission  Continue indwelling henderson catheter          H/O aortic valve replacement    Currently treated with plavix and ASA; due to high risk for intracranial bleed, anticoagulation was recently discontinued.          Severe sepsis    Frederick Winchesterjuan Fernando. is a 82 y.o. male who presents to the Emergency Department       This patient does have evidence of infective focus  My overall impression is severe sepsis.  Antibiotics given-   Antibiotics     Start     Stop Route Frequency Ordered    04/15/18 1130  azithromycin 500 mg in dextrose 5 % 250 mL IVPB (ready to mix system)      -- IV Every 24 hours (non-standard times) 04/14/18 1133    04/15/18 1030  vancomycin (VANCOCIN) 1,250 mg in sodium chloride 0.9% 250 mL IVPB      -- IV  Every 24 hours (non-standard times) 04/14/18 1147    04/14/18 1200  piperacillin-tazobactam 4.5 g in dextrose 5 % 100 mL IVPB (ready to mix system)      -- IV Every 8 hours (non-standard times) 04/14/18 1154          Severe Sepsis only-  Latest labs reviewed, they are-    Recent Labs  Lab 04/14/18  0840   BILITOT 0.4       Recent Labs  Lab 04/14/18  0840 04/14/18  1234   LACTATE 1.1 0.9       Recent Labs  Lab 04/14/18  0843   PH 7.214*   PO2 593*   PCO2 77.1*   HCO3 31.1*   BE 3       Organ dysfunction indicated by acute kidney injury; acute respiratory failure    Fluid resuscitation was not necessary.  Obtain Blood cultures x 2, sputum culture and urine culture        YUAN (acute kidney injury)    BUN 28/Creat 1.5  ? Pulmonary edema  Monitor renal function          Acute on chronic respiratory failure with hypoxia    Chronic respiratory failure requiring home oxygen presented to the ED in acute failure requiring intubation.   Current vent settings:   A/C 20  FiO2 50%  PEEP 5  Vt 500    PH      7.214  PCO2 77.1  PO2    593  HCO3 31.1  BE      3  POCsaturation 100    Consult Pulmonary for vent management.            VTE Risk Mitigation         Ordered     enoxaparin injection 40 mg  Daily     Route:  Subcutaneous        04/14/18 1133     Place CJ hose  Until discontinued      04/14/18 1133     Place sequential compression device  Until discontinued      04/14/18 1133     IP VTE HIGH RISK PATIENT  Once      04/14/18 1133        Critical care time spent on the evaluation and treatment of severe organ dysfunction, review of pertinent labs and imaging studies, discussions with consulting providers and discussions with patient/family: 75 minutes.     MIHAELA Hankins  Department of Hospital Medicine   [facility]

## 2018-04-14 NOTE — ASSESSMENT & PLAN NOTE
Presenting bp 247/108  BNP elevated; troponin at baseline  ? Pulmonary edema; pt was in acute respiratory failure  Intubated in Ed; monitored in ICU  Continue to monitor telemetry  Received lasix; will repeat x 1 this evening  Sedated at this time; bp currently controlled.  Monitor clinically.

## 2018-04-14 NOTE — CONSULTS
Frederick Kong Jr. [id number] is a 82 y.o. male who has been consulted for vancomycin dosing.    The patient has the following labs:     Date Creatinine (mg/dl)    BUN WBC Count   4/14/2018 Estimated Creatinine Clearance: 35.5 mL/min (A) (based on SCr of 1.5 mg/dL (H)). Lab Results   Component Value Date    BUN 28 (H) 04/14/2018     Lab Results   Component Value Date    WBC 17.20 (H) 04/14/2018        Current weight is 74.8 kg (164 lb 14.5 oz)      The patient received  1500 mg on 04/14 at 1027am.    The patient will be started on vancomycin at a dose of 1250 mg every 24 hours.  The vancomycin trough has been ordered for 04/16 at 10am.      Patient will be followed by pharmacy for changes in renal function, toxicity, and efficacy.   Thank you for allowing us to participate in this patient's care.     Hollie Dominique

## 2018-04-14 NOTE — ASSESSMENT & PLAN NOTE
BNP elevated 1242; this morning 269 after receiving IV lasix yesterday for diuresis.  Presented with acute respiratory failure; intubated-currently extubated without complications.  Continue to monitor respiratory status  Plan echocardiogram for AM

## 2018-04-14 NOTE — ASSESSMENT & PLAN NOTE
Chronic respiratory failure requiring home oxygen presented to the ED in acute failure requiring intubation.   Current vent settings:   A/C 20  FiO2 50%  PEEP 5  Vt 500    PH      7.214  PCO2 77.1  PO2    593  HCO3 31.1  BE      3  POCsaturation 100    Consult Pulmonary for vent management.

## 2018-04-14 NOTE — ASSESSMENT & PLAN NOTE
Suspected.  Continue IV abx  Consult pharmacy for vanc dosing  Currently intubated-bronchodilators ordered  Obtain sputum culture-following  Procalcitonin pending

## 2018-04-14 NOTE — ASSESSMENT & PLAN NOTE
Frederick Kong Jr. is a 82 y.o. male who presents to the Emergency Department and was admitted to ICU.      This patient does have evidence of infective focus  My overall impression is sepsis.  Antibiotics given-   Antibiotics     Start     Stop Route Frequency Ordered    04/15/18 1230  linezolid 600mg/300ml IVPB 600 mg      -- IV Every 12 hours (non-standard times) 04/15/18 1124    04/14/18 1200  piperacillin-tazobactam 4.5 g in dextrose 5 % 100 mL IVPB (ready to mix system)      -- IV Every 8 hours (non-standard times) 04/14/18 1154          Severe Sepsis only-  Latest labs reviewed, they are-  No results for input(s): CBC, BILITOT in the last 24 hours.    Invalid input(s): CREATININE.1  No results for input(s): LACTATE in the last 24 hours.    Recent Labs  Lab 04/15/18  0603   PH 7.493*   PO2 134*   PCO2 37.1   HCO3 28.4*   BE 5       Organ dysfunction indicated by acute respiratory failure requiring intubation-is now extubated.    Shock with decreased perfusion noted, Fluid challenge was not given due to pulmonary edema.    Obtain Blood cultures x 2, sputum culture and urine culture-follow

## 2018-04-14 NOTE — ASSESSMENT & PLAN NOTE
Frederick Kong Jr. is a 82 y.o. male who presents to the Emergency Department       This patient does have evidence of infective focus  My overall impression is severe sepsis.  Antibiotics given-   Antibiotics     Start     Stop Route Frequency Ordered    04/15/18 1130  azithromycin 500 mg in dextrose 5 % 250 mL IVPB (ready to mix system)      -- IV Every 24 hours (non-standard times) 04/14/18 1133    04/15/18 1030  vancomycin (VANCOCIN) 1,250 mg in sodium chloride 0.9% 250 mL IVPB      -- IV Every 24 hours (non-standard times) 04/14/18 1147    04/14/18 1200  piperacillin-tazobactam 4.5 g in dextrose 5 % 100 mL IVPB (ready to mix system)      -- IV Every 8 hours (non-standard times) 04/14/18 1154          Severe Sepsis only-  Latest labs reviewed, they are-    Recent Labs  Lab 04/14/18  0840   BILITOT 0.4       Recent Labs  Lab 04/14/18  0840 04/14/18  1234   LACTATE 1.1 0.9       Recent Labs  Lab 04/14/18  0843   PH 7.214*   PO2 593*   PCO2 77.1*   HCO3 31.1*   BE 3       Organ dysfunction indicated by acute kidney injury; acute respiratory failure    Fluid resuscitation was not necessary.  Obtain Blood cultures x 2, sputum culture and urine culture

## 2018-04-14 NOTE — HPI
Frederick Kong Jr. is a 82 y.o. male with a history of HTN, CAD, Ca, and HLD who presents to the ED via EMS with an onset of respiratory distress which began last night, worsening this morning.  Mr. Kong did require intubation in the emergency department.  His wife accompanies him and provides some history.  He has hx of stent placement at the time of his aortic valve replacement in 2001 and was on coumadin for many years however has been changed to asa and plavix within the last year due to frequent falls. His cardiologist is Dr Gonzalez. He has never had an MI or heart failure. He has a remote history of smoking and is otherwise alert and active. He was recently admitted to this facility and treated for severe sepsis 2/2 bilateral aspiration pneumonia and was discharged home 2 days ago with a prescription for augmentin to further treat the pneumonia.  He began to have shortness of breath last night, and this escalated this morning.  Denies incidence of fever, confusion, N/V or diarrhea.  Mr. Kong is admitted to the service of hospital medicine for further medical management.

## 2018-04-15 LAB
ALLENS TEST: ABNORMAL
ANION GAP SERPL CALC-SCNC: 15 MMOL/L
BASOPHILS # BLD AUTO: 0 K/UL
BASOPHILS NFR BLD: 0.4 %
BNP SERPL-MCNC: 269 PG/ML
BUN SERPL-MCNC: 33 MG/DL
CALCIUM SERPL-MCNC: 8.6 MG/DL
CHLORIDE SERPL-SCNC: 106 MMOL/L
CO2 SERPL-SCNC: 21 MMOL/L
CREAT SERPL-MCNC: 1.6 MG/DL
DELSYS: ABNORMAL
DIFFERENTIAL METHOD: ABNORMAL
EOSINOPHIL # BLD AUTO: 0.2 K/UL
EOSINOPHIL NFR BLD: 2 %
ERYTHROCYTE [DISTWIDTH] IN BLOOD BY AUTOMATED COUNT: 14.3 %
EST. GFR  (AFRICAN AMERICAN): 46 ML/MIN/1.73 M^2
EST. GFR  (NON AFRICAN AMERICAN): 40 ML/MIN/1.73 M^2
FIO2: 40
GLUCOSE SERPL-MCNC: 93 MG/DL
HCO3 UR-SCNC: 28.4 MMOL/L (ref 24–28)
HCT VFR BLD AUTO: 33.3 %
HGB BLD-MCNC: 11.2 G/DL
LYMPHOCYTES # BLD AUTO: 1 K/UL
LYMPHOCYTES NFR BLD: 13.2 %
MAGNESIUM SERPL-MCNC: 2.5 MG/DL
MCH RBC QN AUTO: 29.5 PG
MCHC RBC AUTO-ENTMCNC: 33.6 G/DL
MCV RBC AUTO: 88 FL
MIN VOL: 8.56
MODE: ABNORMAL
MONOCYTES # BLD AUTO: 0.7 K/UL
MONOCYTES NFR BLD: 9.4 %
NEUTROPHILS # BLD AUTO: 5.9 K/UL
NEUTROPHILS NFR BLD: 75 %
PCO2 BLDA: 37.1 MMHG (ref 35–45)
PEEP: 5
PH SMN: 7.49 [PH] (ref 7.35–7.45)
PLATELET # BLD AUTO: 268 K/UL
PLATELET BLD QL SMEAR: ABNORMAL
PMV BLD AUTO: 7.3 FL
PO2 BLDA: 134 MMHG (ref 80–100)
POC BE: 5 MMOL/L
POC SATURATED O2: 99 % (ref 95–100)
POC TCO2: 30 MMOL/L (ref 23–27)
POTASSIUM SERPL-SCNC: 4.1 MMOL/L
PS: 10
RBC # BLD AUTO: 3.79 M/UL
SAMPLE: ABNORMAL
SITE: ABNORMAL
SODIUM SERPL-SCNC: 142 MMOL/L
SP02: 100
SPONT RATE: 10
TROPONIN I SERPL DL<=0.01 NG/ML-MCNC: 0.34 NG/ML
TROPONIN I SERPL DL<=0.01 NG/ML-MCNC: 0.38 NG/ML
WBC # BLD AUTO: 7.9 K/UL

## 2018-04-15 PROCEDURE — 63600175 PHARM REV CODE 636 W HCPCS: Performed by: NURSE PRACTITIONER

## 2018-04-15 PROCEDURE — 82803 BLOOD GASES ANY COMBINATION: CPT

## 2018-04-15 PROCEDURE — 25000003 PHARM REV CODE 250: Performed by: NURSE PRACTITIONER

## 2018-04-15 PROCEDURE — 94640 AIRWAY INHALATION TREATMENT: CPT

## 2018-04-15 PROCEDURE — 84484 ASSAY OF TROPONIN QUANT: CPT | Mod: 91

## 2018-04-15 PROCEDURE — 85025 COMPLETE CBC W/AUTO DIFF WBC: CPT

## 2018-04-15 PROCEDURE — 36600 WITHDRAWAL OF ARTERIAL BLOOD: CPT

## 2018-04-15 PROCEDURE — 94770 HC EXHALED C02 TEST: CPT

## 2018-04-15 PROCEDURE — 25000003 PHARM REV CODE 250: Performed by: HOSPITALIST

## 2018-04-15 PROCEDURE — 99900026 HC AIRWAY MAINTENANCE (STAT)

## 2018-04-15 PROCEDURE — 25000242 PHARM REV CODE 250 ALT 637 W/ HCPCS: Performed by: HOSPITALIST

## 2018-04-15 PROCEDURE — 94003 VENT MGMT INPAT SUBQ DAY: CPT

## 2018-04-15 PROCEDURE — 80048 BASIC METABOLIC PNL TOTAL CA: CPT

## 2018-04-15 PROCEDURE — 83880 ASSAY OF NATRIURETIC PEPTIDE: CPT

## 2018-04-15 PROCEDURE — 99291 CRITICAL CARE FIRST HOUR: CPT | Mod: ,,, | Performed by: INTERNAL MEDICINE

## 2018-04-15 PROCEDURE — 84484 ASSAY OF TROPONIN QUANT: CPT

## 2018-04-15 PROCEDURE — 20000000 HC ICU ROOM

## 2018-04-15 PROCEDURE — 63600175 PHARM REV CODE 636 W HCPCS: Performed by: HOSPITALIST

## 2018-04-15 PROCEDURE — 63600175 PHARM REV CODE 636 W HCPCS: Performed by: INTERNAL MEDICINE

## 2018-04-15 PROCEDURE — 83735 ASSAY OF MAGNESIUM: CPT

## 2018-04-15 PROCEDURE — 94761 N-INVAS EAR/PLS OXIMETRY MLT: CPT

## 2018-04-15 PROCEDURE — 27000221 HC OXYGEN, UP TO 24 HOURS

## 2018-04-15 PROCEDURE — C9113 INJ PANTOPRAZOLE SODIUM, VIA: HCPCS | Performed by: NURSE PRACTITIONER

## 2018-04-15 PROCEDURE — 99900035 HC TECH TIME PER 15 MIN (STAT)

## 2018-04-15 PROCEDURE — 36415 COLL VENOUS BLD VENIPUNCTURE: CPT

## 2018-04-15 RX ORDER — LINEZOLID 2 MG/ML
600 INJECTION, SOLUTION INTRAVENOUS
Status: DISCONTINUED | OUTPATIENT
Start: 2018-04-15 | End: 2018-04-17

## 2018-04-15 RX ORDER — LOSARTAN POTASSIUM 25 MG/1
100 TABLET ORAL DAILY
Status: DISCONTINUED | OUTPATIENT
Start: 2018-04-15 | End: 2018-04-19 | Stop reason: HOSPADM

## 2018-04-15 RX ADMIN — CLOPIDOGREL BISULFATE 75 MG: 75 TABLET ORAL at 08:04

## 2018-04-15 RX ADMIN — SODIUM CHLORIDE 1250 MG: 900 INJECTION, SOLUTION INTRAVENOUS at 10:04

## 2018-04-15 RX ADMIN — PROPOFOL 25 MCG/KG/MIN: 10 INJECTION, EMULSION INTRAVENOUS at 02:04

## 2018-04-15 RX ADMIN — ASPIRIN 81 MG: 81 TABLET, COATED ORAL at 08:04

## 2018-04-15 RX ADMIN — METOPROLOL TARTRATE 50 MG: 50 TABLET ORAL at 08:04

## 2018-04-15 RX ADMIN — PANTOPRAZOLE SODIUM 40 MG: 40 INJECTION, POWDER, FOR SOLUTION INTRAVENOUS at 08:04

## 2018-04-15 RX ADMIN — ENOXAPARIN SODIUM 40 MG: 100 INJECTION SUBCUTANEOUS at 08:04

## 2018-04-15 RX ADMIN — AMLODIPINE BESYLATE 5 MG: 5 TABLET ORAL at 08:04

## 2018-04-15 RX ADMIN — PIPERACILLIN SODIUM AND TAZOBACTAM SODIUM 4.5 G: 4; .5 INJECTION, POWDER, FOR SOLUTION INTRAVENOUS at 02:04

## 2018-04-15 RX ADMIN — LOSARTAN POTASSIUM 100 MG: 25 TABLET, FILM COATED ORAL at 12:04

## 2018-04-15 RX ADMIN — LINEZOLID 600 MG: 600 INJECTION, SOLUTION INTRAVENOUS at 12:04

## 2018-04-15 RX ADMIN — PIPERACILLIN SODIUM AND TAZOBACTAM SODIUM 4.5 G: 4; .5 INJECTION, POWDER, FOR SOLUTION INTRAVENOUS at 05:04

## 2018-04-15 RX ADMIN — IPRATROPIUM BROMIDE AND ALBUTEROL SULFATE 3 ML: .5; 3 SOLUTION RESPIRATORY (INHALATION) at 06:04

## 2018-04-15 RX ADMIN — STANDARDIZED SENNA CONCENTRATE AND DOCUSATE SODIUM 1 TABLET: 8.6; 5 TABLET, FILM COATED ORAL at 08:04

## 2018-04-15 RX ADMIN — IPRATROPIUM BROMIDE AND ALBUTEROL SULFATE 3 ML: .5; 3 SOLUTION RESPIRATORY (INHALATION) at 12:04

## 2018-04-15 RX ADMIN — PIPERACILLIN SODIUM AND TAZOBACTAM SODIUM 4.5 G: 4; .5 INJECTION, POWDER, FOR SOLUTION INTRAVENOUS at 10:04

## 2018-04-15 RX ADMIN — IPRATROPIUM BROMIDE AND ALBUTEROL SULFATE 3 ML: .5; 3 SOLUTION RESPIRATORY (INHALATION) at 01:04

## 2018-04-15 RX ADMIN — IPRATROPIUM BROMIDE AND ALBUTEROL SULFATE 3 ML: .5; 3 SOLUTION RESPIRATORY (INHALATION) at 07:04

## 2018-04-15 NOTE — ASSESSMENT & PLAN NOTE
Presenting bp 247/108  BNP elevated; troponin at baseline  + pulmonary edema-treated with lasix  Mildly hypertensive at this time; home meds have been resumed.  Continue to monitor telemetry  Monitor clinically.

## 2018-04-15 NOTE — PROGRESS NOTES
"Progress Note  Pulmonary/Critical Care      Admit Date: 4/14/2018    SUBJECTIVE:     HPI/Interval history (See H&P for complete P,F,SHx) :     4/15/2018 - Stable overnight, has tolerated a SBT with adequate ABG, good parameters and will plan to extubate this AM.  No new issues reported.    Review of Systems: List if applicable    Pain scale: 0/10    Review of Systems   Unable to perform ROS: Intubated       OBJECTIVE:     Vital Signs Range (Last 24H):  Temp:  [98 °F (36.7 °C)-98.6 °F (37 °C)]   Pulse:  []   Resp:  [14-40]   BP: (101-247)/()   SpO2:  [82 %-100 %]     I & O (Last 24H):    Intake/Output Summary (Last 24 hours) at 04/15/18 0724  Last data filed at 04/15/18 0700   Gross per 24 hour   Intake          1243.91 ml   Output             3025 ml   Net         -1781.09 ml       Estimated body mass index is 25.79 kg/m² as calculated from the following:    Height as of this encounter: 5' 7" (1.702 m).    Weight as of this encounter: 74.7 kg (164 lb 10.9 oz).    Vent Settings- Vent Mode: Spont  Oxygen Concentration (%):  [] 30  Resp Rate Total:  [7 br/min-22 br/min] 12 br/min  Vt Set:  [500 mL] 500 mL  PEEP/CPAP:  [5 cmH20] 5 cmH20  Pressure Support:  [0 cmH20-10 cmH20] 10 cmH20  Mean Airway Pressure:  [8.3 avB07-83 cmH20] 8.7 cmH20    ABG    Recent Labs  Lab 04/15/18  0603   PH 7.493*   PO2 134*   PCO2 37.1   HCO3 28.4*   BE 5       Physical Exam:  Physical Exam   Constitutional: He is well-developed, well-nourished, and in no distress. No distress.   intubated   HENT:   Head: Normocephalic and atraumatic.   pale   Eyes: Conjunctivae are normal. Pupils are equal, round, and reactive to light.   Neck: Normal range of motion. Neck supple. No JVD present. No tracheal deviation present. No thyromegaly present.   Cardiovascular: Normal rate, regular rhythm and normal heart sounds.  Exam reveals no gallop and no friction rub.    No murmur heard.  Pulmonary/Chest: Effort normal and breath sounds normal. " No stridor. No respiratory distress. He has no wheezes. He has no rales.   ventilated   Abdominal: Soft. Bowel sounds are normal. He exhibits no distension. There is no tenderness. There is no rebound and no guarding.   Musculoskeletal: Normal range of motion. He exhibits no edema or tenderness.   Neurological: He is alert.   Follows commands  Seems appropriate   Skin: He is not diaphoretic.   Vitals reviewed.      Laboratory/Diagnostic Data:    Recent Results (from the past 336 hour(s))   CBC auto differential    Collection Time: 04/15/18  5:57 AM   Result Value Ref Range    WBC 7.90 3.90 - 12.70 K/uL    Hemoglobin 11.2 (L) 14.0 - 18.0 g/dL    Hematocrit 33.3 (L) 40.0 - 54.0 %    Platelets 268 150 - 350 K/uL   CBC auto differential    Collection Time: 04/14/18  8:40 AM   Result Value Ref Range    WBC 17.20 (H) 3.90 - 12.70 K/uL    Hemoglobin 13.4 (L) 14.0 - 18.0 g/dL    Hematocrit 40.1 40.0 - 54.0 %    Platelets 477 (H) 150 - 350 K/uL   CBC auto differential    Collection Time: 04/09/18  4:33 AM   Result Value Ref Range    WBC 10.80 3.90 - 12.70 K/uL    Hemoglobin 11.8 (L) 14.0 - 18.0 g/dL    Hematocrit 35.6 (L) 40.0 - 54.0 %    Platelets 254 150 - 350 K/uL       Recent Results (from the past 336 hour(s))   Basic metabolic panel    Collection Time: 04/15/18  3:26 AM   Result Value Ref Range    Sodium 142 136 - 145 mmol/L    Potassium 4.1 3.5 - 5.1 mmol/L    Chloride 106 95 - 110 mmol/L    CO2 21 (L) 23 - 29 mmol/L    BUN, Bld 33 (H) 8 - 23 mg/dL    Creatinine 1.6 (H) 0.5 - 1.4 mg/dL    Calcium 8.6 (L) 8.7 - 10.5 mg/dL    Anion Gap 15 8 - 16 mmol/L   Basic metabolic panel    Collection Time: 04/07/18 12:33 PM   Result Value Ref Range    Sodium 140 136 - 145 mmol/L    Potassium 4.5 3.5 - 5.1 mmol/L    Chloride 114 (H) 95 - 110 mmol/L    CO2 17 (L) 23 - 29 mmol/L    BUN, Bld 31 (H) 8 - 23 mg/dL    Creatinine 1.6 (H) 0.5 - 1.4 mg/dL    Calcium 8.3 (L) 8.7 - 10.5 mg/dL    Anion Gap 9 8 - 16 mmol/L       Lab Results    Component Value Date    ALT 15 04/14/2018    AST 22 04/14/2018    ALKPHOS 86 04/14/2018    BILITOT 0.4 04/14/2018       No results for input(s): PT in the last 24 hours.    Invalid input(s):  INR,  APTT    Microbiology    Microbiology Results (last 7 days)     Procedure Component Value Units Date/Time    Blood Culture #1 [[id number]] Collected:  04/14/18 0955    Order Status:  Completed Specimen:  Blood Updated:  04/15/18 0315     Blood Culture, Routine No Growth to date    Blood Culture #2 [[id number]] Collected:  04/14/18 0945    Order Status:  Completed Specimen:  Blood Updated:  04/15/18 0315     Blood Culture, Routine No Growth to date    Culture, Respiratory with Gram Stain [[id number]] Collected:  04/14/18 0854    Order Status:  Completed Specimen:  Respiratory from BAL, LLL Updated:  04/15/18 0156     Gram Stain (Respiratory) <10 epithelial cells per low power field.     Gram Stain (Respiratory) No WBC's     Gram Stain (Respiratory) No organisms seen    Culture, Respiratory with Gram Stain [[id number]] Collected:  04/14/18 1153    Order Status:  Completed Specimen:  Respiratory from Tracheal Aspirate Updated:  04/15/18 0148     Gram Stain (Respiratory) <10 epithelial cells per low power field.     Gram Stain (Respiratory) No WBC's     Gram Stain (Respiratory) No organisms seen    Urine culture [[id number]] Collected:  04/14/18 1609    Order Status:  Sent Specimen:  Urine from Urine, Catheterized Updated:  04/14/18 2341    Culture, Respiratory with Gram Stain [[id number]]     Order Status:  Canceled Specimen:  Respiratory           Radiology    CXR (4/15) - no sign change, possible bilaterally effusions and right infiltrate    Imaging Results          X-Ray Chest AP Portable (SOB) (Final result)  Result time 04/14/18 09:16:05    Final result by Lillain Cuevas MD (04/14/18 09:16:05)                 Impression:      Increased right lung infiltrate and interval development of mild left perihilar infiltrate.   Positions of lines and tubes described.      Electronically signed by: Lillian Cuevas MD  Date:    04/14/2018  Time:    09:16             Narrative:    EXAMINATION:  XR CHEST AP PORTABLE    CLINICAL HISTORY:  SOB;    TECHNIQUE:  Single frontal view of the chest was performed.    COMPARISON:  4/10/2018    FINDINGS:  Endotracheal tube is been inserted with the tip appearing a few above the tobias in satisfactory position.  NG tube is been inserted traversing the esophagus extending beneath the level of the diaphragms.  There is increased right lung infiltrate most confluent perihilar-infrahilar and there is mild prominence of the left perihilar markings with mild left perihilar infiltrate today as well.  Findings suggest CHF and pulmonary edema.  Differential includes pneumonia combination of the tube.  There is no pleural effusion.                            .    Medications:     albuterol-ipratropium 2.5mg-0.5mg/3mL  3 mL Nebulization Q6H    amLODIPine  5 mg Per OG tube Daily    aspirin  81 mg Oral Daily    azithromycin  500 mg Intravenous Q24H    clopidogrel  75 mg Per OG tube Daily    enoxaparin  40 mg Subcutaneous Daily    metoprolol tartrate  50 mg Per OG tube BID    pantoprazole  40 mg Intravenous Daily    piperacillin-tazobactam (ZOSYN) IVPB  4.5 g Intravenous Q8H    senna-docusate 8.6-50 mg  1 tablet Per OG tube BID    vancomycin (VANCOCIN) IVPB  15 mg/kg Intravenous Q24H        propofol Stopped (04/15/18 0525)       acetaminophen, acetaminophen, albuterol sulfate, ondansetron, prochlorperazine, sodium chloride 0.9%    ASSESSMENT/PLAN:     Active Problems:    Active Hospital Problems    Diagnosis  POA    *Gram-negative pneumonia [J15.6]  Yes    Hypertensive urgency [I16.0]  Yes    Acute diastolic congestive heart failure [I50.31]  Yes    Hyperkalemia [E87.5]  Yes    Chronic obstructive pulmonary disease with acute lower respiratory infection [J44.0]  Yes    Acute on chronic respiratory  failure with hypoxia and hypercapnia [J96.21, J96.22]  Yes    H/O aortic valve replacement [Z95.2]  Not Applicable     ? Mechanical      Sepsis [A41.9]  Yes    Urinary retention [R33.9]  Yes    Chronic renal insufficiency, stage 3 (moderate) [N18.3]  Yes      Resolved Hospital Problems    Diagnosis Date Resolved POA   No resolved problems to display.     Respiratory failure, acute and chronic hypoxemic and hypercapnic  - wean/extubate today, better  - most likely was pulmonary edema  - continue antibiotics  - await cultures  - procalcitonin - mild elevation  - cardiac evaluation     Sepsis  - WBC better today  - culture, antibiotics, follow     + troponin  - low +, follow  - EKG ok by report  - cardiology consult noted  - ECHO Monday  - ? If he will need angiogram to evaluate     Critical Care Time     I have spent > 35 minutes providing critical care services for this pt for the above diagnoses.  These services have included pt evaluation, pt exam, ventilator assessment, SBT assessment, decision to extubate, discussions with staff, chart review, data review, note preparation and .    I will continue to follow with the pt.  Please call me at [phone] if you have any questions.      Angel Riggs MD

## 2018-04-15 NOTE — CONSULTS
[facility]  Cardiology  Consult Note    Patient Name: Frederick Kong Jr.  MRN: [id number]  Admission Date: 4/14/2018  Hospital Length of Stay: 0 days  Code Status: Prior   Attending Provider: Harsha Dan MD   Consulting Provider: Marisel Soliman MD  Primary Care Physician: Dhiraj Dempsey III, MD  Principal Problem:Gram-negative pneumonia    Patient information was obtained from ER records.     Inpatient consult to Cardiology  Consult performed by: MARISEL SOLIMAN  Consult ordered by: ROSY GARCIAS        Subjective:     Chief Complaint:   Shortness of breath    HPI:   H2-year-old male with a past medical history significant for aortic valve replacement with mechanical prosthesis, hypertension, coronary artery disease was recently discharged from the hospital after he was admitted for pneumonia. He reportedly did fine till about yesterday. Yesterday evening history of having shortness of breath that gradually worsened and this morning he was brought back to the emergency room. His wife reportedly noted that his oxygen saturations were in the 70s. In the emergency room and was intubated and currently the patient remains intubated and no meaningful history could be obtained. Patient was given IV Lasix in the ED and he reportedly made up to 1700 cc of urine thus far.    Past Medical History:   Diagnosis Date    Cancer     CHF (congestive heart failure)     Coronary artery disease     Gout     Hyperlipemia     Hypertension        Past Surgical History:   Procedure Laterality Date    APPENDECTOMY      BRAIN SURGERY  1975    meningioma removed    CARDIAC SURGERY  2001    mechanical aortic valve replacement, Dr. Sanket Sanchez    TONSILLECTOMY         Review of patient's allergies indicates:  No Known Allergies    No current facility-administered medications on file prior to encounter.      Current Outpatient Prescriptions on File Prior to Encounter   Medication Sig     amLODIPine (NORVASC) 5 MG tablet Take 5 mg by mouth once daily.    amoxicillin-clavulanate 875-125mg (AUGMENTIN) 875-125 mg per tablet Take 1 tablet by mouth 2 (two) times daily.    aspirin (ECOTRIN) 81 MG EC tablet Take 81 mg by mouth once daily.    clopidogrel (PLAVIX) 75 mg tablet Take 75 mg by mouth once daily.    furosemide (LASIX) 20 MG tablet Take 10 mg by mouth every other day.    losartan (COZAAR) 100 MG tablet Take 100 mg by mouth once daily.    lovastatin (MEVACOR) 40 MG tablet Take 40 mg by mouth every evening.    metoprolol succinate (TOPROL-XL) 100 MG 24 hr tablet Take 100 mg by mouth once daily.    pantoprazole (PROTONIX) 40 MG tablet Take 40 mg by mouth once daily.    colchicine 0.6 mg capsule/tablet Take 0.6 mg by mouth once daily.    potassium chloride SA (K-DUR,KLOR-CON) 10 MEQ tablet Take 10 mEq by mouth.      Scheduled Meds:   albuterol-ipratropium 2.5mg-0.5mg/3mL  3 mL Nebulization Q6H    amLODIPine  5 mg Per OG tube Daily    aspirin  81 mg Oral Daily    azithromycin  500 mg Intravenous Q24H    clopidogrel  75 mg Per OG tube Daily    enoxaparin  40 mg Subcutaneous Daily    metoprolol tartrate  50 mg Per OG tube BID    pantoprazole  40 mg Intravenous Daily    piperacillin-tazobactam (ZOSYN) IVPB  4.5 g Intravenous Q8H    senna-docusate 8.6-50 mg  1 tablet Per OG tube BID    vancomycin (VANCOCIN) IVPB  15 mg/kg Intravenous Q24H     Continuous Infusions:   propofol 25 mcg/kg/min (04/15/18 0229)     PRN Meds:.acetaminophen, acetaminophen, albuterol sulfate, ondansetron, prochlorperazine, sodium chloride 0.9%    Family History     None        Social History Main Topics    Smoking status: Former Smoker    Smokeless tobacco: Not on file    Alcohol use No    Drug use: Unknown    Sexual activity: Not on file     ROS   Unobtainable as patient is currently intubated and sedated.    Objective:     Vital Signs (Most Recent):  Temp: 98 °F (36.7 °C) (04/14/18 1600)  Pulse: 61  (04/14/18 1930)  Resp: 20 (04/14/18 1930)  BP: (!) 144/64 (04/14/18 1930)  SpO2: 99 % (04/14/18 1930) Vital Signs (24h Range):  Temp:  [98 °F (36.7 °C)-98.3 °F (36.8 °C)] 98 °F (36.7 °C)  Pulse:  [] 61  Resp:  [16-40] 20  SpO2:  [82 %-100 %] 99 %  BP: (101-247)/() 144/64     Weight: 74.8 kg (164 lb 14.5 oz)  Body mass index is 25.83 kg/m².    SpO2: 99 %  O2 Device (Oxygen Therapy): ventilator      Intake/Output Summary (Last 24 hours) at 04/14/18 2022  Last data filed at 04/14/18 1844   Gross per 24 hour   Intake          1019.89 ml   Output             1700 ml   Net          -680.11 ml       Lines/Drains/Airways     Drain                 Urethral Catheter 04/07/18 2100 6 days         NG/OG Tube 04/14/18 0850 [place] sump 14 Fr. Center mouth less than 1 day         Urethral Catheter 04/14/18 0900 Straight-tip 16 Fr. less than 1 day          Airway                 Airway - Non-Surgical 04/14/18 0832 Endotracheal Tube less than 1 day          Peripheral Intravenous Line                 Peripheral IV - Single Lumen 04/14/18 0836 Left Hand less than 1 day         Peripheral IV - Single Lumen 04/14/18 Right Hand less than 1 day                Physical Exam  HEENT: Normocephalic, atraumatic, PERRL, Conjunctiva pink, no scleral icterus. ET tube in place.  CVS: S1S2+, RRR, no murmurs, rubs or gallops, JVP: Normal. Click of AVR+  LUNGS: Crackles positive at the bases.  ABDOMEN: Soft, NT, BS+  EXTREMITIES: No cyanosis, clubbing or edema  NEURO: Intubated and does not obey simple commands     Significant Labs:   ABG:   Recent Labs  Lab 04/14/18  0843 04/14/18  1424   PH 7.214* 7.418   PCO2 77.1* 41.3   HCO3 31.1* 26.6   POCSATURATED 100 99   BE 3 2   , Blood Culture: No results for input(s): LABBLOO in the last 48 hours., BMP:   Recent Labs  Lab 04/14/18  0840   *      K 5.2*      CO2 23   BUN 28*   CREATININE 1.5*   CALCIUM 9.1   , CMP   Recent Labs  Lab 04/14/18  0840      K 5.2*       CO2 23   *   BUN 28*   CREATININE 1.5*   CALCIUM 9.1   PROT 7.5   ALBUMIN 3.2*   BILITOT 0.4   ALKPHOS 86   AST 22   ALT 15   ANIONGAP 10   ESTGFRAFRICA 49*   EGFRNONAA 43*   , CBC   Recent Labs  Lab 04/14/18  0840   WBC 17.20*   HGB 13.4*   HCT 40.1   *   , INR   Recent Labs  Lab 04/14/18  0840   INR 1.0   , Lipid Panel No results for input(s): CHOL, HDL, LDLCALC, TRIG, CHOLHDL in the last 48 hours. and Troponin   Recent Labs  Lab 04/14/18  0840 04/14/18  1247 04/14/18  1805   TROPONINI 0.103* 0.043* 0.169*       Significant Imaging: X-Ray: CXR: X-Ray Chest 1 View (CXR): No results found for this visit on 04/14/18.  Assessment and Plan:     Impression:  1. Acute hypoxemic hypercapnic respiratory failure. Currently on vent support. Likely secondary to right lower lobe pneumonia.  1A. Congestive heart failure. LV systolic dysfunction during the last admission which was thought to be secondary to hypoxemic cardiomyopathy. Patient given IV Lasix and he put out about 1700 cc thus far.  2. Mildly elevated troponin. Likely secondary to #1/2  3. History of mechanical aortic valve prosthesis. Functioning well per last echocardiogram done in September 2017. Patient not therapeutically anticoagulated and was kept on only aspirin and Plavix reportedly secondary to high risk of bleeding and previous history of intracranial bleeds.  4. History of coronary artery disease status post CABG. Currently with mildly elevated troponin. No dynamic ECG changes.  5. History of atrial flutter. Paroxysmal. Currently in sinus rhythm.  6. Acute on chronic renal failure  7. History of hypertension. Acceptable.    Recommendations:  1.Continue aspirin and Plavix for now.  2. Repeat BNP in the a.m.  3. Repeat echocardiogram on Monday.  4. Further decisions to follow based upon the hospital course.  5. Discussed the case with the patient's nurse     Thank you for your consult. I will follow-up with patient. Please contact us if you  have any additional questions.    Marisel Soliman MD  Cardiology   [facility]

## 2018-04-15 NOTE — PROGRESS NOTES
[facility] Hospital Medicine  Progress Note    Patient Name: Frederick Kong Jr.  MRN: [id number]  Patient Class: IP- Inpatient   Admission Date: 4/14/2018  Length of Stay: 1 days  Attending Physician: Harsha Dan MD  Primary Care Provider: Dhiraj Dempsey III, MD        Subjective:     Principal Problem:Gram-negative pneumonia    HPI:  Frederick Kong Jr. is a 82 y.o. male with a history of HTN, CAD, Ca, and HLD who presents to the ED via EMS with an onset of respiratory distress which began last night, worsening this morning.  Mr. Kong did require intubation in the emergency department.  His wife accompanies him and provides some history.  He has hx of stent placement at the time of his aortic valve replacement in 2001 and was on coumadin for many years however has been changed to asa and plavix within the last year due to frequent falls. His cardiologist is Dr Gonzalez. He has never had an MI or heart failure. He has a remote history of smoking and is otherwise alert and active. He was recently admitted to this facility and treated for severe sepsis 2/2 bilateral aspiration pneumonia and was discharged home 2 days ago with a prescription for augmentin to further treat the pneumonia.  He began to have shortness of breath last night, and this escalated this morning.  Denies incidence of fever, confusion, N/V or diarrhea.  Mr. Kong is admitted to the service of hospital medicine for further medical management.      Hospital Course:  No notes on file    Interval History: Extubated this morning with no complications; is awake and alert.  Answering questions appropriately.  Mildly hypertensive.  Wife at bedside.    Review of Systems   Constitutional: Negative for chills and fever.   HENT: Positive for sore throat. Negative for congestion.    Eyes: Negative for photophobia and visual disturbance.   Respiratory: Positive for cough. Negative for chest tightness, shortness of breath and wheezing.     Cardiovascular: Negative for chest pain and palpitations.   Gastrointestinal: Negative for abdominal pain, constipation, diarrhea, nausea and vomiting.   Genitourinary:        Indwelling henderson catheter   Musculoskeletal: Negative for arthralgias and myalgias.   Neurological: Positive for weakness (generalized). Negative for dizziness and light-headedness.   Psychiatric/Behavioral: Negative for agitation and confusion. The patient is not nervous/anxious.      Objective:     Vital Signs (Most Recent):  Temp: 98.5 °F (36.9 °C) (04/15/18 1200)  Pulse: 82 (04/15/18 1200)  Resp: (!) 26 (04/15/18 1200)  BP: (!) 152/69 (04/15/18 1200)  SpO2: 96 % (04/15/18 1200) Vital Signs (24h Range):  Temp:  [98 °F (36.7 °C)-98.6 °F (37 °C)] 98.5 °F (36.9 °C)  Pulse:  [54-92] 82  Resp:  [14-36] 26  SpO2:  [93 %-100 %] 96 %  BP: (101-191)/() 152/69     Weight: 74.7 kg (164 lb 10.9 oz)  Body mass index is 25.79 kg/m².    Intake/Output Summary (Last 24 hours) at 04/15/18 1325  Last data filed at 04/15/18 0700   Gross per 24 hour   Intake           375.05 ml   Output             3195 ml   Net         -2819.95 ml      Physical Exam   Constitutional: He is oriented to person, place, and time. He appears well-developed and well-nourished.   HENT:   Head: Normocephalic and atraumatic.   Eyes: EOM are normal. Pupils are equal, round, and reactive to light.   Neck: Normal range of motion. Neck supple. No tracheal deviation present.   Cardiovascular: Normal rate, regular rhythm and intact distal pulses.    Pulmonary/Chest: Effort normal. No respiratory distress. He has rales (much improvement from previous exam).   Abdominal: Soft. Bowel sounds are normal. He exhibits no distension. There is no tenderness.   Genitourinary:   Genitourinary Comments: Indwelling henderson catheter draining clear yellow urine.   Musculoskeletal: Normal range of motion. He exhibits edema (trace BLE).   Neurological: He is alert and oriented to person, place, and  time. No cranial nerve deficit.   Skin: Skin is warm and dry.   Psychiatric: He has a normal mood and affect. His behavior is normal.       Significant Labs:   CBC:   Recent Labs  Lab 04/14/18  0840 04/15/18  0557   WBC 17.20* 7.90   HGB 13.4* 11.2*   HCT 40.1 33.3*   * 268     CMP:   Recent Labs  Lab 04/14/18  0840 04/15/18  0326    142   K 5.2* 4.1    106   CO2 23 21*   * 93   BUN 28* 33*   CREATININE 1.5* 1.6*   CALCIUM 9.1 8.6*   PROT 7.5  --    ALBUMIN 3.2*  --    BILITOT 0.4  --    ALKPHOS 86  --    AST 22  --    ALT 15  --    ANIONGAP 10 15   EGFRNONAA 43* 40*     Cardiac Markers:   Recent Labs  Lab 04/15/18  0557   *     Lactic Acid:   Recent Labs  Lab 04/14/18  0840 04/14/18  1234   LACTATE 1.1 0.9     Troponin:   Recent Labs  Lab 04/14/18  1805 04/15/18  0557 04/15/18  0746   TROPONINI 0.169* 0.340* 0.381*       Significant Imaging:   X-Ray Chest AP Portable [[id number]] Resulted: 04/15/18 0840   Order Status: Completed Updated: 04/15/18 0842   Narrative:     EXAMINATION:  XR CHEST AP PORTABLE    CLINICAL HISTORY:  pneumonia; vent;    TECHNIQUE:  Single frontal view of the chest was performed.    COMPARISON:  4/14/2018    FINDINGS:  Lung infiltrates have decreased.  Cardiomediastinal silhouette is stable.  There is no pleural effusion.  NG tube and endotracheal tube remain in place   Impression:       Decrease of the lung infiltrates compared to the prior exam         Assessment/Plan:      * Gram-negative pneumonia    Suspected.  Continue IV abx  Consult pharmacy for vanc dosing  Currently intubated-bronchodilators ordered  Obtain sputum culture-following  Procalcitonin pending        Hyperkalemia    K+ 5.2-4.1 today  Received IV lasix  Will monitor level.  Monitor telemetry          Acute diastolic congestive heart failure    BNP elevated 1242; this morning 269 after receiving IV lasix yesterday for diuresis.  Presented with acute respiratory failure; intubated-currently  extubated without complications.  Continue to monitor respiratory status  Plan echocardiogram for AM        Hypertensive urgency    Presenting bp 247/108  BNP elevated; troponin at baseline  + pulmonary edema-treated with lasix  Mildly hypertensive at this time; home meds have been resumed.  Continue to monitor telemetry  Monitor clinically.          Chronic obstructive pulmonary disease with acute lower respiratory infection    Presented in acute respiratory failure requiring intubation-extubated at this time on supplemental O2 via NC.  Pneumonia identified; continue IV vancomycin, IV azithromycin, and IV zosyn which were initiated in ED-continue and follow cultures.  Bronchodilators  Consult pulmonary for vent management.          Urinary retention    Onset during past admission  Continue indwelling henderson catheter          H/O aortic valve replacement    Currently treated with plavix and ASA; due to high risk for intracranial bleed, anticoagulation was recently discontinued.  2D ECHO planned for tomorrow.          Sepsis    Frederick Kong Jr. is a 82 y.o. male who presents to the Emergency Department and was admitted to ICU.      This patient does have evidence of infective focus  My overall impression is sepsis.  Antibiotics given-   Antibiotics     Start     Stop Route Frequency Ordered    04/15/18 1230  linezolid 600mg/300ml IVPB 600 mg      -- IV Every 12 hours (non-standard times) 04/15/18 1124    04/14/18 1200  piperacillin-tazobactam 4.5 g in dextrose 5 % 100 mL IVPB (ready to mix system)      -- IV Every 8 hours (non-standard times) 04/14/18 1154          Severe Sepsis only-  Latest labs reviewed, they are-  No results for input(s): CBC, BILITOT in the last 24 hours.    Invalid input(s): CREATININE.1  No results for input(s): LACTATE in the last 24 hours.    Recent Labs  Lab 04/15/18  0603   PH 7.493*   PO2 134*   PCO2 37.1   HCO3 28.4*   BE 5       Organ dysfunction indicated by acute respiratory failure  requiring intubation-is now extubated.    Shock with decreased perfusion noted, Fluid challenge was not given due to pulmonary edema.    Obtain Blood cultures x 2, sputum culture and urine culture-follow        Chronic renal insufficiency, stage 3 (moderate)    BUN 28/Creat 1.5  Slight increase today BUN 33/creat 1.6 after receiving IV lasix  Continue to monitor renal function          Acute on chronic respiratory failure with hypoxia and hypercapnia    Chronic respiratory failure requiring home oxygen presented to the ED in acute failure requiring intubation.   Likely due to pulmonary edema.  Extubated this morning without complication.  Continue supplemental O2 as indicated.          VTE Risk Mitigation         Ordered     enoxaparin injection 40 mg  Daily     Route:  Subcutaneous        04/14/18 1133     Place CJ hose  Until discontinued      04/14/18 1133     Place sequential compression device  Until discontinued      04/14/18 1133     IP VTE HIGH RISK PATIENT  Once      04/14/18 1133          Critical care time spent on the evaluation and treatment of severe organ dysfunction, review of pertinent labs and imaging studies, discussions with consulting providers and discussions with patient/family: 40 minutes.    MIHAELA Hankins  Department of Hospital Medicine   [facility]

## 2018-04-15 NOTE — PLAN OF CARE
04/14/18 1930   Patient Assessment/Suction   Level of Consciousness (AVPU) responds to voice   Respiratory Effort Normal;Unlabored   Expansion/Accessory Muscles/Retractions expansion symmetric;no retractions;no use of accessory muscles   All Lung Fields Breath Sounds clear   Suction Method not required   PRE-TX-O2-ETCO2   O2 Device (Oxygen Therapy) ventilator   Oxygen Concentration (%) 50   SpO2 99 %   Pulse Oximetry Type Continuous   ETCO2 (mmHg) 27 mmHg   Pulse 61   Resp 20   BP (!) 144/64   Aerosol Therapy   $ Aerosol Therapy Charges Aerosol Treatment   Respiratory Treatment Status given   SVN/Inhaler Treatment Route in-line;with oxygen   Position During Treatment HOB at 45 degrees   Patient Tolerance good   Post-Treatment   Post-treatment Heart Rate (beats/min) 65   Post-treatment Resp Rate (breaths/min) 20   All Fields Breath Sounds unchanged   Vent Select   Conventional Vent Y   Charged w/in last 24h YES   Preset Conventional Ventilator Settings   Vent Type    Ventilation Type VC   Vent Mode A/C   Humidity HME   Set Rate 20 bmp   Vt Set 500 mL   PEEP/CPAP 5 cmH20   Pressure Support 0 cmH20   Waveform RAMP   Peak Flow 65 L/min   Set Inspiratory Pressure 0 cmH20   Insp Time 0 Sec(s)   Plateau Set/Insp. Hold (sec) 0   Insp Rise Time  0 %   Trigger Sensitivity Flow/I-Trigger 2 L/min   P High 0 cm H2O   P Low 0 cm H2O   T High 0 sec   T Low 0 sec   Patient Ventilator Parameters   Resp Rate Total 20 br/min   Peak Airway Pressure 27 cmH2O   Mean Airway Pressure 11 cmH20   Plateau Pressure 0 cmH20   Exhaled Vt 512 mL   Total Ve 10.2 mL   Spont Ve 0 L   I:E Ratio Measured 1:2.60   Conventional Ventilator Alarms   Ve High Alarm 27 L/min   Resp Rate High Alarm 0 br/min   Press High Alarm 70 cmH2O   Apnea Rate 10   Apnea Volume (mL) 440 mL   Apnea Oxygen Concentration  100   Apnea Flow Rate (L/min) 52   T Apnea 20 sec(s)   Ready to Wean/Extubation Screen   FIO2<=50 (chart decimal) 0.5   MV<16L (chart vol.) 10.2    PEEP <=8 (chart #) 5   Ready to Wean Parameters   F/VT Ratio<105 (RSBI) (!) 39.06

## 2018-04-15 NOTE — PLAN OF CARE
Problem: Patient Care Overview  Goal: Plan of Care Review  Outcome: Ongoing (interventions implemented as appropriate)  Pt extubated this AM, doing well, alert and oriented. Tolerating all meals. 800 ml of urine output. Denies any pain or shortness of breath. Afebrile this shift. Safety maintained.

## 2018-04-15 NOTE — CONSULTS
Frederick Kong . [id number] is a 82 y.o. male who has been consulted for vancomycin dosing.    Pharmacy consult for vancomycin dosing in no longer required.  Vancomycin was discontinued.    Thank you for allowing us to participate in this patient's care.     -Brian Vuong, PharmD

## 2018-04-15 NOTE — PLAN OF CARE
Problem: Patient Care Overview  Goal: Plan of Care Review  Outcome: Ongoing (interventions implemented as appropriate)  Alert. Vital signs stable. Urine output adequate. Patient on CPAP. Tolerating well. Plan Extubate today. Monitor vital signs, urine output

## 2018-04-15 NOTE — EICU
eICU Note :    Called by the [facility]:    Problem: Wrist Restraint    Pertinent History and labs reviewed : 81 y/o Male admitted with Acute Respiratory Failure with Pneumonia and CHF, pt trying to pull tubes  and lines       Treatment /Intervention given: Bilateral Soft restraints ordered.        Isa Cook M.D  eICU Physician

## 2018-04-15 NOTE — PLAN OF CARE
met with patient and his wife in patient's room in order to complete assessment.  Wife reported that [facility] was arranged when patient was discharged the last time; however, they were not able to admit patient to service before he was readmitted to the hospital.   obtained a choice form for [facility] and placed it in patient's folder.  Please leave orders at the time of discharge and case management can arrange services.       04/15/18 1202   Discharge Assessment   Assessment Type Discharge Planning Assessment   Confirmed/corrected address and phone number on facesheet? Yes   Assessment information obtained from? Patient;Caregiver   Prior to hospitilization cognitive status: Alert/Oriented   Prior to hospitalization functional status: Independent   Current cognitive status: Alert/Oriented   Current Functional Status: Needs Assistance   Lives With spouse   Able to Return to Prior Arrangements yes   Is patient able to care for self after discharge? Yes   Patient's perception of discharge disposition home health   Patient currently being followed by outpatient case management? No   Patient currently receives any other outside agency services? No   Equipment Currently Used at Home walker, standard;oxygen   Do you have any problems affording any of your prescribed medications? No   Is the patient taking medications as prescribed? yes   Does the patient have transportation home? Yes   Does the patient receive services at the [facility]? No   Discharge Plan A Home Health   Patient/Family In Agreement With Plan yes

## 2018-04-15 NOTE — SIGNIFICANT EVENT
Results for BEN MADSEN  (MRN [id number]) as of 4/15/2018 06:11   Ref. Range 4/15/2018 06:03   POC PH Latest Ref Range: 7.35 - 7.45  7.493 (H)   POC PCO2 Latest Ref Range: 35 - 45 mmHg 37.1   POC PO2 Latest Ref Range: 80 - 100 mmHg 134 (H)   POC BE Latest Ref Range: -2 to 2 mmol/L 5   POC HCO3 Latest Ref Range: 24 - 28 mmol/L 28.4 (H)   POC SATURATED O2 Latest Ref Range: 95 - 100 % 99   POC TCO2 Latest Ref Range: 23 - 27 mmol/L 30 (H)   FiO2 Unknown 40   PEEP Unknown 5   Sample Unknown ARTERIAL   DelSys Unknown Adult Vent   Allens Test Unknown Pass   Site Unknown RR   Mode Unknown CPAP

## 2018-04-15 NOTE — ASSESSMENT & PLAN NOTE
Presented in acute respiratory failure requiring intubation-extubated at this time on supplemental O2 via NC.  Pneumonia identified; continue IV vancomycin, IV azithromycin, and IV zosyn which were initiated in ED-continue and follow cultures.  Bronchodilators  Consult pulmonary for vent management.

## 2018-04-15 NOTE — PLAN OF CARE
04/15/18 1120   Readmission Questionnaire   At the time of your discharge, did someone talk to you about what your health problems were? Yes   At the time of discharge, did someone talk to you about what to watch out for regarding worsening of your health problem? Yes   At the time of discharge, did someone talk to you about what to do if you experienced worsening of your health problem? No   At the time of discharge, did someone talk to you about which medication to take when you left the hospital and which ones to stop taking? Yes   At the time of discharge, did someone talk to you about when and where to follow up with a doctor after you left the hospital? Yes   What do you believe caused you to be sick enough to be re-admitted? fluid in his lungs   How often do you need to have someone help you when you read instructions, pamphlets, or other written material from your doctor or pharmacy? Never   Do you have problems taking your medications as prescribed? No   Do you have any problems affording any of  your prescribed medications? No   Do you have problems obtaining/receiving your medications? No   Does the patient have transportation to healthcare appointments? Yes   Lives With spouse   Living Arrangements house   Does the patient have family/friends to help with healtcare needs after discharge? yes   Who are your caregiver(s) and their phone number(s)? Wife and daughterBernadette   Does your caregiver provide all the help you need? Yes   Are you currently feeling confused? No   Are you currently having problems thinking? No   Are you currently having memory problems? No   Have you felt down, depressed, or hopeless? 0   Have you felt little interest or pleasure in doing things? 0   In the last 7 days, my sleep quality was: good

## 2018-04-15 NOTE — ASSESSMENT & PLAN NOTE
Chronic respiratory failure requiring home oxygen presented to the ED in acute failure requiring intubation.   Likely due to pulmonary edema.  Extubated this morning without complication.  Continue supplemental O2 as indicated.

## 2018-04-15 NOTE — SUBJECTIVE & OBJECTIVE
Interval History: Extubated this morning with no complications; is awake and alert.  Answering questions appropriately.  Mildly hypertensive.  Wife at bedside.    Review of Systems   Constitutional: Negative for chills and fever.   HENT: Positive for sore throat. Negative for congestion.    Eyes: Negative for photophobia and visual disturbance.   Respiratory: Positive for cough. Negative for chest tightness, shortness of breath and wheezing.    Cardiovascular: Negative for chest pain and palpitations.   Gastrointestinal: Negative for abdominal pain, constipation, diarrhea, nausea and vomiting.   Genitourinary:        Indwelling henderson catheter   Musculoskeletal: Negative for arthralgias and myalgias.   Neurological: Positive for weakness (generalized). Negative for dizziness and light-headedness.   Psychiatric/Behavioral: Negative for agitation and confusion. The patient is not nervous/anxious.      Objective:     Vital Signs (Most Recent):  Temp: 98.5 °F (36.9 °C) (04/15/18 1200)  Pulse: 82 (04/15/18 1200)  Resp: (!) 26 (04/15/18 1200)  BP: (!) 152/69 (04/15/18 1200)  SpO2: 96 % (04/15/18 1200) Vital Signs (24h Range):  Temp:  [98 °F (36.7 °C)-98.6 °F (37 °C)] 98.5 °F (36.9 °C)  Pulse:  [54-92] 82  Resp:  [14-36] 26  SpO2:  [93 %-100 %] 96 %  BP: (101-191)/() 152/69     Weight: 74.7 kg (164 lb 10.9 oz)  Body mass index is 25.79 kg/m².    Intake/Output Summary (Last 24 hours) at 04/15/18 1325  Last data filed at 04/15/18 0700   Gross per 24 hour   Intake           375.05 ml   Output             3195 ml   Net         -2819.95 ml      Physical Exam   Constitutional: He is oriented to person, place, and time. He appears well-developed and well-nourished.   HENT:   Head: Normocephalic and atraumatic.   Eyes: EOM are normal. Pupils are equal, round, and reactive to light.   Neck: Normal range of motion. Neck supple. No tracheal deviation present.   Cardiovascular: Normal rate, regular rhythm and intact distal  pulses.    Pulmonary/Chest: Effort normal. No respiratory distress. He has rales (much improvement from previous exam).   Abdominal: Soft. Bowel sounds are normal. He exhibits no distension. There is no tenderness.   Genitourinary:   Genitourinary Comments: Indwelling henderson catheter draining clear yellow urine.   Musculoskeletal: Normal range of motion. He exhibits edema (trace BLE).   Neurological: He is alert and oriented to person, place, and time. No cranial nerve deficit.   Skin: Skin is warm and dry.   Psychiatric: He has a normal mood and affect. His behavior is normal.       Significant Labs:   CBC:   Recent Labs  Lab 04/14/18  0840 04/15/18  0557   WBC 17.20* 7.90   HGB 13.4* 11.2*   HCT 40.1 33.3*   * 268     CMP:   Recent Labs  Lab 04/14/18  0840 04/15/18  0326    142   K 5.2* 4.1    106   CO2 23 21*   * 93   BUN 28* 33*   CREATININE 1.5* 1.6*   CALCIUM 9.1 8.6*   PROT 7.5  --    ALBUMIN 3.2*  --    BILITOT 0.4  --    ALKPHOS 86  --    AST 22  --    ALT 15  --    ANIONGAP 10 15   EGFRNONAA 43* 40*     Cardiac Markers:   Recent Labs  Lab 04/15/18  0557   *     Lactic Acid:   Recent Labs  Lab 04/14/18  0840 04/14/18  1234   LACTATE 1.1 0.9     Troponin:   Recent Labs  Lab 04/14/18  1805 04/15/18  0557 04/15/18  0746   TROPONINI 0.169* 0.340* 0.381*       Significant Imaging:   X-Ray Chest AP Portable [[id number]] Resulted: 04/15/18 0840   Order Status: Completed Updated: 04/15/18 0842   Narrative:     EXAMINATION:  XR CHEST AP PORTABLE    CLINICAL HISTORY:  pneumonia; vent;    TECHNIQUE:  Single frontal view of the chest was performed.    COMPARISON:  4/14/2018    FINDINGS:  Lung infiltrates have decreased.  Cardiomediastinal silhouette is stable.  There is no pleural effusion.  NG tube and endotracheal tube remain in place   Impression:       Decrease of the lung infiltrates compared to the prior exam

## 2018-04-15 NOTE — PROGRESS NOTES
Notified Dr. Barriga of troponin. Informed him patient extubated this AM, doing well, repeat . Instructed to stop checking troponin's and he will see patient later today.     Results for BEN MADSEN JR. (MRN [id number]) as of 4/15/2018 09:44   Ref. Range 4/15/2018 07:46   Troponin I Latest Ref Range: 0.000 - 0.026 ng/mL 0.381 (H)

## 2018-04-15 NOTE — ASSESSMENT & PLAN NOTE
Currently treated with plavix and ASA; due to high risk for intracranial bleed, anticoagulation was recently discontinued.  2D ECHO planned for tomorrow.

## 2018-04-16 ENCOUNTER — TELEPHONE (OUTPATIENT)
Dept: UROLOGY | Facility: CLINIC | Age: 83
End: 2018-04-16

## 2018-04-16 LAB
ANION GAP SERPL CALC-SCNC: 7 MMOL/L
BACTERIA SPEC AEROBE CULT: NO GROWTH
BACTERIA SPEC AEROBE CULT: NO GROWTH
BACTERIA UR CULT: NO GROWTH
BASOPHILS # BLD AUTO: 0 K/UL
BASOPHILS NFR BLD: 0.4 %
BUN SERPL-MCNC: 27 MG/DL
CALCIUM SERPL-MCNC: 8.1 MG/DL
CHLORIDE SERPL-SCNC: 107 MMOL/L
CO2 SERPL-SCNC: 28 MMOL/L
CREAT SERPL-MCNC: 1.6 MG/DL
DIFFERENTIAL METHOD: ABNORMAL
EOSINOPHIL # BLD AUTO: 0.2 K/UL
EOSINOPHIL NFR BLD: 3.7 %
ERYTHROCYTE [DISTWIDTH] IN BLOOD BY AUTOMATED COUNT: 14.7 %
EST. GFR  (AFRICAN AMERICAN): 46 ML/MIN/1.73 M^2
EST. GFR  (NON AFRICAN AMERICAN): 40 ML/MIN/1.73 M^2
GLUCOSE SERPL-MCNC: 111 MG/DL
GRAM STN SPEC: NORMAL
HCT VFR BLD AUTO: 30.8 %
HGB BLD-MCNC: 10.1 G/DL
LYMPHOCYTES # BLD AUTO: 1.1 K/UL
LYMPHOCYTES NFR BLD: 17.3 %
MAGNESIUM SERPL-MCNC: 2 MG/DL
MCH RBC QN AUTO: 28.7 PG
MCHC RBC AUTO-ENTMCNC: 32.6 G/DL
MCV RBC AUTO: 88 FL
MONOCYTES # BLD AUTO: 0.8 K/UL
MONOCYTES NFR BLD: 12.5 %
NEUTROPHILS # BLD AUTO: 4.1 K/UL
NEUTROPHILS NFR BLD: 66.1 %
PLATELET # BLD AUTO: 254 K/UL
PMV BLD AUTO: 7.7 FL
POTASSIUM SERPL-SCNC: 3.6 MMOL/L
RBC # BLD AUTO: 3.5 M/UL
SODIUM SERPL-SCNC: 142 MMOL/L
WBC # BLD AUTO: 6.3 K/UL

## 2018-04-16 PROCEDURE — 27000221 HC OXYGEN, UP TO 24 HOURS

## 2018-04-16 PROCEDURE — 63600175 PHARM REV CODE 636 W HCPCS: Performed by: HOSPITALIST

## 2018-04-16 PROCEDURE — 25000003 PHARM REV CODE 250: Performed by: NURSE PRACTITIONER

## 2018-04-16 PROCEDURE — 63600175 PHARM REV CODE 636 W HCPCS: Performed by: NURSE PRACTITIONER

## 2018-04-16 PROCEDURE — 83735 ASSAY OF MAGNESIUM: CPT

## 2018-04-16 PROCEDURE — 36415 COLL VENOUS BLD VENIPUNCTURE: CPT

## 2018-04-16 PROCEDURE — 20000000 HC ICU ROOM

## 2018-04-16 PROCEDURE — 25000242 PHARM REV CODE 250 ALT 637 W/ HCPCS: Performed by: HOSPITALIST

## 2018-04-16 PROCEDURE — 25000003 PHARM REV CODE 250: Performed by: HOSPITALIST

## 2018-04-16 PROCEDURE — 80048 BASIC METABOLIC PNL TOTAL CA: CPT

## 2018-04-16 PROCEDURE — 99233 SBSQ HOSP IP/OBS HIGH 50: CPT | Mod: ,,, | Performed by: INTERNAL MEDICINE

## 2018-04-16 PROCEDURE — 94640 AIRWAY INHALATION TREATMENT: CPT

## 2018-04-16 PROCEDURE — 85025 COMPLETE CBC W/AUTO DIFF WBC: CPT

## 2018-04-16 PROCEDURE — 94761 N-INVAS EAR/PLS OXIMETRY MLT: CPT

## 2018-04-16 RX ORDER — HYDRALAZINE HYDROCHLORIDE 20 MG/ML
10 INJECTION INTRAMUSCULAR; INTRAVENOUS EVERY 8 HOURS PRN
Status: DISCONTINUED | OUTPATIENT
Start: 2018-04-16 | End: 2018-04-19 | Stop reason: HOSPADM

## 2018-04-16 RX ORDER — ACETAMINOPHEN 325 MG/1
650 TABLET ORAL EVERY 4 HOURS PRN
Status: DISCONTINUED | OUTPATIENT
Start: 2018-04-16 | End: 2018-04-19

## 2018-04-16 RX ORDER — AMLODIPINE BESYLATE 5 MG/1
5 TABLET ORAL DAILY
Status: DISCONTINUED | OUTPATIENT
Start: 2018-04-16 | End: 2018-04-19

## 2018-04-16 RX ORDER — CLOPIDOGREL BISULFATE 75 MG/1
75 TABLET ORAL DAILY
Status: DISCONTINUED | OUTPATIENT
Start: 2018-04-16 | End: 2018-04-19 | Stop reason: HOSPADM

## 2018-04-16 RX ORDER — AMOXICILLIN 250 MG
1 CAPSULE ORAL 2 TIMES DAILY
Status: DISCONTINUED | OUTPATIENT
Start: 2018-04-16 | End: 2018-04-19 | Stop reason: HOSPADM

## 2018-04-16 RX ORDER — METOPROLOL TARTRATE 50 MG/1
50 TABLET ORAL 2 TIMES DAILY
Status: DISCONTINUED | OUTPATIENT
Start: 2018-04-16 | End: 2018-04-17

## 2018-04-16 RX ORDER — ACETAMINOPHEN 325 MG/1
650 TABLET ORAL EVERY 8 HOURS PRN
Status: DISCONTINUED | OUTPATIENT
Start: 2018-04-16 | End: 2018-04-19 | Stop reason: HOSPADM

## 2018-04-16 RX ADMIN — STANDARDIZED SENNA CONCENTRATE AND DOCUSATE SODIUM 1 TABLET: 8.6; 5 TABLET, FILM COATED ORAL at 08:04

## 2018-04-16 RX ADMIN — LINEZOLID 600 MG: 600 INJECTION, SOLUTION INTRAVENOUS at 01:04

## 2018-04-16 RX ADMIN — IPRATROPIUM BROMIDE AND ALBUTEROL SULFATE 3 ML: .5; 3 SOLUTION RESPIRATORY (INHALATION) at 12:04

## 2018-04-16 RX ADMIN — ASPIRIN 81 MG: 81 TABLET, COATED ORAL at 08:04

## 2018-04-16 RX ADMIN — AMLODIPINE BESYLATE 5 MG: 5 TABLET ORAL at 08:04

## 2018-04-16 RX ADMIN — ENOXAPARIN SODIUM 40 MG: 100 INJECTION SUBCUTANEOUS at 08:04

## 2018-04-16 RX ADMIN — PIPERACILLIN SODIUM AND TAZOBACTAM SODIUM 4.5 G: 4; .5 INJECTION, POWDER, FOR SOLUTION INTRAVENOUS at 06:04

## 2018-04-16 RX ADMIN — IPRATROPIUM BROMIDE AND ALBUTEROL SULFATE 3 ML: .5; 3 SOLUTION RESPIRATORY (INHALATION) at 07:04

## 2018-04-16 RX ADMIN — LINEZOLID 600 MG: 600 INJECTION, SOLUTION INTRAVENOUS at 11:04

## 2018-04-16 RX ADMIN — LOSARTAN POTASSIUM 100 MG: 25 TABLET, FILM COATED ORAL at 08:04

## 2018-04-16 RX ADMIN — PIPERACILLIN SODIUM AND TAZOBACTAM SODIUM 4.5 G: 4; .5 INJECTION, POWDER, FOR SOLUTION INTRAVENOUS at 02:04

## 2018-04-16 RX ADMIN — CLOPIDOGREL BISULFATE 75 MG: 75 TABLET, FILM COATED ORAL at 08:04

## 2018-04-16 RX ADMIN — LINEZOLID 600 MG: 600 INJECTION, SOLUTION INTRAVENOUS at 12:04

## 2018-04-16 RX ADMIN — HYDRALAZINE HYDROCHLORIDE 10 MG: 20 INJECTION INTRAMUSCULAR; INTRAVENOUS at 06:04

## 2018-04-16 RX ADMIN — PIPERACILLIN SODIUM AND TAZOBACTAM SODIUM 4.5 G: 4; .5 INJECTION, POWDER, FOR SOLUTION INTRAVENOUS at 11:04

## 2018-04-16 RX ADMIN — METOPROLOL TARTRATE 50 MG: 50 TABLET ORAL at 08:04

## 2018-04-16 NOTE — PLAN OF CARE
Problem: Patient Care Overview  Goal: Plan of Care Review  Outcome: Ongoing (interventions implemented as appropriate)  Pt denies pain or SOB. O2 maintained via nasal cannula at 2L. Pt voiding per henderson. Pt had BM at beginning of shift. Hearing aid removed for sleep by pt, placed on bedside table at pt request. Pt asleep most of the night, arouses easily.

## 2018-04-16 NOTE — PLAN OF CARE
04/16/18 0714   Patient Assessment/Suction   Level of Consciousness (AVPU) alert   All Lung Fields Breath Sounds clear   PRE-TX-O2-ETCO2   O2 Device (Oxygen Therapy) nasal cannula   $ Is the patient on Low Flow Oxygen? Yes   Flow (L/min) 2   Oxygen Concentration (%) 28   SpO2 98 %   Pulse Oximetry Type Continuous   $ Pulse Oximetry - Multiple Charge Pulse Oximetry - Multiple   Pulse 75   Resp 16   Aerosol Therapy   $ Aerosol Therapy Charges Aerosol Treatment   Respiratory Treatment Status given   SVN/Inhaler Treatment Route mask   Position During Treatment HOB at 45 degrees   Patient Tolerance good   Post-Treatment   Post-treatment Heart Rate (beats/min) 80   Post-treatment Resp Rate (breaths/min) 16   All Fields Breath Sounds unchanged   Ready to Wean/Extubation Screen   FIO2<=50 (chart decimal) 0.28

## 2018-04-16 NOTE — PROGRESS NOTES
[facility]  Cardiology  Progress Note    Patient Name: Frederick Kong Jr.  MRN: [id number]  Admission Date: 4/14/2018  Hospital Length of Stay: 1 days  Code Status: Prior   Attending Physician: Harsha Dan MD   Primary Care Physician: Dhiraj Dempsey III, MD  Expected Discharge Date:   Principal Problem:Gram-negative pneumonia    Subjective:     Hospital Course: Extubated earlier today.    Interval History: Patient denies any chest pain. Shortness of breath is better.    ROS   Reports cough.  Denies any abdominal pain.  Denies any chest pain.    Objective:     Vital Signs (Most Recent):  Temp: 97.9 °F (36.6 °C) (04/15/18 1920)  Pulse: 92 (04/15/18 1920)  Resp: 16 (04/15/18 1920)  BP: (!) 158/70 (04/15/18 1920)  SpO2: 100 % (04/15/18 1920) Vital Signs (24h Range):  Temp:  [97.9 °F (36.6 °C)-98.6 °F (37 °C)] 97.9 °F (36.6 °C)  Pulse:  [58-92] 92  Resp:  [14-37] 16  SpO2:  [93 %-100 %] 100 %  BP: (129-191)/() 158/70     Weight: 74.7 kg (164 lb 10.9 oz)  Body mass index is 25.79 kg/m².    SpO2: 100 %  O2 Device (Oxygen Therapy): nasal cannula      Intake/Output Summary (Last 24 hours) at 04/15/18 2018  Last data filed at 04/15/18 1749   Gross per 24 hour   Intake           724.02 ml   Output             2000 ml   Net         -1275.98 ml       Lines/Drains/Airways     Drain                 Urethral Catheter 04/07/18 2100 7 days         Urethral Catheter 04/14/18 0900 Straight-tip 16 Fr. 1 day          Peripheral Intravenous Line                 Peripheral IV - Single Lumen 04/15/18 0400 Left Forearm less than 1 day              Scheduled Meds:   albuterol-ipratropium 2.5mg-0.5mg/3mL  3 mL Nebulization Q6H    amLODIPine  5 mg Per OG tube Daily    aspirin  81 mg Oral Daily    clopidogrel  75 mg Per OG tube Daily    enoxaparin  40 mg Subcutaneous Daily    linezolid 600mg/300ml  600 mg Intravenous Q12H    losartan  100 mg Oral Daily    metoprolol tartrate  50 mg Per OG tube BID     piperacillin-tazobactam (ZOSYN) IVPB  4.5 g Intravenous Q8H    senna-docusate 8.6-50 mg  1 tablet Per OG tube BID     Continuous Infusions:  PRN Meds:.acetaminophen, acetaminophen, albuterol sulfate, ondansetron, prochlorperazine, sodium chloride 0.9%      Physical Exam  HEENT: Normocephalic, atraumatic, PERRL, Conjunctiva pink, no scleral icterus. ET tube in place.  CVS: S1S2+, RRR, no murmurs, rubs or gallops, JVP: Normal. Click of AVR+  LUNGS: Crackles positive at the bases.  ABDOMEN: Soft, NT, BS+  EXTREMITIES: No cyanosis, clubbing or edema  NEURO: Intubated and does not obey simple commands     Significant Labs:   ABG:   Recent Labs  Lab 04/14/18  0843 04/14/18  1424 04/15/18  0603   PH 7.214* 7.418 7.493*   PCO2 77.1* 41.3 37.1   HCO3 31.1* 26.6 28.4*   POCSATURATED 100 99 99   BE 3 2 5   , Blood Culture:   Recent Labs  Lab 04/14/18  0945 04/14/18  0955   LABBLOO No Growth to date No Growth to date   , BMP:   Recent Labs  Lab 04/14/18  0840 04/15/18  0326   * 93    142   K 5.2* 4.1    106   CO2 23 21*   BUN 28* 33*   CREATININE 1.5* 1.6*   CALCIUM 9.1 8.6*   MG  --  2.5   , CMP   Recent Labs  Lab 04/14/18  0840 04/15/18  0326    142   K 5.2* 4.1    106   CO2 23 21*   * 93   BUN 28* 33*   CREATININE 1.5* 1.6*   CALCIUM 9.1 8.6*   PROT 7.5  --    ALBUMIN 3.2*  --    BILITOT 0.4  --    ALKPHOS 86  --    AST 22  --    ALT 15  --    ANIONGAP 10 15   ESTGFRAFRICA 49* 46*   EGFRNONAA 43* 40*   , CBC   Recent Labs  Lab 04/14/18  0840 04/15/18  0557   WBC 17.20* 7.90   HGB 13.4* 11.2*   HCT 40.1 33.3*   * 268   , INR   Recent Labs  Lab 04/14/18  0840   INR 1.0    and Troponin   Recent Labs  Lab 04/14/18  1805 04/15/18  0557 04/15/18  0746   TROPONINI 0.169* 0.340* 0.381*       Significant Imaging: X-Ray: Reviewed  Assessment and Plan:     1. Acute hypoxemic hypercapnic respiratory failure. Likely secondary to right lower lobe pneumonia.Improving.  1A. Congestive heart  failure. LV systolic dysfunction during the last admission which was thought to be secondary to hypoxemic cardiomyopathy. Patient given IV Lasix and he put out about 1700 cc thus far.  2. Mildly elevated troponin. Likely secondary to #1/2  3. History of mechanical aortic valve prosthesis. Functioning well per last echocardiogram done in September 2017. Patient not therapeutically anticoagulated and was kept on only aspirin and Plavix reportedly secondary to high risk of bleeding and previous history of intracranial bleeds.  4. History of coronary artery disease status post CABG. Currently with mildly elevated troponin. No dynamic ECG changes.  5. History of atrial flutter. Paroxysmal. Currently in sinus rhythm.  6. Acute on chronic renal failure  7. History of hypertension. Acceptable.    Recommendations:  1. Continue current medical regimen for now.    Marisel Soliman MD  Cardiology  [facility]

## 2018-04-16 NOTE — PLAN OF CARE
Problem: Patient Care Overview  Goal: Plan of Care Review  Outcome: Ongoing (interventions implemented as appropriate)  Care plan reviewed.  Safety maintained.  IV antibiotics continue for treatment of pneumonia.  Patient afebrile.  On specialty bed and repositions self, requires some assist to be pulled up in the bed. [place] [facility] [place] assist.

## 2018-04-16 NOTE — PLAN OF CARE
04/15/18 1920   Patient Assessment/Suction   Level of Consciousness (AVPU) alert   Respiratory Effort Normal;Unlabored   Expansion/Accessory Muscles/Retractions expansion symmetric;no retractions;no use of accessory muscles   All Lung Fields Breath Sounds clear;equal bilaterally   Cough Type nonproductive   PRE-TX-O2-ETCO2   O2 Device (Oxygen Therapy) nasal cannula   Flow (L/min) 2   Oxygen Concentration (%) 28   SpO2 100 %   Pulse Oximetry Type Continuous   Pulse 92   Resp 16   Aerosol Therapy   $ Aerosol Therapy Charges Aerosol Treatment   Respiratory Treatment Status given   SVN/Inhaler Treatment Route mask;with oxygen   Position During Treatment HOB at 45 degrees   Patient Tolerance good   Post-Treatment   Post-treatment Heart Rate (beats/min) 91   Post-treatment Resp Rate (breaths/min) 16   All Fields Breath Sounds unchanged   Ready to Wean/Extubation Screen   FIO2<=50 (chart decimal) 0.28

## 2018-04-16 NOTE — TELEPHONE ENCOUNTER
----- Message from Daniel Bustillo sent at 4/16/2018  8:12 AM CDT -----  Contact: Wife/Cristin  MILLICENT--Cristin called in regarding the attached patient ().  Cristin stated patient is in hospital at [facility] and will not be in for his 8:15am nurse visit to remove catheter today 4/16/18.

## 2018-04-16 NOTE — PROGRESS NOTES
Progress Note  PULMONARY    Admit Date: 4/14/2018 04/16/2018      SUBJECTIVE:     April 16, alert, no c/o or recall of events.      PFSH and Allergies reviewed.    OBJECTIVE:     Vitals (Most recent):  Vitals:    04/16/18 0800   BP: (!) 206/78   Pulse: 91   Resp: (!) 24   Temp:        Vitals (24 hour range):  Temp:  [97.4 °F (36.3 °C)-98.5 °F (36.9 °C)]   Pulse:  [72-97]   Resp:  [16-37]   BP: (129-206)/()   SpO2:  [93 %-100 %]       Intake/Output Summary (Last 24 hours) at 04/16/18 0815  Last data filed at 04/16/18 0446   Gross per 24 hour   Intake             1025 ml   Output             1600 ml   Net             -575 ml          Physical Exam:  The patient's neuro status (alertness,orientation,cognitive function,motor skills,), pharyngeal exam (oral lesions, hygiene, abn dentition,), Neck (jvd,mass,thyroid,nodes in neck and above/below clavicle),RESPIRATORY(symmetry,effort,fremitus,percussion,auscultation),  Cor(rhythm,heart tones including gallops,perfusion,edema)ABD(distention,hepatic&splenomegaly,tenderness,masses), Skin(rash,cyanosis),Psyc(affect,judgement,).  Exam negative except for these pertinent findings:    Alert, conversant, no distress, good bs, symmetric, nl fremitus/percussion, distant cor tones RRR with no murmur  Gallop.    Radiographs reviewed: view by direct vision  Clearing pulm edema  Results for orders placed during the hospital encounter of 04/07/18   X-Ray Chest 1 View    Narrative EXAMINATION:  XR CHEST 1 VIEW    CLINICAL HISTORY:  pneumonia;    TECHNIQUE:  A portable semi upright AP view of the chest was acquired.    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  There is an endotracheal tube present with its tip below the level of the clavicular heads.  A nasogastric tube remains in place.  There is atherosclerotic calcification present within the aortic arch.   There has been no interval detrimental change in the radiographic  appearance of the chest as compared to the previous examination with continued demonstration of patchy segmental airspace opacity/consolidative change in the right lower lung zone..  No pneumothorax.      Impression No significant interval detrimental change in the imaging appearance of the chest when compared with the prior study.      Electronically signed by: Ozzie Simental MD  Date:    04/07/2018  Time:    13:58   ]    Labs       Recent Labs  Lab 04/16/18  0316   WBC 6.30   HGB 10.1*   HCT 30.8*        Recent Labs  Lab 04/16/18  0316      K 3.6      CO2 28   BUN 27*   CREATININE 1.6*   *   CALCIUM 8.1*   MG 2.0   No results for input(s): PH, PCO2, PO2, HCO3 in the last 24 hours.  Microbiology Results (last 7 days)     Procedure Component Value Units Date/Time    Urine culture [[id number]] Collected:  04/14/18 1609    Order Status:  Completed Specimen:  Urine from Urine, Catheterized Updated:  04/16/18 0702     Urine Culture, Routine No growth    Blood Culture #1 [[id number]] Collected:  04/14/18 0955    Order Status:  Completed Specimen:  Blood Updated:  04/15/18 2022     Blood Culture, Routine No Growth to date     Blood Culture, Routine No Growth to date    Blood Culture #2 [[id number]] Collected:  04/14/18 0945    Order Status:  Completed Specimen:  Blood Updated:  04/15/18 2022     Blood Culture, Routine No Growth to date     Blood Culture, Routine No Growth to date    Culture, Respiratory with Gram Stain [[id number]] Collected:  04/14/18 1153    Order Status:  Completed Specimen:  Respiratory from Tracheal Aspirate Updated:  04/15/18 0916     Respiratory Culture No Growth     Gram Stain (Respiratory) <10 epithelial cells per low power field.     Gram Stain (Respiratory) No WBC's     Gram Stain (Respiratory) No organisms seen    Culture, Respiratory with Gram Stain [[id number]] Collected:  04/14/18 0854    Order Status:  Completed Specimen:  Respiratory from BAL, LLL Updated:  04/15/18  0916     Respiratory Culture No Growth     Gram Stain (Respiratory) <10 epithelial cells per low power field.     Gram Stain (Respiratory) No WBC's     Gram Stain (Respiratory) No organisms seen    Culture, Respiratory with Gram Stain [[id number]]     Order Status:  Canceled Specimen:  Respiratory           Impression:  Active Hospital Problems    Diagnosis  POA    *Gram-negative pneumonia [J15.6]  Yes    Hypertensive urgency [I16.0]  Yes    Acute diastolic congestive heart failure [I50.31]  Yes    Hyperkalemia [E87.5]  Yes    Chronic obstructive pulmonary disease with acute lower respiratory infection [J44.0]  Yes    Acute on chronic respiratory failure with hypoxia and hypercapnia [J96.21, J96.22]  Yes    H/O aortic valve replacement [Z95.2]  Not Applicable     ? Mechanical      Sepsis [A41.9]  Yes    Urinary retention [R33.9]  Yes    Chronic renal insufficiency, stage 3 (moderate) [N18.3]  Yes      Resolved Hospital Problems    Diagnosis Date Resolved POA   No resolved problems to display.   Plan:   April 16- echo from 4/7 NA, looks compensated now. Pt presented hyperacute last admit with pneuonia (may not have been primary process?).  Represents similar with intubation again!.  Picture not of pneumonia.   If blood cultures negative- consider decrease abx.  Pt did have need for coude' tip henderson last admit.  resp status seems stable?                                      .

## 2018-04-17 ENCOUNTER — TELEPHONE (OUTPATIENT)
Dept: UROLOGY | Facility: CLINIC | Age: 83
End: 2018-04-17

## 2018-04-17 PROBLEM — R53.81 DEBILITY: Status: ACTIVE | Noted: 2018-04-17

## 2018-04-17 PROBLEM — J96.01 ACUTE RESPIRATORY FAILURE WITH HYPOXEMIA: Status: ACTIVE | Noted: 2018-04-17

## 2018-04-17 LAB
ANION GAP SERPL CALC-SCNC: 7 MMOL/L
BASOPHILS # BLD AUTO: 0 K/UL
BASOPHILS NFR BLD: 0.5 %
BSA FOR ECHO PROCEDURE: 1.89 M2
BUN SERPL-MCNC: 22 MG/DL
CALCIUM SERPL-MCNC: 8.3 MG/DL
CHLORIDE SERPL-SCNC: 107 MMOL/L
CO2 SERPL-SCNC: 28 MMOL/L
CREAT SERPL-MCNC: 1.5 MG/DL
CV ECHO LV RWT: 0.3 CM
DIFFERENTIAL METHOD: ABNORMAL
ECHO EF ESTIMATED: 50 %
ECHO LV POSTERIOR WALL: 0.78 CM (ref 0.6–1.1)
EOSINOPHIL # BLD AUTO: 0.3 K/UL
EOSINOPHIL NFR BLD: 3.6 %
ERYTHROCYTE [DISTWIDTH] IN BLOOD BY AUTOMATED COUNT: 14.6 %
EST. GFR  (AFRICAN AMERICAN): 49 ML/MIN/1.73 M^2
EST. GFR  (NON AFRICAN AMERICAN): 43 ML/MIN/1.73 M^2
FRACTIONAL SHORTENING: 14 % (ref 28–44)
GLUCOSE SERPL-MCNC: 126 MG/DL
HCT VFR BLD AUTO: 31.3 %
HGB BLD-MCNC: 10.4 G/DL
INTERVENTRICULAR SEPTUM: 0.78 CM (ref 0.6–1.1)
LEFT INTERNAL DIMENSION IN SYSTOLE: 4.41 CM (ref 2.1–4)
LEFT VENTRICULAR INTERNAL DIMENSION IN DIASTOLE: 5.15 CM (ref 3.5–6)
LYMPHOCYTES # BLD AUTO: 1 K/UL
LYMPHOCYTES NFR BLD: 12.5 %
MAGNESIUM SERPL-MCNC: 2.1 MG/DL
MCH RBC QN AUTO: 29.2 PG
MCHC RBC AUTO-ENTMCNC: 33.3 G/DL
MCV RBC AUTO: 88 FL
MONOCYTES # BLD AUTO: 0.9 K/UL
MONOCYTES NFR BLD: 11.1 %
NEUTROPHILS # BLD AUTO: 5.9 K/UL
NEUTROPHILS NFR BLD: 72.3 %
PLATELET # BLD AUTO: 277 K/UL
PMV BLD AUTO: 7.4 FL
POTASSIUM SERPL-SCNC: 4 MMOL/L
RBC # BLD AUTO: 3.56 M/UL
SODIUM SERPL-SCNC: 142 MMOL/L
WBC # BLD AUTO: 8.1 K/UL

## 2018-04-17 PROCEDURE — 83735 ASSAY OF MAGNESIUM: CPT

## 2018-04-17 PROCEDURE — 25000003 PHARM REV CODE 250

## 2018-04-17 PROCEDURE — 94640 AIRWAY INHALATION TREATMENT: CPT

## 2018-04-17 PROCEDURE — 63600175 PHARM REV CODE 636 W HCPCS: Performed by: HOSPITALIST

## 2018-04-17 PROCEDURE — 99232 SBSQ HOSP IP/OBS MODERATE 35: CPT | Mod: ,,, | Performed by: INTERNAL MEDICINE

## 2018-04-17 PROCEDURE — 94761 N-INVAS EAR/PLS OXIMETRY MLT: CPT

## 2018-04-17 PROCEDURE — 27000221 HC OXYGEN, UP TO 24 HOURS

## 2018-04-17 PROCEDURE — 25000242 PHARM REV CODE 250 ALT 637 W/ HCPCS: Performed by: HOSPITALIST

## 2018-04-17 PROCEDURE — 20000000 HC ICU ROOM

## 2018-04-17 PROCEDURE — 63600175 PHARM REV CODE 636 W HCPCS: Performed by: NURSE PRACTITIONER

## 2018-04-17 PROCEDURE — 63600175 PHARM REV CODE 636 W HCPCS: Performed by: INTERNAL MEDICINE

## 2018-04-17 PROCEDURE — 85025 COMPLETE CBC W/AUTO DIFF WBC: CPT

## 2018-04-17 PROCEDURE — 36415 COLL VENOUS BLD VENIPUNCTURE: CPT

## 2018-04-17 PROCEDURE — 25000003 PHARM REV CODE 250: Performed by: NURSE PRACTITIONER

## 2018-04-17 PROCEDURE — 25000003 PHARM REV CODE 250: Performed by: HOSPITALIST

## 2018-04-17 PROCEDURE — 80048 BASIC METABOLIC PNL TOTAL CA: CPT

## 2018-04-17 PROCEDURE — 25000003 PHARM REV CODE 250: Performed by: INTERNAL MEDICINE

## 2018-04-17 RX ORDER — FUROSEMIDE 20 MG/1
TABLET ORAL
Status: COMPLETED
Start: 2018-04-17 | End: 2018-04-17

## 2018-04-17 RX ORDER — AMOXICILLIN AND CLAVULANATE POTASSIUM 875; 125 MG/1; MG/1
1 TABLET, FILM COATED ORAL 2 TIMES DAILY
Status: DISCONTINUED | OUTPATIENT
Start: 2018-04-17 | End: 2018-04-17

## 2018-04-17 RX ORDER — FUROSEMIDE 10 MG/ML
10 SOLUTION ORAL EVERY OTHER DAY
Status: DISCONTINUED | OUTPATIENT
Start: 2018-04-19 | End: 2018-04-19 | Stop reason: HOSPADM

## 2018-04-17 RX ORDER — FUROSEMIDE 20 MG/1
10 TABLET ORAL EVERY OTHER DAY
Status: DISCONTINUED | OUTPATIENT
Start: 2018-04-17 | End: 2018-04-17 | Stop reason: SDUPTHER

## 2018-04-17 RX ORDER — MUPIROCIN 20 MG/G
OINTMENT TOPICAL 2 TIMES DAILY
Status: DISCONTINUED | OUTPATIENT
Start: 2018-04-18 | End: 2018-04-19 | Stop reason: HOSPADM

## 2018-04-17 RX ORDER — AMOXICILLIN AND CLAVULANATE POTASSIUM 875; 125 MG/1; MG/1
1 TABLET, FILM COATED ORAL 2 TIMES DAILY
Status: DISCONTINUED | OUTPATIENT
Start: 2018-04-17 | End: 2018-04-19 | Stop reason: HOSPADM

## 2018-04-17 RX ORDER — METOPROLOL SUCCINATE 50 MG/1
100 TABLET, EXTENDED RELEASE ORAL DAILY
Status: DISCONTINUED | OUTPATIENT
Start: 2018-04-17 | End: 2018-04-19 | Stop reason: HOSPADM

## 2018-04-17 RX ADMIN — HYDRALAZINE HYDROCHLORIDE 10 MG: 20 INJECTION INTRAMUSCULAR; INTRAVENOUS at 07:04

## 2018-04-17 RX ADMIN — IPRATROPIUM BROMIDE AND ALBUTEROL SULFATE 3 ML: .5; 3 SOLUTION RESPIRATORY (INHALATION) at 08:04

## 2018-04-17 RX ADMIN — METHYLPREDNISOLONE SODIUM SUCCINATE 40 MG: 40 INJECTION, POWDER, FOR SOLUTION INTRAMUSCULAR; INTRAVENOUS at 09:04

## 2018-04-17 RX ADMIN — IPRATROPIUM BROMIDE AND ALBUTEROL SULFATE 3 ML: .5; 3 SOLUTION RESPIRATORY (INHALATION) at 12:04

## 2018-04-17 RX ADMIN — FUROSEMIDE 20 MG: 20 TABLET ORAL at 10:04

## 2018-04-17 RX ADMIN — IPRATROPIUM BROMIDE AND ALBUTEROL SULFATE 3 ML: .5; 3 SOLUTION RESPIRATORY (INHALATION) at 07:04

## 2018-04-17 RX ADMIN — PIPERACILLIN SODIUM AND TAZOBACTAM SODIUM 4.5 G: 4; .5 INJECTION, POWDER, FOR SOLUTION INTRAVENOUS at 02:04

## 2018-04-17 RX ADMIN — STANDARDIZED SENNA CONCENTRATE AND DOCUSATE SODIUM 1 TABLET: 8.6; 5 TABLET, FILM COATED ORAL at 09:04

## 2018-04-17 RX ADMIN — AMOXICILLIN AND CLAVULANATE POTASSIUM 1 TABLET: 875; 125 TABLET, FILM COATED ORAL at 08:04

## 2018-04-17 RX ADMIN — AMLODIPINE BESYLATE 5 MG: 5 TABLET ORAL at 09:04

## 2018-04-17 RX ADMIN — METOPROLOL SUCCINATE 100 MG: 50 TABLET, EXTENDED RELEASE ORAL at 09:04

## 2018-04-17 RX ADMIN — HYDRALAZINE HYDROCHLORIDE 10 MG: 20 INJECTION INTRAMUSCULAR; INTRAVENOUS at 04:04

## 2018-04-17 RX ADMIN — HYDRALAZINE HYDROCHLORIDE: 20 INJECTION INTRAMUSCULAR; INTRAVENOUS at 01:04

## 2018-04-17 RX ADMIN — STANDARDIZED SENNA CONCENTRATE AND DOCUSATE SODIUM 1 TABLET: 8.6; 5 TABLET, FILM COATED ORAL at 08:04

## 2018-04-17 RX ADMIN — ENOXAPARIN SODIUM 40 MG: 100 INJECTION SUBCUTANEOUS at 09:04

## 2018-04-17 RX ADMIN — CLOPIDOGREL BISULFATE 75 MG: 75 TABLET, FILM COATED ORAL at 09:04

## 2018-04-17 RX ADMIN — LOSARTAN POTASSIUM 100 MG: 25 TABLET, FILM COATED ORAL at 09:04

## 2018-04-17 RX ADMIN — ASPIRIN 81 MG: 81 TABLET, COATED ORAL at 08:04

## 2018-04-17 NOTE — PLAN OF CARE
04/16/18 1917   Patient Assessment/Suction   Level of Consciousness (AVPU) alert   Respiratory Effort Unlabored   All Lung Fields Breath Sounds clear   ANASTASIA Breath Sounds diminished   LLL Breath Sounds diminished   PRE-TX-O2-ETCO2   O2 Device (Oxygen Therapy) nasal cannula w/ humidification   $ Is the patient on Low Flow Oxygen? Yes   Flow (L/min) 2   SpO2 97 %   Pulse Oximetry Type Continuous   $ Pulse Oximetry - Multiple Charge Pulse Oximetry - Multiple   Pulse 89   Resp 16   Aerosol Therapy   $ Aerosol Therapy Charges Aerosol Treatment   Respiratory Treatment Status given   SVN/Inhaler Treatment Route with oxygen;mask   Position During Treatment HOB at 45 degrees   Patient Tolerance good   Post-Treatment   Post-treatment Heart Rate (beats/min) 90   Post-treatment Resp Rate (breaths/min) 17   All Fields Breath Sounds unchanged

## 2018-04-17 NOTE — SUBJECTIVE & OBJECTIVE
Interval History:   Awake,-no resp distress  Or complaints   bp slightly elevated. As is troponin       Review of Systems   Constitutional: Negative for chills and fever.   HENT: Positive for sore throat. Negative for congestion.    Eyes: Negative for photophobia and visual disturbance.   Respiratory: Positive for cough. Negative for chest tightness, shortness of breath and wheezing.    Cardiovascular: Negative for chest pain and palpitations.   Gastrointestinal: Negative for abdominal pain, constipation, diarrhea, nausea and vomiting.   Genitourinary:        Indwelling henderson catheter   Musculoskeletal: Negative for arthralgias and myalgias.   Neurological: Positive for weakness (generalized). Negative for dizziness and light-headedness.   Psychiatric/Behavioral: Negative for agitation and confusion. The patient is not nervous/anxious.      Objective:     Vital Signs (Most Recent):  Temp: 98 °F (36.7 °C) (04/16/18 1600)  Pulse: 88 (04/16/18 2053)  Resp: 16 (04/16/18 1917)  BP: (!) 180/74 (04/16/18 2053)  SpO2: 97 % (04/16/18 1917) Vital Signs (24h Range):  Temp:  [97.4 °F (36.3 °C)-98.2 °F (36.8 °C)] 98 °F (36.7 °C)  Pulse:  [72-91] 88  Resp:  [16-62] 16  SpO2:  [95 %-99 %] 97 %  BP: (146-206)/(64-89) 180/74     Weight: 75.3 kg (166 lb)  Body mass index is 26 kg/m².    Intake/Output Summary (Last 24 hours) at 04/16/18 2158  Last data filed at 04/16/18 1824   Gross per 24 hour   Intake             1860 ml   Output             1500 ml   Net              360 ml      Physical Exam   Constitutional: He is oriented to person, place, and time. He appears well-developed and well-nourished.   HENT:   Head: Normocephalic and atraumatic.   Eyes: EOM are normal. Pupils are equal, round, and reactive to light.   Neck: Normal range of motion. Neck supple. No tracheal deviation present.   Cardiovascular: Normal rate, regular rhythm and intact distal pulses.    Pulmonary/Chest: Effort normal. No respiratory distress. He has rales  (much improvement from previous exam).   Abdominal: Soft. Bowel sounds are normal. He exhibits no distension. There is no tenderness.   Genitourinary:   Genitourinary Comments: Indwelling henderson catheter draining clear yellow urine.   Musculoskeletal: Normal range of motion. He exhibits edema (trace BLE).   Neurological: He is alert and oriented to person, place, and time. No cranial nerve deficit.   Skin: Skin is warm and dry.   Psychiatric: He has a normal mood and affect. His behavior is normal.       Significant Labs:   BMP:   Recent Labs  Lab 04/16/18  0316   *      K 3.6      CO2 28   BUN 27*   CREATININE 1.6*   CALCIUM 8.1*   MG 2.0     CBC:   Recent Labs  Lab 04/15/18  0557 04/16/18  0316   WBC 7.90 6.30   HGB 11.2* 10.1*   HCT 33.3* 30.8*    254       Significant Imaging: cxr-   Stable radiographic appearance to the chest when compared to the study from 1 day prior.  Predominantly interstitial opacities are seen bilaterally favoring CHF over pneumonia.  Probable background of pulmonary emphysema.

## 2018-04-17 NOTE — TELEPHONE ENCOUNTER
----- Message from Tosin Garcia sent at 4/17/2018 10:58 AM CDT -----  Contact: Ira from [facility]  Ira is calling in for a consult.

## 2018-04-17 NOTE — PLAN OF CARE
Problem: Patient Care Overview  Goal: Plan of Care Review  Outcome: Ongoing (interventions implemented as appropriate)  Awake and alert.  Respirations unlabored.  Audible wheezes this am.  O2 2L NC, sats maintaining in the upper 90's.   BP elevated.  Home dose of Toprol XL resumed.  Hydralazine IV prn given.  Good appetite.  Leslie intact with clear yellow urine.  Safety maintained.

## 2018-04-17 NOTE — ASSESSMENT & PLAN NOTE
BUN 28/Creat 1.5  Slight increase today BUN 33/creat 1.6 after receiving IV lasix  Continue to monitor renal function   CKD (chronic kidney disease) stage 3, GFR 30-59 ml/min renal dose all meds; no nephrotoxic agents

## 2018-04-17 NOTE — PLAN OF CARE
Problem: Patient Care Overview  Goal: Plan of Care Review  Outcome: Ongoing (interventions implemented as appropriate)  Oxygen saturation 97-99% on 2L via NC with RR mid-20's. Lungs coarse crackles across all fields and diminished in bases bilaterally. 655ml UOP this shift. PRN hydralazine given x1 for /80 at 04:11am. Patient exhibiting some confusion and forgetfulness throughout shift. Afebrile. VSS. NAD. POC discussed with patient and spouse. No needs expressed at this time.

## 2018-04-17 NOTE — ASSESSMENT & PLAN NOTE
Frederick oKng Jr. is a 82 y.o. male who presents to the Emergency Department and was admitted to ICU.      This patient does have evidence of infective focus  My overall impression is sepsis.  Antibiotics given-   Antibiotics     Start     Stop Route Frequency Ordered    04/15/18 1230  linezolid 600mg/300ml IVPB 600 mg      -- IV Every 12 hours (non-standard times) 04/15/18 1124    04/14/18 1200  piperacillin-tazobactam 4.5 g in dextrose 5 % 100 mL IVPB (ready to mix system)      -- IV Every 8 hours (non-standard times) 04/14/18 1154          Severe Sepsis only-  Latest labs reviewed, they are-  No results for input(s): CBC, BILITOT in the last 24 hours.    Invalid input(s): CREATININE.1  No results for input(s): LACTATE in the last 24 hours.    Recent Labs  Lab 04/15/18  0603   PH 7.493*   PO2 134*   PCO2 37.1   HCO3 28.4*   BE 5       Organ dysfunction indicated by acute respiratory failure requiring intubation-is now extubated.    Shock with decreased perfusion noted, Fluid challenge was not given due to pulmonary edema.    Obtain Blood cultures x 2, sputum culture and urine nifsppw-altoay-7/16 ALL NGTD

## 2018-04-17 NOTE — NURSING
OOB to chair for supper.  Slight [place]FIELD.  O2 sats maintaining in the upper 90's.   Ate 75% of meal.   Spoke with Dr. Gutierrez to update on vitals.  States will review and place orders for transfer to floor soon.

## 2018-04-17 NOTE — ASSESSMENT & PLAN NOTE
BNP elevated 1242; this morning 269 after receiving IV lasix yesterday for diuresis.  Presented with acute respiratory failure; intubated-currently extubated without complications.  Continue to monitor respiratory status   echocardiogram -to be compared to last admit-pending

## 2018-04-17 NOTE — PROGRESS NOTES
[facility]  Cardiology  Progress Note    Patient Name: Frederick Kong Jr.  MRN: [id number]  Admission Date: 4/14/2018  Hospital Length of Stay: 3 days  Code Status: Prior   Attending Physician: Cheryl Gutierrez MD   Primary Care Physician: Dhiraj Dempsey III, MD  Expected Discharge Date:   Principal Problem:Gram-negative pneumonia    Subjective:     Interval History: Patient denies any chest pain. Shortness of breath is much better. Cough is better. Blood pressure not well controlled.    ROS   Reports cough.  Denies any abdominal pain.  Denies any chest pain.  Objective:     Vital Signs (Most Recent):  Temp: 98.5 °F (36.9 °C) (04/17/18 0400)  Pulse: 86 (04/17/18 0825)  Resp: (!) 23 (04/17/18 0825)  BP: (!) 173/75 (04/17/18 0600)  SpO2: 98 % (04/17/18 0825) Vital Signs (24h Range):  Temp:  [98 °F (36.7 °C)-98.5 °F (36.9 °C)] 98.5 °F (36.9 °C)  Pulse:  [75-93] 86  Resp:  [16-62] 23  SpO2:  [94 %-99 %] 98 %  BP: (146-204)/(64-89) 173/75     Weight: 73.6 kg (162 lb 4.1 oz)  Body mass index is 25.41 kg/m².    SpO2: 98 %  O2 Device (Oxygen Therapy): nasal cannula      Intake/Output Summary (Last 24 hours) at 04/17/18 0859  Last data filed at 04/17/18 0600   Gross per 24 hour   Intake             2160 ml   Output             1255 ml   Net              905 ml       Lines/Drains/Airways     Drain                 Urethral Catheter 04/07/18 2100 9 days         Urethral Catheter 04/14/18 0900 Straight-tip 16 Fr. 2 days          Peripheral Intravenous Line                 Peripheral IV - Single Lumen 04/15/18 0400 Left Forearm 2 days              Scheduled Meds:   albuterol-ipratropium 2.5mg-0.5mg/3mL  3 mL Nebulization Q6H    amLODIPine  5 mg Oral Daily    aspirin  81 mg Oral Daily    clopidogrel  75 mg Oral Daily    enoxaparin  40 mg Subcutaneous Daily    linezolid 600mg/300ml  600 mg Intravenous Q12H    losartan  100 mg Oral Daily    methylPREDNISolone sodium succinate  40 mg Intravenous Daily     metoprolol tartrate  50 mg Oral BID    piperacillin-tazobactam (ZOSYN) IVPB  4.5 g Intravenous Q8H    senna-docusate 8.6-50 mg  1 tablet Oral BID     Continuous Infusions:  PRN Meds:.acetaminophen, acetaminophen, albuterol sulfate, hydrALAZINE, ondansetron, prochlorperazine, sodium chloride 0.9%    Physical Exam  HEENT: Normocephalic, atraumatic, PERRL, Conjunctiva pink, no scleral icterus. ET tube in place.  CVS: S1S2+, RRR, no murmurs, rubs or gallops, JVP: Normal. Click of AVR+  LUNGS: Crackles positive at the bases.  ABDOMEN: Soft, NT, BS+  EXTREMITIES: No cyanosis, clubbing or edema  NEURO: Intubated and does not obey simple commands     Significant Labs: Reviewed.      Assessment and Plan:     1. Acute hypoxemic hypercapnic respiratory failure. Likely secondary to right lower lobe pneumonia.Improving.  1A. Congestive heart failure. LV systolic dysfunction during the last admission which was thought to be secondary to hypoxemic cardiomyopathy. Improved LVEF on limited echocardiogram.  2. Mildly elevated troponin. Likely secondary to #1/2. Currently with no ischemic symptoms.  3. History of mechanical aortic valve prosthesis. Functioning well per last echocardiogram done in September 2017. Patient not therapeutically anticoagulated and was kept on only aspirin and Plavix reportedly secondary to high risk of bleeding and previous history of intracranial bleeds.  4. History of coronary artery disease status post CABG. Currently with mildly elevated troponin. No dynamic ECG changes.  5. History of atrial flutter. Paroxysmal. Currently in sinus rhythm.  6. Acute on chronic renal failure  7. History of hypertension. Not well controlled.     Recommendations:  1. Discontinue Lopressor and resume Toprol- mg by mouth daily which was his home medication.  2. Continue current medical regimen otherwise including aspirin and Plavix.    Marisel Soliman MD  Cardiology  [facility]

## 2018-04-17 NOTE — ASSESSMENT & PLAN NOTE
Suspected.-aspiration   Continue IV abx  Consult pharmacy for vanc dosing  Currently intubated-bronchodilators ordered  Obtain sputum culture-following  Procalcitonin --minimal elevation--dc ivabx.

## 2018-04-17 NOTE — ASSESSMENT & PLAN NOTE
Onset during past admission  Continue indwelling henderson catheter-[facility] urology fu today-Dr Jerry who had great difficulty placing henderson catheter a few days ago -reconsult

## 2018-04-17 NOTE — PLAN OF CARE
04/17/18 0825   Patient Assessment/Suction   Level of Consciousness (AVPU) alert   Respiratory Effort Unlabored   Expansion/Accessory Muscles/Retractions accessory muscle use   All Lung Fields Breath Sounds clear;diminished   RML Breath Sounds crackles fine   RLL Breath Sounds crackles fine   PRE-TX-O2-ETCO2   O2 Device (Oxygen Therapy) nasal cannula   $ Is the patient on Low Flow Oxygen? Yes   Flow (L/min) 2   Oxygen Concentration (%) 28   SpO2 98 %   Pulse Oximetry Type Continuous   $ Pulse Oximetry - Multiple Charge Pulse Oximetry - Multiple   Pulse 86   Resp (!) 23   Aerosol Therapy   $ Aerosol Therapy Charges Aerosol Treatment   Respiratory Treatment Status given   SVN/Inhaler Treatment Route mask   Position During Treatment HOB at 45 degrees   Patient Tolerance good   Post-Treatment   Post-treatment Heart Rate (beats/min) 92   Post-treatment Resp Rate (breaths/min) 20   All Fields Breath Sounds unchanged   Ready to Wean/Extubation Screen   FIO2<=50 (chart decimal) 0.28

## 2018-04-17 NOTE — ASSESSMENT & PLAN NOTE
Chronic respiratory failure requiring home oxygen presented to the ED in acute failure requiring intubation.   Likely due to pulmonary edema.-improved and extubated post diureses   Improved on CXR    Continue supplemental O2 as indicated./nebs/increased activity

## 2018-04-17 NOTE — PROGRESS NOTES
Progress Note  PULMONARY    Admit Date: 4/14/2018 04/17/2018      SUBJECTIVE:     April 16, alert, no c/o or recall of events.  April 17, alert, no c/o.      PFSH and Allergies reviewed.    OBJECTIVE:     Vitals (Most recent):  Vitals:    04/17/18 0600   BP: (!) 173/75   Pulse: 84   Resp: 18   Temp:        Vitals (24 hour range):  Temp:  [98 °F (36.7 °C)-98.5 °F (36.9 °C)]   Pulse:  [75-93]   Resp:  [16-62]   BP: (146-206)/(64-89)   SpO2:  [94 %-99 %]       Intake/Output Summary (Last 24 hours) at 04/17/18 0756  Last data filed at 04/17/18 0600   Gross per 24 hour   Intake             2160 ml   Output             1355 ml   Net              805 ml          Physical Exam:  The patient's neuro status (alertness,orientation,cognitive function,motor skills,), pharyngeal exam (oral lesions, hygiene, abn dentition,), Neck (jvd,mass,thyroid,nodes in neck and above/below clavicle),RESPIRATORY(symmetry,effort,fremitus,percussion,auscultation),  Cor(rhythm,heart tones including gallops,perfusion,edema)ABD(distention,hepatic&splenomegaly,tenderness,masses), Skin(rash,cyanosis),Psyc(affect,judgement,).  Exam negative except for these pertinent findings:    Alert, conversant, no distress, good bs with faint diffuse wheezes, symmetric, nl fremitus/percussion, distant cor tones RRR with no murmur  Gallop.    Radiographs reviewed: view by direct vision  Clearing pulm edema- mild ild/edema 4/17  Results for orders placed during the hospital encounter of 04/07/18   X-Ray Chest 1 View    Narrative EXAMINATION:  XR CHEST 1 VIEW    CLINICAL HISTORY:  pneumonia;    TECHNIQUE:  A portable semi upright AP view of the chest was acquired.    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  There is an endotracheal tube present with its tip below the level of the clavicular heads.  A nasogastric tube remains in place.  There is atherosclerotic calcification present within the aortic arch.    There has been no interval detrimental change in the radiographic appearance of the chest as compared to the previous examination with continued demonstration of patchy segmental airspace opacity/consolidative change in the right lower lung zone..  No pneumothorax.      Impression No significant interval detrimental change in the imaging appearance of the chest when compared with the prior study.      Electronically signed by: Ozzie Simental MD  Date:    04/07/2018  Time:    13:58   ]    Labs       Recent Labs  Lab 04/17/18  0358   WBC 8.10   HGB 10.4*   HCT 31.3*          Recent Labs  Lab 04/17/18  0358      K 4.0      CO2 28   BUN 22   CREATININE 1.5*   *   CALCIUM 8.3*   MG 2.1   No results for input(s): PH, PCO2, PO2, HCO3 in the last 24 hours.  Microbiology Results (last 7 days)     Procedure Component Value Units Date/Time    Blood Culture #1 [[id number]] Collected:  04/14/18 0955    Order Status:  Completed Specimen:  Blood Updated:  04/16/18 2022     Blood Culture, Routine No Growth to date     Blood Culture, Routine No Growth to date     Blood Culture, Routine No Growth to date    Blood Culture #2 [[id number]] Collected:  04/14/18 0945    Order Status:  Completed Specimen:  Blood Updated:  04/16/18 2022     Blood Culture, Routine No Growth to date     Blood Culture, Routine No Growth to date     Blood Culture, Routine No Growth to date    Culture, Respiratory with Gram Stain [[id number]] Collected:  04/14/18 0854    Order Status:  Completed Specimen:  Respiratory from BAL, LLL Updated:  04/16/18 0939     Respiratory Culture No growth     Gram Stain (Respiratory) <10 epithelial cells per low power field.     Gram Stain (Respiratory) No WBC's     Gram Stain (Respiratory) No organisms seen    Culture, Respiratory with Gram Stain [[id number]] Collected:  04/14/18 1153    Order Status:  Completed Specimen:  Respiratory from Tracheal Aspirate Updated:  04/16/18 0939     Respiratory Culture No  growth     Gram Stain (Respiratory) <10 epithelial cells per low power field.     Gram Stain (Respiratory) No WBC's     Gram Stain (Respiratory) No organisms seen    Urine culture [[id number]] Collected:  04/14/18 1609    Order Status:  Completed Specimen:  Urine from Urine, Catheterized Updated:  04/16/18 0702     Urine Culture, Routine No growth    Culture, Respiratory with Gram Stain [[id number]]     Order Status:  Canceled Specimen:  Respiratory           Impression:  Active Hospital Problems    Diagnosis  POA    *Gram-negative pneumonia [J15.6]  Yes    Hypertensive urgency [I16.0]  Yes    Acute diastolic congestive heart failure [I50.31]  Yes    Hyperkalemia [E87.5]  Yes    Chronic obstructive pulmonary disease with acute lower respiratory infection [J44.0]  Yes    Urethral stricture, unspecified [N35.9]  Yes    Acute on chronic respiratory failure with hypoxia and hypercapnia [J96.21, J96.22]  Yes    H/O aortic valve replacement [Z95.2]  Not Applicable     ? Mechanical      Sepsis [A41.9]  Yes    Urinary retention [R33.9]  Yes    Chronic renal insufficiency, stage 3 (moderate) [N18.3]  Yes      Resolved Hospital Problems    Diagnosis Date Resolved POA   No resolved problems to display.   Plan:   April 16- echo from 4/7 NA, looks compensated now. Pt presented hyperacute last admit with pneuonia (may not have been primary process?).  Represents similar with intubation again!.  Picture not of pneumonia.   If blood cultures negative- consider decrease abx.  Pt did have need for coude' tip henderson last admit.  resp status seems stable?    April 17, echos from 4/7 and 4/16 pending, diffuse wheezes noted - will dose solumedrol.  bp runs high off and on.  Will do rosario if echo reasonable.  resp status hard to eval - need good handle on cardiac reserves                                  .

## 2018-04-17 NOTE — PROGRESS NOTES
[facility] Hospital Medicine  Progress Note    Patient Name: Frederick Kong Jr.  MRN: [id number]  Patient Class: IP- Inpatient   Admission Date: 4/14/2018  Length of Stay: 2 days  Attending Physician: Cheryl Gutierrez MD  Primary Care Provider: Dhiraj Dempsey III, MD        Subjective:     Principal Problem:Gram-negative pneumonia    HPI:  Frederick Kong Jr. is a 82 y.o. male with a history of HTN, CAD, Ca, and HLD who presents to the ED via EMS with an onset of respiratory distress which began last night, worsening this morning.  Mr. Kong did require intubation in the emergency department.  His wife accompanies him and provides some history.  He has hx of stent placement at the time of his aortic valve replacement in 2001 and was on coumadin for many years however has been changed to asa and plavix within the last year due to frequent falls. His cardiologist is Dr Gonzalez. He has never had an MI or heart failure. He has a remote history of smoking and is otherwise alert and active. He was recently admitted to this facility and treated for severe sepsis 2/2 bilateral aspiration pneumonia and was discharged home 2 days ago with a prescription for augmentin to further treat the pneumonia.  He began to have shortness of breath last night, and this escalated this morning.  Denies incidence of fever, confusion, N/V or diarrhea.  Mr. Kong is admitted to the service of hospital medicine for further medical management.      Hospital Course:  No notes on file    Interval History:   Awake,-no resp distress  Or complaints   bp slightly elevated. As is troponin       Review of Systems   Constitutional: Negative for chills and fever.   HENT: Positive for sore throat. Negative for congestion.    Eyes: Negative for photophobia and visual disturbance.   Respiratory: Positive for cough. Negative for chest tightness, shortness of breath and wheezing.    Cardiovascular: Negative for chest pain and palpitations.    Gastrointestinal: Negative for abdominal pain, constipation, diarrhea, nausea and vomiting.   Genitourinary:        Indwelling henderson catheter   Musculoskeletal: Negative for arthralgias and myalgias.   Neurological: Positive for weakness (generalized). Negative for dizziness and light-headedness.   Psychiatric/Behavioral: Negative for agitation and confusion. The patient is not nervous/anxious.      Objective:     Vital Signs (Most Recent):  Temp: 98 °F (36.7 °C) (04/16/18 1600)  Pulse: 88 (04/16/18 2053)  Resp: 16 (04/16/18 1917)  BP: (!) 180/74 (04/16/18 2053)  SpO2: 97 % (04/16/18 1917) Vital Signs (24h Range):  Temp:  [97.4 °F (36.3 °C)-98.2 °F (36.8 °C)] 98 °F (36.7 °C)  Pulse:  [72-91] 88  Resp:  [16-62] 16  SpO2:  [95 %-99 %] 97 %  BP: (146-206)/(64-89) 180/74     Weight: 75.3 kg (166 lb)  Body mass index is 26 kg/m².    Intake/Output Summary (Last 24 hours) at 04/16/18 2158  Last data filed at 04/16/18 1824   Gross per 24 hour   Intake             1860 ml   Output             1500 ml   Net              360 ml      Physical Exam   Constitutional: He is oriented to person, place, and time. He appears well-developed and well-nourished.   HENT:   Head: Normocephalic and atraumatic.   Eyes: EOM are normal. Pupils are equal, round, and reactive to light.   Neck: Normal range of motion. Neck supple. No tracheal deviation present.   Cardiovascular: Normal rate, regular rhythm and intact distal pulses.    Pulmonary/Chest: Effort normal. No respiratory distress. He has rales (much improvement from previous exam).   Abdominal: Soft. Bowel sounds are normal. He exhibits no distension. There is no tenderness.   Genitourinary:   Genitourinary Comments: Indwelling henderson catheter draining clear yellow urine.   Musculoskeletal: Normal range of motion. He exhibits edema (trace BLE).   Neurological: He is alert and oriented to person, place, and time. No cranial nerve deficit.   Skin: Skin is warm and dry.   Psychiatric: He  has a normal mood and affect. His behavior is normal.       Significant Labs:   BMP:   Recent Labs  Lab 04/16/18  0316   *      K 3.6      CO2 28   BUN 27*   CREATININE 1.6*   CALCIUM 8.1*   MG 2.0     CBC:   Recent Labs  Lab 04/15/18  0557 04/16/18  0316   WBC 7.90 6.30   HGB 11.2* 10.1*   HCT 33.3* 30.8*    254       Significant Imaging: cxr-   Stable radiographic appearance to the chest when compared to the study from 1 day prior.  Predominantly interstitial opacities are seen bilaterally favoring CHF over pneumonia.  Probable background of pulmonary emphysema.    Assessment/Plan:      * Gram-negative pneumonia    Suspected.-aspiration   Continue IV abx  Consult pharmacy for vanc dosing  Currently intubated-bronchodilators ordered  Obtain sputum culture-following  Procalcitonin --minimal elevation--dc ivabx.         Hyperkalemia    K+ 5.2-4.1 today-reosoved --to 3.6  Received IV lasix  Will monitor level.  Monitor telemetry          Acute diastolic congestive heart failure    BNP elevated 1242; this morning 269 after receiving IV lasix yesterday for diuresis.  Presented with acute respiratory failure; intubated-currently extubated without complications.  Continue to monitor respiratory status   echocardiogram -to be compared to last admit-pending         Hypertensive urgency    Presenting bp 247/108  BNP elevated; troponin at baseline  + pulmonary edema-treated with lasix  Mildly hypertensive at this time; home meds have been resumed.  Continue to monitor telemetry  Monitor clinically.          Chronic obstructive pulmonary disease with acute lower respiratory infection    Presented in acute respiratory failure requiring intubation-extubated at this time on supplemental O2 via NC.  Pneumonia identified; continue IV vancomycin, IV azithromycin, and IV zosyn which were initiated in ED-continue and follow cultures.  Bronchodilators  Consult pulmonary for vent management.--extubated            Urethral stricture, unspecified      See above         Urinary retention    Onset during past admission  Continue indwelling henderson catheter-[facility] urology fu today-Dr Jerry who had great difficulty placing henderson catheter a few days ago -reconsult           H/O aortic valve replacement    Currently treated with plavix and ASA; due to high risk for intracranial bleed, anticoagulation was recently discontinued.  2D ECHO planned for tomorrow.          Sepsis    Frederick Kong Jr. is a 82 y.o. male who presents to the Emergency Department and was admitted to ICU.      This patient does have evidence of infective focus  My overall impression is sepsis.  Antibiotics given-   Antibiotics     Start     Stop Route Frequency Ordered    04/15/18 1230  linezolid 600mg/300ml IVPB 600 mg      -- IV Every 12 hours (non-standard times) 04/15/18 1124    04/14/18 1200  piperacillin-tazobactam 4.5 g in dextrose 5 % 100 mL IVPB (ready to mix system)      -- IV Every 8 hours (non-standard times) 04/14/18 1154          Severe Sepsis only-  Latest labs reviewed, they are-  No results for input(s): CBC, BILITOT in the last 24 hours.    Invalid input(s): CREATININE.1  No results for input(s): LACTATE in the last 24 hours.    Recent Labs  Lab 04/15/18  0603   PH 7.493*   PO2 134*   PCO2 37.1   HCO3 28.4*   BE 5       Organ dysfunction indicated by acute respiratory failure requiring intubation-is now extubated.    Shock with decreased perfusion noted, Fluid challenge was not given due to pulmonary edema.    Obtain Blood cultures x 2, sputum culture and urine [id number]/16 ALL NGTD        Chronic renal insufficiency, stage 3 (moderate)    BUN 28/Creat 1.5  Slight increase today BUN 33/creat 1.6 after receiving IV lasix  Continue to monitor renal function   CKD (chronic kidney disease) stage 3, GFR 30-59 ml/min renal dose all meds; no nephrotoxic agents               Acute on chronic respiratory failure with hypoxia and hypercapnia     Chronic respiratory failure requiring home oxygen presented to the ED in acute failure requiring intubation.   Likely due to pulmonary edema.-improved and extubated post diureses   Improved on CXR    Continue supplemental O2 as indicated./nebs/increased activity           VTE Risk Mitigation         Ordered     enoxaparin injection 40 mg  Daily     Route:  Subcutaneous        04/14/18 1133     Place CJ hose  Until discontinued      04/14/18 1133     Place sequential compression device  Until discontinued      04/14/18 1133     IP VTE HIGH RISK PATIENT  Once      04/14/18 1133        Discussed with pt /wife   Critical care time spent on the evaluation and treatment of severe organ dysfunction, review of pertinent labs and imaging studies, discussions with consulting providers and discussions with patient/family: 35  minutes.    Cheryl Gutierrez MD  Department of Hospital Medicine   [facility]

## 2018-04-17 NOTE — ASSESSMENT & PLAN NOTE
Presented in acute respiratory failure requiring intubation-extubated at this time on supplemental O2 via NC.  Pneumonia identified; continue IV vancomycin, IV azithromycin, and IV zosyn which were initiated in ED-continue and follow cultures.  Bronchodilators  Consult pulmonary for vent management.--extubated

## 2018-04-18 PROBLEM — E87.5 HYPERKALEMIA: Status: RESOLVED | Noted: 2018-04-14 | Resolved: 2018-04-18

## 2018-04-18 PROBLEM — A41.9 SEPSIS: Status: RESOLVED | Noted: 2018-04-07 | Resolved: 2018-04-18

## 2018-04-18 LAB
ANION GAP SERPL CALC-SCNC: 8 MMOL/L
BASOPHILS # BLD AUTO: 0 K/UL
BASOPHILS NFR BLD: 0.4 %
BUN SERPL-MCNC: 29 MG/DL
CALCIUM SERPL-MCNC: 8.6 MG/DL
CHLORIDE SERPL-SCNC: 107 MMOL/L
CO2 SERPL-SCNC: 25 MMOL/L
CREAT SERPL-MCNC: 1.2 MG/DL
DIFFERENTIAL METHOD: ABNORMAL
EOSINOPHIL # BLD AUTO: 0.2 K/UL
EOSINOPHIL NFR BLD: 2.9 %
ERYTHROCYTE [DISTWIDTH] IN BLOOD BY AUTOMATED COUNT: 14.7 %
EST. GFR  (AFRICAN AMERICAN): >60 ML/MIN/1.73 M^2
EST. GFR  (NON AFRICAN AMERICAN): 56 ML/MIN/1.73 M^2
GLUCOSE SERPL-MCNC: 99 MG/DL
HCT VFR BLD AUTO: 30.7 %
HGB BLD-MCNC: 10.4 G/DL
LYMPHOCYTES # BLD AUTO: 1.2 K/UL
LYMPHOCYTES NFR BLD: 17 %
MAGNESIUM SERPL-MCNC: 2.1 MG/DL
MCH RBC QN AUTO: 29.5 PG
MCHC RBC AUTO-ENTMCNC: 33.9 G/DL
MCV RBC AUTO: 87 FL
MONOCYTES # BLD AUTO: 0.6 K/UL
MONOCYTES NFR BLD: 9.3 %
NEUTROPHILS # BLD AUTO: 4.9 K/UL
NEUTROPHILS NFR BLD: 70.4 %
PLATELET # BLD AUTO: 291 K/UL
PMV BLD AUTO: 8 FL
POTASSIUM SERPL-SCNC: 3.9 MMOL/L
RBC # BLD AUTO: 3.52 M/UL
SODIUM SERPL-SCNC: 140 MMOL/L
WBC # BLD AUTO: 7 K/UL

## 2018-04-18 PROCEDURE — 94640 AIRWAY INHALATION TREATMENT: CPT

## 2018-04-18 PROCEDURE — 94799 UNLISTED PULMONARY SVC/PX: CPT

## 2018-04-18 PROCEDURE — 25000003 PHARM REV CODE 250: Performed by: HOSPITALIST

## 2018-04-18 PROCEDURE — 97162 PT EVAL MOD COMPLEX 30 MIN: CPT

## 2018-04-18 PROCEDURE — 80048 BASIC METABOLIC PNL TOTAL CA: CPT

## 2018-04-18 PROCEDURE — 85025 COMPLETE CBC W/AUTO DIFF WBC: CPT

## 2018-04-18 PROCEDURE — 99232 SBSQ HOSP IP/OBS MODERATE 35: CPT | Mod: ,,, | Performed by: INTERNAL MEDICINE

## 2018-04-18 PROCEDURE — 63600175 PHARM REV CODE 636 W HCPCS: Performed by: NURSE PRACTITIONER

## 2018-04-18 PROCEDURE — 25000242 PHARM REV CODE 250 ALT 637 W/ HCPCS: Performed by: HOSPITALIST

## 2018-04-18 PROCEDURE — G8988 SELF CARE GOAL STATUS: HCPCS | Mod: CI

## 2018-04-18 PROCEDURE — 27000221 HC OXYGEN, UP TO 24 HOURS

## 2018-04-18 PROCEDURE — 94761 N-INVAS EAR/PLS OXIMETRY MLT: CPT

## 2018-04-18 PROCEDURE — 97530 THERAPEUTIC ACTIVITIES: CPT

## 2018-04-18 PROCEDURE — 63600175 PHARM REV CODE 636 W HCPCS: Performed by: INTERNAL MEDICINE

## 2018-04-18 PROCEDURE — G8987 SELF CARE CURRENT STATUS: HCPCS | Mod: CJ

## 2018-04-18 PROCEDURE — 25000003 PHARM REV CODE 250: Performed by: NURSE PRACTITIONER

## 2018-04-18 PROCEDURE — 99223 1ST HOSP IP/OBS HIGH 75: CPT | Mod: ,,, | Performed by: UROLOGY

## 2018-04-18 PROCEDURE — 97116 GAIT TRAINING THERAPY: CPT

## 2018-04-18 PROCEDURE — 12000002 HC ACUTE/MED SURGE SEMI-PRIVATE ROOM

## 2018-04-18 PROCEDURE — 25000003 PHARM REV CODE 250: Performed by: INTERNAL MEDICINE

## 2018-04-18 PROCEDURE — 36415 COLL VENOUS BLD VENIPUNCTURE: CPT

## 2018-04-18 PROCEDURE — 83735 ASSAY OF MAGNESIUM: CPT

## 2018-04-18 PROCEDURE — 97165 OT EVAL LOW COMPLEX 30 MIN: CPT

## 2018-04-18 PROCEDURE — 97535 SELF CARE MNGMENT TRAINING: CPT

## 2018-04-18 RX ADMIN — ASPIRIN 81 MG: 81 TABLET, COATED ORAL at 09:04

## 2018-04-18 RX ADMIN — CLOPIDOGREL BISULFATE 75 MG: 75 TABLET, FILM COATED ORAL at 10:04

## 2018-04-18 RX ADMIN — LOSARTAN POTASSIUM 100 MG: 25 TABLET, FILM COATED ORAL at 10:04

## 2018-04-18 RX ADMIN — AMOXICILLIN AND CLAVULANATE POTASSIUM 1 TABLET: 875; 125 TABLET, FILM COATED ORAL at 10:04

## 2018-04-18 RX ADMIN — STANDARDIZED SENNA CONCENTRATE AND DOCUSATE SODIUM 1 TABLET: 8.6; 5 TABLET, FILM COATED ORAL at 09:04

## 2018-04-18 RX ADMIN — METHYLPREDNISOLONE SODIUM SUCCINATE 40 MG: 40 INJECTION, POWDER, FOR SOLUTION INTRAMUSCULAR; INTRAVENOUS at 09:04

## 2018-04-18 RX ADMIN — IPRATROPIUM BROMIDE AND ALBUTEROL SULFATE 3 ML: .5; 3 SOLUTION RESPIRATORY (INHALATION) at 12:04

## 2018-04-18 RX ADMIN — AMLODIPINE BESYLATE 5 MG: 5 TABLET ORAL at 10:04

## 2018-04-18 RX ADMIN — IPRATROPIUM BROMIDE AND ALBUTEROL SULFATE 3 ML: .5; 3 SOLUTION RESPIRATORY (INHALATION) at 07:04

## 2018-04-18 RX ADMIN — ENOXAPARIN SODIUM 40 MG: 100 INJECTION SUBCUTANEOUS at 10:04

## 2018-04-18 RX ADMIN — STANDARDIZED SENNA CONCENTRATE AND DOCUSATE SODIUM 1 TABLET: 8.6; 5 TABLET, FILM COATED ORAL at 10:04

## 2018-04-18 RX ADMIN — MUPIROCIN: 20 OINTMENT TOPICAL at 09:04

## 2018-04-18 RX ADMIN — AMOXICILLIN AND CLAVULANATE POTASSIUM 1 TABLET: 875; 125 TABLET, FILM COATED ORAL at 09:04

## 2018-04-18 RX ADMIN — IPRATROPIUM BROMIDE AND ALBUTEROL SULFATE 3 ML: .5; 3 SOLUTION RESPIRATORY (INHALATION) at 06:04

## 2018-04-18 RX ADMIN — METOPROLOL SUCCINATE 100 MG: 50 TABLET, EXTENDED RELEASE ORAL at 10:04

## 2018-04-18 NOTE — ASSESSMENT & PLAN NOTE
Chronic respiratory failure requiring home oxygen due to recent aspiration pneumonia-presented to the ED in acute failure requiring intubation. -  Likely due to pulmonary edema.-improved and extubated post diureses   Improved on CXR  Echo-done indicates no changes from recent -despite slightly elevated troponin    Continue supplemental O2 as indicated./nebs/increased activity -consult to PT/oT  Antibiotics dc as sputum/blood cultures neg-changed to po augmentin to complete course for recent infection

## 2018-04-18 NOTE — ASSESSMENT & PLAN NOTE
Due to recent hospitalization/pneumoina and readmit-consult to PT/OT  Increase activity  Discussed with wife-consider SNF-she and patient did refuse last admit.   Nimo CM -

## 2018-04-18 NOTE — PROGRESS NOTES
04/18/18 0644   Patient Assessment/Suction   Level of Consciousness (AVPU) alert   All Lung Fields Breath Sounds clear   Cough Type none   PRE-TX-O2-ETCO2   O2 Device (Oxygen Therapy) nasal cannula   $ Is the patient on Low Flow Oxygen? Yes   Flow (L/min) 1   SpO2 96 %   Pulse Oximetry Type Intermittent   $ Pulse Oximetry - Multiple Charge Pulse Oximetry - Multiple   Pulse 82   Resp 16   Aerosol Therapy   $ Aerosol Therapy Charges Aerosol Treatment   Respiratory Treatment Status given   SVN/Inhaler Treatment Route mask   Position During Treatment HOB at 30 degrees   Patient Tolerance good   Post-Treatment   Post-treatment Heart Rate (beats/min) 85   Post-treatment Resp Rate (breaths/min) 16   All Fields Breath Sounds clear

## 2018-04-18 NOTE — PLAN OF CARE
Problem: Physical Therapy Goal  Goal: Physical Therapy Goal  Goals to be met by: 2018     Patient will increase functional independence with mobility by performin. Supine to sit with Contact Guard Assistance  2. Sit to stand transfer with Contact Guard Assistance  3. Bed to chair transfer with Contact Guard Assistance using Rolling Walker  4. Gait  x 250 feet with Minimal Assistance using Rolling Walker.   5. Lower extremity exercise program x20 reps per handout, with assistance as needed    Outcome: Ongoing (interventions implemented as appropriate)  PT eval and treat completed. Gait training with RW/O2 100ft. OOB to chair, encouraged LE's thera ex

## 2018-04-18 NOTE — PLAN OF CARE
Problem: Patient Care Overview  Goal: Plan of Care Review  Outcome: Ongoing (interventions implemented as appropriate)  Patient transferred from ICU. POC reviewed with patient. Verbalized understanding. VSS. Patient denies pain. 02 @ 2l/min Nc. PIV intact. Leslie catheter intact draining clear yellow urine freely. Safety maintained through out the shift. Patient positions and repositions self independently. No new skin break down noted.   Bed locked and in lowest position. Call light in reach. Side rails up x2. NON skid socks on when OOB. Patient remained free of falls/ trauma.  Will continue to monitor.

## 2018-04-18 NOTE — NURSING
Wife notified of patient's transfer to [facility] via bed. Report called to  Nurse Moss.

## 2018-04-18 NOTE — CONSULTS
[facility] Consult Note - [place]  Staff: MD Rosalino     Referring provider and please cc: Matt  PCP: [facility]     Chief Complaint: difficult henderson placement        Subjective:         History obtained from wife.        HPI: Frederick Kong Jr. is a 82 y.o. male      He was a previous patient of , has not see him for over 5 years. He had BPH and Green light laser TURP. Per his wife pt does not have a weak stream, nocturia 2x a night, denies straining, no gross hematuria, no UTIs.     On 4/7/18 he presented to hospital with h/o CAD, htn, DLD and SOB admitted today for sepsis secondary to pneumonia. They attempted to place multiple catheters in the ER but were unable to. He was admitted to ICU and intubated. Urology was consulted for henderson placement to monitor UOP because mutliple nurses had difficulty placing henderson. He had no catheters in the past 5 years.     I was able to advance a glidewire into his bladder and dilated a urethral stricture to 18fr (coud not dilate to 20fr, too much scar tissue).  An 18fr [place] tip henderson was placed and has remained in bladder. Plan was for outpatient voiding trial today and f/u.     He was re-admitted yesterday with SOB and found to have PHILLIP. I have been consulted for henderson management.    They state that he has had no problems with henderson.       ECOG Status: 0     REVIEW OF SYSTEMS:  General ROS: no fevers, no chills  Psychological ROS: no depression  Endocrine ROS: no heat or cold  Respiratory ROS: + SOB  Cardiovascular ROS: no CP  Gastrointestinal ROS: no abdominal pain, no constipation, no diarrhea, no BRBPR  Musculoskeletal ROS: no muscle pain  Neurological ROS: no headaches  Dermatological ROS: no rashes  HEENT: glasses, no sinus   ROS: per HPI     PMHx:       Past Medical History:   Diagnosis Date    Cancer, skin      Coronary artery disease      Gout      Hyperlipemia      Hypertension     Brain Tumor (meningioma)  s/p resection with no residual deficits.  Kidney stones: No     PSHx:        Past Surgical History:   Procedure Laterality Date    APPENDECTOMY        BRAIN SURGERY        CARDIAC SURGERY        TONSILLECTOMY       Aortic Valve Replacement and 2 cardiac stents 2001 - Dr.Decorte TUBBS bypass for LE clot > 20 years ago     Stents/Valves/Foreign Bodies: Yes - 2 placed in 2001  Cardiac Evaluation: Yes -      Screening Studies  Colonoscopy: 10 years ago, normal      Fam Hx:   malignancies: No  . Gyn malignancies: none.   kidney stones: No      Soc Hx:  Former tobacco, quit 20 years ago.  1pk per day x 30-40 year  Social alcohol  Lives in [place]  :yes, wife is Cristin- history obtained from her  Children: 2  Occupation:retired  with [employer]      Allergies:  Patient has no known allergies.     Medications: reviewed   Anticoagulation: Yes - plavix for stents 2001, asa 81mg      Objective:          Vitals:     04/07/18 1845   BP: (!) 146/65   Pulse: 62   Resp: 19   Temp:           General:WDWN in NAD  Eyes: PERRLA, normal conjunctiva  Respiratory: No increased work on breathing.   Cardiovascular: No obvious extremity edema. Warm and well perfused.   GI: palpation of masses. No tenderness. No hepatosplenomegaly to palpation.  Musculoskeletal: normal range of motion of bilateral upper extremities. Normal muscle strength and tone.  Skin: no obvious rashes or lesions. No tightening of skin noted.  Neurologic: CN grossly normal. Normal sensation.   Psychiatric: awake, alert and oriented x 3. Mood and affect normal. Cooperative.      4/7/18:  Inspection of anus normal  No scrotal rashes, cysts or lesions  Epididymis normal in size, no tenderness  Testes normal and size, equal size bilaterally  Urethral meatus normal without blood at meatus. 10fr silicone catheter without any fluid.   Penis is circumcised   DUC: deferred     LABS REVIEW:    UCx:  4/14/18 Ng, cath: 3+blood/1+leuk  4/9/18 Ng,  cath: 3+blood, no leuk  4/8/18 ng     Cr:         Lab Results   Component Value Date     CREATININE 1.6 (H) 04/07/2018   baseline 1.2     PSA:         Lab Results   Component Value Date     PSA 1.7 05/23/2011      Testosterone: No results found for: TESTOSTERONE     PATHOLOGY REVIEW:  none     RADIOGRAPHIC REVIEW:  none        Assessment:       1. Acute respiratory failure, unspecified whether with hypoxia or hypercapnia    2. SOB (shortness of breath)    3. Elevated troponin          Urethral stricture     Plan:      Likely has scar tissue from his previous laser turp.     1. [facility] has been in since 4/7/18. Plan was for outpt f/u for voiding trial. Orders were written and henderson was removed tonight. They are to check pvr by scan after void.  2. Scar tissue likely to return unless he does CIC or has a definitve treatment, explained this to wife.  3. He will f/u in 1-2 weeks in our clinic to learn CIC with 16fr coude silicone henderson catheter. He will do this once daily for a week then every other day for 1 week then every 3rd day for 1 week, Fauzia can schedule this appt  4. F/u with me in 3 to 4 weeks (May 15 11am)     Kimmie Jerry MD

## 2018-04-18 NOTE — PLAN OF CARE
Spoke with pt's wife regarding SNF option for discharge and provided her with CMS list of nursing facilities in the area.   Pt was walking with PT while we spoke.  Pt's wife is undetermined regarding discharge disposition and plan is for her to speak to Dr. Kingston regarding pt's discharge.      04/18/18 1135   Discharge Reassessment   Assessment Type Discharge Planning Reassessment

## 2018-04-18 NOTE — PLAN OF CARE
04/17/18 1924   Patient Assessment/Suction   Level of Consciousness (AVPU) alert   Respiratory Effort Normal;Unlabored   Expansion/Accessory Muscles/Retractions expansion symmetric;no retractions;no use of accessory muscles   All Lung Fields Breath Sounds clear   Cough Type nonproductive   PRE-TX-O2-ETCO2   O2 Device (Oxygen Therapy) nasal cannula   Oxygen Concentration (%) 2   Oxygen Analyzed Concentration (%) 28 %   SpO2 98 %   Pulse Oximetry Type Continuous   Pulse 85   Resp 19   Aerosol Therapy   $ Aerosol Therapy Charges Aerosol Treatment   Respiratory Treatment Status given   SVN/Inhaler Treatment Route mask;with oxygen   Position During Treatment HOB at 45 degrees   Patient Tolerance good   Post-Treatment   Post-treatment Heart Rate (beats/min) 87   Post-treatment Resp Rate (breaths/min) 20   All Fields Breath Sounds unchanged   Ready to Wean/Extubation Screen   FIO2<=50 (chart decimal) 0.02

## 2018-04-18 NOTE — ASSESSMENT & PLAN NOTE
Onset during past admission  Continue indwelling henderson catheter-[facility]AD urology fu today-Dr Jerry who had great difficulty placing henderson catheter a few days ago -reconsult urology now that culture negative  Add flomax;

## 2018-04-18 NOTE — ASSESSMENT & PLAN NOTE
BNP elevated 1242; this morning 269 after receiving IV lasix yesterday for diuresis.  Presented with acute respiratory failure; intubated-currently extubated without complications.  Continue to monitor respiratory status   echocardiogram --noted as per cardiology -see htn treatment

## 2018-04-18 NOTE — PROGRESS NOTES
Progress Note  PULMONARY    Admit Date: 4/14/2018 04/18/2018      SUBJECTIVE:     April 16, alert, no c/o or recall of events.  April 17, alert, no c/o.    April 18, alert and denies sob      PFSH and Allergies reviewed.    OBJECTIVE:     Vitals (Most recent):  Vitals:    04/18/18 1543   BP: (!) 172/74   Pulse: 89   Resp: 18   Temp: 97.7 °F (36.5 °C)       Vitals (24 hour range):  Temp:  [96.3 °F (35.7 °C)-98.9 °F (37.2 °C)]   Pulse:  [82-96]   Resp:  [14-21]   BP: (140-183)/()   SpO2:  [94 %-100 %]       Intake/Output Summary (Last 24 hours) at 04/18/18 1900  Last data filed at 04/18/18 1800   Gross per 24 hour   Intake             1080 ml   Output             1360 ml   Net             -280 ml          Physical Exam:  The patient's neuro status (alertness,orientation,cognitive function,motor skills,), pharyngeal exam (oral lesions, hygiene, abn dentition,), Neck (jvd,mass,thyroid,nodes in neck and above/below clavicle),RESPIRATORY(symmetry,effort,fremitus,percussion,auscultation),  Cor(rhythm,heart tones including gallops,perfusion,edema)ABD(distention,hepatic&splenomegaly,tenderness,masses), Skin(rash,cyanosis),Psyc(affect,judgement,).  Exam negative except for these pertinent findings:    Alert, conversant, no distress, good bs with resolution of wheezes, symmetric, nl fremitus/percussion, distant cor tones RRR with no murmur  Gallop.    Radiographs reviewed: view by direct vision  Clearing pulm edema- mild ild/edema 4/17  Results for orders placed during the hospital encounter of 04/07/18   X-Ray Chest 1 View    Narrative EXAMINATION:  XR CHEST 1 VIEW    CLINICAL HISTORY:  pneumonia;    TECHNIQUE:  A portable semi upright AP view of the chest was acquired.    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  There is an endotracheal tube present with its tip below the level of the clavicular heads.  A nasogastric tube remains in place.  There is  atherosclerotic calcification present within the aortic arch.   There has been no interval detrimental change in the radiographic appearance of the chest as compared to the previous examination with continued demonstration of patchy segmental airspace opacity/consolidative change in the right lower lung zone..  No pneumothorax.      Impression No significant interval detrimental change in the imaging appearance of the chest when compared with the prior study.      Electronically signed by: Ozzie Simental MD  Date:    04/07/2018  Time:    13:58   ]    Labs       Recent Labs  Lab 04/18/18 0514   WBC 7.00   HGB 10.4*   HCT 30.7*          Recent Labs  Lab 04/18/18 0514      K 3.9      CO2 25   BUN 29*   CREATININE 1.2   GLU 99   CALCIUM 8.6*   MG 2.1   No results for input(s): PH, PCO2, PO2, HCO3 in the last 24 hours.  Microbiology Results (last 7 days)     Procedure Component Value Units Date/Time    Blood Culture #2 [[id number]] Collected:  04/14/18 0945    Order Status:  Completed Specimen:  Blood Updated:  04/17/18 2022     Blood Culture, Routine No Growth to date     Blood Culture, Routine No Growth to date     Blood Culture, Routine No Growth to date     Blood Culture, Routine No Growth to date    Blood Culture #1 [[id number]] Collected:  04/14/18 0955    Order Status:  Completed Specimen:  Blood Updated:  04/17/18 2022     Blood Culture, Routine No Growth to date     Blood Culture, Routine No Growth to date     Blood Culture, Routine No Growth to date     Blood Culture, Routine No Growth to date    Culture, Respiratory with Gram Stain [[id number]] Collected:  04/14/18 0854    Order Status:  Completed Specimen:  Respiratory from BAL, LLL Updated:  04/16/18 0939     Respiratory Culture No growth     Gram Stain (Respiratory) <10 epithelial cells per low power field.     Gram Stain (Respiratory) No WBC's     Gram Stain (Respiratory) No organisms seen    Culture, Respiratory with Gram Stain [[id number]]  Collected:  04/14/18 1153    Order Status:  Completed Specimen:  Respiratory from Tracheal Aspirate Updated:  04/16/18 0939     Respiratory Culture No growth     Gram Stain (Respiratory) <10 epithelial cells per low power field.     Gram Stain (Respiratory) No WBC's     Gram Stain (Respiratory) No organisms seen    Urine culture [[id number]] Collected:  04/14/18 1609    Order Status:  Completed Specimen:  Urine from Urine, Catheterized Updated:  04/16/18 0702     Urine Culture, Routine No growth    Culture, Respiratory with Gram Stain [[id number]]     Order Status:  Canceled Specimen:  Respiratory           Impression:  Active Hospital Problems    Diagnosis  POA    *Gram-negative pneumonia [J15.6]  Yes    Debility [R53.81]  Yes    Acute respiratory failure with hypoxemia [J96.01]  Yes    Hypertensive urgency [I16.0]  Yes    Acute diastolic congestive heart failure [I50.31]  Yes    Hyperkalemia [E87.5]  Yes    Chronic obstructive pulmonary disease with acute lower respiratory infection [J44.0]  Yes    Urethral stricture, unspecified [N35.9]  Yes    Acute on chronic respiratory failure with hypoxia and hypercapnia [J96.21, J96.22]  Yes    H/O aortic valve replacement [Z95.2]  Not Applicable     ? Mechanical      Sepsis [A41.9]  Yes    Urinary retention [R33.9]  Yes    Chronic renal insufficiency, stage 3 (moderate) [N18.3]  Yes      Resolved Hospital Problems    Diagnosis Date Resolved POA   No resolved problems to display.   Plan:   April 16- echo from 4/7 NA, looks compensated now. Pt presented hyperacute last admit with pneuonia (may not have been primary process?).  Represents similar with intubation again!.  Picture not of pneumonia.   If blood cultures negative- consider decrease abx.  Pt did have need for coude' tip henderson last admit.  resp status seems stable?    April 17, echos from 4/7 and 4/16 pending, diffuse wheezes noted - will dose solumedrol.  bp runs high off and on.  Will do rosario if echo  reasonable.  resp status hard to eval - need good handle on cardiac reserves    April 18, breathing better and no wheezes, will check rosario am.  outpt wrt lungs may be reasonable soon.  Would give aggressive copd rx if rosario indicates.  chf seems like most likely dx?                              .

## 2018-04-18 NOTE — PT/OT/SLP EVAL
Physical Therapy Evaluation    Patient Name:  Frederick Kong Jr.   MRN:  [id number]    Recommendations:     Discharge Recommendations:  home health PT   Discharge Equipment Recommendations: none   Barriers to discharge: None    Assessment:     Frederick Kong Jr. is a 82 y.o. male admitted with a medical diagnosis of Gram-negative pneumonia.  He presents with the following impairments/functional limitations:  weakness, impaired endurance, impaired functional mobilty, impaired self care skills, impaired cardiopulmonary response to activity . Pt alert, pleasant and motivated- spouse stated has tanks of o2 at home and pt is not as active. Pt to benefit from continued therapies.    Rehab Prognosis:  fair; patient would benefit from acute skilled PT services to address these deficits and reach maximum level of function.      Recent Surgery: * No surgery found *      Plan:     During this hospitalization, patient to be seen 6 x/week to address the above listed problems via gait training, therapeutic activities, therapeutic exercises  · Plan of Care Expires:  04/27/18   Plan of Care Reviewed with: patient, spouse    Subjective     Communicated with nurse Lam prior to session.  Patient found seated with spouse present upon PT entry to room, agreeable to evaluation.    Pt with periods of coughing spells      Chief Complaint: weakness  Patient comments/goals: get well  Pain/Comfort:  · Pain Rating 1: 0/10    Patients cultural, spiritual, Yarsanism conflicts given the current situation:      Living Environment:  Home with spouse  Prior to admission, patients level of function was ambulatory with SW/O2 dependent, does not drive anymore.  Patient has the following equipment: walker, rolling, oxygen.  DME owned (not currently used): .  Upon discharge, patient will have assistance from spouse.    Objective:     Patient found with: telemetry, oxygen, henderson catheter     General Precautions: Standard, fall, respiratory , hard of  hearing  Orthopedic Precautions:N/A   Braces: N/A     Exams:  · Postural Exam:  Patient presented with the following abnormalities:    · -       Rounded shoulders  · -       Forward head  · -       Kyphosis  · RLE ROM: WFL  · RLE Strength: WFL  · LLE ROM: WFL  · LLE Strength: WFL    Functional Mobility:  · Bed Mobility:     · Scooting: minimum assistance  · Transfers:     · Sit to Stand:  minimum assistance with rolling walker  · Bed to Chair: minimum assistance with  rolling walker  using  Stand Pivot  · Gait: 100ft with O2 at 1 liter. SAT before gait 98%, SAT post gait 92%    AM-PAC 6 CLICK MOBILITY  Total Score:16       Therapeutic Activities and Exercises:   gait to hallways with RW/O2  Back to chair- completed AP,LAQ  Pt with coughing and spitting spells  Pt encouraged active LE exercises and increased OOB time  Spouse at bedside and attentive    Patient left up in chair with all lines intact, call button in reach and spouse present.    GOALS:    Physical Therapy Goals        Problem: Physical Therapy Goal    Goal Priority Disciplines Outcome Goal Variances Interventions   Physical Therapy Goal     PT/OT, PT Ongoing (interventions implemented as appropriate)     Description:  Goals to be met by: 2018     Patient will increase functional independence with mobility by performin. Supine to sit with Contact Guard Assistance  2. Sit to stand transfer with Contact Guard Assistance  3. Bed to chair transfer with Contact Guard Assistance using Rolling Walker  4. Gait  x 250 feet with Minimal Assistance using Rolling Walker.   5. Lower extremity exercise program x20 reps per handout, with assistance as needed                      History:     Past Medical History:   Diagnosis Date    Cancer     CHF (congestive heart failure)     Coronary artery disease     Gout     Hyperlipemia     Hypertension        Past Surgical History:   Procedure Laterality Date    APPENDECTOMY      BRAIN SURGERY       meningioma removed    CARDIAC SURGERY  2001    mechanical aortic valve replacement, Dr. Sanket Sanchez    TONSILLECTOMY         Clinical Decision Making:     History  Co-morbidities and personal factors that may impact the plan of care Examination  Body Structures and Functions, activity limitations and participation restrictions that may impact the plan of care Clinical Presentation   Decision Making/ Complexity Score   Co-morbidities:   [] Time since onset of injury / illness / exacerbation  [] Status of current condition  []Patient's cognitive status and safety concerns    [] Multiple Medical Problems (see med hx)  Personal Factors:   [] Patient's age  [] Prior Level of function   [] Patient's home situation (environment and family support)  [] Patient's level of motivation  [] Expected progression of patient      HISTORY:(criteria)    [] 56441 - no personal factors/history    [] 53205 - has 1-2 personal factor/comorbidity     [] 99585 - has >3 personal factor/comorbidity     Body Regions:  [] Objective examination findings  [] Head     []  Neck  [] Trunk   [] Upper Extremity  [] Lower Extremity    Body Systems:  [] For communication ability, affect, cognition, language, and learning style: the assessment of the ability to make needs known, consciousness, orientation (person, place, and time), expected emotional /behavioral responses, and learning preferences (eg, learning barriers, education  needs)  [] For the neuromuscular system: a general assessment of gross coordinated movement (eg, balance, gait, locomotion, transfers, and transitions) and motor function  (motor control and motor learning)  [] For the musculoskeletal system: the assessment of gross symmetry, gross range of motion, gross strength, height, and weight  [] For the integumentary system: the assessment of pliability(texture), presence of scar formation, skin color, and skin integrity  [] For cardiovascular/pulmonary system: the assessment of  heart rate, respiratory rate, blood pressure, and edema     Activity limitations:    [] Patient's cognitive status and saf ety concerns          [] Status of current condition      [] Weight bearing restriction  [] Cardiopulmunary Restriction    Participation Restrictions:   [] Goals and goal agreement with the patient     [] Rehab potential (prognosis) and probable outcome      Examination of Body System: (criteria)    [] 14428 - addressing 1-2 elements    [] 27736 - addressing a total of 3 or more elements     [] 26566 -  Addressing a total of 4 or more elements         Clinical Presentation: (criteria)  Choose one     On examination of body system using standardized tests and measures patient presents with (CHOOSE ONE) elements from any of the following: body structures and functions, activity limitations, and/or participation restrictions.  Leading to a clinical presentation that is considered (CHOOSE ONE)                              Clinical Decision Making  (Eval Complexity):  Choose One     Time Tracking:     PT Received On: 04/18/18  PT Start Time: 1115     PT Stop Time: 1139  PT Total Time (min): 24 min     Billable Minutes: Evaluation 8, Gait Training 8 and Therapeutic Activity 8      Chen Spears, PT  04/18/2018

## 2018-04-18 NOTE — SUBJECTIVE & OBJECTIVE
Interval History:   Feeling much better./less sob   Was up in chair for awhile. Tolerating po    bp was elevated to meds adjusted per cardiology -    Review of Systems   Constitutional: Negative for chills and fever.   HENT: Positive for sore throat. Negative for congestion.    Eyes: Negative for photophobia and visual disturbance.   Respiratory: Negative for cough, chest tightness, shortness of breath and wheezing.    Cardiovascular: Negative for chest pain and palpitations.   Gastrointestinal: Negative for abdominal pain, constipation, diarrhea, nausea and vomiting.   Genitourinary:        Indwelling henderson catheter   Musculoskeletal: Negative for arthralgias and myalgias.   Neurological: Positive for weakness (generalized). Negative for dizziness and light-headedness.   Psychiatric/Behavioral: Negative for agitation and confusion. The patient is not nervous/anxious.      Objective:     Vital Signs (Most Recent):  Temp: 98.9 °F (37.2 °C) (04/17/18 2300)  Pulse: 95 (04/17/18 2300)  Resp: 18 (04/17/18 2300)  BP: (!) 150/65 (04/17/18 2300)  SpO2: 98 % (04/17/18 2300) Vital Signs (24h Range):  Temp:  [98.5 °F (36.9 °C)-99.3 °F (37.4 °C)] 98.9 °F (37.2 °C)  Pulse:  [80-97] 95  Resp:  [16-36] 18  SpO2:  [93 %-100 %] 98 %  BP: (140-199)/() 150/65     Weight: 73.6 kg (162 lb 4.1 oz)  Body mass index is 25.41 kg/m².    Intake/Output Summary (Last 24 hours) at 04/17/18 7952  Last data filed at 04/17/18 1800   Gross per 24 hour   Intake             1500 ml   Output             1030 ml   Net              470 ml      Physical Exam   Constitutional: He is oriented to person, place, and time. He appears well-developed and well-nourished.   HENT:   Head: Normocephalic and atraumatic.   Eyes: EOM are normal. Pupils are equal, round, and reactive to light.   Neck: Normal range of motion. Neck supple. No tracheal deviation present.   Cardiovascular: Normal rate, regular rhythm and intact distal pulses.    Pulmonary/Chest:  Effort normal. No respiratory distress. He has no rales (much improvement from previous exam).   Abdominal: Soft. Bowel sounds are normal. He exhibits no distension. There is no tenderness.   Genitourinary:   Genitourinary Comments: Indwelling henderson catheter draining clear yellow urine.   Musculoskeletal: Normal range of motion. He exhibits no edema (trace BLE).   Neurological: He is alert and oriented to person, place, and time. No cranial nerve deficit.   Skin: Skin is warm and dry.   Sore left upper lip -excoration; no blisters or drainage    Psychiatric: He has a normal mood and affect. His behavior is normal.       Significant Labs:   BMP:   Recent Labs  Lab 04/17/18  0358   *      K 4.0      CO2 28   BUN 22   CREATININE 1.5*   CALCIUM 8.3*   MG 2.1     CBC:   Recent Labs  Lab 04/16/18  0316 04/17/18  0358   WBC 6.30 8.10   HGB 10.1* 10.4*   HCT 30.8* 31.3*    277       Significant Imaging: echo-EF 50%; no DD

## 2018-04-18 NOTE — PLAN OF CARE
Problem: Occupational Therapy Goal  Goal: Occupational Therapy Goal  Goals to be met by: 5/2/2018     Patient will increase functional independence with ADLs by performing:    Grooming while standing at sink with Modified [place].  Toileting from toilet with Modified [place] for hygiene and clothing management.   Supine to sit with Modified [place].  Stand pivot transfers with Modified [place].  Upper extremity exercise program x15 reps, with independence.    Outcome: Ongoing (interventions implemented as appropriate)  OT evaluation completed today. Goals & care plan established.    JULIO Goodman  4/18/2018

## 2018-04-18 NOTE — PLAN OF CARE
Problem: Patient Care Overview  Goal: Plan of Care Review  Outcome: Ongoing (interventions implemented as appropriate)  Pt alert and oriented. [place] wears hearing aids.min assist to bathroom . Cont bowels. Leslie just removed. Will monitor with bladder scans. Plan of care continued. Call light in reach. Wife at bedside. Pt up in chair

## 2018-04-18 NOTE — NURSING
Transferred via bed to [facility]. Charge RN at bedside. Siderails up, call bell within reach. Tele monitor on. No distress noted.

## 2018-04-18 NOTE — PROGRESS NOTES
04/18/18 1200   Patient Assessment/Suction   Level of Consciousness (AVPU) alert   All Lung Fields Breath Sounds clear   Cough Type none   PRE-TX-O2-ETCO2   O2 Device (Oxygen Therapy) nasal cannula   $ Is the patient on Low Flow Oxygen? Yes   Flow (L/min) 1   SpO2 96 %  (Simultaneous filing. User may not have seen previous data.)   Pulse Oximetry Type Intermittent   Pulse 83  (Simultaneous filing. User may not have seen previous data.)   Resp 18  (Simultaneous filing. User may not have seen previous data.)   Temp 96.3 °F (35.7 °C)   BP (!) 164/71   Aerosol Therapy   $ Aerosol Therapy Charges Aerosol Treatment   Respiratory Treatment Status given   SVN/Inhaler Treatment Route mask   Position During Treatment Sitting in chair   Patient Tolerance good   Post-Treatment   Post-treatment Heart Rate (beats/min) 83   Post-treatment Resp Rate (breaths/min) 18   All Fields Breath Sounds clear

## 2018-04-18 NOTE — PT/OT/SLP EVAL
"Occupational Therapy   Evaluation    Name: Frederick Kong Jr.  MRN: [id number]  Admitting Diagnosis:  Gram-negative pneumonia      Recommendations:     Discharge Recommendations: home health PT, home health OT  Discharge Equipment Recommendations:  none  Barriers to discharge:  None    History:     Occupational Profile:  Living Environment: Pt lives with wife in a H with 1 MACIEL.   Previous level of function: Pt was modified independent with ADLs and functional mobility, using RW with ambulation.  Equipment Owned:  walker, rolling, oxygen, shower chair  Assistance upon Discharge: Pt will receive assistance from wife.    Past Medical History:   Diagnosis Date    Cancer     CHF (congestive heart failure)     Coronary artery disease     Gout     Hyperlipemia     Hypertension        Past Surgical History:   Procedure Laterality Date    APPENDECTOMY      BRAIN SURGERY  1975    meningioma removed    CARDIAC SURGERY  2001    mechanical aortic valve replacement, Dr. Sanket Sanchez    TONSILLECTOMY         Subjective     Chief Complaint: none stated  Patient comments/goals: "I don't remember why I came to the hospital."  Communicated with: nurse Carole prior to session.  Pain/Comfort:  · Pain Rating 1: 0/10  · Pain Rating Post-Intervention 1: 0/10    Patients cultural, spiritual, Voodoo conflicts given the current situation:      Objective:     Patient found with: oxygen, telemetry, SCD, bed alarm, henderson catheter    General Precautions: Standard, fall   Orthopedic Precautions:N/A   Braces: N/A     Occupational Performance:    Bed Mobility:    · Patient completed Scooting/Bridging with stand by assistance  · Patient completed Supine to Sit with stand by assistance    Functional Mobility/Transfers:  · Patient completed Sit <> Stand Transfer with contact guard assistance  with  rolling walker   · Patient completed Bed <> Chair Transfer using Stand Pivot technique with contact guard assistance with rolling " "walker  · Functional Mobility: Pt ambulated with CGA and RW from EOB to sink and then to chair near bedside. No LOB.    Activities of Daily Living:  · Grooming: stand by assistance with oral & facial hygiene while standing at sink.  · LB Dressing: stand by assistance to don socks at EOB.    Cognitive/Visual Perceptual:  Cognitive/Psychosocial Skills:     -       Oriented to: x4   -       Follows Commands/attention:Follows two-step commands  -       Communication: clear/fluent  -       Safety awareness/insight to disability: intact   -       Mood/Affect/Coping skills/emotional control: Appropriate to situation  Visual/Perceptual:      -Intact    Physical Exam:  Postural examination/scapula alignment:    -       Rounded shoulders  -       Forward head  Upper Extremity Range of Motion:     -       Right Upper Extremity: WNL  -       Left Upper Extremity: WNL  Upper Extremity Strength:    -       Right Upper Extremity: 3+/5  -       Left Upper Extremity: 3+/5   Strength:    -       Right Upper Extremity: WFL  -       Left Upper Extremity: WFL  Fine Motor Coordination:    -       Intact  Gross motor coordination:   WFL    Patient left up in chair with all lines intact and wife and PT present    [facility] 6 Click:  [facility] Total Score: 20      Education:    Assessment:     Frederick Kong Jr. is a 82 y.o. male with a medical diagnosis of Gram-negative pneumonia.  Performance deficits affecting function are weakness, impaired endurance, impaired cardiopulmonary response to activity, impaired self care skills, gait instability.      Rehab Prognosis:  good; patient would benefit from acute skilled OT services to address these deficits and reach maximum level of function.         Clinical Decision Makin.  OT Low:  "Pt evaluation falls under low complexity for evaluation coding due to performance deficits noted in 1-3 areas as stated above and 0 co-morbities affecting current functional status. Data obtained from problem " "focused assessments. No modifications or assistance was required for completion of evaluation. Only brief occupational profile and history review completed."     Plan:     Patient to be seen 3 x/week to address the above listed problems via self-care/home management, therapeutic activities, therapeutic exercises  · Plan of Care Expires: 05/02/18  · Plan of Care Reviewed with: patient    This Plan of care has been discussed with the patient who was involved in its development and understands and is in agreement with the identified goals and treatment plan    GOALS:    Occupational Therapy Goals        Problem: Occupational Therapy Goal    Goal Priority Disciplines Outcome Interventions   Occupational Therapy Goal     OT, PT/OT Ongoing (interventions implemented as appropriate)    Description:  Goals to be met by: 5/2/2018     Patient will increase functional independence with ADLs by performing:    Grooming while standing at sink with Modified [place].  Toileting from toilet with Modified [place] for hygiene and clothing management.   Supine to sit with Modified [place].  Stand pivot transfers with Modified [place].  Upper extremity exercise program x15 reps, with independence.                      Time Tracking:     OT Date of Treatment: 04/18/18  OT Start Time: 1023  OT Stop Time: 1100  OT Total Time (min): 37 min    Billable Minutes:Evaluation 10  Self Care/Home Management 27    Juan Manuel Jesus, OT  4/18/2018    "

## 2018-04-18 NOTE — ASSESSMENT & PLAN NOTE
Presented in acute respiratory failure requiring intubation-extubated at this time on supplemental O2 via NC.  Pneumonia identified; discontinue IV vancomycin, IV azithromycin, and IV zosyn which were initiated in ED-continue and follow cultures.-ngtd as per sepsis   Bronchodilators  Consult pulmonary for vent management.--extubated

## 2018-04-18 NOTE — ASSESSMENT & PLAN NOTE
Presenting bp 247/108  BNP elevated; troponin at baseline  + pulmonary edema-treated with lasix  Mildly hypertensive at this time; home meds have been resumed.-  Continue to monitor telemetry    Hypertension Medications             amLODIPine (NORVASC) 5 MG tablet Take 5 mg by mouth once daily.    furosemide (LASIX) 20 MG tablet Take 10 mg by mouth every other day.    losartan (COZAAR) 100 MG tablet Take 100 mg by mouth once daily.    metoprolol succinate (TOPROL-XL) 100 MG 24 hr tablet Take 100 mg by mouth once daily.      Hospital Medications             amLODIPine tablet 5 mg Take 1 tablet (5 mg total) by mouth once daily.    furosemide 10 mg/mL (alcohol free) solution 10 mg Starting on 4/19/2018. Take 1 mL (10 mg total) by mouth every other day.    hydrALAZINE injection 10 mg Inject 0.5 mLs (10 mg total) into the vein every 8 (eight) hours as needed for SBP greater than (180).    losartan tablet 100 mg Take 4 tablets (100 mg total) by mouth once daily.    metoprolol succinate (TOPROL-XL) 24 hr tablet 100 mg Take 2 tablets (100 mg total) by mouth once daily.    furosemide tablet 20 mg (Discontinued) Take 1 tablet (20 mg total) by mouth every other day.    metoprolol tartrate (LOPRESSOR) tablet 50 mg (Discontinued) Take 1 tablet (50 mg total) by mouth 2 (two) times daily.

## 2018-04-18 NOTE — PROGRESS NOTES
[facility] Hospital Medicine  Progress Note    Patient Name: Frederick Kong Jr.  MRN: [id number]  Patient Class: IP- Inpatient   Admission Date: 4/14/2018  Length of Stay: 3 days  Attending Physician: Cheryl Gutierrez MD  Primary Care Provider: Dhiraj Dempsey III, MD        Subjective:     Principal Problem:Gram-negative pneumonia    HPI:  Frederick Kong Jr. is a 82 y.o. male with a history of HTN, CAD, Ca, and HLD who presents to the ED via EMS with an onset of respiratory distress which began last night, worsening this morning.  Mr. Kong did require intubation in the emergency department.  His wife accompanies him and provides some history.  He has hx of stent placement at the time of his aortic valve replacement in 2001 and was on coumadin for many years however has been changed to asa and plavix within the last year due to frequent falls. His cardiologist is Dr Gonzalez. He has never had an MI or heart failure. He has a remote history of smoking and is otherwise alert and active. He was recently admitted to this facility and treated for severe sepsis 2/2 bilateral aspiration pneumonia and was discharged home 2 days ago with a prescription for augmentin to further treat the pneumonia.  He began to have shortness of breath last night, and this escalated this morning.  Denies incidence of fever, confusion, N/V or diarrhea.  Mr. Kong is admitted to the service of hospital medicine for further medical management.      Hospital Course:  No notes on file    Interval History:   Feeling much better./less sob   Was up in chair for awhile. Tolerating po    bp was elevated to meds adjusted per cardiology -    Review of Systems   Constitutional: Negative for chills and fever.   HENT: Positive for sore throat. Negative for congestion.    Eyes: Negative for photophobia and visual disturbance.   Respiratory: Negative for cough, chest tightness, shortness of breath and wheezing.    Cardiovascular: Negative for  chest pain and palpitations.   Gastrointestinal: Negative for abdominal pain, constipation, diarrhea, nausea and vomiting.   Genitourinary:        Indwelling henderson catheter   Musculoskeletal: Negative for arthralgias and myalgias.   Neurological: Positive for weakness (generalized). Negative for dizziness and light-headedness.   Psychiatric/Behavioral: Negative for agitation and confusion. The patient is not nervous/anxious.      Objective:     Vital Signs (Most Recent):  Temp: 98.9 °F (37.2 °C) (04/17/18 2300)  Pulse: 95 (04/17/18 2300)  Resp: 18 (04/17/18 2300)  BP: (!) 150/65 (04/17/18 2300)  SpO2: 98 % (04/17/18 2300) Vital Signs (24h Range):  Temp:  [98.5 °F (36.9 °C)-99.3 °F (37.4 °C)] 98.9 °F (37.2 °C)  Pulse:  [80-97] 95  Resp:  [16-36] 18  SpO2:  [93 %-100 %] 98 %  BP: (140-199)/() 150/65     Weight: 73.6 kg (162 lb 4.1 oz)  Body mass index is 25.41 kg/m².    Intake/Output Summary (Last 24 hours) at 04/17/18 2327  Last data filed at 04/17/18 1800   Gross per 24 hour   Intake             1500 ml   Output             1030 ml   Net              470 ml      Physical Exam   Constitutional: He is oriented to person, place, and time. He appears well-developed and well-nourished.   HENT:   Head: Normocephalic and atraumatic.   Eyes: EOM are normal. Pupils are equal, round, and reactive to light.   Neck: Normal range of motion. Neck supple. No tracheal deviation present.   Cardiovascular: Normal rate, regular rhythm and intact distal pulses.    Pulmonary/Chest: Effort normal. No respiratory distress. He has no rales (much improvement from previous exam).   Abdominal: Soft. Bowel sounds are normal. He exhibits no distension. There is no tenderness.   Genitourinary:   Genitourinary Comments: Indwelling henderson catheter draining clear yellow urine.   Musculoskeletal: Normal range of motion. He exhibits no edema (trace BLE).   Neurological: He is alert and oriented to person, place, and time. No cranial nerve  deficit.   Skin: Skin is warm and dry.   Sore left upper lip -excoration; no blisters or drainage    Psychiatric: He has a normal mood and affect. His behavior is normal.       Significant Labs:   BMP:   Recent Labs  Lab 04/17/18  0358   *      K 4.0      CO2 28   BUN 22   CREATININE 1.5*   CALCIUM 8.3*   MG 2.1     CBC:   Recent Labs  Lab 04/16/18  0316 04/17/18  0358   WBC 6.30 8.10   HGB 10.1* 10.4*   HCT 30.8* 31.3*    277       Significant Imaging: echo-EF 50%; no DD     Assessment/Plan:      * Gram-negative pneumonia    Suspected.-aspiration   Continue IV abx  Consult pharmacy for vanc dosing  Currently intubated-bronchodilators ordered  Obtain sputum culture-following  Procalcitonin --minimal elevation--dc ivabx.         Debility      Due to recent hospitalization/pneumoina and readmit-consult to PT/OT  Increase activity  Discussed with wife-consider SNF-she and patient did refuse last admit.   Conslt CM -        Hyperkalemia    K+ 5.2-4.1 today-reosoved --to 3.6  Received IV lasix  Will monitor level.  Monitor telemetry          Acute diastolic congestive heart failure    BNP elevated 1242; this morning 269 after receiving IV lasix yesterday for diuresis.  Presented with acute respiratory failure; intubated-currently extubated without complications.  Continue to monitor respiratory status   echocardiogram --noted as per cardiology -see htn treatment        Hypertensive urgency    Presenting bp 247/108  BNP elevated; troponin at baseline  + pulmonary edema-treated with lasix  Mildly hypertensive at this time; home meds have been resumed.-  Continue to monitor telemetry    Hypertension Medications             amLODIPine (NORVASC) 5 MG tablet Take 5 mg by mouth once daily.    furosemide (LASIX) 20 MG tablet Take 10 mg by mouth every other day.    losartan (COZAAR) 100 MG tablet Take 100 mg by mouth once daily.    metoprolol succinate (TOPROL-XL) 100 MG 24 hr tablet Take 100 mg by mouth  once daily.      Hospital Medications             amLODIPine tablet 5 mg Take 1 tablet (5 mg total) by mouth once daily.    furosemide 10 mg/mL (alcohol free) solution 10 mg Starting on 4/19/2018. Take 1 mL (10 mg total) by mouth every other day.    hydrALAZINE injection 10 mg Inject 0.5 mLs (10 mg total) into the vein every 8 (eight) hours as needed for SBP greater than (180).    losartan tablet 100 mg Take 4 tablets (100 mg total) by mouth once daily.    metoprolol succinate (TOPROL-XL) 24 hr tablet 100 mg Take 2 tablets (100 mg total) by mouth once daily.    furosemide tablet 20 mg (Discontinued) Take 1 tablet (20 mg total) by mouth every other day.    metoprolol tartrate (LOPRESSOR) tablet 50 mg (Discontinued) Take 1 tablet (50 mg total) by mouth 2 (two) times daily.                Chronic obstructive pulmonary disease with acute lower respiratory infection    Presented in acute respiratory failure requiring intubation-extubated at this time on supplemental O2 via NC.  Pneumonia identified; discontinue IV vancomycin, IV azithromycin, and IV zosyn which were initiated in ED-continue and follow cultures.-ngtd as per sepsis   Bronchodilators  Consult pulmonary for vent management.--extubated           Urethral stricture, unspecified      See above         Urinary retention    Onset during past admission  Continue indwelling henderson catheter-[facility] urology fu today-Dr Jerry who had great difficulty placing henderson catheter a few days ago -reconsult urology now that culture negative  Add flomax;            H/O aortic valve replacement    Currently treated with plavix and ASA; due to high risk for intracranial bleed, anticoagulation was recently discontinued.  2D ECHO planned for tomorrow.          Sepsis    Frederick LIBRADO Kong Jr. is a 82 y.o. male who presents to the Emergency Department and was admitted to ICU.      This patient does have evidence of infective focus  My overall impression is sepsis.  Antibiotics given-    Antibiotics     Start     Stop Route Frequency Ordered    04/18/18 0900  mupirocin 2 % ointment      -- Top 2 times daily 04/17/18 2330    04/17/18 2100  amoxicillin-clavulanate 875-125mg per tablet 1 tablet      -- Oral 2 times daily 04/17/18 0945          Severe Sepsis only-  Latest labs reviewed, they are-    No results for input(s): LACTATE in the last 24 hours.    Recent Labs  Lab 04/15/18  0603   PH 7.493*   PO2 134*   PCO2 37.1   HCO3 28.4*   BE 5       Organ dysfunction indicated by acute respiratory failure requiring intubation-is now extubated.    Shock with decreased perfusion noted, Fluid challenge was not given due to pulmonary edema.    Obtain Blood cultures x 2, sputum culture and urine [id number]/16 ALL NGTD        Chronic renal insufficiency, stage 3 (moderate)    BUN 28/Creat 1.5  Slight increase today BUN 33/creat 1.6 after receiving IV lasix  Continue to monitor renal function   CKD (chronic kidney disease) stage 3, GFR 30-59 ml/min renal dose all meds; no nephrotoxic agents               Acute on chronic respiratory failure with hypoxia and hypercapnia      Chronic respiratory failure requiring home oxygen due to recent aspiration pneumonia-presented to the ED in acute failure requiring intubation. -  Likely due to pulmonary edema.-improved and extubated post diureses   Improved on CXR  Echo-done indicates no changes from recent -despite slightly elevated troponin    Continue supplemental O2 as indicated./nebs/increased activity -consult to PT/oT  Antibiotics dc as sputum/blood cultures neg-changed to po augmentin to complete course for recent infection           VTE Risk Mitigation         Ordered     enoxaparin injection 40 mg  Daily     Route:  Subcutaneous        04/14/18 1133     Place CJ hose  Until discontinued      04/14/18 1133     Place sequential compression device  Until discontinued      04/14/18 1133     IP VTE HIGH RISK PATIENT  Once      04/14/18 1133          Critical  care time spent on the evaluation and treatment of severe organ dysfunction, review of pertinent labs and imaging studies, discussions with consulting providers and discussions with patient/family: 35minutes.        Cheryl Gutierrez MD  Department of Hospital Medicine   [facility]

## 2018-04-19 ENCOUNTER — TELEPHONE (OUTPATIENT)
Dept: UROLOGY | Facility: CLINIC | Age: 83
End: 2018-04-19

## 2018-04-19 VITALS
TEMPERATURE: 98 F | DIASTOLIC BLOOD PRESSURE: 71 MMHG | HEART RATE: 77 BPM | RESPIRATION RATE: 20 BRPM | BODY MASS INDEX: 25.47 KG/M2 | SYSTOLIC BLOOD PRESSURE: 156 MMHG | OXYGEN SATURATION: 97 % | HEIGHT: 67 IN | WEIGHT: 162.25 LBS

## 2018-04-19 LAB
ANION GAP SERPL CALC-SCNC: 9 MMOL/L
BACTERIA BLD CULT: NORMAL
BACTERIA BLD CULT: NORMAL
BASOPHILS # BLD AUTO: 0 K/UL
BASOPHILS NFR BLD: 0.3 %
BUN SERPL-MCNC: 31 MG/DL
CALCIUM SERPL-MCNC: 8.6 MG/DL
CHLORIDE SERPL-SCNC: 108 MMOL/L
CO2 SERPL-SCNC: 24 MMOL/L
CREAT SERPL-MCNC: 1.2 MG/DL
DIFFERENTIAL METHOD: ABNORMAL
EOSINOPHIL # BLD AUTO: 0.2 K/UL
EOSINOPHIL NFR BLD: 3.1 %
ERYTHROCYTE [DISTWIDTH] IN BLOOD BY AUTOMATED COUNT: 14.7 %
EST. GFR  (AFRICAN AMERICAN): >60 ML/MIN/1.73 M^2
EST. GFR  (NON AFRICAN AMERICAN): 56 ML/MIN/1.73 M^2
GLUCOSE SERPL-MCNC: 97 MG/DL
HCT VFR BLD AUTO: 30.5 %
HGB BLD-MCNC: 10.2 G/DL
LYMPHOCYTES # BLD AUTO: 1.2 K/UL
LYMPHOCYTES NFR BLD: 20.1 %
MAGNESIUM SERPL-MCNC: 2.2 MG/DL
MCH RBC QN AUTO: 29.5 PG
MCHC RBC AUTO-ENTMCNC: 33.4 G/DL
MCV RBC AUTO: 88 FL
MONOCYTES # BLD AUTO: 0.6 K/UL
MONOCYTES NFR BLD: 10 %
NEUTROPHILS # BLD AUTO: 4.1 K/UL
NEUTROPHILS NFR BLD: 66.5 %
PLATELET # BLD AUTO: 293 K/UL
PMV BLD AUTO: 7.7 FL
POTASSIUM SERPL-SCNC: 4 MMOL/L
RBC # BLD AUTO: 3.45 M/UL
SODIUM SERPL-SCNC: 141 MMOL/L
WBC # BLD AUTO: 6.1 K/UL

## 2018-04-19 PROCEDURE — 97110 THERAPEUTIC EXERCISES: CPT

## 2018-04-19 PROCEDURE — 94060 EVALUATION OF WHEEZING: CPT

## 2018-04-19 PROCEDURE — 25000003 PHARM REV CODE 250: Performed by: HOSPITALIST

## 2018-04-19 PROCEDURE — 83735 ASSAY OF MAGNESIUM: CPT

## 2018-04-19 PROCEDURE — 63600175 PHARM REV CODE 636 W HCPCS: Performed by: NURSE PRACTITIONER

## 2018-04-19 PROCEDURE — 36415 COLL VENOUS BLD VENIPUNCTURE: CPT

## 2018-04-19 PROCEDURE — 63600175 PHARM REV CODE 636 W HCPCS: Performed by: HOSPITALIST

## 2018-04-19 PROCEDURE — 25000003 PHARM REV CODE 250: Performed by: NURSE PRACTITIONER

## 2018-04-19 PROCEDURE — 94640 AIRWAY INHALATION TREATMENT: CPT

## 2018-04-19 PROCEDURE — 94799 UNLISTED PULMONARY SVC/PX: CPT

## 2018-04-19 PROCEDURE — 63600175 PHARM REV CODE 636 W HCPCS: Performed by: INTERNAL MEDICINE

## 2018-04-19 PROCEDURE — 85025 COMPLETE CBC W/AUTO DIFF WBC: CPT

## 2018-04-19 PROCEDURE — 80048 BASIC METABOLIC PNL TOTAL CA: CPT

## 2018-04-19 PROCEDURE — 97116 GAIT TRAINING THERAPY: CPT

## 2018-04-19 PROCEDURE — 27000221 HC OXYGEN, UP TO 24 HOURS

## 2018-04-19 PROCEDURE — 94727 GAS DIL/WSHOT DETER LNG VOL: CPT

## 2018-04-19 PROCEDURE — 94761 N-INVAS EAR/PLS OXIMETRY MLT: CPT

## 2018-04-19 PROCEDURE — 25000003 PHARM REV CODE 250: Performed by: INTERNAL MEDICINE

## 2018-04-19 PROCEDURE — 25000242 PHARM REV CODE 250 ALT 637 W/ HCPCS: Performed by: HOSPITALIST

## 2018-04-19 RX ORDER — IPRATROPIUM BROMIDE AND ALBUTEROL SULFATE 2.5; .5 MG/3ML; MG/3ML
3 SOLUTION RESPIRATORY (INHALATION) EVERY 6 HOURS
Qty: 1 BOX | Refills: 0 | Status: SHIPPED | OUTPATIENT
Start: 2018-04-19 | End: 2019-06-10 | Stop reason: ALTCHOICE

## 2018-04-19 RX ORDER — AMLODIPINE BESYLATE 10 MG/1
10 TABLET ORAL DAILY
Qty: 30 TABLET | Refills: 11 | Status: SHIPPED | OUTPATIENT
Start: 2018-04-19 | End: 2019-06-10 | Stop reason: ALTCHOICE

## 2018-04-19 RX ORDER — AMLODIPINE BESYLATE 5 MG/1
10 TABLET ORAL DAILY
Status: DISCONTINUED | OUTPATIENT
Start: 2018-04-19 | End: 2018-04-19 | Stop reason: HOSPADM

## 2018-04-19 RX ORDER — AMOXICILLIN AND CLAVULANATE POTASSIUM 875; 125 MG/1; MG/1
1 TABLET, FILM COATED ORAL 2 TIMES DAILY
Qty: 30 TABLET | Refills: 0 | Status: SHIPPED | OUTPATIENT
Start: 2018-04-19 | End: 2018-04-30

## 2018-04-19 RX ADMIN — FUROSEMIDE 10 MG: 10 SOLUTION ORAL at 09:04

## 2018-04-19 RX ADMIN — IPRATROPIUM BROMIDE AND ALBUTEROL SULFATE 3 ML: .5; 3 SOLUTION RESPIRATORY (INHALATION) at 12:04

## 2018-04-19 RX ADMIN — IPRATROPIUM BROMIDE AND ALBUTEROL SULFATE 3 ML: .5; 3 SOLUTION RESPIRATORY (INHALATION) at 06:04

## 2018-04-19 RX ADMIN — AMLODIPINE BESYLATE 5 MG: 5 TABLET ORAL at 12:04

## 2018-04-19 RX ADMIN — METHYLPREDNISOLONE SODIUM SUCCINATE 40 MG: 40 INJECTION, POWDER, FOR SOLUTION INTRAMUSCULAR; INTRAVENOUS at 09:04

## 2018-04-19 RX ADMIN — HYDRALAZINE HYDROCHLORIDE 10 MG: 20 INJECTION INTRAMUSCULAR; INTRAVENOUS at 10:04

## 2018-04-19 RX ADMIN — CLOPIDOGREL BISULFATE 75 MG: 75 TABLET, FILM COATED ORAL at 09:04

## 2018-04-19 RX ADMIN — METOPROLOL SUCCINATE 100 MG: 50 TABLET, EXTENDED RELEASE ORAL at 09:04

## 2018-04-19 RX ADMIN — LOSARTAN POTASSIUM 100 MG: 25 TABLET, FILM COATED ORAL at 09:04

## 2018-04-19 RX ADMIN — ENOXAPARIN SODIUM 40 MG: 100 INJECTION SUBCUTANEOUS at 09:04

## 2018-04-19 RX ADMIN — AMOXICILLIN AND CLAVULANATE POTASSIUM 1 TABLET: 875; 125 TABLET, FILM COATED ORAL at 09:04

## 2018-04-19 RX ADMIN — AMLODIPINE BESYLATE 5 MG: 5 TABLET ORAL at 09:04

## 2018-04-19 NOTE — DISCHARGE INSTRUCTIONS
Thank you for choosing [facility] for your medical care. The primary doctor who is taking care of you at the time of your discharge is Ponce Kingston MD.     You were admitted to the hospital with Acute on chronic respiratory failure with hypoxia and hypercapnia.     Please note your discharge instructions, including diet/activity restrictions, follow-up appointments, and medication changes.  If you have any questions about your medical issues, prescriptions, or any other questions, please feel free to contact the [facility] Medicine Dept at [phone] and we will help.    If you are previously with Home health, outpatient PT/OT or under a therapy program, you are cleared to return to those programs.    Please direct all long term medication refills and follow up to your primary care provider, Dhiraj Dempsey III, MD. Thank you again for letting us take care of your health care needs.

## 2018-04-19 NOTE — PROGRESS NOTES
Monitor room called reporting patient running trigeminy, bigeminy, couplets, and PVCS. DAVID Clark NP Made aware. Will continue to monitor.

## 2018-04-19 NOTE — PROGRESS NOTES
[facility] Hospital Medicine  Progress Note    Patient Name: Frederick Kong Jr.  MRN: [id number]  Patient Class: IP- Inpatient   Admission Date: 4/14/2018  Length of Stay: 4 days  Attending Physician: Ponce Kingston MD  Primary Care Provider: Dhiraj Dempsey III, MD        Subjective:     Principal Problem:Acute on chronic respiratory failure with hypoxia and hypercapnia    HPI:  Frederick Kong Jr. is a 82 y.o. male with a history of HTN, CAD, Ca, and HLD who presents to the ED via EMS with an onset of respiratory distress which began last night, worsening this morning.  Mr. Kong did require intubation in the emergency department.  His wife accompanies him and provides some history.  He has hx of stent placement at the time of his aortic valve replacement in 2001 and was on coumadin for many years however has been changed to asa and plavix within the last year due to frequent falls. His cardiologist is Dr Gonzalez. He has never had an MI or heart failure. He has a remote history of smoking and is otherwise alert and active. He was recently admitted to this facility and treated for severe sepsis 2/2 bilateral aspiration pneumonia and was discharged home 2 days ago with a prescription for augmentin to further treat the pneumonia.  He began to have shortness of breath last night, and this escalated this morning.  Denies incidence of fever, confusion, N/V or diarrhea.  Mr. Kong is admitted to the service of hospital medicine for further medical management.      Hospital Course:  No notes on file    Interval History: Patient seen and examined.  No acute events overnight.  Patient asymptomatic and doing well on nasal cannula.  Plan of care discussed with the patient and the wife at the bedside in detail.    Review of Systems   Constitutional: Positive for fatigue. Negative for chills and fever.   Respiratory: Positive for cough and shortness of breath.    Cardiovascular: Negative for chest pain and leg  swelling.   Gastrointestinal: Negative for abdominal pain, nausea and vomiting.   Musculoskeletal: Negative for back pain.   Neurological: Positive for weakness.   Psychiatric/Behavioral: Negative for confusion. The patient is not nervous/anxious.      Objective:     Vital Signs (Most Recent):  Temp: 97.8 °F (36.6 °C) (04/18/18 2045)  Pulse: 85 (04/18/18 2045)  Resp: 16 (04/18/18 2045)  BP: (!) 180/77 (04/18/18 2045)  SpO2: 97 % (04/18/18 2045) Vital Signs (24h Range):  Temp:  [96.3 °F (35.7 °C)-98.9 °F (37.2 °C)] 97.8 °F (36.6 °C)  Pulse:  [78-96] 85  Resp:  [14-20] 16  SpO2:  [94 %-98 %] 97 %  BP: (140-183)/() 180/77     Weight: 73.6 kg (162 lb 4.1 oz)  Body mass index is 25.41 kg/m².    Intake/Output Summary (Last 24 hours) at 04/18/18 2136  Last data filed at 04/18/18 1800   Gross per 24 hour   Intake             1080 ml   Output             1300 ml   Net             -220 ml      Physical Exam   Constitutional: He appears well-developed and well-nourished.   Eyes: EOM are normal. Pupils are equal, round, and reactive to light.   Neck: No JVD present.   Cardiovascular: Normal rate, regular rhythm, normal heart sounds and intact distal pulses.    Pulmonary/Chest: Effort normal and breath sounds normal.   Abdominal: Soft. Bowel sounds are normal. He exhibits no distension. There is no tenderness.   Musculoskeletal: He exhibits no edema or deformity.   Lymphadenopathy:     He has no cervical adenopathy.   Skin: No rash noted. No pallor.   Nursing note and vitals reviewed.      Significant Labs: All pertinent labs within the past 24 hours have been reviewed.    Significant Imaging: I have reviewed all pertinent imaging results/findings within the past 24 hours.    Assessment/Plan:      * Acute on chronic respiratory failure with hypoxia and hypercapnia    Patient etiologies respiratory failure is likely secondary to pulmonary edema secondary to hypertensive emergency/ diastolic HF  versus pneumonia.  Patient  appears to be markedly improved from previous.  Will continue antibiotics (augmentin)  for total 7 days of coverage as well as continue Lasix.  Potentially will be able to go home tomorrow.        Debility    PT/OT seen- referral for HH made.        Acute diastolic congestive heart failure    CHF currently controlled. Latest ECHO shows ejection fraction of 50%. Continue BB, ACEi and Lasix and monitor clinical status closely. Monitor on telemetry. Last BNP is   Recent Labs  Lab 04/15/18  0557   *   . Continue to stress to patient importance of self efficacy and  on diet for CHF.          Chronic obstructive pulmonary disease with acute lower respiratory infection    Presented in acute respiratory failure requiring intubation-extubated at this time on supplemental O2 via NC.  Pneumonia identified; Currently on day #5/7 of ABX, de-escalated to PO.        Urinary retention    Patient evaluated by urology. Will undergo voiding trial in AM. Continue flomax and monitor post trial.          H/O aortic valve replacement    Currently treated with plavix and ASA; due to high risk for intracranial bleed, anticoagulation was recently discontinued. ECHO shows good valve function.          Chronic renal insufficiency, stage 3 (moderate)    Creatine stable for now. BMP reviewed- noted GFR of Estimated Creatinine Clearance: 44.4 mL/min (based on SCr of 1.2 mg/dL). according to latest data. Monitor UOP and serial BMP and adjust therapy as needed. Renally dose meds.                      VTE Risk Mitigation         Ordered     enoxaparin injection 40 mg  Daily      04/14/18 1133     Place CJ hose  Until discontinued      04/14/18 1133     Place sequential compression device  Until discontinued      04/14/18 1133     IP VTE HIGH RISK PATIENT  Once      04/14/18 1133              Ponce Kingston MD  Department of Hospital Medicine   [facility]

## 2018-04-19 NOTE — PLAN OF CARE
Met with pt and his wife to discuss their discharge plan to return home with home health.  Wife verified that they would like to have [facility] even though home health had not come to admit pt prior to his readmission to the hospital.  Obtained signature for the disclosure form.      04/19/18 0984   Discharge Reassessment   Assessment Type Discharge Planning Reassessment

## 2018-04-19 NOTE — PT/OT/SLP PROGRESS
"Occupational Therapy  Treatment    Frederick Kong Jr.   MRN: [id number]   Admitting Diagnosis: Acute on chronic respiratory failure with hypoxia and hypercapnia    OT Date of Treatment: 04/19/18   OT Start Time: 1130  OT Stop Time: 1151  OT Total Time (min): 21 min    Billable Minutes:  Therapeutic Exercise 21    General Precautions: Standard, fall  Braces: N/A    Do you have any cultural, spiritual, Alevism conflicts, given your current situation?: no    Subjective:  Communicated with nursing (Hamida) prior to session.    Pain/Comfort  Pain Rating 1: 0/10    Objective:  Patient found with: oxygen, peripheral IV, telemetry     Functional Mobility:    Transfers:   Sit to Stand completed with Contact Guard assistance, patient remained standing for BUE exercising.      Balance:   Static Stand: GOOD: Takes MODERATE challenges from all directions  Dynamic stand: GOOD: Needs SUPERVISION only during gait and able to self right with moderate     Therapeutic Activities and Exercise:  BUE exercising completed while standing with red thera band in all planes, 45 reps completed.    AM-PAC 6 CLICK ADL   How much help from another person does this patient currently need?   1 = Unable, Total/Dependent Assistance  2 = A lot, Maximum/Moderate Assistance  3 = A little, Minimum/Contact Guard/Supervision  4 = None, Modified Haakon/Independent          AM-PAC Raw Score CMS "G-Code Modifier Level of Impairment Assistance   6 % Total / Unable   7 - 8 CM 80 - 100% Maximal Assist   9-13 CL 60 - 80% Moderate Assist   14 - 19 CK 40 - 60% Moderate Assist   20 - 22 CJ 20 - 40% Minimal Assist   23 CI 1-20% SBA / CGA   24 CH 0% Independent/ Mod I       Patient left up in chair with all lines intact, call button in reach, nursing notified and spouse present    ASSESSMENT:  Frederick Kong Jr. is a 82 y.o. male with a medical diagnosis of Acute on chronic respiratory failure with hypoxia and hypercapnia.      Rehab potential is " excellent.    Activity tolerance: Excellent        GOALS:    Occupational Therapy Goals     Not on file          Multidisciplinary Problems (Resolved)        Problem: Occupational Therapy Goal    Goal Priority Disciplines Outcome Interventions   Occupational Therapy Goal   (Resolved)     OT, PT/OT Outcome(s) achieved    Description:  Goals to be met by: 5/2/2018     Patient will increase functional independence with ADLs by performing:    Grooming while standing at sink with Modified [place].  Toileting from toilet with Modified [place] for hygiene and clothing management.   Supine to sit with Modified [place].  Stand pivot transfers with Modified [place].  Upper extremity exercise program x15 reps, with independence.                      Plan:  Cont POC.  Patient to be seen 3 x/week to address the above listed problems via self-care/home management, therapeutic activities, therapeutic exercises  Plan of Care expires: 05/02/18  Plan of Care reviewed with: patient         CHRYSTAL Ortiz  04/19/2018

## 2018-04-19 NOTE — PLAN OF CARE
04/19/18 1132   Final Note   Assessment Type Final Discharge Note   Discharge Disposition Home-Health  ([facility]   Hospital Follow Up  Appt(s) scheduled? Yes

## 2018-04-19 NOTE — ASSESSMENT & PLAN NOTE
CHF currently controlled. Latest ECHO shows ejection fraction of 50%. Continue BB, ACEi and Lasix and monitor clinical status closely. Monitor on telemetry. Last BNP is   Recent Labs  Lab 04/15/18  0557   *   . Continue to stress to patient importance of self efficacy and  on diet for CHF.

## 2018-04-19 NOTE — TELEPHONE ENCOUNTER
----- Message from Kimmie Jerry MD sent at 4/18/2018  6:07 PM CDT -----  F/u in 1-2 weeks to learn cic

## 2018-04-19 NOTE — PLAN OF CARE
[facility] sent home health referral to [facility] via [employer] system for processing, pending acceptance     Patient accepted by [facility]

## 2018-04-19 NOTE — PLAN OF CARE
Problem: Patient Care Overview  Goal: Plan of Care Review  Outcome: Ongoing (interventions implemented as appropriate)  Patient AAOx4.  POC reviewed with patient. Verbalized understanding. VSS. Patient denies pain. 02 @ 2l/min Nc. PIV intact. Urinal at the bedside. Safety maintained through out the shift. Patient positions and repositions self independently. No new skin break down noted.   Bed locked and in lowest position. Call light in reach. Side rails up x2. NON skid socks on when OOB. Patient remained free of falls/ trauma.  Will continue to monitor.

## 2018-04-19 NOTE — ASSESSMENT & PLAN NOTE
Patient evaluated by urology. Will undergo voiding trial in AM. Continue flomax and monitor post trial.

## 2018-04-19 NOTE — ASSESSMENT & PLAN NOTE
Presented in acute respiratory failure requiring intubation-extubated at this time on supplemental O2 via NC.  Pneumonia identified; Currently on day #5/7 of ABX, de-escalated to PO.

## 2018-04-19 NOTE — PROGRESS NOTES
[facility]  Cardiology  Progress Note    Patient Name: Frederick Kong Jr.  MRN: [id number]  Admission Date: 4/14/2018  Hospital Length of Stay: 5 days  Code Status: Prior   Attending Physician: Ponce Kingston MD   Primary Care Physician: Dhiraj Dempsey III, MD  Expected Discharge Date:   Principal Problem:Acute on chronic respiratory failure with hypoxia and hypercapnia    Subjective:     Interval History: Patient denies any chest pain. Shortness of breath is better.    ROS   Reports cough.  Denies any abdominal pain.  Denies any chest pain.    Objective:     Vital Signs (Most Recent):  Temp: 98.8 °F (37.1 °C) (04/19/18 0406)  Pulse: 75 (04/19/18 0406)  Resp: 20 (04/19/18 0406)  BP: (!) 149/67 (04/19/18 0406)  SpO2: 97 % (04/19/18 0406) Vital Signs (24h Range):  Temp:  [96.3 °F (35.7 °C)-98.8 °F (37.1 °C)] 98.8 °F (37.1 °C)  Pulse:  [75-92] 75  Resp:  [14-20] 20  SpO2:  [94 %-98 %] 97 %  BP: (149-184)/(67-78) 149/67     Weight: 73.6 kg (162 lb 4.1 oz)  Body mass index is 25.41 kg/m².    SpO2: 97 %  O2 Device (Oxygen Therapy): nasal cannula      Intake/Output Summary (Last 24 hours) at 04/19/18 0601  Last data filed at 04/18/18 1800   Gross per 24 hour   Intake              780 ml   Output              900 ml   Net             -120 ml       Lines/Drains/Airways     Drain                 Urethral Catheter 04/07/18 2100 11 days          Peripheral Intravenous Line                 Peripheral IV - Single Lumen 04/15/18 0400 Left Forearm 4 days              Scheduled Meds:   albuterol-ipratropium 2.5mg-0.5mg/3mL  3 mL Nebulization Q6H    amLODIPine  5 mg Oral Daily    amoxicillin-clavulanate 875-125mg  1 tablet Oral BID    aspirin  81 mg Oral Daily    clopidogrel  75 mg Oral Daily    enoxaparin  40 mg Subcutaneous Daily    furosemide  10 mg Oral Every other day    losartan  100 mg Oral Daily    methylPREDNISolone sodium succinate  40 mg Intravenous Daily    metoprolol succinate  100 mg Oral Daily     mupirocin   Topical (Top) BID    senna-docusate 8.6-50 mg  1 tablet Oral BID     Continuous Infusions:  PRN Meds:.acetaminophen, acetaminophen, albuterol sulfate, hydrALAZINE, ondansetron, prochlorperazine, sodium chloride 0.9%      Physical Exam  HEENT: Normocephalic, atraumatic, PERRL, Conjunctiva pink, no scleral icterus. ET tube in place.  CVS: S1S2+, RRR, no murmurs, rubs or gallops, JVP: Normal. Click of AVR+  LUNGS: Crackles positive at the bases.  ABDOMEN: Soft, NT, BS+  EXTREMITIES: No cyanosis, clubbing or edema  NEURO: Intubated and does not obey simple commands     Significant Labs:   ABG: No results for input(s): PH, PCO2, HCO3, POCSATURATED, BE in the last 48 hours., Blood Culture: No results for input(s): LABBLOO in the last 48 hours., BMP:     Recent Labs  Lab 04/18/18  0514   GLU 99      K 3.9      CO2 25   BUN 29*   CREATININE 1.2   CALCIUM 8.6*   MG 2.1   , CMP     Recent Labs  Lab 04/18/18  0514      K 3.9      CO2 25   GLU 99   BUN 29*   CREATININE 1.2   CALCIUM 8.6*   ANIONGAP 8   ESTGFRAFRICA >60   EGFRNONAA 56*   , CBC     Recent Labs  Lab 04/18/18  0514 04/19/18  0520   WBC 7.00 6.10   HGB 10.4* 10.2*   HCT 30.7* 30.5*    293   , INR No results for input(s): INR, PROTIME in the last 48 hours. and Troponin No results for input(s): TROPONINI in the last 48 hours.    Significant Imaging: X-Ray: Reviewed  Assessment and Plan:     1. Acute hypoxemic hypercapnic respiratory failure. Likely secondary to right lower lobe pneumonia.Improving.  1A. Congestive heart failure. LV systolic dysfunction during the last admission which was thought to be secondary to hypoxemic cardiomyopathy. Patient given IV Lasix and he put out about 1700 cc thus far.  2. Mildly elevated troponin. Likely secondary to #1/2  3. History of mechanical aortic valve prosthesis. Functioning well per last echocardiogram done in September 2017. Patient not therapeutically anticoagulated and was  kept on only aspirin and Plavix reportedly secondary to high risk of bleeding and previous history of intracranial bleeds.  4. History of coronary artery disease status post CABG. Currently with mildly elevated troponin. No dynamic ECG changes.  5. History of atrial flutter. Paroxysmal. Currently in sinus rhythm.  6. Acute on chronic renal failure  7. History of hypertension. Acceptable.    Recommendations:  1. Continue current medical regimen for now.    Marisel Soliman MD  Cardiology  [facility]

## 2018-04-19 NOTE — SUBJECTIVE & OBJECTIVE
Interval History: Patient seen and examined.  No acute events overnight.  Patient asymptomatic and doing well on nasal cannula.  Plan of care discussed with the patient and the wife at the bedside in detail.    Review of Systems   Constitutional: Positive for fatigue. Negative for chills and fever.   Respiratory: Positive for cough and shortness of breath.    Cardiovascular: Negative for chest pain and leg swelling.   Gastrointestinal: Negative for abdominal pain, nausea and vomiting.   Musculoskeletal: Negative for back pain.   Neurological: Positive for weakness.   Psychiatric/Behavioral: Negative for confusion. The patient is not nervous/anxious.      Objective:     Vital Signs (Most Recent):  Temp: 97.8 °F (36.6 °C) (04/18/18 2045)  Pulse: 85 (04/18/18 2045)  Resp: 16 (04/18/18 2045)  BP: (!) 180/77 (04/18/18 2045)  SpO2: 97 % (04/18/18 2045) Vital Signs (24h Range):  Temp:  [96.3 °F (35.7 °C)-98.9 °F (37.2 °C)] 97.8 °F (36.6 °C)  Pulse:  [78-96] 85  Resp:  [14-20] 16  SpO2:  [94 %-98 %] 97 %  BP: (140-183)/() 180/77     Weight: 73.6 kg (162 lb 4.1 oz)  Body mass index is 25.41 kg/m².    Intake/Output Summary (Last 24 hours) at 04/18/18 2136  Last data filed at 04/18/18 1800   Gross per 24 hour   Intake             1080 ml   Output             1300 ml   Net             -220 ml      Physical Exam   Constitutional: He appears well-developed and well-nourished.   Eyes: EOM are normal. Pupils are equal, round, and reactive to light.   Neck: No JVD present.   Cardiovascular: Normal rate, regular rhythm, normal heart sounds and intact distal pulses.    Pulmonary/Chest: Effort normal and breath sounds normal.   Abdominal: Soft. Bowel sounds are normal. He exhibits no distension. There is no tenderness.   Musculoskeletal: He exhibits no edema or deformity.   Lymphadenopathy:     He has no cervical adenopathy.   Skin: No rash noted. No pallor.   Nursing note and vitals reviewed.      Significant Labs: All pertinent  labs within the past 24 hours have been reviewed.    Significant Imaging: I have reviewed all pertinent imaging results/findings within the past 24 hours.

## 2018-04-19 NOTE — PT/OT/SLP PROGRESS
Physical Therapy Treatment    Patient Name:  Frederick Kong Jr.   MRN:  [id number]    Recommendations:     Discharge Recommendations:  home health PT       Assessment:     Frederick Kong Jr. is a 82 y.o. male admitted with a medical diagnosis of Acute on chronic respiratory failure with hypoxia and hypercapnia.  He presents with the following impairments/functional limitations:  weakness, impaired endurance, impaired functional mobilty, impaired cardiopulmonary response to activity .    Rehab Prognosis:  good; patient would benefit from acute skilled PT services to address these deficits and reach maximum level of function.      Recent Surgery: * No surgery found *      Plan:     During this hospitalization, patient to be seen 6 x/week to address the above listed problems via gait training, therapeutic activities, therapeutic exercises  · Plan of Care Expires:  04/27/18   Plan of Care Reviewed with: patient    Subjective     Communicated with nurse Gallo prior to session.  Patient found seated in chair upon PT entry to room, agreeable to treatment.      Chief Complaint: none stated  Patient comments/goals: eager to walk, reported he is feeling better today  Pain/Comfort:  · Pain Rating 1: 0/10    Patients cultural, spiritual, Voodoo conflicts given the current situation:      Objective:     Patient found with: telemetry, oxygen     General Precautions: Standard, fall, respiratory   Orthopedic Precautions:N/A   Braces: N/A     Functional Mobility:  · Transfers:     · Sit to Stand:  contact guard assistance with rolling walker  · Gait: 250' with CGA using RW and portable O2, cueing for safety and deep breathing tech          Therapeutic Activities and Exercises:  O2 SAT: 94% following gait on 2L of O2   Seated BLE therex: AP, laq, marching, hip abd/add x 10-15 reps each    Patient left up in chair with all lines intact, call button in reach and nurse notified..    GOALS:    Physical Therapy Goals        Problem:  Physical Therapy Goal    Goal Priority Disciplines Outcome Goal Variances Interventions   Physical Therapy Goal     PT/OT, PT Ongoing (interventions implemented as appropriate)     Description:  Goals to be met by: 2018     Patient will increase functional independence with mobility by performin. Supine to sit with Contact Guard Assistance  2. Sit to stand transfer with Contact Guard Assistance  3. Bed to chair transfer with Contact Guard Assistance using Rolling Walker  4. Gait  x 250 feet with Minimal Assistance using Rolling Walker.   5. Lower extremity exercise program x20 reps per handout, with assistance as needed                      Time Tracking:     PT Received On: 18  PT Start Time: 1022     PT Stop Time: 1042  PT Total Time (min): 20 min     Billable Minutes: Gait Training 12 and Therapeutic Exercise 8    Treatment Type: Treatment  PT/PTA: PTA     PTA Visit Number: 1     Tea Riddle PTA  2018

## 2018-04-19 NOTE — NURSING
Pt given discharge instructions at this time, discussed with pt thoroughly, pt verbalized an understanding of instructions, prescriptions, and follow up appointments. Iv and tele removed. No further questions or concerns at this time. Waiting on wife to arrive to escort pt off unit.

## 2018-04-19 NOTE — PROGRESS NOTES
Patient's first void post henderson 350 ml. Bladder scanned patient post void residual less than 32ml.

## 2018-04-19 NOTE — ASSESSMENT & PLAN NOTE
Currently treated with plavix and ASA; due to high risk for intracranial bleed, anticoagulation was recently discontinued. ECHO shows good valve function.

## 2018-04-19 NOTE — ASSESSMENT & PLAN NOTE
Patient etiologies respiratory failure is likely secondary to pulmonary edema secondary to hypertensive emergency/ diastolic HF  versus pneumonia.  Patient appears to be markedly improved from previous.  Will continue antibiotics (augmentin)  for total 7 days of coverage as well as continue Lasix.  Potentially will be able to go home tomorrow.

## 2018-04-19 NOTE — ASSESSMENT & PLAN NOTE
Creatine stable for now. BMP reviewed- noted GFR of Estimated Creatinine Clearance: 44.4 mL/min (based on SCr of 1.2 mg/dL). according to latest data. Monitor UOP and serial BMP and adjust therapy as needed. Renally dose meds.

## 2018-04-19 NOTE — PROGRESS NOTES
Patient's 2nd void post henderson 100ml. Bladder scanned patient greater than 200. Will continue to monitor.

## 2018-04-20 ENCOUNTER — TELEPHONE (OUTPATIENT)
Dept: MEDSURG UNIT | Facility: HOSPITAL | Age: 83
End: 2018-04-20

## 2018-04-20 NOTE — HOSPITAL COURSE
Patient is an 82-year-old  male with a past medical history significant for diastolic heart failure, hypertension, dementia, COPD who presents to the hospital with acute respiratory failure.  Patient initially was intubated and moved to the intensive care unit.  He was started on aggressive therapy including broad-spectrum IV antibiotics, high-dose steroids, and Lasix.  Cardiology and pulmonology were consulted and assisted in the care of the patient.  Patient improved over the next 24 hours and was extubated shortly thereafter.  He continued to have some intermittent episodes of hypoxemia and he was on 2 L nasal cannula at baseline at time of discharge but improved markedly.  His blood pressures remain difficult to control in his hospitalization and he was titrated up on several medications.  Discharge blood pressure was approximately in the 150s-160 systolic with 70s-80s diastolic.  Patient was walking in the hallway 480 feet without significant shortness of breath or dyspnea on exertion and was discharged home with home health with physical therapy occupational therapy and skilled nurse.  Patient was deemed medically stable at time of discharge.

## 2018-04-20 NOTE — DISCHARGE SUMMARY
[facility] Hospital Medicine  Discharge Summary      Patient Name: Frederick Kong Jr.  MRN: [id number]  Admission Date: 4/14/2018  Hospital Length of Stay: 5 days  Discharge Date and Time: 4/19/2018  3:47 PM  Attending Physician: No att. providers found   Discharging Provider: Ponce Kingston MD  Primary Care Provider: Dhiraj Dempsey III, MD      HPI:   Frederick Kong Jr. is a 82 y.o. male with a history of HTN, CAD, Ca, and HLD who presents to the ED via EMS with an onset of respiratory distress which began last night, worsening this morning.  Mr. Kong did require intubation in the emergency department.  His wife accompanies him and provides some history.  He has hx of stent placement at the time of his aortic valve replacement in 2001 and was on coumadin for many years however has been changed to asa and plavix within the last year due to frequent falls. His cardiologist is Dr Gonzalez. He has never had an MI or heart failure. He has a remote history of smoking and is otherwise alert and active. He was recently admitted to this facility and treated for severe sepsis 2/2 bilateral aspiration pneumonia and was discharged home 2 days ago with a prescription for augmentin to further treat the pneumonia.  He began to have shortness of breath last night, and this escalated this morning.  Denies incidence of fever, confusion, N/V or diarrhea.  Mr. Kong is admitted to the service of hospital medicine for further medical management.      * No surgery found *      Hospital Course:   Patient is an 82-year-old  male with a past medical history significant for diastolic heart failure, hypertension, dementia, COPD who presents to the hospital with acute respiratory failure.  Patient initially was intubated and moved to the intensive care unit.  He was started on aggressive therapy including broad-spectrum IV antibiotics, high-dose steroids, and Lasix.  Cardiology and pulmonology were consulted and  assisted in the care of the patient.  Patient improved over the next 24 hours and was extubated shortly thereafter.  He continued to have some intermittent episodes of hypoxemia and he was on 2 L nasal cannula at baseline at time of discharge but improved markedly.  His blood pressures remain difficult to control in his hospitalization and he was titrated up on several medications.  Discharge blood pressure was approximately in the 150s-160 systolic with 70s-80s diastolic.  Patient was walking in the hallway 480 feet without significant shortness of breath or dyspnea on exertion and was discharged home with home health with physical therapy occupational therapy and skilled nurse.  Patient was deemed medically stable at time of discharge.     Consults:   Consults         Status Ordering Provider     Inpatient consult to Cardiology  Once     Provider:  Marisel Soliman MD    Completed ROSY RIGGS     Inpatient consult to Urology  Once     Provider:  Kimmie Jerry MD    Completed ALONZO ZAPATA     Pulmonology  Once     Provider:  Rosy Riggs MD    Completed ARRON DEL TORO          No new Assessment & Plan notes have been filed under this hospital service since the last note was generated.  Service: Hospital Medicine    Final Active Diagnoses:    Diagnosis Date Noted POA    PRINCIPAL PROBLEM:  Acute on chronic respiratory failure with hypoxia and hypercapnia [J96.21, J96.22] 04/07/2018 Yes    Debility [R53.81] 04/17/2018 Yes    Acute diastolic congestive heart failure [I50.31] 04/14/2018 Yes    Chronic obstructive pulmonary disease with acute lower respiratory infection [J44.0] 04/12/2018 Yes    H/O aortic valve replacement [Z95.2] 04/07/2018 Not Applicable    Urinary retention [R33.9] 04/07/2018 Yes    Chronic renal insufficiency, stage 3 (moderate) [N18.3] 04/07/2018 Yes      Problems Resolved During this Admission:    Diagnosis Date Noted Date Resolved POA     Hyperkalemia [E87.5] 04/14/2018 04/18/2018 Yes    Sepsis [A41.9] 04/07/2018 04/18/2018 Yes       Discharged Condition: stable    Disposition: Home-Health Care Svc    Follow Up:  Follow-up Information     Dhiraj Dempsey III, MD On 5/25/2018.    Specialty:  Family Medicine  Why:  Keep previously scheduled appointment  Contact information:  [address]  [address]  [place] [address]  [phone]             Toby Garcia MD On 4/30/2018.    Specialty:  Pulmonary Disease  Why:  Keep previously scheduled appointment  Contact information:  [address]  [address]  [address]  [place] [address]  [phone]             Go Gonzalez MD On 5/2/2018.    Specialty:  Cardiology  Why:  Hospital f/u on Wednesday, 5/2/18. @ 12:00pm  Contact information:  [address]  [address]  [facility]  [place] [address]  [phone]             [facility].    Specialty:  Home Health Services  Why:  Home Health  Contact information:  [phone]                 Patient Instructions:     Referral to Home health   Referral Priority: Routine Referral Type: Home Health Care   Referral Reason: Specialty Services Required    Requested Specialty: Home Health Services    Number of Visits Requested: 1      Diet Adult Regular   Order Specific Question Answer Comments   Fluid restriction: Fluid - 2000mL    Additional restrictions: Cardiac (Low Na/Chol)      Activity as tolerated     Notify your health care provider if you experience any of the following:  temperature >100.4     Notify your health care provider if you experience any of the following:  difficulty breathing or increased cough     Notify your health care provider if you experience any of the following:  severe uncontrolled pain     Notify your health care provider if you experience any of the following:  increased confusion or weakness         Significant Diagnostic Studies: Labs:   BMP:   Recent Labs  Lab 04/18/18  0514 04/19/18  0520   GLU 99 97     141   K 3.9 4.0    108   CO2 25 24   BUN 29* 31*   CREATININE 1.2 1.2   CALCIUM 8.6* 8.6*   MG 2.1 2.2    and CBC   Recent Labs  Lab 04/18/18  0514 04/19/18  0520   WBC 7.00 6.10   HGB 10.4* 10.2*   HCT 30.7* 30.5*    293       Pending Diagnostic Studies:     None         Medications:  Reconciled Home Medications:      Medication List      START taking these medications    albuterol-ipratropium 2.5mg-0.5mg/3mL 0.5 mg-3 mg(2.5 mg base)/3 mL nebulizer solution  Commonly known as:  DUO-NEB  Take 3 mLs by nebulization every 6 (six) hours. Rescue        CHANGE how you take these medications    amLODIPine 10 MG tablet  Commonly known as:  NORVASC  Take 1 tablet (10 mg total) by mouth once daily.  What changed:  · medication strength  · how much to take        CONTINUE taking these medications    amoxicillin-clavulanate 875-125mg 875-125 mg per tablet  Commonly known as:  AUGMENTIN  Take 1 tablet by mouth 2 (two) times daily.     aspirin 81 MG EC tablet  Commonly known as:  ECOTRIN  Take 81 mg by mouth once daily.     clopidogrel 75 mg tablet  Commonly known as:  PLAVIX  Take 75 mg by mouth once daily.     colchicine 0.6 mg capsule/tablet  Take 0.6 mg by mouth once daily.     furosemide 20 MG tablet  Commonly known as:  LASIX  Take 10 mg by mouth every other day.     losartan 100 MG tablet  Commonly known as:  COZAAR  Take 100 mg by mouth once daily.     lovastatin 40 MG tablet  Commonly known as:  MEVACOR  Take 40 mg by mouth every evening.     metoprolol succinate 100 MG 24 hr tablet  Commonly known as:  TOPROL-XL  Take 100 mg by mouth once daily.     pantoprazole 40 MG tablet  Commonly known as:  PROTONIX  Take 40 mg by mouth once daily.        STOP taking these medications    potassium chloride SA 10 MEQ tablet  Commonly known as:  K-DUR,KLOR-CON            Indwelling Lines/Drains at time of discharge:   Lines/Drains/Airways          No matching active lines, drains, or airways          Time spent  on the discharge of patient: 43 minutes  Patient was seen and examined on the date of discharge and determined to be suitable for discharge.         Ponce Kingston MD  Department of Hospital Medicine  [facility]

## 2018-04-23 ENCOUNTER — PATIENT OUTREACH (OUTPATIENT)
Dept: ADMINISTRATIVE | Facility: CLINIC | Age: 83
End: 2018-04-23

## 2018-04-24 ENCOUNTER — TELEPHONE (OUTPATIENT)
Dept: MEDSURG UNIT | Facility: HOSPITAL | Age: 83
End: 2018-04-24

## 2018-04-30 ENCOUNTER — OFFICE VISIT (OUTPATIENT)
Dept: PULMONOLOGY | Facility: CLINIC | Age: 83
End: 2018-04-30
Payer: MEDICARE

## 2018-04-30 VITALS
HEART RATE: 71 BPM | OXYGEN SATURATION: 95 % | BODY MASS INDEX: 25.3 KG/M2 | DIASTOLIC BLOOD PRESSURE: 72 MMHG | WEIGHT: 161.19 LBS | HEIGHT: 67 IN | SYSTOLIC BLOOD PRESSURE: 172 MMHG

## 2018-04-30 DIAGNOSIS — J44.9 MIXED TYPE COPD (CHRONIC OBSTRUCTIVE PULMONARY DISEASE): ICD-10-CM

## 2018-04-30 DIAGNOSIS — I10 ESSENTIAL HYPERTENSION: ICD-10-CM

## 2018-04-30 DIAGNOSIS — J96.11 CHRONIC RESPIRATORY FAILURE WITH HYPOXIA: ICD-10-CM

## 2018-04-30 DIAGNOSIS — I50.31 ACUTE DIASTOLIC CONGESTIVE HEART FAILURE: Primary | ICD-10-CM

## 2018-04-30 PROBLEM — J69.0 ASPIRATION PNEUMONIA OF BOTH LOWER LOBES: Status: RESOLVED | Noted: 2018-04-09 | Resolved: 2018-04-30

## 2018-04-30 PROBLEM — J96.21 ACUTE ON CHRONIC RESPIRATORY FAILURE WITH HYPOXIA AND HYPERCAPNIA: Status: RESOLVED | Noted: 2018-04-07 | Resolved: 2018-04-30

## 2018-04-30 PROBLEM — J96.22 ACUTE ON CHRONIC RESPIRATORY FAILURE WITH HYPOXIA AND HYPERCAPNIA: Status: RESOLVED | Noted: 2018-04-07 | Resolved: 2018-04-30

## 2018-04-30 PROCEDURE — 3078F DIAST BP <80 MM HG: CPT | Mod: CPTII,S$GLB,, | Performed by: INTERNAL MEDICINE

## 2018-04-30 PROCEDURE — 99999 PR PBB SHADOW E&M-EST. PATIENT-LVL IV: CPT | Mod: PBBFAC,,, | Performed by: INTERNAL MEDICINE

## 2018-04-30 PROCEDURE — 3077F SYST BP >= 140 MM HG: CPT | Mod: CPTII,S$GLB,, | Performed by: INTERNAL MEDICINE

## 2018-04-30 PROCEDURE — 99214 OFFICE O/P EST MOD 30 MIN: CPT | Mod: S$GLB,,, | Performed by: INTERNAL MEDICINE

## 2018-04-30 RX ORDER — HYDRALAZINE HYDROCHLORIDE 10 MG/1
10 TABLET, FILM COATED ORAL EVERY 12 HOURS
Qty: 60 TABLET | Refills: 1 | Status: SHIPPED | OUTPATIENT
Start: 2018-04-30 | End: 2019-06-10 | Stop reason: ALTCHOICE

## 2018-04-30 RX ORDER — ALBUTEROL SULFATE 0.83 MG/ML
2.5 SOLUTION RESPIRATORY (INHALATION) EVERY 6 HOURS PRN
Refills: 3 | COMMUNITY
Start: 2018-04-24 | End: 2019-06-10 | Stop reason: SDUPTHER

## 2018-04-30 NOTE — PROGRESS NOTES
"4/30/2018    Frederick Kong Jr.  Hospital Follow Up    Chief Complaint   Patient presents with    Hospital Follow Up    Pneumonia       HPI: pt worked [employer] , had valve surg in past and follow with Dr Gonzalez.  Pt was admitted with resp failure, extubated, went home and represented.  Had troptonin up and bnp up.  Cardiac role suspected.    Smoked 1.5 ppd for up to 30 yrs.    Still on abx.  Pt needed urology to place henderson.        The chief compliant  problem is new to me",   PFSH:  Past Medical History:   Diagnosis Date    Cancer     CHF (congestive heart failure)     Coronary artery disease     Gout     Hyperlipemia     Hypertension          Past Surgical History:   Procedure Laterality Date    APPENDECTOMY      BRAIN SURGERY  1975    meningioma removed    CARDIAC SURGERY  2001    mechanical aortic valve replacement, Dr. Sanket Sanchez    TONSILLECTOMY       Social History   Substance Use Topics    Smoking status: Former Smoker    Smokeless tobacco: Not on file    Alcohol use No     History reviewed. No pertinent family history.  Review of patient's allergies indicates:   Allergen Reactions    Iodinated contrast- oral and iv dye Rash       Performance Status:The patient's activity level is housebound activities.      Review of Systems:  a review of eleven systems covering constitutional, Eye, HEENT, Psych, Respiratory, Cardiac, GI, , Musculoskeletal, Endocrine, Dermatologic was negative except for pertinent findings as listed ABOVE and below:  pertinent positive as above, rest is good       Exam:Comprehensive exam done. BP (!) 172/72   Pulse 71   Ht 5' 7" (1.702 m)   Wt 73.1 kg (161 lb 2.5 oz)   SpO2 95%   BMI 25.24 kg/m²   Exam included Vitals as listed, and patient's appearance and affect and alertness and mood, oral exam for yeast and hygiene and pharynx lesions and Mallapatti (M) score, neck with inspection for jvd and masses and thyroid abnormalities and lymph nodes (supraclavicular " and infraclavicular nodes and axillary also examined and noted if abn), chest exam included symmetry and effort and fremitus and percussion and auscultation, cardiac exam included rhythm and gallops and murmur and rubs and jvd and edema, abdominal exam for mass and hepatosplenomegaly and tenderness and hernias and bowel sounds, Musculoskeletal exam with muscle tone and posture and mobility/gait and  strength, and skin for rashes and cyanosis and pallor and turgor, extremity for clubbing.  Findings were normal except for pertinent findings listed below:  Pleasant, no distress.    Radiographs (ct chest and cxr) reviewed: view by direct vision  cxr suggest pulm edeam- improved    Labs reviewed   - bnp up    PFT was not done       Plan:  Clinical impression is apparently straight forward and impression with management as below.    Frederick was seen today for hospital follow up and pneumonia.    Diagnoses and all orders for this visit:    Acute diastolic congestive heart failure  -     hydrALAZINE (APRESOLINE) 10 MG tablet; Take 1 tablet (10 mg total) by mouth every 12 (twelve) hours.  -     X-Ray Chest PA And Lateral; Future    Essential hypertension  -     hydrALAZINE (APRESOLINE) 10 MG tablet; Take 1 tablet (10 mg total) by mouth every 12 (twelve) hours.  -     X-Ray Chest PA And Lateral; Future    Chronic respiratory failure with hypoxia  -     X-Ray Chest PA And Lateral; Future    Mixed type COPD (chronic obstructive pulmonary disease)  -     X-Ray Chest PA And Lateral; Future  -     fluticasone-umeclidin-vilanter (TRELEGY ELLIPTA) 100-62.5-25 mcg DsDv; Inhale 1 puff into the lungs once daily.        Follow-up in about 4 weeks (around 5/28/2018), or if symptoms worsen or fail to improve.    Discussed with patient above for education the following:      Patient Instructions     Try trelegy once daily, could do breathing test if Dr Gonzalez feels heart is fine.  Would add hydralazine 10 mg twice daily  - Dr Gonzalez  may increase or stop - today 172/72 and 201/79 earlier today.  Call if Dr Gonzalez indicates need more lung therapy as would do breathing test.    Do breathing treatments as needed.     Use 02 less with time as long as good breathing and 0xygen over 90.          Results for BEN MADSEN JR. (MRN [id number]) as of 4/30/2018 14:32   Ref. Range 4/14/2018 08:40 4/14/2018 12:34 4/14/2018 12:47 4/14/2018 18:05 4/15/2018 03:26 4/15/2018 05:57 4/15/2018 07:46   Troponin I Latest Ref Range: 0.000 - 0.026 ng/mL 0.103 (H)  0.043 (H) 0.169 (H)  0.340 (H) 0.381 (H)   BNP Latest Ref Range: 0 - 99 pg/mL 1,242 (H)     269 (H)        Chest xrays looked like fluid in lung from heart-would recheck chest xray now.  The very rapid changes suggested abrupt fluid build up in lung was cause of recent problems.

## 2018-04-30 NOTE — PATIENT INSTRUCTIONS
Try trelegy once daily, could do breathing test if Dr Gonzalez feels heart is fine.  Would add hydralazine 10 mg twice daily  - Dr Gonzalez may increase or stop - today 172/72 and 201/79 earlier today.  Call if Dr Gonzalez indicates need more lung therapy as would do breathing test.    Do breathing treatments as needed.     Use 02 less with time as long as good breathing and 0xygen over 90.          Results for BEN MADSEN JR. (MRN [id number]) as of 4/30/2018 14:32   Ref. Range 4/14/2018 08:40 4/14/2018 12:34 4/14/2018 12:47 4/14/2018 18:05 4/15/2018 03:26 4/15/2018 05:57 4/15/2018 07:46   Troponin I Latest Ref Range: 0.000 - 0.026 ng/mL 0.103 (H)  0.043 (H) 0.169 (H)  0.340 (H) 0.381 (H)   BNP Latest Ref Range: 0 - 99 pg/mL 1,242 (H)     269 (H)        Chest xrays looked like fluid in lung from heart-would recheck chest xray now.  The very rapid changes suggested abrupt fluid build up in lung was cause of recent problems.

## 2018-04-30 NOTE — LETTER
April 30, 2018      Dhiraj Dempsey III, MD  [address]  [address]  [place] [address]           [place] [facility]  [address]  [place] [address]  Phone: [phone]  Fax: [phone]          Patient: Frederick Kong Jr.   MR Number: [id number]   YOB: 1935   Date of Visit: 4/30/2018       Dear Dr. Dhiraj Dempsey III:    Thank you for referring Frederick Kong to me for evaluation. Attached you will find relevant portions of my assessment and plan of care.    If you have questions, please do not hesitate to call me. I look forward to following Frederick Kong along with you.    Sincerely,    Toby Garcia MD    Enclosure  CC:  No Recipients    If you would like to receive this communication electronically, please contact [email or web address] or [phone] to request more information on [employer] access.    For providers and/or their staff who would like to refer a patient to [facility], please contact us through our one-stop-shop provider referral line, [facility], at [phone].    If you feel you have received this communication in error or would no longer like to receive these types of communications, please e-mail [email or web address]

## 2018-05-02 ENCOUNTER — CLINICAL SUPPORT (OUTPATIENT)
Dept: UROLOGY | Facility: CLINIC | Age: 83
End: 2018-05-02
Payer: MEDICARE

## 2018-05-02 NOTE — PROGRESS NOTES
Patient arrived to learn self catheterization with wife present. Watched DVD first. Materials given with 14 fr henderson catheter to demonstrate, patient inserted without any difficulty, got 200 ml yellow urine returned. Supplies given and patient has follow up appt to see the MD, patient and spouse verbally understood.

## 2018-05-15 ENCOUNTER — APPOINTMENT (OUTPATIENT)
Dept: LAB | Facility: HOSPITAL | Age: 83
End: 2018-05-15
Attending: UROLOGY
Payer: MEDICARE

## 2018-05-15 ENCOUNTER — OFFICE VISIT (OUTPATIENT)
Dept: UROLOGY | Facility: CLINIC | Age: 83
End: 2018-05-15
Payer: MEDICARE

## 2018-05-15 VITALS
DIASTOLIC BLOOD PRESSURE: 75 MMHG | HEIGHT: 67 IN | SYSTOLIC BLOOD PRESSURE: 193 MMHG | HEART RATE: 94 BPM | TEMPERATURE: 99 F | WEIGHT: 161.19 LBS | BODY MASS INDEX: 25.3 KG/M2

## 2018-05-15 DIAGNOSIS — N35.9 URETHRAL STRICTURE, UNSPECIFIED STRICTURE TYPE: Primary | ICD-10-CM

## 2018-05-15 LAB
AMORPH CRY URNS QL MICRO: ABNORMAL
BACTERIA #/AREA URNS HPF: ABNORMAL /HPF
BILIRUB UR QL STRIP: NEGATIVE
CLARITY UR: CLEAR
COLOR UR: YELLOW
GLUCOSE UR QL STRIP: NEGATIVE
HGB UR QL STRIP: ABNORMAL
KETONES UR QL STRIP: NEGATIVE
LEUKOCYTE ESTERASE UR QL STRIP: NEGATIVE
MICROSCOPIC COMMENT: ABNORMAL
NITRITE UR QL STRIP: NEGATIVE
PH UR STRIP: 6 [PH] (ref 5–8)
PROT UR QL STRIP: NEGATIVE
RBC #/AREA URNS HPF: 1 /HPF (ref 0–4)
SP GR UR STRIP: <=1.005 (ref 1–1.03)
SQUAMOUS #/AREA URNS HPF: 3 /HPF
URN SPEC COLLECT METH UR: ABNORMAL
UROBILINOGEN UR STRIP-ACNC: NEGATIVE EU/DL
WBC #/AREA URNS HPF: 1 /HPF (ref 0–5)

## 2018-05-15 PROCEDURE — 3077F SYST BP >= 140 MM HG: CPT | Mod: CPTII,S$GLB,, | Performed by: UROLOGY

## 2018-05-15 PROCEDURE — 81000 URINALYSIS NONAUTO W/SCOPE: CPT

## 2018-05-15 PROCEDURE — 99999 PR PBB SHADOW E&M-EST. PATIENT-LVL IV: CPT | Mod: PBBFAC,,, | Performed by: UROLOGY

## 2018-05-15 PROCEDURE — 99214 OFFICE O/P EST MOD 30 MIN: CPT | Mod: 25,S$GLB,, | Performed by: UROLOGY

## 2018-05-15 PROCEDURE — 87086 URINE CULTURE/COLONY COUNT: CPT

## 2018-05-15 PROCEDURE — 3078F DIAST BP <80 MM HG: CPT | Mod: CPTII,S$GLB,, | Performed by: UROLOGY

## 2018-05-15 PROCEDURE — 51702 INSERT TEMP BLADDER CATH: CPT | Mod: S$GLB,,, | Performed by: UROLOGY

## 2018-05-15 RX ORDER — POTASSIUM CHLORIDE 750 MG/1
10 TABLET, EXTENDED RELEASE ORAL EVERY OTHER DAY
Refills: 1 | COMMUNITY
Start: 2018-04-30

## 2018-05-15 NOTE — PROGRESS NOTES
[facility] Progress Note - Ne  Urology Group: Rosalino/Saroj/Gary/Ursula  PCP: [facility]    Chief Complaint: difficult henderson, urethral stricture    Subjective:        HPI: Frederick Kong Jr. is a 82 y.o. male presents with     Urethral stricture   -initially seen by me on 4/7/18 in consult in the hospital as they had difficult placing henderson. He was a previous pt of  and had not seen him for 5 years. Had a h/o greenlight laser turp for bph. Denied any sx prior to admission. When they tried to place henderson they could not and so I did a bedside urethral dilation over wire for suspected scar tissue. Eventually underwent voiding trial and learned CIC on 5/2/18.   -they have been using the 14fr red rubber straight catheters about twice a week without much difficulty. Minimal residual.   -AUA ssx:(0 incomplete emptying, 1 frequency, 4 intermittency, 1 urgency, 0 weak stream, 0 straining, 2 sleeping). 7. QOL: pleased       IIEF:    REVIEW OF SYSTEMS:  General ROS: no fevers, no chills  Psychological ROS: no depression  Endocrine ROS: no heat or cold  Respiratory ROS: no SOB  Cardiovascular ROS: no CP  Gastrointestinal ROS: no abdominal pain, no constipation, no diarrhea, non BRBPR  Musculoskeletal ROS: no muscle pain  Neurological ROS: no headaches  Dermatological ROS: no rashes  HEENT: +glasses, no sinus   ROS: per HPI     The past medical, surgical, social and family hx were reviewed. There have not been any changes.   Cataracts? 1 removed    Urologic meds: none  Anticoagulation: Yes - plavix and stents 2001, asa 81mg    Objective:     Vitals:    05/15/18 1126   BP: (!) 193/75   Pulse: 94   Temp: 98.5 °F (36.9 °C)       General:WDWN in NAD  Eyes: PERRLA, normal conjunctiva  Respiratory: No increased work on breathing.   Cardiovascular: No obvious extremity edema. Warm and well perfused.   GI: palpation of masses. No tenderness. No hepatosplenomegaly to  palpation.  Musculoskeletal: normal range of motion of bilateral upper extremities. Normal muscle strength and tone.  Skin: no obvious rashes or lesions. No tightening of skin noted.  Neurologic: CN grossly normal. Normal sensation.   Psychiatric: awake, alert and oriented x 3. Mood and affect normal. Cooperative.     today:  Penis is circumcised,    DUC: 45g gland without masses, tenderness. SV not palpable. Normal sphincter tone. No hemhorroids.    They have been catheterizing with 14fr coude red rubber in and out cath  In and out cath attempted 18fr coude silicone but could not pass. Then used 16fr coude and this did pass but started bleeding and definitely met resistance.   Decided to place 16fr coude indwelling henderson to allow urethra to heal around this. 10cc in balloon. Bladder irrigated to confirm it was in correct location.       Urinalysis cath: sent for ua and culture (traumatic)  Ua/cx:  4/14/18          Ng, cath: 3+blood/1+leuk  4/9/18            Ng, cath: 3+blood, no leuk  4/8/18            ng    Labs:  cr  4/25/18 .7    psa  5/23/11 1.7    Path:   none    Rads:   none    Assessment:       1. Urethral stricture, unspecified stricture type        Plan:     They have been catheterizing with 14fr coude red rubber in and out cath  In and out cath attempted 18fr coude silicone but could not pass. Then used 16fr coude and this did pass but started bleeding and definitely met resistance.     Decided to place 16fr coude indwelling henderson to allow urethra to heal around this.    They will remove henderson at home by cutting across catheter at 6am on Friday.   Return Tuesday morning at 845am to do CIC by me with 16fr coude silicone. They will bring the 14fr red rubbers.  We need to make sure they are using 16fr from now on bc of urethral stricture.    F/u next Tuesday to do CIC with me.  If catheter clogs up or starts leaking around catheter call clinic. If bloody drink lots of water or come to clinic or ER if in  pain.

## 2018-05-15 NOTE — PATIENT INSTRUCTIONS
They have been catheterizing with 14fr coude red rubber in and out cath  In and out cath attempted 18fr coude silicone but could not pass. Then used 16fr coude and this did pass but started bleeding and definitely met resistance.     Decided to place 16fr coude indwelling henderson to allow urethra to heal around this.    They will remove henderson at home by cutting across catheter at 6am on Friday.   Return Tuesday morning at 845am to do CIC by me with 16fr coude silicone. They will bring the 14fr red rubbers.  We need to make sure they are using 16fr from now on bc of urethral stricture.    F/u next Tuesday to do CIC with me.  If catheter clogs up or starts leaking around catheter call clinic. If bloody drink lots of water or come to clinic or ER if in pain.

## 2018-05-17 ENCOUNTER — TELEPHONE (OUTPATIENT)
Dept: UROLOGY | Facility: CLINIC | Age: 83
End: 2018-05-17

## 2018-05-17 LAB — BACTERIA UR CULT: NO GROWTH

## 2018-05-17 NOTE — TELEPHONE ENCOUNTER
----- Message from Daniellebest Castillo sent at 5/17/2018  9:28 AM CDT -----  Please call [facility] in regards to catheter.  Please call [facility] at [phone].

## 2018-05-17 NOTE — TELEPHONE ENCOUNTER
Spoke with home health she states patient's wife doesn't feel comfortable removing catheter tomorrow morning. Home health will go back out tomorrow morning at 8am to remove the patient's catheter. Patient will keep follow up Tuesday with  to learn CIC

## 2018-05-22 ENCOUNTER — OFFICE VISIT (OUTPATIENT)
Dept: UROLOGY | Facility: CLINIC | Age: 83
End: 2018-05-22
Payer: MEDICARE

## 2018-05-22 VITALS
HEIGHT: 67 IN | WEIGHT: 154.44 LBS | DIASTOLIC BLOOD PRESSURE: 66 MMHG | BODY MASS INDEX: 24.24 KG/M2 | SYSTOLIC BLOOD PRESSURE: 161 MMHG | HEART RATE: 83 BPM

## 2018-05-22 DIAGNOSIS — N35.9 URETHRAL STRICTURE, UNSPECIFIED STRICTURE TYPE: Primary | ICD-10-CM

## 2018-05-22 DIAGNOSIS — N35.919 URETHRAL STRICTURE: ICD-10-CM

## 2018-05-22 LAB
BILIRUB SERPL-MCNC: ABNORMAL MG/DL
BLOOD URINE, POC: ABNORMAL
COLOR, POC UA: ABNORMAL
GLUCOSE UR QL STRIP: ABNORMAL
KETONES UR QL STRIP: ABNORMAL
LEUKOCYTE ESTERASE URINE, POC: ABNORMAL
NITRITE, POC UA: ABNORMAL
PH, POC UA: 5
PROTEIN, POC: ABNORMAL
SPECIFIC GRAVITY, POC UA: 1015
UROBILINOGEN, POC UA: ABNORMAL

## 2018-05-22 PROCEDURE — 99214 OFFICE O/P EST MOD 30 MIN: CPT | Mod: 25,S$GLB,, | Performed by: UROLOGY

## 2018-05-22 PROCEDURE — 99999 PR PBB SHADOW E&M-EST. PATIENT-LVL V: CPT | Mod: PBBFAC,,, | Performed by: UROLOGY

## 2018-05-22 PROCEDURE — 3078F DIAST BP <80 MM HG: CPT | Mod: CPTII,S$GLB,, | Performed by: UROLOGY

## 2018-05-22 PROCEDURE — 3077F SYST BP >= 140 MM HG: CPT | Mod: CPTII,S$GLB,, | Performed by: UROLOGY

## 2018-05-22 PROCEDURE — 81002 URINALYSIS NONAUTO W/O SCOPE: CPT | Mod: S$GLB,,, | Performed by: UROLOGY

## 2018-05-22 NOTE — PATIENT INSTRUCTIONS
Urethral stricture  Drink plenty of water   Antibiotic today x 1 - already given  Return for a uroflow and pvr - nurse visit  Cystoscopy at the [facility] (ambulatory surgical center on Monday June 11th)  -urine sample in clinic 1 week beforehand  -stop plavix if ok with  3d prior  -ok to continue to ASA.   -will likely dilate urethra then and have another catheter and goal will be to catheterize with 16fr after this as they were dilating with 14fr      Will have pt return on nurse visit for a uroflow (to check the urine stream flow rate- how fast he is urinating) and post void residual/PVR (to check how much urine is left in his bladder after he voids to ensure he is emptying).  Test is inaccurate with less than 150 milliliters in bladder so come with FULL bladder or bring fluid and drink in waiting room and tell nurse when ready to do test.

## 2018-05-22 NOTE — PROGRESS NOTES
[facility] Progress Note - Ne  Urology Group: Rosalino/Saroj/Gary/Ursula  PCP: [facility]    Chief Complaint: difficult henderson, urethral stricture    Subjective:        HPI: Frederick Kong Jr. is a 82 y.o. male presents with     Urethral stricture   -initially seen by me on 4/7/18 in consult in the hospital as they had difficult placing henderson. He was a previous pt of  and had not seen him for 5 years. Had a h/o greenlight laser turp for bph. Denied any sx prior to admission. When they tried to place henderson they could not and so I did a bedside urethral dilation over wire for suspected scar tissue. Eventually underwent voiding trial and learned CIC on 5/2/18.   -they have been using the 14fr red rubber straight catheters about twice a week without much difficulty. Minimal residual. When I saw him on 5/15/18 we attempted 16fr silicone but it bled it a little and had some resistance so I left a henderson. And they had it removed by HH a few days later.   -he came today to learn CIC with 16fr in and out     -AUA ssx:(0 incomplete emptying, 1 frequency, 4 intermittency, 1 urgency, 0 weak stream, 0 straining, 2 sleeping). 7. QOL: pleased       IIEF:    REVIEW OF SYSTEMS:  General ROS: no fevers, no chills  Psychological ROS: no depression  Endocrine ROS: no heat or cold  Respiratory ROS: no SOB  Cardiovascular ROS: no CP  Gastrointestinal ROS: no abdominal pain, no constipation, no diarrhea, non BRBPR  Musculoskeletal ROS: no muscle pain  Neurological ROS: no headaches  Dermatological ROS: no rashes  HEENT: +glasses, no sinus   ROS: per HPI     The past medical, surgical, social and family hx were reviewed. There have not been any changes.   Cataracts? 1 removed    Urologic meds: none  Anticoagulation: Yes - plavix and stents 2001, asa 81mg    Objective:     Vitals:    05/22/18 0853   BP: (!) 161/66   Pulse: 83       General:WDWN in NAD  Eyes: PERRLA, normal  conjunctiva  Respiratory: No increased work on breathing.   Cardiovascular: No obvious extremity edema. Warm and well perfused.   GI: palpation of masses. No tenderness. No hepatosplenomegaly to palpation.  Musculoskeletal: normal range of motion of bilateral upper extremities. Normal muscle strength and tone.  Skin: no obvious rashes or lesions. No tightening of skin noted.  Neurologic: CN grossly normal. Normal sensation.   Psychiatric: awake, alert and oriented x 3. Mood and affect normal. Cooperative.     5/15/18:  Penis is circumcised,    DUC: 45g gland without masses, tenderness. SV not palpable. Normal sphincter tone. No hemhorroids.    Bedside catheterization today:  Placed 16fr silicone coude and did not advance easily  Then tried 14fr and 12fr and still did not advance easily and large volume of blood noted. I filled the catheter through the 12fr when I hubbed it but he started c/o pain.   Had pt leave and ensure he could void and empty and then return with urine sample to see how bloody urine sample was.       Urinalysis cath: sent for ua and culture (traumatic)  Ua/cx:  4/14/18          Ng, cath: 3+blood/1+leuk  4/9/18            Ng, cath: 3+blood, no leuk  4/8/18            ng    Labs:  cr  4/25/18 .7    psa  5/23/11 1.7    Path:   none    Rads:   none    Assessment:       1. Urethral stricture, unspecified stricture type    2. Urethral stricture        Plan:     Attempted 16fr, 14fr (silicone and red rubber) and 12fr (silicone) coude catheters today and could not advance easily despite catheter being removed only just a few days ago. Prior to his visit on 5/15/18 he was catheterizing at home with 14fr coude red rubber in and out twice a week without a problem. I had them leave today and return to ensure urine was not too bloody and he could void without difficulty. Urine was red but clearing with each void when he returned. Wife gave him 1 abx based on my rec.     Because I cannot place a catheter  and I am concerned that he will need to be admitted for an emergent admission requiring a catheter I would like to make sure he does not have a stricture in a non-emergent setting. I will set him up for a cysto and possible urethral dilation awake in 2 weeks at the [facility] (poor candidate for general anesthesia-increased confusion).    He is seeing  today and will ask permission to stop plavix a few days prior and ok to stay on ASA. He will give a urine sample a week prior as well and perform a uroflow and pvr before this too. We will dilate if necessary and then plan to proceed with 16fr coude red rubber  in and out if easy after this but if not then just montoya 14 fr red rubber coude in and out as this is what was working for him. Did explain this could be painful. They were counseled to return to ER if he has difficulty urinating or fever.  Also if is admitted to not have anyone place a henderson.     Drink plenty of water   Antibiotic today x 1 - already given  Return for a uroflow and pvr - nurse visit  Cystoscopy at the [facility] (ambulatory surgical center on Monday June 11th)  -gent 80mg IMx 1 prior  -urine sample in clinic 1 week beforehand  -stop plavix if ok with  3d prior  -ok to continue to ASA.   -will likely dilate urethra then and have another catheter and goal will be to catheterize with 16fr after this as they were dilating with 14fr    Consider repeat psa at some point but DUC was negative and pt is 82    I spent 40 minutes with the patient of which more than half was spent in direct consultation with the patient in regards to our treatment and plan.

## 2018-05-30 ENCOUNTER — TELEPHONE (OUTPATIENT)
Dept: UROLOGY | Facility: CLINIC | Age: 83
End: 2018-05-30

## 2018-05-30 NOTE — TELEPHONE ENCOUNTER
Clearance received from marquis rios for pt to stop plavix 5 days before cysto and possible dilation on 6/11/18  Please let pt know

## 2018-05-31 ENCOUNTER — CLINICAL SUPPORT (OUTPATIENT)
Dept: UROLOGY | Facility: CLINIC | Age: 83
End: 2018-05-31
Payer: MEDICARE

## 2018-05-31 DIAGNOSIS — N35.9 URETHRAL STRICTURE, UNSPECIFIED STRICTURE TYPE: Primary | ICD-10-CM

## 2018-05-31 PROCEDURE — 51741 ELECTRO-UROFLOWMETRY FIRST: CPT | Mod: S$GLB,,, | Performed by: UROLOGY

## 2018-05-31 PROCEDURE — 99499 UNLISTED E&M SERVICE: CPT | Mod: S$GLB,,, | Performed by: UROLOGY

## 2018-05-31 NOTE — PROGRESS NOTES
Per  patient in clinic today for uroflow and pvr.  Uroflow results (date: 05/31/2018) with urethral stricture  : Voiding time: 45.3s, Flow time: 41.7s, TTP flow: 12.0s, Peak flowrate: 10.3 mL/s, Average flowrate: 5.0mL/s, Intervals: 3,  Voided volume: 209 mL,  Pvr by bladder scan: 118. Pattern of curve:bell with multiple decreasing spikes

## 2018-06-03 LAB
AORTIC ROOT ANNULUS: 2.91 CM
AORTIC VALVE CUSP SEPERATION: 1.95 CM
AV MEAN GRADIENT: 4.65 MMHG
AV VALVE AREA: 1.64 CM2
BSA FOR ECHO PROCEDURE: 1.87 M2
CV ECHO LV RWT: 0.35 CM
DOP CALC AO PEAK VEL: 1.33 M/S
DOP CALC AO VTI: 0.31 CM
DOP CALC LVOT AREA: 3.17 CM2
DOP CALC LVOT DIAMETER: 2.01 CM
DOP CALC LVOT STROKE VOLUME: 0.51 CM3
DOP CALCLVOT PEAK VEL VTI: 0.16 CM
E WAVE DECELERATION TIME: 282.2 MSEC
E/A RATIO: 0.83
E/E' RATIO: 0.17
ECHO LV POSTERIOR WALL: 0.92 CM (ref 0.6–1.1)
FRACTIONAL SHORTENING: 14 % (ref 28–44)
INTERVENTRICULAR SEPTUM: 0.88 CM (ref 0.6–1.1)
IVRT: 0.12 MSEC
LEFT ATRIUM SIZE: 3.51 CM
LEFT INTERNAL DIMENSION IN SYSTOLE: 4.44 CM (ref 2.1–4)
LEFT VENTRICLE MASS INDEX: 89.8 G/M2
LEFT VENTRICULAR INTERNAL DIMENSION IN DIASTOLE: 5.18 CM (ref 3.5–6)
LEFT VENTRICULAR MASS: 167.9 G
LV LATERAL E/E' RATIO: 0.19
LV SEPTAL E/E' RATIO: 0.15
MV PEAK A VEL: 0.93 M/S
MV PEAK E VEL: 0.77 M/S
MV STENOSIS PRESSURE HALF TIME: 81.84 MS
MV VALVE AREA P 1/2 METHOD: 2.69 CM2
PISA TR MAX VEL: 2.45 M/S
PV PEAK GRADIENT: 3.2 MMHG
PV PEAK VELOCITY: 0.89 CM/S
RIGHT VENTRICULAR END-DIASTOLIC DIMENSION: 2.98 CM
TDI LATERAL: 4
TDI SEPTAL: 5
TDI: 4.5
TR MAX PG: 24.01 MMHG

## 2018-06-04 ENCOUNTER — CLINICAL SUPPORT (OUTPATIENT)
Dept: UROLOGY | Facility: CLINIC | Age: 83
End: 2018-06-04
Payer: MEDICARE

## 2018-06-04 ENCOUNTER — APPOINTMENT (OUTPATIENT)
Dept: LAB | Facility: HOSPITAL | Age: 83
End: 2018-06-04
Attending: UROLOGY
Payer: MEDICARE

## 2018-06-04 DIAGNOSIS — R82.998 CELLS AND CASTS IN URINE: Primary | ICD-10-CM

## 2018-06-04 DIAGNOSIS — R82.998 CELLS AND CASTS IN URINE: ICD-10-CM

## 2018-06-04 LAB
BACTERIA #/AREA URNS HPF: NORMAL /HPF
BILIRUB UR QL STRIP: NEGATIVE
CLARITY UR: ABNORMAL
COLOR UR: YELLOW
GLUCOSE UR QL STRIP: NEGATIVE
HGB UR QL STRIP: NEGATIVE
HYALINE CASTS #/AREA URNS LPF: 0 /LPF
KETONES UR QL STRIP: NEGATIVE
LEUKOCYTE ESTERASE UR QL STRIP: NEGATIVE
MICROSCOPIC COMMENT: NORMAL
NITRITE UR QL STRIP: NEGATIVE
PH UR STRIP: 6 [PH] (ref 5–8)
PROT UR QL STRIP: ABNORMAL
RBC #/AREA URNS HPF: 0 /HPF (ref 0–4)
SP GR UR STRIP: 1.02 (ref 1–1.03)
URN SPEC COLLECT METH UR: ABNORMAL
UROBILINOGEN UR STRIP-ACNC: NEGATIVE EU/DL
WBC #/AREA URNS HPF: 2 /HPF (ref 0–5)

## 2018-06-04 PROCEDURE — 99999 PR PBB SHADOW E&M-EST. PATIENT-LVL I: CPT | Mod: PBBFAC,,,

## 2018-06-04 PROCEDURE — 87086 URINE CULTURE/COLONY COUNT: CPT

## 2018-06-04 PROCEDURE — 81000 URINALYSIS NONAUTO W/SCOPE: CPT

## 2018-06-04 PROCEDURE — 99499 UNLISTED E&M SERVICE: CPT | Mod: S$GLB,,, | Performed by: UROLOGY

## 2018-06-05 LAB — BACTERIA UR CULT: NO GROWTH

## 2018-06-11 ENCOUNTER — SURGERY (OUTPATIENT)
Age: 83
End: 2018-06-11

## 2018-06-11 ENCOUNTER — HOSPITAL ENCOUNTER (OUTPATIENT)
Facility: AMBULARY SURGERY CENTER | Age: 83
Discharge: HOME | End: 2018-06-11
Attending: UROLOGY | Admitting: UROLOGY
Payer: MEDICARE

## 2018-06-11 DIAGNOSIS — N32.0 BLADDER NECK CONTRACTURE: Primary | ICD-10-CM

## 2018-06-11 DIAGNOSIS — N35.9 URETHRAL STRICTURE, UNSPECIFIED STRICTURE TYPE: ICD-10-CM

## 2018-06-11 DIAGNOSIS — N35.919 URETHRAL STRICTURE: ICD-10-CM

## 2018-06-11 LAB
BACTERIA SPEC CULT: NORMAL
BILIRUB SERPL-MCNC: NORMAL MG/DL
BLOOD URINE, POC: NORMAL
CASTS: NORMAL
COLOR, POC UA: NORMAL
CRYSTALS: NORMAL
GLUCOSE UR QL STRIP: NORMAL
KETONES UR QL STRIP: NORMAL
LEUKOCYTE ESTERASE URINE, POC: NORMAL
NITRITE, POC UA: NORMAL
PH, POC UA: 5
PROTEIN, POC: NORMAL
RBC CELLS COUNTED: NORMAL
SPECIFIC GRAVITY, POC UA: 1.01
UROBILINOGEN, POC UA: NORMAL
WHITE BLOOD CELLS: NORMAL

## 2018-06-11 PROCEDURE — 52000 CYSTOURETHROSCOPY: CPT | Performed by: UROLOGY

## 2018-06-11 PROCEDURE — 52000 CYSTOURETHROSCOPY: CPT | Mod: ,,, | Performed by: UROLOGY

## 2018-06-11 RX ORDER — GENTAMICIN SULFATE 40 MG/ML
INJECTION, SOLUTION INTRAMUSCULAR; INTRAVENOUS
Status: DISCONTINUED
Start: 2018-06-11 | End: 2018-06-11 | Stop reason: HOSPADM

## 2018-06-11 RX ORDER — LIDOCAINE HYDROCHLORIDE 20 MG/ML
JELLY TOPICAL
Status: DISCONTINUED | OUTPATIENT
Start: 2018-06-11 | End: 2018-06-11 | Stop reason: HOSPADM

## 2018-06-11 RX ORDER — CIPROFLOXACIN 500 MG/1
500 TABLET ORAL ONCE
Status: DISCONTINUED | OUTPATIENT
Start: 2018-06-11 | End: 2018-06-11

## 2018-06-11 RX ORDER — GENTAMICIN SULFATE 40 MG/ML
80 INJECTION, SOLUTION INTRAMUSCULAR; INTRAVENOUS ONCE
Status: DISCONTINUED | OUTPATIENT
Start: 2018-06-11 | End: 2018-06-11 | Stop reason: HOSPADM

## 2018-06-11 RX ORDER — GENTAMICIN SULFATE 40 MG/ML
INJECTION, SOLUTION INTRAMUSCULAR; INTRAVENOUS
Status: DISCONTINUED | OUTPATIENT
Start: 2018-06-11 | End: 2018-06-11 | Stop reason: HOSPADM

## 2018-06-11 RX ORDER — WATER 1000 ML/1000ML
INJECTION, SOLUTION INTRAVENOUS
Status: DISCONTINUED | OUTPATIENT
Start: 2018-06-11 | End: 2018-06-11 | Stop reason: HOSPADM

## 2018-06-11 RX ORDER — LIDOCAINE HYDROCHLORIDE 20 MG/ML
JELLY TOPICAL
Status: DISCONTINUED
Start: 2018-06-11 | End: 2018-06-11 | Stop reason: HOSPADM

## 2018-06-11 RX ADMIN — GENTAMICIN SULFATE 80 MG: 40 INJECTION, SOLUTION INTRAMUSCULAR; INTRAVENOUS at 04:06

## 2018-06-11 RX ADMIN — LIDOCAINE HYDROCHLORIDE 5 ML: 20 JELLY TOPICAL at 04:06

## 2018-06-11 RX ADMIN — WATER 500 ML: 1000 INJECTION, SOLUTION INTRAVENOUS at 04:06

## 2018-06-11 NOTE — H&P
[facility] H&P Note - [place]  Urology Group: Rosalino/Saroj/Gary/Ursula  PCP: [facility]: inactive     Chief Complaint: difficult henderson, urethral stricture     Subjective:        HPI: Frederick Kong Jr. is a 82 y.o. male presents with      Urethral stricture   -initially seen by me on 4/7/18 in consult in the hospital as they had difficult placing henderson. He was a previous pt of  and had not seen him for 5 years. Had a h/o greenlight laser turp for bph. Denied any sx prior to admission. When they tried to place henderson they could not and so I did a bedside urethral dilation over wire for suspected scar tissue. Eventually underwent voiding trial and learned CIC on 5/2/18.   -they have been using the 14fr red rubber straight catheters about twice a week without much difficulty. Minimal residual. When I saw him on 5/15/18 we attempted 16fr silicone but it bled it a little and had some resistance so I left a henderson. And they had it removed by HH a few days later.   -attempted to catheterize on 5/22/18 but could not place 16fr  Uroflow results (date: 05/31/2018) with urethral stricture  : Voiding time: 45.3s, Flow time: 41.7s, TTP flow: 12.0s, Peak flowrate: 10.3 mL/s, Average flowrate: 5.0mL/s, Intervals: 3,  Voided volume: 209 mL,  Pvr by bladder scan: 118. Pattern of curve:bell with multiple decreasing spikes     -AUA ssx:(0 incomplete emptying, 1 frequency, 4 intermittency, 1 urgency, 0 weak stream, 0 straining, 2 sleeping). 7. QOL: pleased     Here today for possible urethral dilation        IIEF:     REVIEW OF SYSTEMS:  General ROS: no fevers, no chills  Psychological ROS: no depression  Endocrine ROS: no heat or cold  Respiratory ROS: no SOB  Cardiovascular ROS: no CP  Gastrointestinal ROS: no abdominal pain, no constipation, no diarrhea, non BRBPR  Musculoskeletal ROS: no muscle pain  Neurological ROS: no headaches  Dermatological ROS: no rashes  HEENT: +glasses,  no sinus   ROS: per HPI     The past medical, surgical, social and family hx were reviewed. There have not been any changes.   Cataracts? 1 removed     Urologic meds: none  Anticoagulation: Yes - plavix and stents 2001, asa 81mg     Objective:      Vitals:    06/11/18 1513   BP: (!) 170/81   Pulse: 72   Resp: 18   Temp: 98.1 °F (36.7 °C)       General:WDWN in NAD  Eyes: PERRLA, normal conjunctiva  Respiratory: No increased work on breathing.   Cardiovascular: No obvious extremity edema. Warm and well perfused.   GI: palpation of masses. No tenderness. No hepatosplenomegaly to palpation.  Musculoskeletal: normal range of motion of bilateral upper extremities. Normal muscle strength and tone.  Skin: no obvious rashes or lesions. No tightening of skin noted.  Neurologic: CN grossly normal. Normal sensation.   Psychiatric: awake, alert and oriented x 3. Mood and affect normal. Cooperative.      5/15/18:  Penis is circumcised,    DUC: 45g gland without masses, tenderness. SV not palpable. Normal sphincter tone. No hemhorroids.     Bedside catheterization 5/22/18  Placed 16fr silicone coude and did not advance easily  Then tried 14fr and 12fr and still did not advance easily and large volume of blood noted. I filled the catheter through the 12fr when I hubbed it but he started c/o pain.   Had pt leave and ensure he could void and empty and then return with urine sample to see how bloody urine sample was.         Urinalysis today: negative  Ua/cx:  6/4/18  Ng, void: 1+ protein  5/22/18 No cx, void: 1+ protein/tr blood  5/15/18 Ng, void: 3+ blood  4/14/18          Ng, cath: 3+blood/1+leuk  4/9/18            Ng, cath: 3+blood, no leuk  4/8/18            ng     Labs:  cr  4/25/18            .7     psa  5/23/11            1.7     Path:   none     Rads:   none     Assessment:       1. Urethral stricture, unspecified stricture type    2. Urethral stricture        Plan:      Attempted 16fr, 14fr (silicone and red rubber) and  12fr (silicone) coude catheters today and could not advance easily despite catheter being removed only just a few days ago. Prior to his visit on 5/15/18 he was catheterizing at home with 14fr coude red rubber in and out twice a week without a problem. I had them leave today and return to ensure urine was not too bloody and he could void without difficulty. Urine was red but clearing with each void when he returned. Wife gave him 1 abx based on my rec.      Because I could not place a catheter and I was concerned that he will need to be admitted for an emergent admission requiring a catheter I would like to make sure he does not have a stricture in a non-emergent setting. I will set him up for a cysto and possible urethral dilation awake in 2 weeks at the [facility] (poor candidate for general anesthesia-increased confusion).     He is saw  today and asked permission to stop plavix a few days prior and ok to stay on ASA. He will give a urine sample a week prior as well and perform a uroflow and pvr before this too. We will dilate if necessary and then plan to proceed with 16fr coude red rubber  in and out if easy after this but if not then just montoya 14 fr red rubber coude in and out as this is what was working for him. Did explain this could be painful. They were counseled to return to ER if he has difficulty urinating or fever.  Also if is admitted to not have anyone place a henderson.     Cystoscopy at the [facility] (ambulatory surgical center on Monday June 11th) today   -gent 80mg IMx 1 prior  -stop plavix if ok with  3d prior  -ok to continue to ASA.   -will likely dilate urethra then and have another catheter and goal will be to catheterize with 16fr after this as they were dilating with 14fr     Consider repeat psa at some point but DUC was negative and pt is 82     This patient has been cleared for surgery in ambulatory surgical facility.

## 2018-06-11 NOTE — DISCHARGE SUMMARY
[facility]  Urology  Discharge Note - Short Stay      Patient Name: Frederick Kong Jr.  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    Hospital Course: Patient was admitted for an outpatient procedure and tolerated the procedure well with no complications.*    Procedure(s) (LRB):  CYSTOSCOPY and possible urethral dilation (N/A)     Indwelling Lines/Drains at time of discharge:   Lines/Drains/Airways          No matching active lines, drains, or airways          Discharged Condition: good    Disposition: home    Follow Up:      Patient Instructions:   No discharge procedures on file.    Medications:  Reconciled Home Medications:      Medication List      CONTINUE taking these medications    albuterol 2.5 mg /3 mL (0.083 %) nebulizer solution  Commonly known as:  PROVENTIL  Take 2.5 mg by nebulization every 6 (six) hours as needed.     albuterol-ipratropium 2.5 mg-0.5 mg/3 mL nebulizer solution  Commonly known as:  DUO-NEB  Take 3 mLs by nebulization every 6 (six) hours. Rescue     amLODIPine 10 MG tablet  Commonly known as:  NORVASC  Take 1 tablet (10 mg total) by mouth once daily.     aspirin 81 MG EC tablet  Commonly known as:  ECOTRIN  Take 81 mg by mouth once daily.     clopidogrel 75 mg tablet  Commonly known as:  PLAVIX  Take 75 mg by mouth once daily.     colchicine 0.6 mg capsule/tablet  Take 0.6 mg by mouth once daily.     fluticasone-umeclidin-vilanter 100-62.5-25 mcg Dsdv  Commonly known as:  TRELEGY ELLIPTA  Inhale 1 puff into the lungs once daily.     furosemide 20 MG tablet  Commonly  known as:  LASIX  Take 10 mg by mouth every other day.     hydrALAZINE 10 MG tablet  Commonly known as:  APRESOLINE  Take 1 tablet (10 mg total) by mouth every 12 (twelve) hours.     losartan 100 MG tablet  Commonly known as:  COZAAR  Take 100 mg by mouth once daily.     lovastatin 40 MG tablet  Commonly known as:  MEVACOR  Take 40 mg by mouth every evening.     metoprolol succinate 100 MG 24 hr tablet  Commonly known as:  TOPROL-XL  Take 100 mg by mouth once daily.     pantoprazole 40 MG tablet  Commonly known as:  PROTONIX  Take 40 mg by mouth once daily.     potassium chloride SA 10 MEQ tablet  Commonly known as:  K-DUR,KLOR-CON  Take 10 mEq by mouth every other day.            Discharge Procedure Orders (must include Diet, Follow-up, Activity):  No discharge procedures on file.         Kimmie Jerry MD  Urology  [facility]

## 2018-06-11 NOTE — DISCHARGE INSTRUCTIONS
Cystoscopy    Cystoscopy is a procedure that lets your doctor look directly inside your urethra and bladder. It can be used to:  · Help diagnose a problem with your urethra, bladder, or kidneys.  · Take a sample (biopsy) of bladder or urethral tissue.  · Treat certain problems (such as removing kidney stones).  · Place a stent to bypass an obstruction.  · Take special X-rays of the kidneys.  Based on the findings, your doctor may recommend other tests or treatments.  What is a cystoscope?  A cystoscope is a telescope-like instrument that contains lenses and fiberoptics (small glass wires that make bright light). The cystoscope may be straight and rigid, or flexible to bend around curves in the urethra. The doctor may look directly into the cystoscope, or project the image onto a monitor.  Getting ready  · Ask your doctor if you should stop taking any medicines before the procedure.  · Ask whether you should avoid eating or drinking anything after midnight before the procedure.  · Follow any other instructions your doctor gives you.  Tell your doctor before the exam if you:  · Take any medicines, such as aspirin or blood thinners  · Have allergies to any medicines  · Are pregnant   The procedure  Cystoscopy is done in the doctors office, surgery center, or hospital. The doctor and a nurse are present during the procedure. It takes only a few minutes, longer if a biopsy, X-ray, or treatment needs to be done.  During the procedure:  · You lie on an exam table on your back, knees bent and legs apart. You are covered with a drape.  · Your urethra and the area around it are washed. Anesthetic jelly may be applied to numb the urethra. Other pain medicine is usually not needed. In some cases, you may be offered a mild sedative to help you relax. If a more extensive procedure is to be done, such as a biopsy or kidney stone removal, general anesthesia may be needed.  · The cystoscope is inserted. A sterile fluid is put  into the bladder to expand it. You may feel pressure from this fluid.  · When the procedure is done, the cystoscope is removed.  After the procedure  If you had a sedative, general anesthesia, or spinal anesthesia, you must have someone drive you home. Once youre home:  · Drink plenty of fluids.  · You may have burning or light bleeding when you urinate--this is normal.  · Medicines may be prescribed to ease any discomfort or prevent infection. Take these as directed.  · Call your doctor if you have heavy bleeding or blood clots, burning that lasts more than a day, a fever over 100°F  (38° C), or trouble urinating.  Date Last Reviewed: 1/1/2017  © 3282-8036 [employer]. [address] [place] [address], [place] [address]. All rights reserved. This information is not intended as a substitute for professional medical care. Always follow your healthcare professional's instructions.

## 2018-06-11 NOTE — OP NOTE
[facility] [place] Procedure Note  Date: 06/11/2018    Procedures: Flexible cystourethroscopy    Pre Procedure Diagnosis:urethral stricture    Post Procedure Diagnosis: bladder neck  contracture    Surgeon: Kimmie Jerry MD    UA: negative    Specimen: none    Anesthesia: 2% uro-jet lidocaine jelly for local analgesia    Flexible cysto-urethroscopy was performed after consent was obtained.  The risks and benefits were explained.    2% lidocaine urojet was used for local analgesia.  The genitalia was prepped and draped in the sterile fashion with betadine. .    The flexible scope was advanced into the urethra and into the bladder.  Bilateral ureteral orifice were evaluated and noted to be normal with clear efflux.  The bladder was completely surveyed in a systematic fashion and the cytoscope was retroflex.  No strictures were noted.  The prostate showed moderate bilobar hypertrophy with high riding bladder neck with bladder neck contracture. Areas of previous false passage seen in posterior prostate. Able to pass 16fr cystoscope with some resistance but did pass.   I then withdrew the scope.    I placed a 14fr coude in and out catheter which went easily.  I then placed a 16fr coude in and out catheter which went easily but did meet some resistance when removed      The patient tolerated the procedure well without complication.    HPI: Frederick Kong Jr. is a 83 y.o. male who presents for cystoscopy today for suspected urethral stricture    Findings: (pictures were uploaded into media)  1. The prostate showed moderate bilobar hypertrophy with high riding bladder neck with bladder neck contracture. Areas of previous false passage seen in posterior prostate.  2. Otherwise no urethral stricture seen.     Plan:  F/u nurse visit to learn cic with 16fr coude in and out cath.  Every 2-3 days for now, then 1x a week  F/u with me in 3 months   Restart all blood thinners today

## 2018-06-11 NOTE — PLAN OF CARE
Pt states ready to go home , stable, tolerating fluids. Denies pain. Ambulated to car accompanied by spouse. Home w/ spouse.

## 2018-06-12 ENCOUNTER — TELEPHONE (OUTPATIENT)
Dept: UROLOGY | Facility: CLINIC | Age: 83
End: 2018-06-12

## 2018-06-12 VITALS
WEIGHT: 154 LBS | HEIGHT: 67 IN | OXYGEN SATURATION: 92 % | RESPIRATION RATE: 18 BRPM | DIASTOLIC BLOOD PRESSURE: 73 MMHG | SYSTOLIC BLOOD PRESSURE: 171 MMHG | TEMPERATURE: 99 F | HEART RATE: 73 BPM | BODY MASS INDEX: 24.17 KG/M2

## 2018-06-12 NOTE — TELEPHONE ENCOUNTER
----- Message from Kimmie Jerry MD sent at 6/11/2018  4:55 PM CDT -----  F/u nurse visit to learn cic with 16fr coude in and out cath.  Every 2-3 days for now, then 1x a week  F/u with me in 3 months

## 2018-06-18 ENCOUNTER — TELEPHONE (OUTPATIENT)
Dept: UROLOGY | Facility: CLINIC | Age: 83
End: 2018-06-18

## 2018-06-18 NOTE — TELEPHONE ENCOUNTER
----- Message from Radha Cole sent at 6/18/2018 11:01 AM CDT -----  Contact: Wife Cristin  Requesting a nurse appt to learn how to insert cath.  Call back at [phone].  Thank you!

## 2018-07-05 ENCOUNTER — CLINICAL SUPPORT (OUTPATIENT)
Dept: UROLOGY | Facility: CLINIC | Age: 83
End: 2018-07-05
Payer: MEDICARE

## 2018-07-05 DIAGNOSIS — N35.9 URETHRAL STRICTURE, UNSPECIFIED STRICTURE TYPE: Primary | ICD-10-CM

## 2018-07-05 PROCEDURE — 51701 INSERT BLADDER CATHETER: CPT | Mod: S$GLB,,, | Performed by: UROLOGY

## 2018-07-05 NOTE — PROGRESS NOTES
Fauzia unable to place catheter about a month ago. Last catheter was on may 22nd, 6 weeks ago by me.     Able to place 14fr coude lubricated lofric catheter. Did meet resistances as if popping through stricture but when I tried to replace again did not go easily with other catehters. Residual <50cc.    14fr straight and 12fr coude did not go easily.   Bloody tip with clot after 14fr coude placed although drained some initially  Will hold off on family learning/performing CIC for now and return at least 1x a month on doctor or nurse visits to keep stricture open.     Return in 4 weeks for cic with 14fr coude lofric lubricated catheter by me doctors visit.   augmentin x1 at home

## 2018-08-03 ENCOUNTER — OFFICE VISIT (OUTPATIENT)
Dept: UROLOGY | Facility: CLINIC | Age: 83
End: 2018-08-03
Payer: MEDICARE

## 2018-08-03 VITALS
HEIGHT: 67 IN | SYSTOLIC BLOOD PRESSURE: 175 MMHG | DIASTOLIC BLOOD PRESSURE: 67 MMHG | TEMPERATURE: 98 F | BODY MASS INDEX: 24.71 KG/M2 | HEART RATE: 67 BPM | WEIGHT: 157.44 LBS

## 2018-08-03 DIAGNOSIS — N32.0 BLADDER NECK CONTRACTURE: Primary | ICD-10-CM

## 2018-08-03 PROCEDURE — 81002 URINALYSIS NONAUTO W/O SCOPE: CPT | Mod: S$GLB,,, | Performed by: UROLOGY

## 2018-08-03 PROCEDURE — 51700 IRRIGATION OF BLADDER: CPT | Mod: S$GLB,,, | Performed by: UROLOGY

## 2018-08-03 PROCEDURE — 3078F DIAST BP <80 MM HG: CPT | Mod: CPTII,S$GLB,, | Performed by: UROLOGY

## 2018-08-03 PROCEDURE — 3077F SYST BP >= 140 MM HG: CPT | Mod: CPTII,S$GLB,, | Performed by: UROLOGY

## 2018-08-03 PROCEDURE — 99214 OFFICE O/P EST MOD 30 MIN: CPT | Mod: 25,S$GLB,, | Performed by: UROLOGY

## 2018-08-03 PROCEDURE — 99999 PR PBB SHADOW E&M-EST. PATIENT-LVL IV: CPT | Mod: PBBFAC,,, | Performed by: UROLOGY

## 2018-08-03 RX ORDER — CIPROFLOXACIN 500 MG/1
500 TABLET ORAL 2 TIMES DAILY
Qty: 14 TABLET | Refills: 0 | Status: SHIPPED | OUTPATIENT
Start: 2018-08-03 | End: 2018-08-13

## 2018-08-03 NOTE — PATIENT INSTRUCTIONS
14 fr lucricated coude lofric would not go today however I was able to place 16fr orange tip coude telemex catheter.    Will continue to do this intermittently. Don't want to do this bc of blood.  F/u in sept for another catheter - 16fr coude orange tip telemex catheter  cipro 500 x 1 after each catheter (today), written for 20 today     ER for fever withi difficulty urinating or fever  Drinks lots of water

## 2018-08-03 NOTE — PROGRESS NOTES
[facility] Progress Note - [place]  Urology Group: Rosalino/Saroj/Gary/Ursula  PCP: Clinton Sharp [facility]: inactive    Chief Complaint: difficult henderson, urethral stricture    Subjective:        HPI: Frederick Kong Jr. is a 83 y.o. male presents with     Bladder neck contacture/bphe  -initially seen by me on 4/7/18 in consult in the hospital as they had difficult placing henderson. He was a previous pt of  and had not seen him for 5 years. Had a h/o greenlight laser turp for bph. Denied any sx prior to admission. When they tried to place henderson they could not and so I did a bedside urethral dilation over wire for suspected scar tissue. Eventually underwent voiding trial and learned CIC on 5/2/18.   -they have been using the 14fr red rubber straight catheters about twice a week without much difficulty. Minimal residual. When I saw him on 5/15/18 we attempted 16fr silicone but it bled it a little and had some resistance so I left a henderson. And they had it removed by HH a few days later.   -attempted to catheterize on 5/22/18 but could not place 16fr. Could never teach cic but could not place henderson.   -Uroflow results (date: 05/31/2018) with urethral stricture  : Voiding time: 45.3s, Flow time: 41.7s, TTP flow: 12.0s, Peak flowrate: 10.3 mL/s, Average flowrate: 5.0mL/s, Intervals: 3,  Voided volume: 209 mL,  Pvr by bladder scan: 118. Pattern of curve:bell with multiple decreasing spikes  -cystoscopy at asc to eval stricture on 6/11/18 showed moderate bilobar hypertrophy with high riding bladder neck with bladder neck contracture. Areas of previous false passage seen in posterior prostate. Otherwise no urethral stricture seen. Plan was to learn cic  -able to place 14fr coude lubricated lofric catheter but felt like I was popping through tissue on 7/5/18. Could not pass more catheters after this. Here for anothercatheter today. Decided not to do CI  -he's been urinating normally with ok  stream for the past month. Has nocturia (early morning waking).     Otherwise no complaints.     In and out 16fr coude lofric lubricated catheter placed. Went easily but did not file like it was going into bladder. Irrigated 40cc and again tried to advance but unalbe to advanced. Then tried 16fr orange tip coude catheter ([employer])-harder tip and this went into bladder and some urine with blood clots drained (pt on plavix).        REVIEW OF SYSTEMS:  General ROS: no fevers, no chills  Psychological ROS: no depression  Endocrine ROS: no heat or cold  Respiratory ROS: no SOB  Cardiovascular ROS: no CP  Gastrointestinal ROS: no abdominal pain, no constipation, no diarrhea, non BRBPR  Musculoskeletal ROS: no muscle pain  Neurological ROS: no headaches  Dermatological ROS: no rashes  HEENT: +glasses, no sinus   ROS: per HPI     The past medical, surgical, social and family hx were reviewed. There have not been any changes.   Cataracts? 1 removed    Urologic meds: none  Anticoagulation: Yes - plavix and stents 2001, asa 81mg    Objective:     Vitals:    08/03/18 1041   BP: (!) 175/67   Pulse: 67   Temp: 98.1 °F (36.7 °C)       General:WDWN in NAD  Eyes: PERRLA, normal conjunctiva  Respiratory: No increased work on breathing.   Cardiovascular: No obvious extremity edema. Warm and well perfused.   GI: palpation of masses. No tenderness. No hepatosplenomegaly to palpation.  Musculoskeletal: normal range of motion of bilateral upper extremities. Normal muscle strength and tone.  Skin: no obvious rashes or lesions. No tightening of skin noted.  Neurologic: CN grossly normal. Normal sensation.   Psychiatric: awake, alert and oriented x 3. Mood and affect normal. Cooperative.     5/15/18:  Penis is circumcised,    DUC: 45g gland without masses, tenderness. SV not palpable. Normal sphincter tone. No hemhorroids.    Bedside catheterization today:    In and out 16fr coude lofric lubricated catheter placed. Went easily but did  not file like it was going into bladder. Irrigated 40cc and again tried to advance but unalbe to advanced. Then tried 16fr orange tip coude catheter ([employer])-harder tip and this went into bladder and some urine with blood clots drained (pt on plavix).          Urinalysis void: 1.015/5/remainder neg  Ua/cx:  6/4/18              Ng, void: 1+ protein  5/22/18            No cx, void: 1+ protein/tr blood  5/15/18            Ng, void: 3+ blood  4/14/18          Ng, cath: 3+blood/1+leuk  4/9/18            Ng, cath: 3+blood, no leuk  4/8/18            ng    Labs:  cr  4/25/18 .7    psa  5/23/11 1.7 (DUC 5/15/18 45g without nodules)    Path:   none    Rads:   none    Assessment:       1. Bladder neck contracture        Plan:     14 fr lucricated coude lofric would not go today however I was able to place 16fr orange tip coude telemex catheter.    Will continue to do this intermittently. Don't want to do this bc of blood.  F/u in sept for another catheter - 16fr coude orange tip telemex catheter  cipro 500 x 1 after each catheter (today), written for 20 today     ER for fever withi difficulty urinating or fever  Drinks lots of water

## 2018-09-18 ENCOUNTER — PATIENT MESSAGE (OUTPATIENT)
Dept: UROLOGY | Facility: CLINIC | Age: 83
End: 2018-09-18

## 2018-09-18 ENCOUNTER — APPOINTMENT (OUTPATIENT)
Dept: LAB | Facility: HOSPITAL | Age: 83
End: 2018-09-18
Attending: UROLOGY
Payer: MEDICARE

## 2018-09-18 ENCOUNTER — OFFICE VISIT (OUTPATIENT)
Dept: UROLOGY | Facility: CLINIC | Age: 83
End: 2018-09-18
Payer: MEDICARE

## 2018-09-18 VITALS
RESPIRATION RATE: 16 BRPM | BODY MASS INDEX: 24.36 KG/M2 | TEMPERATURE: 98 F | HEART RATE: 73 BPM | DIASTOLIC BLOOD PRESSURE: 63 MMHG | WEIGHT: 155.19 LBS | HEIGHT: 67 IN | SYSTOLIC BLOOD PRESSURE: 182 MMHG

## 2018-09-18 DIAGNOSIS — N35.9 URETHRAL STRICTURE, UNSPECIFIED STRICTURE TYPE: Primary | ICD-10-CM

## 2018-09-18 LAB
BACTERIA #/AREA URNS HPF: ABNORMAL /HPF
BILIRUB SERPL-MCNC: ABNORMAL MG/DL
BILIRUB UR QL STRIP: NEGATIVE
BLOOD URINE, POC: ABNORMAL
CLARITY UR: CLEAR
COLOR UR: YELLOW
COLOR, POC UA: YELLOW
GLUCOSE UR QL STRIP: ABNORMAL
GLUCOSE UR QL STRIP: NEGATIVE
HGB UR QL STRIP: ABNORMAL
KETONES UR QL STRIP: ABNORMAL
KETONES UR QL STRIP: NEGATIVE
LEUKOCYTE ESTERASE UR QL STRIP: NEGATIVE
LEUKOCYTE ESTERASE URINE, POC: ABNORMAL
MICROSCOPIC COMMENT: ABNORMAL
NITRITE UR QL STRIP: NEGATIVE
NITRITE, POC UA: ABNORMAL
PH UR STRIP: 6 [PH] (ref 5–8)
PH, POC UA: 5
PROT UR QL STRIP: ABNORMAL
PROTEIN, POC: 30
RBC #/AREA URNS HPF: 40 /HPF (ref 0–4)
SP GR UR STRIP: 1.01 (ref 1–1.03)
SPECIFIC GRAVITY, POC UA: 1.02
URN SPEC COLLECT METH UR: ABNORMAL
UROBILINOGEN UR STRIP-ACNC: NEGATIVE EU/DL
UROBILINOGEN, POC UA: ABNORMAL
WBC #/AREA URNS HPF: 5 /HPF (ref 0–5)

## 2018-09-18 PROCEDURE — 87086 URINE CULTURE/COLONY COUNT: CPT

## 2018-09-18 PROCEDURE — 99214 OFFICE O/P EST MOD 30 MIN: CPT | Mod: S$PBB,,, | Performed by: UROLOGY

## 2018-09-18 PROCEDURE — 81000 URINALYSIS NONAUTO W/SCOPE: CPT

## 2018-09-18 PROCEDURE — 3078F DIAST BP <80 MM HG: CPT | Mod: CPTII,,, | Performed by: UROLOGY

## 2018-09-18 PROCEDURE — 1101F PT FALLS ASSESS-DOCD LE1/YR: CPT | Mod: CPTII,,, | Performed by: UROLOGY

## 2018-09-18 PROCEDURE — 99999 PR PBB SHADOW E&M-EST. PATIENT-LVL V: CPT | Mod: PBBFAC,,, | Performed by: UROLOGY

## 2018-09-18 PROCEDURE — 81002 URINALYSIS NONAUTO W/O SCOPE: CPT | Mod: PBBFAC,PN | Performed by: UROLOGY

## 2018-09-18 PROCEDURE — 3077F SYST BP >= 140 MM HG: CPT | Mod: CPTII,,, | Performed by: UROLOGY

## 2018-09-18 PROCEDURE — 51701 INSERT BLADDER CATHETER: CPT | Mod: PBBFAC,PN | Performed by: UROLOGY

## 2018-09-18 PROCEDURE — 99215 OFFICE O/P EST HI 40 MIN: CPT | Mod: PBBFAC,PN,25 | Performed by: UROLOGY

## 2018-09-18 NOTE — PROGRESS NOTES
[facility] Progress Note - [place]  Urology Group: Rosalino/Saroj/Gary/Ursula  PCP: Clinton Sharp [facility]: inactive    Chief Complaint: difficult henderson, urethral stricture    Subjective:        HPI: Frederick Kong Jr. is a 83 y.o. male presents with     Bladder neck contacture/bphe  -initially seen by me on 4/7/18 in consult in the hospital as they had difficult placing henderson. He was a previous pt of  and had not seen him for 5 years. Had a h/o greenlight laser turp for bph. Denied any sx prior to admission. When they tried to place henderson they could not and so I did a bedside urethral dilation over wire for suspected scar tissue. Eventually underwent voiding trial and learned CIC on 5/2/18.   -they have been using the 14fr red rubber straight catheters about twice a week without much difficulty. Minimal residual. When I saw him on 5/15/18 we attempted 16fr silicone but it bled it a little and had some resistance so I left a henderson. And they had it removed by HH a few days later.   -attempted to catheterize on 5/22/18 but could not place 16fr. Could never teach cic but could not place henderson.   -Uroflow results (date: 05/31/2018) with urethral stricture  : Voiding time: 45.3s, Flow time: 41.7s, TTP flow: 12.0s, Peak flowrate: 10.3 mL/s, Average flowrate: 5.0mL/s, Intervals: 3,  Voided volume: 209 mL,  Pvr by bladder scan: 118. Pattern of curve:bell with multiple decreasing spikes  -cystoscopy at asc to eval stricture on 6/11/18 showed moderate bilobar hypertrophy with high riding bladder neck with bladder neck contracture. Areas of previous false passage seen in posterior prostate. Otherwise no urethral stricture seen. Plan was to learn cic  -able to place 14fr coude lubricated lofric catheter but felt like I was popping through tissue on 7/5/18. Could not pass more catheters after this. Here for anothercatheter today. Decided not to do CI  -seen 8/3/18 and could only place  16fr orange tip coude catheter. Could not advance lofric. he's been urinating normally with ok stream     Here today for catheter placement.   Bedside catheterization today - see above  16fr orange tip ginna catheter did not go easily   Then did a 16fr clear coude tip which also did not go, bloody tip  Then tried the 16fr orange tip ginna and this went all the way to bladder.  Then taught wife how to do it and she demonstrated ability to do so       REVIEW OF SYSTEMS:  General ROS: no fevers, no chills  Psychological ROS: no depression  Endocrine ROS: no heat or cold  Respiratory ROS: no SOB  Cardiovascular ROS: no CP  Gastrointestinal ROS: no abdominal pain, no constipation, no diarrhea, non BRBPR  Musculoskeletal ROS: no muscle pain  Neurological ROS: no headaches  Dermatological ROS: no rashes  HEENT: +glasses, no sinus   ROS: per HPI     The past medical, surgical, social and family hx were reviewed. There have not been any changes.   Cataracts? 1 removed    Urologic meds: none  Anticoagulation: Yes - plavix and stents 2001, asa 81mg    Objective:     Vitals:    09/18/18 0940   BP: (!) 182/63   Pulse: 73   Resp: 16   Temp: 97.7 °F (36.5 °C)       General:WDWN in NAD  Eyes: PERRLA, normal conjunctiva  Respiratory: No increased work on breathing.   Cardiovascular: No obvious extremity edema. Warm and well perfused.   GI: palpation of masses. No tenderness. No hepatosplenomegaly to palpation.  Musculoskeletal: normal range of motion of bilateral upper extremities. Normal muscle strength and tone.  Skin: no obvious rashes or lesions. No tightening of skin noted.  Neurologic: CN grossly normal. Normal sensation.   Psychiatric: awake, alert and oriented x 3. Mood and affect normal. Cooperative.     5/15/18:  Penis is circumcised,    DUC 5/18/18: 45g gland without masses, tenderness. SV not palpable. Normal sphincter tone. No hemhorroids.    Bedside catheterization today - see above          Urinalysis void (circ):  1.025/5/tr leuk/30 prot- no sx of uti  Ua/cx:  6/4/18              Ng, void: 1+ protein  5/22/18            No cx, void: 1+ protein/tr blood  5/15/18            Ng, void: 3+ blood  4/14/18          Ng, cath: 3+blood/1+leuk  4/9/18            Ng, cath: 3+blood, no leuk  4/8/18            ng    Labs:  BMP  Lab Results   Component Value Date     08/27/2018    K 5.1 (H) 08/27/2018     08/27/2018    CO2 30.9 08/27/2018    BUN 32 (H) 08/27/2018    CREATININE 1.24 08/27/2018    CALCIUM 8.8 08/27/2018    ANIONGAP 9 04/19/2018    ESTGFRAFRICA 55 (L) 04/25/2018    EGFRNONAA 48 (L) 04/25/2018     Lab Results   Component Value Date    WBC 6.10 04/19/2018    HGB 10.2 (L) 04/19/2018    HCT 30.5 (L) 04/19/2018    MCV 88 04/19/2018     04/19/2018       psa  5/23/11 1.7 (DUC 5/15/18 45g without nodules)    Path:   none    Rads:   none    Assessment:       1. Urethral stricture, unspecified stricture type        Plan:     Urethral stricture  cipro 500 x 1 after each catheter (today), written for 20 last visit  16fr orange tip coude went after some difficulty. retaught wife how to do this. ADVANCE ALL THE WAY INTO THE BLADDER. She will contact me tonight to let me know how it went.   She will do this tonight then once daily for a week  Then every other day for a week  Then every 3rd day for a week and so on until once weekly  Given about 20+ orange tip catheters. If she needs more will contact me for script.  pics uploaded to media of type of catheter used  Cath urine for ua and culture- will not treat unless he calls having symptoms    Otherwise follow-up in 3 months visit with me or sooner as a nurse visit if having difficulty.     ER for fever withi difficulty urinating or fever  Drinks lots of water    The natural history of prostate cancer and ongoing controversy regarding screening and potential treatment outcomes of prostate cancer has been discussed with the patient. The meaning of a false positive PSA and a  false negative PSA has been discussed. He indicates understanding of the limitations of this screening test and wishes not to proceed with screening PSA testing.  -last psa checked in 2011 when he was 75 and was 1.7  -recent DUC 5/18 was negative  -denies any unintentional weight loss

## 2018-09-18 NOTE — PATIENT INSTRUCTIONS
cipro 500 x 1 after each catheter (today), written for 20 last visit  16fr orange tip coude went after some difficulty. retaught wife how to do this. ADVANCE ALL THE WAY INTO THE BLADDER. She will contact me tonight to let me know how it went.   She will do this tonight then once daily for a week  Then every other day for a week  Then every 3rd day for a week and so on until once weekly  Given about 20+ orange tip catheters. If she needs more will contact me for script.  pics uploaded to media of type of catheter used  Call us if he thinks he has a uti and will treat if culture is positive.     Otherwise follow-up in 3 months visit with me or sooner as a nurse visit if having difficulty.     ER for fever withi difficulty urinating or fever  Drinks lots of water

## 2018-09-20 LAB — BACTERIA UR CULT: NO GROWTH

## 2018-12-04 ENCOUNTER — TELEPHONE (OUTPATIENT)
Dept: PULMONOLOGY | Facility: CLINIC | Age: 83
End: 2018-12-04

## 2018-12-04 NOTE — TELEPHONE ENCOUNTER
Contacted pt scheduled appt for 12/7 with NP. No further action needed.   ----- Message from Gene Zegn sent at 12/4/2018 11:31 AM CST -----  Contact: Wife   Type:  Sooner Apoointment Request    Caller is requesting a sooner appointment.  Caller declined first available appointment listed below.  Caller will not accept being placed on the waitlist and is requesting a message be sent to doctor.    Name of Caller: Wife Cristin   When is the first available appointment? February   Symptoms: hosp f/u 1 week   Best Call Back Number: [phone] (home)

## 2018-12-07 ENCOUNTER — OFFICE VISIT (OUTPATIENT)
Dept: PULMONOLOGY | Facility: CLINIC | Age: 83
End: 2018-12-07
Payer: MEDICARE

## 2018-12-07 VITALS
HEIGHT: 67 IN | BODY MASS INDEX: 26.16 KG/M2 | HEART RATE: 61 BPM | DIASTOLIC BLOOD PRESSURE: 53 MMHG | WEIGHT: 166.69 LBS | OXYGEN SATURATION: 94 % | SYSTOLIC BLOOD PRESSURE: 151 MMHG

## 2018-12-07 DIAGNOSIS — J96.11 CHRONIC RESPIRATORY FAILURE WITH HYPOXIA: ICD-10-CM

## 2018-12-07 DIAGNOSIS — J44.1 CHRONIC OBSTRUCTIVE PULMONARY DISEASE WITH ACUTE EXACERBATION: Primary | ICD-10-CM

## 2018-12-07 PROCEDURE — 3077F SYST BP >= 140 MM HG: CPT | Mod: CPTII,S$GLB,, | Performed by: NURSE PRACTITIONER

## 2018-12-07 PROCEDURE — 1101F PT FALLS ASSESS-DOCD LE1/YR: CPT | Mod: CPTII,S$GLB,, | Performed by: NURSE PRACTITIONER

## 2018-12-07 PROCEDURE — 3078F DIAST BP <80 MM HG: CPT | Mod: CPTII,S$GLB,, | Performed by: NURSE PRACTITIONER

## 2018-12-07 PROCEDURE — 99999 PR PBB SHADOW E&M-EST. PATIENT-LVL III: CPT | Mod: PBBFAC,,, | Performed by: NURSE PRACTITIONER

## 2018-12-07 PROCEDURE — 99213 OFFICE O/P EST LOW 20 MIN: CPT | Mod: S$GLB,,, | Performed by: NURSE PRACTITIONER

## 2018-12-07 RX ORDER — MONTELUKAST SODIUM 10 MG/1
10 TABLET ORAL NIGHTLY
Qty: 30 TABLET | Refills: 0 | Status: SHIPPED | OUTPATIENT
Start: 2018-12-07 | End: 2019-01-14 | Stop reason: SDUPTHER

## 2018-12-07 RX ORDER — FLUTICASONE PROPIONATE 50 MCG
2 SPRAY, SUSPENSION (ML) NASAL DAILY
Qty: 1 BOTTLE | Refills: 2 | Status: SHIPPED | OUTPATIENT
Start: 2018-12-07 | End: 2019-06-10 | Stop reason: SDUPTHER

## 2018-12-07 RX ORDER — PREDNISONE 20 MG/1
TABLET ORAL
Qty: 9 TABLET | Refills: 0 | Status: SHIPPED | OUTPATIENT
Start: 2018-12-07 | End: 2019-03-08 | Stop reason: SDUPTHER

## 2018-12-07 RX ORDER — DOXYCYCLINE 100 MG/1
100 CAPSULE ORAL EVERY 12 HOURS
Qty: 10 CAPSULE | Refills: 0 | Status: SHIPPED | OUTPATIENT
Start: 2018-12-07 | End: 2018-12-12

## 2018-12-07 NOTE — PROGRESS NOTES
12/7/2018    Frederick Kong Jr.  Hospital Follow Up: COPD exacerbation, URI    Chief Complaint   Patient presents with    Hospital Follow Up     1 week       HPI:12/7/18- Discharged [facility] 11/30/18 COPD exacerbation, upper respiratory infection. Started as sinus infection tx outpatient turned into SOB requiring hospitalization. Feeling better today, Cough daily- worse in AM clear color. Tx post discharge Doxycyline and prednisone 40 mg a day for 5 days.  States allergies a problem for years, has frequent sinus drainage    4/30/18-HPI: pt worked [employer] , had valve surg in past and follow with Dr Gonzalez.  Pt was admitted with resp failure, extubated, went home and represented.  Had troptonin up and bnp up.  Cardiac role suspected.     Smoked 1.5 ppd for up to 30 yrs.     Still on abx.  Pt needed urology to place henderson.          The chief compliant  problem is new to me  PFSH:  Past Medical History:   Diagnosis Date    Cancer     CHF (congestive heart failure)     Coronary artery disease     Gout     Hyperlipemia     Hypertension          Past Surgical History:   Procedure Laterality Date    APPENDECTOMY      BRAIN SURGERY  1975    meningioma removed    CARDIAC SURGERY  2001    mechanical aortic valve replacement, Dr. Sanket Sanchez    CYSTOSCOPY N/A 6/11/2018    Procedure: CYSTOSCOPY and possible urethral dilation;  Surgeon: Kimmie Jerry MD;  Location: [facility] OR;  Service: Urology;  Laterality: N/A;    CYSTOSCOPY and possible urethral dilation N/A 6/11/2018    Performed by Kimmie Jerry MD at [facility] OR    TONSILLECTOMY       Social History     Tobacco Use    Smoking status: Former Smoker   Substance Use Topics    Alcohol use: No    Drug use: Not on file     History reviewed. No pertinent family history.  Review of patient's allergies indicates:   Allergen Reactions    Iodinated contrast- oral and iv dye Rash     I have reviewed past medical, family, and social history. I have  "reviewed previous nurse notes.    Performance Status:The patient's activity level is functions out of house.          Review of Systems   Constitutional: Negative for activity change, appetite change, chills, diaphoresis, fatigue, fever and unexpected weight change.   HENT: Negative for dental problem, postnasal drip, rhinorrhea, sinus pressure, sinus pain, sneezing, sore throat, trouble swallowing and voice change.  Respiratory: Negative for apnea, , chest tightness,  wheezing and stridor.  Positive for cough and shortness of breath with physical therapy    Cardiovascular: Negative for chest pain, palpitations and leg swelling.   Gastrointestinal: Negative for abdominal distention, abdominal pain, constipation and nausea.   Musculoskeletal: Negative for gait problem, myalgias and neck pain.   Skin: Negative for color change and pallor.   Allergic/Immunologic: Negative for environmental allergies and food allergies.   Neurological: Negative for dizziness, speech difficulty, weakness, light-headedness, numbness and headaches.   Hematological: Negative for adenopathy. Does not bruise/bleed easily.   Psychiatric/Behavioral: Negative for dysphoric mood and sleep disturbance. The patient is not nervous/anxious.           Exam:Comprehensive exam done. BP (!) 151/53 (BP Location: Left arm, Patient Position: Sitting)   Pulse 61   Ht 5' 7" (1.702 m)   Wt 75.6 kg (166 lb 10.7 oz)   SpO2 (!) 94% Comment: on room air  BMI 26.10 kg/m²   Exam included Vitals as listed  Constitutional: He is oriented to person, place, and time. He appears well-developed. No distress.   Nose: Nose normal.   Mouth/Throat: Uvula is midline, oropharynx is clear and moist and mucous membranes are normal. No dental caries. No oropharyngeal exudate, posterior oropharyngeal edema, posterior oropharyngeal erythema or tonsillar abscesses.  Mallapatti (M) score 3  Eyes: Pupils are equal, round, and reactive to light.   Neck: No JVD present. No " thyromegaly present.   Cardiovascular: Normal rate, regular rhythm and normal heart sounds. Exam reveals no gallop and no friction rub.   No murmur heard.  Pulmonary/Chest: Effort normal and breath sounds normal. No accessory muscle usage or stridor. No apnea and no tachypnea. No respiratory distress, decreased breath sounds, wheezes, rhonchi, rales, or tenderness.   Abdominal: Soft. He exhibits no mass. There is no tenderness. No hepatosplenomegaly, hernias and normoactive bowel sounds  Musculoskeletal: Normal range of motion. exhibits no edema.   Lymphadenopathy:     He has no cervical adenopathy.     He has no axillary adenopathy.   Neurological:  alert and oriented to person, place, and time. not disoriented.   Skin: Skin is warm and dry. Capillary refill takes less 2 sec. No cyanosis or erythema. No pallor. Nails show no clubbing.   Psychiatric: normal mood and affect. behavior is normal. Judgment and thought content normal.       Radiographs (ct chest and cxr) reviewed: results reviewed   4/17/18 Chest X-ray shows pulmonary emphysema    Labs reviewed       Lab Results   Component Value Date    WBC 6.10 04/19/2018    RBC 3.45 (L) 04/19/2018    HGB 10.2 (L) 04/19/2018    HCT 30.5 (L) 04/19/2018    MCV 88 04/19/2018    MCH 29.5 04/19/2018    MCHC 33.4 04/19/2018    RDW 14.7 (H) 04/19/2018     04/19/2018    MPV 7.7 (L) 04/19/2018    GRAN 4.1 04/19/2018    GRAN 66.5 04/19/2018    LYMPH 1.2 04/19/2018    LYMPH 20.1 04/19/2018    MONO 0.6 04/19/2018    MONO 10.0 04/19/2018    EOS 0.2 04/19/2018    BASO 0.00 04/19/2018    EOSINOPHIL 3.1 04/19/2018    BASOPHIL 0.3 04/19/2018       PFT was not done          Plan:  Clinical impression is resonably certain and repeated evaluation prn +/- follow up will be needed as below.    Frederick was seen today for hospital follow up.    Diagnoses and all orders for this visit:    Chronic obstructive pulmonary disease with acute exacerbation  -     montelukast (SINGULAIR) 10 mg  tablet; Take 1 tablet (10 mg total) by mouth every evening.  -     fluticasone (FLONASE) 50 mcg/actuation nasal spray; 2 sprays (100 mcg total) by Each Nare route once daily.  -     predniSONE (DELTASONE) 20 MG tablet; Take one pill a day for three days, repeat for shortness of breath  -     doxycycline (MONODOX) 100 MG capsule; Take 1 capsule (100 mg total) by mouth every 12 (twelve) hours. for 5 days    Chronic respiratory failure with hypoxia        Follow-up in about 3 months (around 3/7/2019), or if symptoms worsen or fail to improve.    Discussed with patient above for education the following:      Patient Instructions   Continue taking Trelegy daily  Start taking Singulair pill at night to help with allergies   Flonase 2 sprays each nostril once a day, point away from nasal septum (middle of nose)    When your shortness of breath worsens   Take 1 20mg of prednisone a day for three days  Use Duo-Neb nebulizer medication every 6 hours    When your sputum turns yellow/green in color   Take Doxycycline 100 mg twice a day for 5 days    Continue Oxygen at night, use during day if needed

## 2018-12-07 NOTE — PATIENT INSTRUCTIONS
Continue taking Trelegy daily  Start taking Singulair pill at night to help with allergies   Flonase 2 sprays each nostril once a day, point away from nasal septum (middle of nose)    When your shortness of breath worsens   Take 1 20mg of prednisone a day for three days  Use Duo-Neb nebulizer medication every 6 hours    When your sputum turns yellow/green in color   Take Doxycycline 100 mg twice a day for 5 days    Continue Oxygen at night, use during day if needed

## 2018-12-20 ENCOUNTER — TELEPHONE (OUTPATIENT)
Dept: UROLOGY | Facility: CLINIC | Age: 83
End: 2018-12-20

## 2018-12-21 ENCOUNTER — OFFICE VISIT (OUTPATIENT)
Dept: UROLOGY | Facility: CLINIC | Age: 83
End: 2018-12-21
Payer: MEDICARE

## 2018-12-21 VITALS
SYSTOLIC BLOOD PRESSURE: 166 MMHG | BODY MASS INDEX: 26.16 KG/M2 | DIASTOLIC BLOOD PRESSURE: 94 MMHG | WEIGHT: 166.69 LBS | HEIGHT: 67 IN

## 2018-12-21 DIAGNOSIS — N40.1 BPH WITH OBSTRUCTION/LOWER URINARY TRACT SYMPTOMS: ICD-10-CM

## 2018-12-21 DIAGNOSIS — N13.8 BPH WITH OBSTRUCTION/LOWER URINARY TRACT SYMPTOMS: ICD-10-CM

## 2018-12-21 DIAGNOSIS — Z87.448 H/O URETHRAL STRICTURE: Primary | ICD-10-CM

## 2018-12-21 LAB
BILIRUB SERPL-MCNC: ABNORMAL MG/DL
BLOOD URINE, POC: ABNORMAL
COLOR, POC UA: YELLOW
GLUCOSE UR QL STRIP: ABNORMAL
KETONES UR QL STRIP: ABNORMAL
LEUKOCYTE ESTERASE URINE, POC: ABNORMAL
NITRITE, POC UA: ABNORMAL
PH, POC UA: 7
PROTEIN, POC: 300
SPECIFIC GRAVITY, POC UA: 1.02
UROBILINOGEN, POC UA: 1

## 2018-12-21 PROCEDURE — 81002 URINALYSIS NONAUTO W/O SCOPE: CPT | Mod: PBBFAC,PN | Performed by: UROLOGY

## 2018-12-21 PROCEDURE — 99214 OFFICE O/P EST MOD 30 MIN: CPT | Mod: PBBFAC,PN | Performed by: UROLOGY

## 2018-12-21 PROCEDURE — 99999 PR PBB SHADOW E&M-EST. PATIENT-LVL IV: CPT | Mod: PBBFAC,,, | Performed by: UROLOGY

## 2018-12-21 PROCEDURE — 99213 OFFICE O/P EST LOW 20 MIN: CPT | Mod: S$GLB,,, | Performed by: UROLOGY

## 2018-12-21 RX ORDER — TAMSULOSIN HYDROCHLORIDE 0.4 MG/1
0.4 CAPSULE ORAL
Qty: 90 CAPSULE | Refills: 3 | Status: SHIPPED | OUTPATIENT
Start: 2018-12-21 | End: 2020-01-01

## 2018-12-21 NOTE — PATIENT INSTRUCTIONS
Take Flomax/Tamsulosin 0.4mg nightly after dinner . Side effects include Lightheadedness when standing- be careful when going from sitting to standing because this medicine can cause a drop in blood pressure, stand slowly and usually better to take medicine at night and retrograde ejaculation (you may notice decreased or no ejaculate with orgasm). Why? To help relax prostate so that you will urinate with an improved flow.    Continue to catheterize every 2 weeks    Urethral stricture  Continue catheterization 16cc orange tip ginna catheter every 2 weeks  Doing well with this     bph with lower urinary tract symptoms  -had b lobe hypertrophy with  High riding bladder neck  -consider flomax but has to take at night after dinner and bp meds in the morning  -given info on bph    F/u 1 month with np to discuss bph flomax sx  F/u 6 months with me and then if doing well will change to yearly

## 2018-12-21 NOTE — PROGRESS NOTES
[facility] Progress Note - [place]  Urology Group: Rosalino/Saroj/Gary/Ursula  PCP: Clinton Sharp [facility]: [facility]    Chief Complaint: difficult henderson, urethral stricture    Subjective:        HPI: Frederick Kong Jr. is a 83 y.o. male presents with     Bladder neck contacture/bphe  -initially seen by me on 4/7/18 in consult in the hospital as they had difficult placing henderson. He was a previous pt of  and had not seen him for 5 years. Had a h/o greenlight laser turp for bph. Denied any sx prior to admission. When they tried to place henderson they could not and so I did a bedside urethral dilation over wire for suspected scar tissue. Eventually underwent voiding trial and learned CIC on 5/2/18. Had some difficulty.   -Uroflow results (date: 05/31/2018) with urethral stricture  : Voiding time: 45.3s, Flow time: 41.7s, TTP flow: 12.0s, Peak flowrate: 10.3 mL/s, Average flowrate: 5.0mL/s, Intervals: 3,  Voided volume: 209 mL,  Pvr by bladder scan: 118. Pattern of curve:bell with multiple decreasing spikes  -cystoscopy at [facility] to eval stricture on 6/11/18 showed moderate bilobar hypertrophy with high riding bladder neck with bladder neck contracture. Areas of previous false passage seen in posterior prostate. Otherwise no urethral stricture seen.   -seen 8/3/18 and could only place 16fr orange tip coude catheter. Could not advance lofric. he's been urinating normally with ok stream     Doing well, catheterizing every 2 weeks with 16fr colton orange tip. No more bleeding. Tolerating. Denies any straining. occ weak stream and Frequency 8-10x. occ dribbling that is not that bothersome.  Nocturia 1-2x a night. Did have cysto showing enlarged prostate      REVIEW OF SYSTEMS:  General ROS: no fevers, no chills  Psychological ROS: no depression  Endocrine ROS: no heat or cold  Respiratory ROS: no SOB  Cardiovascular ROS: no CP  Gastrointestinal ROS: no abdominal pain, no constipation, no  diarrhea, non BRBPR  Musculoskeletal ROS: no muscle pain  Neurological ROS: no headaches  Dermatological ROS: no rashes  HEENT: +glasses, no sinus   ROS: per HPI     The past medical, surgical, social and family hx were reviewed. There have not been any changes.   Cataracts? 1 removed    Urologic meds: none  Anticoagulation: Yes - plavix and stents 2001, asa 81mg    Objective:     Vitals:    12/21/18 0950   BP: (!) 166/94       General:WDWN in NAD  Eyes: PERRLA, normal conjunctiva  Respiratory: No increased work on breathing.   Cardiovascular: No obvious extremity edema. Warm and well perfused.   GI: palpation of masses. No tenderness. No hepatosplenomegaly to palpation.  Musculoskeletal: normal range of motion of bilateral upper extremities. Normal muscle strength and tone.  Skin: no obvious rashes or lesions. No tightening of skin noted.  Neurologic: CN grossly normal. Normal sensation.   Psychiatric: awake, alert and oriented x 3. Mood and affect normal. Cooperative.     5/15/18:  Penis is circumcised,    DUC 5/18/18: 45g gland without masses, tenderness. SV not palpable. Normal sphincter tone. No hemhorroids.        Urinalysis void (circ): tr blood  Urine history:  9/18/18 Ng, void: tr leuk/30 prot- no sx of uti  6/4/18              Ng, void: 1+ protein  5/22/18            No cx, void: 1+ protein/tr blood  5/15/18            Ng, void: 3+ blood  4/14/18          Ng, cath: 3+blood/1+leuk  4/9/18            Ng, cath: 3+blood, no leuk  4/8/18            ng    Labs:  BMP  Lab Results   Component Value Date     08/27/2018    K 5.1 (H) 08/27/2018     08/27/2018    CO2 30.9 08/27/2018    BUN 32 (H) 08/27/2018    CREATININE 1.24 08/27/2018    CALCIUM 8.8 08/27/2018    ANIONGAP 9 04/19/2018    ESTGFRAFRICA 55 (L) 04/25/2018    EGFRNONAA 48 (L) 04/25/2018     Lab Results   Component Value Date    WBC 6.10 04/19/2018    HGB 10.2 (L) 04/19/2018    HCT 30.5 (L) 04/19/2018    MCV 88 04/19/2018      04/19/2018       psa  5/23/11 1.7 (DUC 5/15/18 45g without nodules)    Path:   none    Rads:   none    Assessment:       1. H/O urethral stricture    2. BPH with obstruction/lower urinary tract symptoms        Plan:     Urethral stricture  Continue catheterization 16cc orange tip ginna catheter every 2 weeks  Discussed doing monthly but concerned they may forget   Doing well with this   Prescription written today    bph with lower urinary tract symptoms  -had b lobe hypertrophy with  High riding bladder neck on cysto   -they want to start flomax but has to take at night after dinner and bp meds in the morning  -given info on bph    F/u 1 month with np to discuss bph flomax sx. If doing well on meds then show pt rezum and urolift videos and nancy 2-3 week f/u with me after.   If pt wants to continue meds instead then f/u at 6 months with me

## 2019-01-01 ENCOUNTER — TELEPHONE (OUTPATIENT)
Dept: PULMONOLOGY | Facility: CLINIC | Age: 84
End: 2019-01-01

## 2019-01-01 ENCOUNTER — OFFICE VISIT (OUTPATIENT)
Dept: PULMONOLOGY | Facility: CLINIC | Age: 84
End: 2019-01-01
Payer: MEDICARE

## 2019-01-01 VITALS
WEIGHT: 159.38 LBS | BODY MASS INDEX: 24.6 KG/M2 | SYSTOLIC BLOOD PRESSURE: 165 MMHG | OXYGEN SATURATION: 96 % | HEART RATE: 68 BPM | DIASTOLIC BLOOD PRESSURE: 54 MMHG

## 2019-01-01 DIAGNOSIS — J96.11 CHRONIC RESPIRATORY FAILURE WITH HYPOXIA: ICD-10-CM

## 2019-01-01 DIAGNOSIS — R09.89 CHRONIC SINUS COMPLAINTS: ICD-10-CM

## 2019-01-01 DIAGNOSIS — J44.9 COPD MIXED TYPE: Primary | ICD-10-CM

## 2019-01-01 PROCEDURE — 1159F PR MEDICATION LIST DOCUMENTED IN MEDICAL RECORD: ICD-10-PCS | Mod: S$GLB,,, | Performed by: NURSE PRACTITIONER

## 2019-01-01 PROCEDURE — 1126F AMNT PAIN NOTED NONE PRSNT: CPT | Mod: S$GLB,,, | Performed by: NURSE PRACTITIONER

## 2019-01-01 PROCEDURE — 1100F PR PT FALLS ASSESS DOC 2+ FALLS/FALL W/INJURY/YR: ICD-10-PCS | Mod: CPTII,S$GLB,, | Performed by: NURSE PRACTITIONER

## 2019-01-01 PROCEDURE — 3077F SYST BP >= 140 MM HG: CPT | Mod: CPTII,S$GLB,, | Performed by: NURSE PRACTITIONER

## 2019-01-01 PROCEDURE — 3078F PR MOST RECENT DIASTOLIC BLOOD PRESSURE < 80 MM HG: ICD-10-PCS | Mod: CPTII,S$GLB,, | Performed by: NURSE PRACTITIONER

## 2019-01-01 PROCEDURE — 3288F PR FALLS RISK ASSESSMENT DOCUMENTED: ICD-10-PCS | Mod: CPTII,S$GLB,, | Performed by: NURSE PRACTITIONER

## 2019-01-01 PROCEDURE — 99999 PR PBB SHADOW E&M-EST. PATIENT-LVL III: ICD-10-PCS | Mod: PBBFAC,,, | Performed by: NURSE PRACTITIONER

## 2019-01-01 PROCEDURE — 1100F PTFALLS ASSESS-DOCD GE2>/YR: CPT | Mod: CPTII,S$GLB,, | Performed by: NURSE PRACTITIONER

## 2019-01-01 PROCEDURE — 99999 PR PBB SHADOW E&M-EST. PATIENT-LVL III: CPT | Mod: PBBFAC,,, | Performed by: NURSE PRACTITIONER

## 2019-01-01 PROCEDURE — 1159F MED LIST DOCD IN RCRD: CPT | Mod: S$GLB,,, | Performed by: NURSE PRACTITIONER

## 2019-01-01 PROCEDURE — 99213 PR OFFICE/OUTPT VISIT, EST, LEVL III, 20-29 MIN: ICD-10-PCS | Mod: S$GLB,,, | Performed by: NURSE PRACTITIONER

## 2019-01-01 PROCEDURE — 3288F FALL RISK ASSESSMENT DOCD: CPT | Mod: CPTII,S$GLB,, | Performed by: NURSE PRACTITIONER

## 2019-01-01 PROCEDURE — 3077F PR MOST RECENT SYSTOLIC BLOOD PRESSURE >= 140 MM HG: ICD-10-PCS | Mod: CPTII,S$GLB,, | Performed by: NURSE PRACTITIONER

## 2019-01-01 PROCEDURE — 3078F DIAST BP <80 MM HG: CPT | Mod: CPTII,S$GLB,, | Performed by: NURSE PRACTITIONER

## 2019-01-01 PROCEDURE — 1126F PR PAIN SEVERITY QUANTIFIED, NO PAIN PRESENT: ICD-10-PCS | Mod: S$GLB,,, | Performed by: NURSE PRACTITIONER

## 2019-01-01 PROCEDURE — 99213 OFFICE O/P EST LOW 20 MIN: CPT | Mod: S$GLB,,, | Performed by: NURSE PRACTITIONER

## 2019-01-01 RX ORDER — MONTELUKAST SODIUM 10 MG/1
10 TABLET ORAL NIGHTLY
Qty: 90 TABLET | Refills: 3 | Status: SHIPPED | OUTPATIENT
Start: 2019-01-01 | End: 2020-01-01 | Stop reason: SDUPTHER

## 2019-01-01 RX ORDER — PREDNISONE 20 MG/1
TABLET ORAL
Qty: 21 TABLET | Refills: 1 | Status: SHIPPED | OUTPATIENT
Start: 2019-01-01 | End: 2020-01-01 | Stop reason: ALTCHOICE

## 2019-01-01 RX ORDER — ALBUTEROL SULFATE 90 UG/1
2 AEROSOL, METERED RESPIRATORY (INHALATION) EVERY 6 HOURS PRN
Qty: 18 G | Refills: 11 | Status: SHIPPED | OUTPATIENT
Start: 2019-01-01 | End: 2020-01-01 | Stop reason: SDUPTHER

## 2019-01-01 RX ORDER — FLUTICASONE PROPIONATE 50 MCG
2 SPRAY, SUSPENSION (ML) NASAL DAILY
Qty: 1 BOTTLE | Refills: 11 | Status: SHIPPED | OUTPATIENT
Start: 2019-01-01

## 2019-01-02 ENCOUNTER — OFFICE VISIT (OUTPATIENT)
Dept: URGENT CARE | Facility: CLINIC | Age: 84
End: 2019-01-02
Payer: MEDICARE

## 2019-01-02 VITALS
RESPIRATION RATE: 16 BRPM | HEART RATE: 76 BPM | WEIGHT: 165 LBS | OXYGEN SATURATION: 97 % | BODY MASS INDEX: 25.9 KG/M2 | SYSTOLIC BLOOD PRESSURE: 174 MMHG | TEMPERATURE: 97 F | DIASTOLIC BLOOD PRESSURE: 64 MMHG | HEIGHT: 67 IN

## 2019-01-02 DIAGNOSIS — S63.105A CLOSED DISLOCATION OF LEFT THUMB, INITIAL ENCOUNTER: Primary | ICD-10-CM

## 2019-01-02 PROCEDURE — 73140 PR  X-RAY EXAM OF FINGER(S): ICD-10-PCS | Mod: LT,S$GLB,, | Performed by: NURSE PRACTITIONER

## 2019-01-02 PROCEDURE — 99214 PR OFFICE/OUTPT VISIT, EST, LEVL IV, 30-39 MIN: ICD-10-PCS | Mod: 25,S$GLB,, | Performed by: NURSE PRACTITIONER

## 2019-01-02 PROCEDURE — 73140 X-RAY EXAM OF FINGER(S): CPT | Mod: LT,S$GLB,, | Performed by: NURSE PRACTITIONER

## 2019-01-02 PROCEDURE — 99214 OFFICE O/P EST MOD 30 MIN: CPT | Mod: 25,S$GLB,, | Performed by: NURSE PRACTITIONER

## 2019-01-03 NOTE — PROGRESS NOTES
"Subjective:       Patient ID: Frederick Kong Jr. is a 83 y.o. male.    Vitals:  height is 5' 7" (1.702 m) and weight is 74.8 kg (165 lb). His temperature is 97.3 °F (36.3 °C). His blood pressure is 174/64 (abnormal) and his pulse is 76. His respiration is 16 and oxygen saturation is 97%.     Chief Complaint: Pain (L thumb)    Pt fell after tripping over a box in residence. Pt out stretched L arm to brace himself causing pain and deformity to thumb on L hand.         Constitution: Negative for chills, fatigue and fever.   HENT: Negative for congestion and sore throat.    Neck: Negative for painful lymph nodes.   Cardiovascular: Negative for chest pain and leg swelling.   Eyes: Negative for double vision and blurred vision.   Respiratory: Negative for cough and shortness of breath.    Gastrointestinal: Negative for nausea, vomiting and diarrhea.   Genitourinary: Negative for dysuria, frequency and urgency.   Musculoskeletal: Positive for pain, trauma, joint pain and joint swelling. Negative for muscle cramps and muscle ache.   Skin: Negative for color change, pale and rash.   Allergic/Immunologic: Negative for seasonal allergies.   Neurological: Negative for dizziness, history of vertigo, light-headedness, passing out and headaches.   Hematologic/Lymphatic: Negative for swollen lymph nodes, easy bruising/bleeding and history of blood clots. Does not bruise/bleed easily.   Psychiatric/Behavioral: Negative for nervous/anxious, sleep disturbance and depression. The patient is not nervous/anxious.        Objective:      Physical Exam   Musculoskeletal:        Left hand: He exhibits decreased range of motion, tenderness, bony tenderness, disruption of two-point discrimination, deformity and swelling. He exhibits normal capillary refill and no laceration. Normal sensation noted. Normal strength noted.   Gross deformity of left thumb       Assessment:       1. Closed dislocation of left thumb, initial encounter        Plan: "     xray reviewed by me, closed dislocation of left thumb. Recommended to go to ER for further treatment    Closed dislocation of left thumb, initial encounter

## 2019-01-14 DIAGNOSIS — J44.1 CHRONIC OBSTRUCTIVE PULMONARY DISEASE WITH ACUTE EXACERBATION: ICD-10-CM

## 2019-01-14 RX ORDER — MONTELUKAST SODIUM 10 MG/1
TABLET ORAL
Qty: 30 TABLET | Refills: 0 | Status: SHIPPED | OUTPATIENT
Start: 2019-01-14 | End: 2019-02-05 | Stop reason: SDUPTHER

## 2019-01-21 ENCOUNTER — OFFICE VISIT (OUTPATIENT)
Dept: UROLOGY | Facility: CLINIC | Age: 84
End: 2019-01-21
Payer: MEDICARE

## 2019-01-21 VITALS
WEIGHT: 161.81 LBS | RESPIRATION RATE: 18 BRPM | TEMPERATURE: 98 F | HEIGHT: 67 IN | BODY MASS INDEX: 25.4 KG/M2 | DIASTOLIC BLOOD PRESSURE: 60 MMHG | SYSTOLIC BLOOD PRESSURE: 162 MMHG | HEART RATE: 73 BPM

## 2019-01-21 DIAGNOSIS — Z87.448 HX OF URETHRAL STRICTURE: ICD-10-CM

## 2019-01-21 DIAGNOSIS — N40.1 BENIGN PROSTATIC HYPERPLASIA WITH LOWER URINARY TRACT SYMPTOMS, SYMPTOM DETAILS UNSPECIFIED: Primary | ICD-10-CM

## 2019-01-21 LAB
BILIRUB SERPL-MCNC: ABNORMAL MG/DL
BLOOD URINE, POC: ABNORMAL
COLOR, POC UA: YELLOW
GLUCOSE UR QL STRIP: ABNORMAL
KETONES UR QL STRIP: ABNORMAL
LEUKOCYTE ESTERASE URINE, POC: ABNORMAL
NITRITE, POC UA: ABNORMAL
PH, POC UA: 6
POC RESIDUAL URINE VOLUME: 124 ML (ref 0–100)
PROTEIN, POC: 30
SPECIFIC GRAVITY, POC UA: 1.01
UROBILINOGEN, POC UA: 0.2

## 2019-01-21 PROCEDURE — 87186 SC STD MICRODIL/AGAR DIL: CPT

## 2019-01-21 PROCEDURE — 81002 URINALYSIS NONAUTO W/O SCOPE: CPT | Mod: PBBFAC,PN | Performed by: NURSE PRACTITIONER

## 2019-01-21 PROCEDURE — 51798 US URINE CAPACITY MEASURE: CPT | Mod: PBBFAC,PN | Performed by: NURSE PRACTITIONER

## 2019-01-21 PROCEDURE — 99999 PR PBB SHADOW E&M-EST. PATIENT-LVL IV: ICD-10-PCS | Mod: PBBFAC,,, | Performed by: NURSE PRACTITIONER

## 2019-01-21 PROCEDURE — 87086 URINE CULTURE/COLONY COUNT: CPT

## 2019-01-21 PROCEDURE — 99214 OFFICE O/P EST MOD 30 MIN: CPT | Mod: PBBFAC,PN,25 | Performed by: NURSE PRACTITIONER

## 2019-01-21 PROCEDURE — 87077 CULTURE AEROBIC IDENTIFY: CPT

## 2019-01-21 PROCEDURE — 99999 PR PBB SHADOW E&M-EST. PATIENT-LVL IV: CPT | Mod: PBBFAC,,, | Performed by: NURSE PRACTITIONER

## 2019-01-21 PROCEDURE — 99214 PR OFFICE/OUTPT VISIT, EST, LEVL IV, 30-39 MIN: ICD-10-PCS | Mod: S$GLB,,, | Performed by: NURSE PRACTITIONER

## 2019-01-21 PROCEDURE — 99214 OFFICE O/P EST MOD 30 MIN: CPT | Mod: S$GLB,,, | Performed by: NURSE PRACTITIONER

## 2019-01-21 PROCEDURE — 87088 URINE BACTERIA CULTURE: CPT

## 2019-01-24 LAB — BACTERIA UR CULT: NORMAL

## 2019-01-24 RX ORDER — AMOXICILLIN AND CLAVULANATE POTASSIUM 500; 125 MG/1; MG/1
1 TABLET, FILM COATED ORAL 2 TIMES DAILY
Qty: 10 TABLET | Refills: 0 | Status: SHIPPED | OUTPATIENT
Start: 2019-01-24 | End: 2019-01-29

## 2019-01-25 ENCOUNTER — TELEPHONE (OUTPATIENT)
Dept: UROLOGY | Facility: CLINIC | Age: 84
End: 2019-01-25

## 2019-01-25 NOTE — TELEPHONE ENCOUNTER
Called patient to give result of urine culture and informed him of antibiotic that was called into pharmacy. Patient voiced understanding.

## 2019-02-05 DIAGNOSIS — J44.1 CHRONIC OBSTRUCTIVE PULMONARY DISEASE WITH ACUTE EXACERBATION: ICD-10-CM

## 2019-02-05 RX ORDER — MONTELUKAST SODIUM 10 MG/1
TABLET ORAL
Qty: 30 TABLET | Refills: 0 | Status: SHIPPED | OUTPATIENT
Start: 2019-02-05 | End: 2019-03-14 | Stop reason: SDUPTHER

## 2019-03-08 ENCOUNTER — LAB VISIT (OUTPATIENT)
Dept: LAB | Facility: HOSPITAL | Age: 84
End: 2019-03-08
Attending: NURSE PRACTITIONER
Payer: MEDICARE

## 2019-03-08 ENCOUNTER — TELEPHONE (OUTPATIENT)
Dept: PULMONOLOGY | Facility: CLINIC | Age: 84
End: 2019-03-08

## 2019-03-08 ENCOUNTER — HOSPITAL ENCOUNTER (OUTPATIENT)
Dept: RADIOLOGY | Facility: HOSPITAL | Age: 84
Discharge: HOME | End: 2019-03-08
Attending: NURSE PRACTITIONER
Payer: MEDICARE

## 2019-03-08 ENCOUNTER — OFFICE VISIT (OUTPATIENT)
Dept: PULMONOLOGY | Facility: CLINIC | Age: 84
End: 2019-03-08
Payer: MEDICARE

## 2019-03-08 VITALS
HEIGHT: 67 IN | HEART RATE: 79 BPM | SYSTOLIC BLOOD PRESSURE: 155 MMHG | OXYGEN SATURATION: 84 % | WEIGHT: 162.69 LBS | DIASTOLIC BLOOD PRESSURE: 62 MMHG | BODY MASS INDEX: 25.53 KG/M2

## 2019-03-08 DIAGNOSIS — R06.02 SOB (SHORTNESS OF BREATH): Primary | ICD-10-CM

## 2019-03-08 DIAGNOSIS — R07.9 ACUTE CHEST PAIN: ICD-10-CM

## 2019-03-08 DIAGNOSIS — J44.1 CHRONIC OBSTRUCTIVE PULMONARY DISEASE WITH ACUTE EXACERBATION: ICD-10-CM

## 2019-03-08 DIAGNOSIS — J96.11 CHRONIC RESPIRATORY FAILURE WITH HYPOXIA: ICD-10-CM

## 2019-03-08 DIAGNOSIS — R06.02 SOB (SHORTNESS OF BREATH): ICD-10-CM

## 2019-03-08 LAB
D DIMER PPP IA.FEU-MCNC: 1.66 MG/L FEU
PROCALCITONIN SERPL IA-MCNC: 0.08 NG/ML

## 2019-03-08 PROCEDURE — 99213 OFFICE O/P EST LOW 20 MIN: CPT | Mod: PBBFAC,PO,25 | Performed by: NURSE PRACTITIONER

## 2019-03-08 PROCEDURE — 99213 OFFICE O/P EST LOW 20 MIN: CPT | Mod: S$GLB,,, | Performed by: NURSE PRACTITIONER

## 2019-03-08 PROCEDURE — 99999 PR PBB SHADOW E&M-EST. PATIENT-LVL III: CPT | Mod: PBBFAC,,, | Performed by: NURSE PRACTITIONER

## 2019-03-08 PROCEDURE — 99213 PR OFFICE/OUTPT VISIT, EST, LEVL III, 20-29 MIN: ICD-10-PCS | Mod: S$GLB,,, | Performed by: NURSE PRACTITIONER

## 2019-03-08 PROCEDURE — 71046 X-RAY EXAM CHEST 2 VIEWS: CPT | Mod: 26,,, | Performed by: RADIOLOGY

## 2019-03-08 PROCEDURE — 99999 PR PBB SHADOW E&M-EST. PATIENT-LVL III: ICD-10-PCS | Mod: PBBFAC,,, | Performed by: NURSE PRACTITIONER

## 2019-03-08 PROCEDURE — 71046 XR CHEST PA AND LATERAL: ICD-10-PCS | Mod: 26,,, | Performed by: RADIOLOGY

## 2019-03-08 PROCEDURE — 84145 PROCALCITONIN (PCT): CPT

## 2019-03-08 PROCEDURE — 85379 FIBRIN DEGRADATION QUANT: CPT

## 2019-03-08 PROCEDURE — 71046 X-RAY EXAM CHEST 2 VIEWS: CPT | Mod: TC,FY

## 2019-03-08 PROCEDURE — 36415 COLL VENOUS BLD VENIPUNCTURE: CPT

## 2019-03-08 RX ORDER — PREDNISONE 20 MG/1
TABLET ORAL
Qty: 9 TABLET | Refills: 0 | Status: SHIPPED | OUTPATIENT
Start: 2019-03-08 | End: 2019-06-10 | Stop reason: SDUPTHER

## 2019-03-08 RX ORDER — ALBUTEROL SULFATE 90 UG/1
2 AEROSOL, METERED RESPIRATORY (INHALATION) EVERY 6 HOURS PRN
Qty: 18 G | Refills: 11 | Status: SHIPPED | OUTPATIENT
Start: 2019-03-08 | End: 2019-01-01 | Stop reason: SDUPTHER

## 2019-03-08 NOTE — PATIENT INSTRUCTIONS
Shortness of breath:  Blood work at hospital today  Chest x-ray at hospital today    Sputum cultures are if your sputum turns yellow or green, take to outpatient registration with in four hours of collecting.    Albuterol Inhaler 1-2 puffs every 4 hours, for cough or shortness of breath    Continue taking Trelegy daily  Singulair pill at night to help with allergies   Flonase 2 sprays each nostril once a day, point away from nasal septum (middle of nose)     When your shortness of breath worsens   Take 1 20mg of prednisone a day for three days  Use Duo-Neb nebulizer medication every 6 hours     When your sputum turns yellow/green in color   Take Doxycycline 100 mg twice a day for 5 days     Continue Oxygen at night, use during day if needed

## 2019-03-08 NOTE — TELEPHONE ENCOUNTER
Message sent to Caitlin daniels To schedule CTA . alerted caitlin to IV dye allergy. Caitlin stated she will call pt to schedule.     ----- Message from Ileana Fabian NP sent at 3/8/2019 12:08 PM CST -----  Message to patient. Blood work is elevated will do that test we discussed. The test does involve IV dye. You will need to be pre treated with a medication to prevent rash.    Need to call and schedule appt and let radiology know about the allergy to Iv dye that is mild and causes rash and let me know if a medication needs to be ordered to prevent.

## 2019-03-08 NOTE — PROGRESS NOTES
"3/8/2019    Frederick Kong Jr.  Office Note    Chief Complaint   Patient presents with    Shortness of Breath     on excertion     Cough    Sputum Production     "cloudy"        HPI:  3/8/2019- SOB worsened onset 4 days, was wearing supplemental oxygen at night, now during day and night, not able to walk with out oxygen, Cardiology appt yesterday for antithrombolitic and diuretic therapy.   Cough onset 2 days- productive clear in color, in am only. Low grade fever, night prior. Albuterol nebulizer 1x daily.    12/7/18- Discharged [facility] 11/30/18 COPD exacerbation, upper respiratory infection. Started as sinus infection tx outpatient turned into SOB requiring hospitalization. Feeling better today, Cough daily- worse in AM clear color. Tx post discharge Doxycyline and prednisone 40 mg a day for 5 days.  States allergies a problem for years, has frequent sinus drainage    4/30/18-HPI: pt worked [employer] , had valve surg in past and follow with Dr Gonzalez.  Pt was admitted with resp failure, extubated, went home and represented.  Had troptonin up and bnp up.  Cardiac role suspected.     Smoked 1.5 ppd for up to 30 yrs.     Still on abx.  Pt needed urology to place henderson.          The chief compliant  problem varies with instablilty at time    PFSH:  Past Medical History:   Diagnosis Date    BPH with urinary obstruction     Cancer     CHF (congestive heart failure)     Coronary artery disease     Gout     Hyperlipemia     Hypertension     Personal history of urethral stricture          Past Surgical History:   Procedure Laterality Date    APPENDECTOMY      BRAIN SURGERY  1975    meningioma removed    CARDIAC SURGERY  2001    mechanical aortic valve replacement, Dr. Sanket Sanchez    CYSTOSCOPY and possible urethral dilation N/A 6/11/2018    Performed by Kimmie Jerry MD at [facility] OR    TONSILLECTOMY       Social History     Tobacco Use    Smoking status: Former Smoker   Substance Use Topics    " "Alcohol use: No    Drug use: Not on file     History reviewed. No pertinent family history.  Review of patient's allergies indicates:   Allergen Reactions    Iodinated contrast- oral and iv dye Rash     I have reviewed past medical, family, and social history. I have reviewed previous nurse notes.    Performance Status:The patient's activity level is functions out of house.          Review of Systems   Constitutional: Negative for activity change, appetite change, chills, diaphoresis, fatigue, and unexpected weight change. Positive for fever  HENT: Negative for dental problem, postnasal drip, rhinorrhea, sinus pressure, sinus pain, sneezing, sore throat, trouble swallowing and voice change.   Respiratory: Negative for apnea, , chest tightness,  wheezing and stridor.  Positive for cough and shortness of breath with exertuib  Cardiovascular: Negative for chest pain, palpitations and leg swelling.   Gastrointestinal: Negative for abdominal distention, abdominal pain, constipation and nausea.   Musculoskeletal: Negative for gait problem, myalgias and neck pain.   Skin: Negative for color change and pallor.   Allergic/Immunologic: Negative for environmental allergies and food allergies.   Neurological: Negative for dizziness, speech difficulty, weakness, light-headedness, numbness and headaches.   Hematological: Negative for adenopathy. Does not bruise/bleed easily.   Psychiatric/Behavioral: Negative for dysphoric mood and sleep disturbance. Positive for anxious sensation two night ago.           Exam:Comprehensive exam done. BP (!) 155/62 (BP Location: Right arm, Patient Position: Sitting)   Pulse 79   Ht 5' 7" (1.702 m)   Wt 73.8 kg (162 lb 11.2 oz)   SpO2 (!) 84% Comment: on room air  BMI 25.48 kg/m²   Exam included Vitals as listed  Constitutional: He is oriented to person, place, and time. He appears well-developed. No distress.   Nose: Nose normal.   Mouth/Throat: Uvula is midline, oropharynx is clear and " moist and mucous membranes are normal.  No oropharyngeal exudate, posterior oropharyngeal edema, posterior oropharyngeal erythema or tonsillar abscesses.  Mallapatti (M) score 3  Eyes: Pupils are equal, round, and reactive to light.   Neck: No JVD present. No thyromegaly present.   Cardiovascular: Normal rate, regular rhythm and normal hear t sounds. Exam reveals no gallop and no friction rub.   No murmur heard.  Pulmonary/Chest: Effort normal and breath sounds abnormal: right lower lung field rale . No accessory muscle usage or stridor. No apnea and no tachypnea. No respiratory distress, decreased breath sounds, wheezes, rhonchi, or tenderness.   Abdominal: Soft. He exhibits no mass. There is no tenderness. No hepatosplenomegaly, hernias and normoactive bowel sounds  Musculoskeletal: Normal range of motion. exhibits mild bilateral lower extremity edema +2.   Neurological:  alert and oriented to person, place, and time. not disoriented.   Skin: Skin is warm and dry. Capillary refill takes less 2 sec. No cyanosis or erythema. No pallor. Nails show no clubbing.   Psychiatric: normal mood and affect. behavior is normal. Judgment and thought content normal.       Radiographs (ct chest and cxr) reviewed: results reviewed   4/17/18 Chest X-ray shows pulmonary emphysema    Labs reviewed       Lab Results   Component Value Date    WBC 10.7 01/08/2019    RBC 4.14 (L) 01/08/2019    HGB 12.4 (L) 01/08/2019    HCT 37.2 (L) 01/08/2019    MCV 89.9 01/08/2019    MCH 30.0 01/08/2019    MCHC 33.3 01/08/2019    RDW 13.1 01/08/2019     01/08/2019    MPV 10.2 01/08/2019    GRAN 4.1 04/19/2018    GRAN 66.5 04/19/2018    LYMPH 1,241 01/08/2019    LYMPH 11.6 01/08/2019    MONO 1,027 (H) 01/08/2019    MONO 9.6 01/08/2019     01/08/2019    BASO 64 01/08/2019    EOSINOPHIL 2.1 01/08/2019    BASOPHIL 0.6 01/08/2019       PFT was not done        Plan:  Clinical impression is resonably certain and repeated evaluation prn +/-  follow up will be needed as below.    Frederick was seen today for shortness of breath, cough and sputum production.    Diagnoses and all orders for this visit:    SOB (shortness of breath)  -     D-DIMER, QUANTITATIVE; Future  -     X-Ray Chest PA And Lateral; Future  -     Procalcitonin; Future  -     albuterol (PROVENTIL/VENTOLIN HFA) 90 mcg/actuation inhaler; Inhale 2 puffs into the lungs every 6 (six) hours as needed for Wheezing or Shortness of Breath. Rescue  -     Culture, Respiratory with Gram Stain; Future    Chronic obstructive pulmonary disease with acute exacerbation  -     albuterol (PROVENTIL/VENTOLIN HFA) 90 mcg/actuation inhaler; Inhale 2 puffs into the lungs every 6 (six) hours as needed for Wheezing or Shortness of Breath. Rescue  -     Culture, Respiratory with Gram Stain; Future  -     predniSONE (DELTASONE) 20 MG tablet; Take one pill a day for three days, repeat for shortness of breath    Chronic respiratory failure with hypoxia  -     X-Ray Chest PA And Lateral; Future  -     albuterol (PROVENTIL/VENTOLIN HFA) 90 mcg/actuation inhaler; Inhale 2 puffs into the lungs every 6 (six) hours as needed for Wheezing or Shortness of Breath. Rescue        Follow-up in about 3 months (around 6/8/2019), or if symptoms worsen or fail to improve.    Discussed with patient above for education the following:      Patient Instructions   Shortness of breath:  Blood work at hospital today  Chest x-ray at hospital today    Sputum cultures are if your sputum turns yellow or green, take to outpatient registration with in four hours of collecting.    Albuterol Inhaler 1-2 puffs every 4 hours, for cough or shortness of breath    Continue taking Trelegy daily  Singulair pill at night to help with allergies   Flonase 2 sprays each nostril once a day, point away from nasal septum (middle of nose)     When your shortness of breath worsens   Take 1 20mg of prednisone a day for three days  Use Duo-Neb nebulizer medication  every 6 hours     When your sputum turns yellow/green in color   Take Doxycycline 100 mg twice a day for 5 days     Continue Oxygen at night, use during day if needed

## 2019-03-08 NOTE — TELEPHONE ENCOUNTER
Pt notified   ----- Message from Ileana Fabian NP sent at 3/8/2019 12:22 PM CST -----  Chest x-ray was clear, no sign of pneumonia.

## 2019-03-14 DIAGNOSIS — J44.1 CHRONIC OBSTRUCTIVE PULMONARY DISEASE WITH ACUTE EXACERBATION: ICD-10-CM

## 2019-03-14 RX ORDER — MONTELUKAST SODIUM 10 MG/1
TABLET ORAL
Qty: 30 TABLET | Refills: 0 | Status: SHIPPED | OUTPATIENT
Start: 2019-03-14 | End: 2019-04-16 | Stop reason: SDUPTHER

## 2019-04-16 DIAGNOSIS — J44.1 CHRONIC OBSTRUCTIVE PULMONARY DISEASE WITH ACUTE EXACERBATION: ICD-10-CM

## 2019-04-17 RX ORDER — MONTELUKAST SODIUM 10 MG/1
TABLET ORAL
Qty: 30 TABLET | Refills: 0 | Status: SHIPPED | OUTPATIENT
Start: 2019-04-17 | End: 2019-06-10 | Stop reason: SDUPTHER

## 2019-06-10 ENCOUNTER — OFFICE VISIT (OUTPATIENT)
Dept: PULMONOLOGY | Facility: CLINIC | Age: 84
End: 2019-06-10
Payer: MEDICARE

## 2019-06-10 VITALS
HEART RATE: 68 BPM | HEIGHT: 67 IN | WEIGHT: 160.5 LBS | OXYGEN SATURATION: 96 % | DIASTOLIC BLOOD PRESSURE: 54 MMHG | BODY MASS INDEX: 25.19 KG/M2 | SYSTOLIC BLOOD PRESSURE: 164 MMHG

## 2019-06-10 DIAGNOSIS — R09.89 CHRONIC SINUS COMPLAINTS: ICD-10-CM

## 2019-06-10 DIAGNOSIS — J96.11 CHRONIC RESPIRATORY FAILURE WITH HYPOXIA: ICD-10-CM

## 2019-06-10 DIAGNOSIS — J44.9 COPD MIXED TYPE: Primary | ICD-10-CM

## 2019-06-10 PROCEDURE — 3077F PR MOST RECENT SYSTOLIC BLOOD PRESSURE >= 140 MM HG: ICD-10-PCS | Mod: CPTII,S$GLB,, | Performed by: NURSE PRACTITIONER

## 2019-06-10 PROCEDURE — 3078F PR MOST RECENT DIASTOLIC BLOOD PRESSURE < 80 MM HG: ICD-10-PCS | Mod: CPTII,S$GLB,, | Performed by: NURSE PRACTITIONER

## 2019-06-10 PROCEDURE — 1100F PR PT FALLS ASSESS DOC 2+ FALLS/FALL W/INJURY/YR: ICD-10-PCS | Mod: CPTII,S$GLB,, | Performed by: NURSE PRACTITIONER

## 2019-06-10 PROCEDURE — 3288F PR FALLS RISK ASSESSMENT DOCUMENTED: ICD-10-PCS | Mod: CPTII,S$GLB,, | Performed by: NURSE PRACTITIONER

## 2019-06-10 PROCEDURE — 99999 PR PBB SHADOW E&M-EST. PATIENT-LVL III: CPT | Mod: PBBFAC,,, | Performed by: NURSE PRACTITIONER

## 2019-06-10 PROCEDURE — 3288F FALL RISK ASSESSMENT DOCD: CPT | Mod: CPTII,S$GLB,, | Performed by: NURSE PRACTITIONER

## 2019-06-10 PROCEDURE — 3077F SYST BP >= 140 MM HG: CPT | Mod: CPTII,S$GLB,, | Performed by: NURSE PRACTITIONER

## 2019-06-10 PROCEDURE — 1100F PTFALLS ASSESS-DOCD GE2>/YR: CPT | Mod: CPTII,S$GLB,, | Performed by: NURSE PRACTITIONER

## 2019-06-10 PROCEDURE — 3078F DIAST BP <80 MM HG: CPT | Mod: CPTII,S$GLB,, | Performed by: NURSE PRACTITIONER

## 2019-06-10 PROCEDURE — 99214 OFFICE O/P EST MOD 30 MIN: CPT | Mod: S$GLB,,, | Performed by: NURSE PRACTITIONER

## 2019-06-10 PROCEDURE — 99214 PR OFFICE/OUTPT VISIT, EST, LEVL IV, 30-39 MIN: ICD-10-PCS | Mod: S$GLB,,, | Performed by: NURSE PRACTITIONER

## 2019-06-10 PROCEDURE — 99999 PR PBB SHADOW E&M-EST. PATIENT-LVL III: ICD-10-PCS | Mod: PBBFAC,,, | Performed by: NURSE PRACTITIONER

## 2019-06-10 RX ORDER — FLUTICASONE PROPIONATE 50 MCG
2 SPRAY, SUSPENSION (ML) NASAL DAILY
Qty: 1 BOTTLE | Refills: 11 | Status: SHIPPED | OUTPATIENT
Start: 2019-06-10 | End: 2019-01-01 | Stop reason: SDUPTHER

## 2019-06-10 RX ORDER — PREDNISONE 20 MG/1
TABLET ORAL
Qty: 21 TABLET | Refills: 1 | Status: SHIPPED | OUTPATIENT
Start: 2019-06-10 | End: 2019-01-01 | Stop reason: SDUPTHER

## 2019-06-10 RX ORDER — MONTELUKAST SODIUM 10 MG/1
10 TABLET ORAL NIGHTLY
Qty: 30 TABLET | Refills: 11 | Status: SHIPPED | OUTPATIENT
Start: 2019-06-10 | End: 2019-01-01 | Stop reason: SDUPTHER

## 2019-06-10 RX ORDER — ALBUTEROL SULFATE 0.83 MG/ML
2.5 SOLUTION RESPIRATORY (INHALATION) 4 TIMES DAILY PRN
Qty: 1 BOX | Refills: 11 | Status: SHIPPED | OUTPATIENT
Start: 2019-06-10 | End: 2020-01-01 | Stop reason: SDUPTHER

## 2019-06-10 NOTE — PATIENT INSTRUCTIONS
Chest x-ray was reviewed small changes seen but still a stable scan    Not concerned over CTA, SOB has resolved.    Continue current COPD treatment plan    Use Prednisone when needed for shortness of breath    Continue Singulair and Flonase for sinus complaints.

## 2019-06-10 NOTE — PROGRESS NOTES
6/10/2019    Frederick Kong Jr.  Office Note    Chief Complaint   Patient presents with    Follow-up     3 months, doing better since starting Singulair    COPD       HPI: 6/10/19- breathing doing well, wearing oxygen at 2 L at night. Did not have CTA due to fear of IV dye allergy, SOB resolved with prednisone. Taken prednisone 2x in 3 months. On antithrombotics followed by cardiology. NO cough. States big improvement with allergies with daily singulair tx.    3/8/2019- SOB worsened onset 4 days, was wearing supplemental oxygen at night, now during day and night, not able to walk with out oxygen, Cardiology appt yesterday for antithrombolitic and diuretic therapy.   Cough onset 2 days- productive clear in color, in am only. Low grade fever, night prior. Albuterol nebulizer 1x daily.    12/7/18- Discharged [facility] 11/30/18 COPD exacerbation, upper respiratory infection. Started as sinus infection tx outpatient turned into SOB requiring hospitalization. Feeling better today, Cough daily- worse in AM clear color. Tx post discharge Doxycyline and prednisone 40 mg a day for 5 days.  States allergies a problem for years, has frequent sinus drainage    4/30/18-HPI: pt worked [employer] , had valve surg in past and follow with Dr Gonzalez.  Pt was admitted with resp failure, extubated, went home and represented.  Had troptonin up and bnp up.  Cardiac role suspected.     Smoked 1.5 ppd for up to 30 yrs.     Still on abx.  Pt needed urology to place henderson.          The chief compliant  problem varies with instablilty at time    PFSH:  Past Medical History:   Diagnosis Date    BPH with urinary obstruction     Cancer     CHF (congestive heart failure)     Coronary artery disease     Gout     Hyperlipemia     Hypertension     Personal history of urethral stricture          Past Surgical History:   Procedure Laterality Date    APPENDECTOMY      BRAIN SURGERY  1975    meningioma removed    CARDIAC SURGERY  2001     "mechanical aortic valve replacement, Dr. Sanket Sanchez    CYSTOSCOPY and possible urethral dilation N/A 6/11/2018    Performed by Kimmie Jerry [facility] at [facility]    TONSILLECTOMY       Social History     Tobacco Use    Smoking status: Former Smoker   Substance Use Topics    Alcohol use: No    Drug use: Not on file     History reviewed. No pertinent family history.  Review of patient's allergies indicates:   Allergen Reactions    Iodinated contrast- oral and iv dye Rash     I have reviewed past medical, family, and social history. I have reviewed previous nurse notes.    Performance Status:The patient's activity level is functions out of house.          Review of Systems   Constitutional: Negative for activity change, appetite change, chills, diaphoresis, fatigue, fever, and unexpected weight change.   HENT: Negative for dental problem, postnasal drip, rhinorrhea, sinus pressure, sinus pain, sneezing, sore throat, trouble swallowing and voice change.   Respiratory: Negative for apnea, , chest tightness,  wheezing and stridor, cough and shortness of breath  Cardiovascular: Negative for chest pain, palpitations and leg swelling.   Gastrointestinal: Negative for abdominal distention, abdominal pain, constipation and nausea.   Musculoskeletal: Negative for gait problem, myalgias and neck pain.   Skin: Negative for color change and pallor.   Allergic/Immunologic: Negative for environmental allergies and food allergies.   Neurological: Negative for dizziness, speech difficulty, weakness, light-headedness, numbness and headaches.   Hematological: Negative for adenopathy. Does not bruise/bleed easily.   Psychiatric/Behavioral: Negative for dysphoric mood, anxiety, and sleep disturbance.           Exam:Comprehensive exam done. BP (!) 164/54 (BP Location: Left arm, Patient Position: Sitting)   Pulse 68   Ht 5' 7" (1.702 m)   Wt 72.8 kg (160 lb 7.9 oz)   SpO2 96% Comment: on room air  BMI 25.14 kg/m²   Exam " included Vitals as listed  Constitutional: He is oriented to person, place, and time. He appears well-developed. No distress.   Nose: Nose normal.   Mouth/Throat: Uvula is midline, oropharynx is clear and moist and mucous membranes are normal.  No oropharyngeal exudate, posterior oropharyngeal edema, posterior oropharyngeal erythema or tonsillar abscesses.  M[place]apatti (M) score 3  Eyes: Pupils are equal, round, and reactive to light.   Neck: No JVD present. No thyromegaly present.   Cardiovascular: Normal rate, regular rhythm and normal hear t sounds. Exam reveals no gallop and no friction rub.   No murmur heard.  Pulmonary/Chest: Effort normal and breath sounds abnormal: right lower lung field rale . No accessory muscle usage or stridor. No apnea and no tachypnea. No respiratory distress, decreased breath sounds, wheezes, rhonchi, or tenderness.   Abdominal: Soft. He exhibits no mass. There is no tenderness. No hepatosplenomegaly, hernias and normoactive bowel sounds  Musculoskeletal: Normal range of motion. exhibits no edema  Neurological:  alert and oriented to person, place, and time. not disoriented.   Skin: Skin is warm and dry. Capillary refill takes less 2 sec. No cyanosis or erythema. No pallor. Nails show no clubbing.   Psychiatric: normal mood and affect. behavior is normal. Judgment and thought content normal.       Radiographs (ct chest and cxr) reviewed: results reviewed   XR CHEST PA AND LATERAL 03/08/2019   There is paucity of markings in the right upper lung field with diffuse interstitial changes in the right lower lung field and throughout the left lung.  There is calcified granuloma left midlung field.  There is slight blunting the right costophrenic sulcus.  The cardiac silhouette appears mildly enlarged the postoperative changes and prosthetic aortic valve.         Labs reviewed       Lab Results   Component Value Date    WBC 7.6 05/31/2019    RBC 4.67 05/31/2019    HGB 12.9 (L) 05/31/2019     HCT 39.7 05/31/2019    MCV 85.0 05/31/2019    MCH 27.6 05/31/2019    MCHC 32.5 05/31/2019    RDW 15.0 05/31/2019     05/31/2019    MPV 10.0 05/31/2019    GRAN 4.1 04/19/2018    GRAN 66.5 04/19/2018    LYMPH 1,117 05/31/2019    LYMPH 14.7 05/31/2019    MONO 790 05/31/2019    MONO 10.4 05/31/2019     05/31/2019    BASO 30 05/31/2019    EOSINOPHIL 4.3 05/31/2019    BASOPHIL 0.4 05/31/2019       PFT was not done        Plan:  Clinical impression is resonably certain and repeated evaluation prn +/- follow up will be needed as below.    Frederick was seen today for follow-up and copd.    Diagnoses and all orders for this visit:    COPD mixed type  -     predniSONE (DELTASONE) 20 MG tablet; Take one pill a day for three days, repeat for shortness of breath  -     montelukast (SINGULAIR) 10 mg tablet; Take 1 tablet (10 mg total) by mouth every evening.  -     fluticasone-umeclidin-vilanter (TRELEGY ELLIPTA) 100-62.5-25 mcg DsDv; Inhale 1 puff into the lungs once daily.  -     fluticasone propionate (FLONASE) 50 mcg/actuation nasal spray; 2 sprays (100 mcg total) by Each Nare route once daily.  -     albuterol (PROVENTIL) 2.5 mg /3 mL (0.083 %) nebulizer solution; Take 3 mLs (2.5 mg total) by nebulization 4 (four) times daily as needed for Wheezing or Shortness of Breath.    Chronic respiratory failure with hypoxia  -     albuterol (PROVENTIL) 2.5 mg /3 mL (0.083 %) nebulizer solution; Take 3 mLs (2.5 mg total) by nebulization 4 (four) times daily as needed for Wheezing or Shortness of Breath.    Chronic sinus complaints  -     montelukast (SINGULAIR) 10 mg tablet; Take 1 tablet (10 mg total) by mouth every evening.  -     fluticasone propionate (FLONASE) 50 mcg/actuation nasal spray; 2 sprays (100 mcg total) by Each Nare route once daily.        Follow up in about 6 months (around 12/10/2019), or if symptoms worsen or fail to improve.    Discussed with patient above for education the following:      Patient  Instructions   Chest x-ray was reviewed small changes seen but still a stable scan    Not concerned over CTA, SOB has resolved.    Continue current COPD treatment plan    Use Prednisone when needed for shortness of breath    Continue Singulair and Flonase for sinus complaints.

## 2019-07-23 ENCOUNTER — OFFICE VISIT (OUTPATIENT)
Dept: UROLOGY | Facility: CLINIC | Age: 84
End: 2019-07-23
Payer: MEDICARE

## 2019-07-23 VITALS
HEART RATE: 66 BPM | WEIGHT: 160.19 LBS | SYSTOLIC BLOOD PRESSURE: 175 MMHG | DIASTOLIC BLOOD PRESSURE: 65 MMHG | HEIGHT: 67 IN | BODY MASS INDEX: 25.14 KG/M2

## 2019-07-23 DIAGNOSIS — N40.1 BPH WITH OBSTRUCTION/LOWER URINARY TRACT SYMPTOMS: ICD-10-CM

## 2019-07-23 DIAGNOSIS — Z87.448 H/O URETHRAL STRICTURE: Primary | ICD-10-CM

## 2019-07-23 DIAGNOSIS — N13.8 BPH WITH OBSTRUCTION/LOWER URINARY TRACT SYMPTOMS: ICD-10-CM

## 2019-07-23 LAB
BILIRUB SERPL-MCNC: ABNORMAL MG/DL
BLOOD URINE, POC: ABNORMAL
COLOR, POC UA: YELLOW
GLUCOSE UR QL STRIP: ABNORMAL
KETONES UR QL STRIP: ABNORMAL
LEUKOCYTE ESTERASE URINE, POC: ABNORMAL
NITRITE, POC UA: ABNORMAL
PH, POC UA: 6
PROTEIN, POC: 100
SPECIFIC GRAVITY, POC UA: 1.02
UROBILINOGEN, POC UA: ABNORMAL

## 2019-07-23 PROCEDURE — 99213 PR OFFICE/OUTPT VISIT, EST, LEVL III, 20-29 MIN: ICD-10-PCS | Mod: 25,S$GLB,, | Performed by: UROLOGY

## 2019-07-23 PROCEDURE — 3077F PR MOST RECENT SYSTOLIC BLOOD PRESSURE >= 140 MM HG: ICD-10-PCS | Mod: CPTII,S$GLB,, | Performed by: UROLOGY

## 2019-07-23 PROCEDURE — 3077F SYST BP >= 140 MM HG: CPT | Mod: CPTII,S$GLB,, | Performed by: UROLOGY

## 2019-07-23 PROCEDURE — 3288F PR FALLS RISK ASSESSMENT DOCUMENTED: ICD-10-PCS | Mod: CPTII,S$GLB,, | Performed by: UROLOGY

## 2019-07-23 PROCEDURE — 81002 POCT URINE DIPSTICK WITHOUT MICROSCOPE: ICD-10-PCS | Mod: S$GLB,,, | Performed by: UROLOGY

## 2019-07-23 PROCEDURE — 3078F DIAST BP <80 MM HG: CPT | Mod: CPTII,S$GLB,, | Performed by: UROLOGY

## 2019-07-23 PROCEDURE — 1100F PR PT FALLS ASSESS DOC 2+ FALLS/FALL W/INJURY/YR: ICD-10-PCS | Mod: CPTII,S$GLB,, | Performed by: UROLOGY

## 2019-07-23 PROCEDURE — 1100F PTFALLS ASSESS-DOCD GE2>/YR: CPT | Mod: CPTII,S$GLB,, | Performed by: UROLOGY

## 2019-07-23 PROCEDURE — 3078F PR MOST RECENT DIASTOLIC BLOOD PRESSURE < 80 MM HG: ICD-10-PCS | Mod: CPTII,S$GLB,, | Performed by: UROLOGY

## 2019-07-23 PROCEDURE — 3288F FALL RISK ASSESSMENT DOCD: CPT | Mod: CPTII,S$GLB,, | Performed by: UROLOGY

## 2019-07-23 PROCEDURE — 99213 OFFICE O/P EST LOW 20 MIN: CPT | Mod: 25,S$GLB,, | Performed by: UROLOGY

## 2019-07-23 PROCEDURE — 99999 PR PBB SHADOW E&M-EST. PATIENT-LVL III: CPT | Mod: PBBFAC,,, | Performed by: UROLOGY

## 2019-07-23 PROCEDURE — 81002 URINALYSIS NONAUTO W/O SCOPE: CPT | Mod: S$GLB,,, | Performed by: UROLOGY

## 2019-07-23 PROCEDURE — 99999 PR PBB SHADOW E&M-EST. PATIENT-LVL III: ICD-10-PCS | Mod: PBBFAC,,, | Performed by: UROLOGY

## 2019-07-23 RX ORDER — AMLODIPINE BESYLATE 10 MG/1
10 TABLET ORAL DAILY
Refills: 3 | COMMUNITY
Start: 2019-06-19 | End: 2020-01-01

## 2019-07-23 NOTE — PATIENT INSTRUCTIONS
Urethral stricture, bph with lower urinary tract symptoms  -Continue catheterization 16cc orange tip ginna catheter 1x a month, going very well. Wife hubs catheter.    -had b lobe hypertrophy with  High riding bladder neck on cysto, doing well on flomax and doesn't want to do any procedures    F/u 1 year

## 2019-07-23 NOTE — PROGRESS NOTES
[facility] Progress Note - [place]  Urology Group: Rosalino/Saroj/Gary/Ursula  PCP: Clinton [facility]    Chief Complaint: difficult henderson, urethral stricture    Subjective:        HPI: Frederick Kong Jr. is a 84 y.o. male presents with     Bladder neck contacture/bphe  -initially seen by me on 4/7/18 in consult in the hospital as they had difficult placing henderson. He was a previous pt of  and had not seen him for 5 years. Had a h/o greenlight laser turp for bph. Denied any sx prior to admission. When they tried to place henderson they could not and so I did a bedside urethral dilation over wire for suspected scar tissue. Eventually underwent voiding trial and learned CIC on 5/2/18. Had some difficulty.   -Uroflow results (date: 05/31/2018) with urethral stricture  : Voiding time: 45.3s, Flow time: 41.7s, TTP flow: 12.0s, Peak flowrate: 10.3 mL/s, Average flowrate: 5.0mL/s, Intervals: 3,  Voided volume: 209 mL,  Pvr by bladder scan: 118. Pattern of curve:bell with multiple decreasing spikes  -cystoscopy at [facility] to eval stricture on 6/11/18 showed moderate bilobar hypertrophy with high riding bladder neck with bladder neck contracture. Areas of previous false passage seen in posterior prostate. Otherwise no urethral stricture seen.   -seen 8/3/18 and could only place 16fr orange tip coude catheter. Could not advance lofric. he's been urinating normally with ok stream     I last saw them December 21, 2018 and plan was to catheterize every 2 weeks with a 16fr colton orange tip.  However  they have cut down to once a month.  No issues at all no resistance.  She is able to hub the catheter.  He was also started on Flomax for high riding bladder neck.  He is tolerating this without any side effects.  He is voiding 4 to 5 times a day and was previously voiding 8-10 times a day.  No longer having any dribbling.  Still waking up 1-2 times a night.    They want to continue  Flomax.     psa  5/23/11 1.7 (DUC 5/15/18 45g without nodules)    Urinalysis void (circ): neg  Urine history:  1/21/19 E.faecalis, void: tr bld/30 prot/small wbc- tx w aug  9/18/18 Ng, void: tr leuk/30 prot- no sx of uti  6/4/18              Ng, void: 1+ protein  5/22/18            No cx, void: 1+ protein/tr blood  5/15/18            Ng, void: 3+ blood  4/14/18          Ng, cath: 3+blood/1+leuk  4/9/18            Ng, cath: 3+blood, no leuk  4/8/18            ng    REVIEW OF SYSTEMS:  General ROS: no fevers, no chills  Psychological ROS: no depression  Endocrine ROS: no heat or cold  Respiratory ROS: no SOB  Cardiovascular ROS: no CP  Gastrointestinal ROS: no abdominal pain, no constipation, no diarrhea, non BRBPR  Musculoskeletal ROS: no muscle pain  Neurological ROS: no headaches  Dermatological ROS: no rashes  HEENT: +glasses, no sinus   ROS: per HPI     Past Medical History:   Diagnosis Date    BPH with urinary obstruction     Cancer     CHF (congestive heart failure)     Coronary artery disease     Gout     Hyperlipemia     Hypertension     Personal history of urethral stricture      Past Surgical History:   Procedure Laterality Date    APPENDECTOMY      BRAIN SURGERY  1975    meningioma removed    CARDIAC SURGERY  2001    mechanical aortic valve replacement, Dr. Sanket Sanchez    CYSTOSCOPY and possible urethral dilation N/A 6/11/2018    [facility]    TONSILLECTOMY       Social and family hx unchanged  Cataracts? 1 removed    Urologic meds: none  Anticoagulation: Yes - plavix and stents 2001, asa 81mg    Objective:     Vitals:    07/23/19 1548   BP: (!) 175/65   Pulse: 66       General:WDWN in NAD  Eyes: PERRLA, normal conjunctiva  Respiratory: No increased work on breathing.   Cardiovascular: No obvious extremity edema. Warm and well perfused.   GI: palpation of masses. No tenderness. No hepatosplenomegaly to palpation.  Musculoskeletal: normal range of  motion of bilateral upper extremities. Normal muscle strength and tone.  Skin: no obvious rashes or lesions. No tightening of skin noted.  Neurologic: CN grossly normal. Normal sensation.   Psychiatric: awake, alert and oriented x 3. Mood and affect normal. Cooperative.     5/15/18:  Penis is circumcised,    DUC 5/18/18: 45g gland without masses, tenderness. SV not palpable. Normal sphincter tone. No hemhorroids.        Labs:  BMP  Lab Results   Component Value Date     05/31/2019    K 4.3 05/31/2019     05/31/2019    CO2 28 05/31/2019    BUN 19 05/31/2019    CREATININE 1.15 (H) 05/31/2019    CALCIUM 9.1 05/31/2019    ANIONGAP 9 04/19/2018    ESTGFRAFRICA 67 05/31/2019    EGFRNONAA 58 (L) 05/31/2019     Lab Results   Component Value Date    WBC 7.6 05/31/2019    HGB 12.9 (L) 05/31/2019    HCT 39.7 05/31/2019    MCV 85.0 05/31/2019     05/31/2019           Path:   none    Rads:   none    Assessment:       1. H/O urethral stricture    2. BPH with obstruction/lower urinary tract symptoms        Plan:     Urethral stricture, bph with lower urinary tract symptoms  -Continue catheterization 16cc orange tip ginna catheter 1x a month, going very well. Wife hubs catheter.    -had b lobe hypertrophy with  High riding bladder neck on cysto, doing well on flomax and doesn't want to do any procedures    F/u 1 year

## 2019-11-26 ENCOUNTER — HOSPITAL ENCOUNTER (EMERGENCY)
Facility: HOSPITAL | Age: 84
Discharge: HOME | End: 2019-11-27
Attending: EMERGENCY MEDICINE
Payer: MEDICARE

## 2019-11-26 DIAGNOSIS — L03.116 CELLULITIS OF LEFT LOWER EXTREMITY: ICD-10-CM

## 2019-11-26 DIAGNOSIS — R52 PAIN: ICD-10-CM

## 2019-11-26 DIAGNOSIS — R22.41 LOCALIZED SWELLING OF RIGHT LOWER LEG: Primary | ICD-10-CM

## 2019-11-26 DIAGNOSIS — R60.9 SWELLING: ICD-10-CM

## 2019-11-26 PROCEDURE — 99284 EMERGENCY DEPT VISIT MOD MDM: CPT | Mod: 25

## 2019-11-27 VITALS
SYSTOLIC BLOOD PRESSURE: 187 MMHG | BODY MASS INDEX: 23.49 KG/M2 | WEIGHT: 155 LBS | DIASTOLIC BLOOD PRESSURE: 77 MMHG | HEIGHT: 68 IN | OXYGEN SATURATION: 97 % | RESPIRATION RATE: 16 BRPM | HEART RATE: 84 BPM | TEMPERATURE: 98 F

## 2019-11-27 RX ORDER — MUPIROCIN 20 MG/G
OINTMENT TOPICAL 3 TIMES DAILY
Qty: 1 TUBE | Refills: 0 | Status: SHIPPED | OUTPATIENT
Start: 2019-11-27 | End: 2020-01-01

## 2019-11-27 RX ORDER — CLINDAMYCIN HYDROCHLORIDE 300 MG/1
300 CAPSULE ORAL EVERY 6 HOURS
Qty: 40 CAPSULE | Refills: 0 | Status: SHIPPED | OUTPATIENT
Start: 2019-11-27 | End: 2019-01-01

## 2019-11-27 NOTE — DISCHARGE INSTRUCTIONS
Apply Bactroban twice daily.  Take clindamycin 4 times a day for 10 days.  Follow up with Dr. Dhiraj Dempsey in 2-3 days.  Elevate the leg when not walking

## 2019-11-27 NOTE — ED NOTES
Presents to ER with c/o fall last week and did not want to come to ER. Patient now c/o left knee and lower leg pain with redness and swelling. Wife at bedside. AAOx4

## 2019-11-27 NOTE — ED PROVIDER NOTES
Encounter Date: 11/26/2019       History     Chief Complaint   Patient presents with    LEFT KNEE AND LOWER LEG SWELLING POST FALL ONE WEEK AGO     Chief complaint is swollen left lower leg as well as abrasion to left knee.  A HPI the patient fell about a week ago and has a mild abrasion to the left hand over the metacarpal phalangeal joint the middle finger.  He has a slight skin tear to the dorsum of the right hand just proximal to the right 4th finger metacarpophalangeal joint.  He has abrasions to his left knee with swelling to his left lower leg.  He denies any other complaints.  He has difficulty walking because of the pain.  He is with his wife tonight.        Review of patient's allergies indicates:   Allergen Reactions    Iodinated contrast media Rash     Past Medical History:   Diagnosis Date    BPH with urinary obstruction     Cancer     CHF (congestive heart failure)     Coronary artery disease     Gout     Hyperlipemia     Hypertension     Personal history of urethral stricture      Past Surgical History:   Procedure Laterality Date    APPENDECTOMY      BRAIN SURGERY  1975    meningioma removed    CARDIAC SURGERY  2001    mechanical aortic valve replacement, Dr. Sanket Sanchez    CYSTOSCOPY N/A 6/11/2018    Procedure: CYSTOSCOPY and possible urethral dilation;  Surgeon: Kimmie Jerry MD;  [facility];  Service: Urology;  Laterality: N/A;    TONSILLECTOMY       No family history on file.  Social History     Tobacco Use    Smoking status: Former Smoker   Substance Use Topics    Alcohol use: No    Drug use: Not on file     Review of Systems   Constitutional: Negative for chills and fever.   HENT: Negative for ear pain, rhinorrhea and sore throat.    Eyes: Negative for pain and visual disturbance.   Respiratory: Negative for cough and shortness of breath.    Cardiovascular: Negative for chest pain and palpitations.   Gastrointestinal: Negative for abdominal pain,  constipation, diarrhea, nausea and vomiting.   Genitourinary: Negative for dysuria, frequency, hematuria and urgency.   Musculoskeletal: Negative for back pain, joint swelling and myalgias.        Left knee pain   Skin: Negative for rash.   Neurological: Negative for dizziness, seizures, weakness and headaches.   Psychiatric/Behavioral: Negative for dysphoric mood. The patient is not nervous/anxious.        Physical Exam     Initial Vitals [11/26/19 2323]   BP Pulse Resp Temp SpO2   (!) 185/80 86 16 98.1 °F (36.7 °C) 96 %      MAP       --         Physical Exam    Nursing note and vitals reviewed.  Constitutional: He appears well-developed and well-nourished.   HENT:   Head: Normocephalic and atraumatic.   Eyes: Conjunctivae, EOM and lids are normal. Pupils are equal, round, and reactive to light.   Neck: Trachea normal. Neck supple. No thyroid mass present.   Cardiovascular: Normal rate and regular rhythm.   Patient with typical sounds of valve replacement to the aortic valve with systolic click   Pulmonary/Chest: Breath sounds normal. No respiratory distress.   Abdominal: Soft. Bowel sounds are normal. There is no tenderness.   Musculoskeletal: Normal range of motion.   Neurological: He is alert and oriented to person, place, and time. He has normal strength and normal reflexes. No cranial nerve deficit or sensory deficit.   Skin: Skin is warm and dry.   The patient has a small abrasion to the dorsum of the left hand at the metacarpophalangeal joint of the middle finger.  The patient has a slight skin tear to the dorsum of the right hand about an inch proximal from the right 4th finger metacarpophalangeal joint.  He has abrasions to his left knee.  His left knee is minimally swollen with good range of motion.  He does have swelling to the left lower leg.   Psychiatric: He has a normal mood and affect. His speech is normal and behavior is normal. Judgment and thought content normal.         ED Course    Procedures  Labs Reviewed - No data to display       Imaging Results    None                       Attending Attestation:             Attending ED Notes:   X-rays negative, ultrasound negative. Patient able to walk.  The left leg is swollen as compared to the right and there is slight pink discoloration and warm to the lower aspect of the leg near the ankle.  We will treat him aggressively with clindamycin by mouth and Bactroban topically.  He will be instructed to elevate his leg when he is not walking.  He will be instructed to follow up with Dr. Dempsey as well                        Clinical Impression:       ICD-10-CM ICD-9-CM   1. Localized swelling of right lower leg R22.41 729.81   2. Pain R52 780.96   3. Swelling R60.9 782.3   4. Cellulitis of left lower extremity L03.116 682.6                             Nikki Medley MD  11/27/19 0132

## 2019-12-09 NOTE — TELEPHONE ENCOUNTER
appointment rescheduled to the afternoon 12/10/19  ----- Message from Renee Morgan sent at 12/9/2019 11:56 AM CST -----  Type: Needs Medical Advice    Who Called:  Wife Callum Amaral  Symptoms (please be specific):  na  How long has patient had these symptoms:  na  Pharmacy name and phone #:  [email or web address]  Best Call Back Number: [phone]  Additional Information: has an appt with Ms Fabian tomorrow 12 10 19 at 9:30am but also has another doctor's appt around this same time/could he please come in tomorrow early afternoon

## 2019-12-10 PROBLEM — R09.89 CHRONIC SINUS COMPLAINTS: Status: ACTIVE | Noted: 2019-01-01

## 2019-12-10 NOTE — PROGRESS NOTES
12/10/2019    Frederick Kong Jr.  Office Note    Chief Complaint   Patient presents with    Follow-up     6 month    COPD       HPI:   12/10/2019- Breathing is stable, states pretty good, Supplemental oxygen at night. COPD exacerbation 2x in past 6 months, resolved with prednisone taper. Cough- occasionally, worse in early morning, wife states is usually when he forgets to take Singulair at night. Productive clear in color. Shortness of breath- only with exertion, not severe, resolves with rest. Fell previous month in yard. Went to ER.     6/10/19- breathing doing well, wearing oxygen at 2 L at night. Did not have CTA due to fear of IV dye allergy, SOB resolved with prednisone. Taken prednisone 2x in 3 months. On antithrombotics followed by cardiology. NO cough. States big improvement with allergies with daily singulair tx.    3/8/2019- SOB worsened onset 4 days, was wearing supplemental oxygen at night, now during day and night, not able to walk with out oxygen, Cardiology appt yesterday for antithrombolitic and diuretic therapy.   Cough onset 2 days- productive clear in color, in am only. Low grade fever, night prior. Albuterol nebulizer 1x daily.    12/7/18- Discharged [facility] 11/30/18 COPD exacerbation, upper respiratory infection. Started as sinus infection tx outpatient turned into SOB requiring hospitalization. Feeling better today, Cough daily- worse in AM clear color. Tx post discharge Doxycyline and prednisone 40 mg a day for 5 days.  States allergies a problem for years, has frequent sinus drainage    4/30/18-HPI: pt worked [employer] , had valve surg in past and follow with Dr Gonzalez.  Pt was admitted with resp failure, extubated, went home and represented.  Had troptonin up and bnp up.  Cardiac role suspected.     Smoked 1.5 ppd for up to 30 yrs.     Still on abx.  Pt needed urology to [facility].          The chief compliant  problem varies with instablilty at time    PFSH:  Past Medical History:    Diagnosis Date    BPH with urinary obstruction     Cancer     CHF (congestive heart failure)     Coronary artery disease     Gout     Hyperlipemia     Hypertension     Personal history of urethral stricture          Past Surgical History:   Procedure Laterality Date    APPENDECTOMY      BRAIN SURGERY  1975    meningioma removed    CARDIAC SURGERY  2001    mechanical aortic valve replacement, Dr. Sanket Sanchez    CYSTOSCOPY N/A 6/11/2018    Procedure: CYSTOSCOPY and possible urethral dilation;  Surgeon: Kimmie Jerry MD;  Location: [facility];  Service: Urology;  Laterality: N/A;    TONSILLECTOMY       Social History     Tobacco Use    Smoking status: Former Smoker   Substance Use Topics    Alcohol use: No    Drug use: Not on file     No family history on file.  Review of patient's allergies indicates:   Allergen Reactions    Iodinated contrast- oral and iv dye Rash     I have reviewed past medical, family, and social history. I have reviewed previous nurse notes.    Performance Status:The patient's activity level is functions out of house.          Review of Systems   Constitutional: Negative for activity change, appetite change, chills, diaphoresis, fatigue, fever, and unexpected weight change.   HENT: Negative for dental problem, postnasal drip, sinus pain, sneezing, sore throat, trouble swallowing and voice change. Positive for rhinorrhea, sinus pressure,  Respiratory: Negative for apnea, , chest tightness,  wheezing and stridor.  Positive for cough and shortness of breath   Cardiovascular: Negative for chest pain, palpitations and leg swelling.   Gastrointestinal: Negative for abdominal distention, abdominal pain, constipation and nausea.   Musculoskeletal: Negative for gait problem, myalgias and neck pain.   Skin: Negative for color change and pallor.   Allergic/Immunologic: Negative for environmental allergies and food allergies.   Neurological: Negative for dizziness, speech  difficulty, weakness, light-headedness, numbness and headaches.   Hematological: Negative for adenopathy. Does not bruise/bleed easily.   Psychiatric/Behavioral: Negative for dysphoric mood, anxiety, and sleep disturbance.           Exam:Comprehensive exam done. BP (!) 165/54   Pulse 68   Wt 72.3 kg (159 lb 6.3 oz)   SpO2 96%   BMI 24.60 kg/m²   Exam included Vitals as listed  Constitutional: He is oriented to person, place, and time. He appears well-developed. No distress.   Nose: Nose normal.   Mouth/Throat: Uvula is midline, oropharynx is clear and moist and mucous membranes are normal.  No oropharyngeal exudate, posterior oropharyngeal edema, posterior oropharyngeal erythema or tonsillar abscesses.  Mallapatti (M) score 3  Eyes: Pupils are equal, round, and reactive to light.   Neck: No JVD present. No thyromegaly present.   Cardiovascular: Normal rate, regular rhythm and normal hear t sounds. Exam reveals no gallop and no friction rub.   No murmur heard.  Pulmonary/Chest: Effort normal and breath sounds abnormal: right lower lung field rale . No accessory muscle usage or stridor. No apnea and no tachypnea. No respiratory distress, decreased breath sounds, wheezes, rhonchi, or tenderness.   Abdominal: Soft. He exhibits no mass. There is no tenderness. No hepatosplenomegaly, hernias and normoactive bowel sounds  Musculoskeletal: Normal range of motion. exhibits no edema  Neurological:  alert and oriented to person, place, and time. not disoriented.   Skin: Skin is warm and dry. Capillary refill takes less 2 sec. No cyanosis or erythema. No pallor. Nails show no clubbing.   Psychiatric: normal mood and affect. behavior is normal. Judgment and thought content normal.       Radiographs (ct chest and cxr) reviewed: results reviewed   XR CHEST PA AND LATERAL 03/08/2019   There is paucity of markings in the right upper lung field with diffuse interstitial changes in the right lower lung field and throughout the  left lung.  There is calcified granuloma left midlung field.  There is slight blunting the right costophrenic sulcus.  The cardiac silhouette appears mildly enlarged the postoperative changes and prosthetic aortic valve.         Labs reviewed       Lab Results   Component Value Date    WBC 7.6 05/31/2019    RBC 4.67 05/31/2019    HGB 12.9 (L) 05/31/2019    HCT 39.7 05/31/2019    MCV 85.0 05/31/2019    MCH 27.6 05/31/2019    MCHC 32.5 05/31/2019    RDW 15.0 05/31/2019     05/31/2019    MPV 10.0 05/31/2019    GRAN 4.1 04/19/2018    GRAN 66.5 04/19/2018    LYMPH 1,117 05/31/2019    LYMPH 14.7 05/31/2019    MONO 790 05/31/2019    MONO 10.4 05/31/2019     05/31/2019    BASO 30 05/31/2019    EOSINOPHIL 4.3 05/31/2019    BASOPHIL 0.4 05/31/2019       PFT was not done        Plan:  Clinical impression is resonably certain and repeated evaluation prn +/- follow up will be needed as below.    Frederick was seen today for follow-up and copd.    Diagnoses and all orders for this visit:    COPD mixed type  -     predniSONE (DELTASONE) 20 MG tablet; Take one pill a day for three days, repeat for shortness of breath  -     fluticasone-umeclidin-vilanter (TRELEGY ELLIPTA) 100-62.5-25 mcg DsDv; Inhale 1 puff into the lungs once daily.  -     montelukast (SINGULAIR) 10 mg tablet; Take 1 tablet (10 mg total) by mouth every evening.  -     fluticasone propionate (FLONASE) 50 mcg/actuation nasal spray; 2 sprays (100 mcg total) by Each Nostril route once daily.    Chronic respiratory failure with hypoxia  -     albuterol (PROVENTIL/VENTOLIN HFA) 90 mcg/actuation inhaler; Inhale 2 puffs into the lungs every 6 (six) hours as needed for Wheezing or Shortness of Breath. Rescue    Chronic sinus complaints  -     montelukast (SINGULAIR) 10 mg tablet; Take 1 tablet (10 mg total) by mouth every evening.  -     fluticasone propionate (FLONASE) 50 mcg/actuation nasal spray; 2 sprays (100 mcg total) by Each Nostril route once  daily.        Follow up in about 1 year (around 12/10/2020), or if symptoms worsen or fail to improve.    Discussed with patient above for education the following:      Patient Instructions   Your COPD is stable    Continue current COPD medication regiment   Action plan    Prednisone 20 mg pills, Take one pill a day for three days, repeat for shortness of breath or wheeze    Albuterol Inhaler 1-2 puffs every 4 hours, for cough or shortness of breath    Continue supplemental oxygen at night and during day when needed to keep oxygen saturation above 92%

## 2019-12-10 NOTE — PATIENT INSTRUCTIONS
Your COPD is stable    Continue current COPD medication regiment   Action plan    Prednisone 20 mg pills, Take one pill a day for three days, repeat for shortness of breath or wheeze    Albuterol Inhaler 1-2 puffs every 4 hours, for cough or shortness of breath    Continue supplemental oxygen at night and during day when needed to keep oxygen saturation above 92%

## 2020-01-01 ENCOUNTER — OFFICE VISIT (OUTPATIENT)
Dept: PULMONOLOGY | Facility: CLINIC | Age: 85
End: 2020-01-01
Payer: MEDICARE

## 2020-01-01 ENCOUNTER — LAB VISIT (OUTPATIENT)
Dept: URGENT CARE | Facility: CLINIC | Age: 85
End: 2020-01-01
Payer: MEDICARE

## 2020-01-01 ENCOUNTER — HOSPITAL ENCOUNTER (INPATIENT)
Facility: HOSPITAL | Age: 85
LOS: 5 days | DRG: 291 | End: 2020-12-06
Attending: EMERGENCY MEDICINE | Admitting: HOSPITALIST
Payer: MEDICARE

## 2020-01-01 ENCOUNTER — LAB VISIT (OUTPATIENT)
Dept: LAB | Facility: HOSPITAL | Age: 85
End: 2020-01-01
Attending: FAMILY MEDICINE
Payer: MEDICARE

## 2020-01-01 ENCOUNTER — TELEPHONE (OUTPATIENT)
Dept: PULMONOLOGY | Facility: CLINIC | Age: 85
End: 2020-01-01

## 2020-01-01 ENCOUNTER — HOSPITAL ENCOUNTER (OUTPATIENT)
Dept: RADIOLOGY | Facility: HOSPITAL | Age: 85
Discharge: HOME | End: 2020-09-23
Attending: OTOLARYNGOLOGY
Payer: MEDICARE

## 2020-01-01 ENCOUNTER — TELEPHONE (OUTPATIENT)
Dept: UROLOGY | Facility: CLINIC | Age: 85
End: 2020-01-01

## 2020-01-01 ENCOUNTER — HOSPITAL ENCOUNTER (OUTPATIENT)
Dept: RADIOLOGY | Facility: HOSPITAL | Age: 85
Discharge: HOME | DRG: 291 | End: 2020-11-30
Attending: NURSE PRACTITIONER
Payer: MEDICARE

## 2020-01-01 ENCOUNTER — HOSPITAL ENCOUNTER (OUTPATIENT)
Dept: RADIOLOGY | Facility: HOSPITAL | Age: 85
Discharge: HOME | End: 2020-07-13
Attending: FAMILY MEDICINE
Payer: MEDICARE

## 2020-01-01 ENCOUNTER — OFFICE VISIT (OUTPATIENT)
Dept: CARDIOLOGY | Facility: CLINIC | Age: 85
End: 2020-01-01
Payer: MEDICARE

## 2020-01-01 VITALS
DIASTOLIC BLOOD PRESSURE: 60 MMHG | OXYGEN SATURATION: 93 % | HEART RATE: 65 BPM | WEIGHT: 157.5 LBS | BODY MASS INDEX: 24.67 KG/M2 | SYSTOLIC BLOOD PRESSURE: 165 MMHG | TEMPERATURE: 98 F

## 2020-01-01 VITALS
SYSTOLIC BLOOD PRESSURE: 148 MMHG | RESPIRATION RATE: 22 BRPM | WEIGHT: 149.69 LBS | TEMPERATURE: 97 F | HEART RATE: 80 BPM | HEIGHT: 67 IN | BODY MASS INDEX: 23.49 KG/M2 | DIASTOLIC BLOOD PRESSURE: 65 MMHG | OXYGEN SATURATION: 100 %

## 2020-01-01 VITALS
OXYGEN SATURATION: 91 % | BODY MASS INDEX: 24.17 KG/M2 | HEART RATE: 79 BPM | SYSTOLIC BLOOD PRESSURE: 132 MMHG | DIASTOLIC BLOOD PRESSURE: 76 MMHG | HEIGHT: 67 IN | WEIGHT: 154 LBS

## 2020-01-01 VITALS
OXYGEN SATURATION: 80 % | DIASTOLIC BLOOD PRESSURE: 63 MMHG | HEART RATE: 71 BPM | BODY MASS INDEX: 24.12 KG/M2 | SYSTOLIC BLOOD PRESSURE: 170 MMHG | HEIGHT: 67 IN

## 2020-01-01 VITALS
BODY MASS INDEX: 25.03 KG/M2 | OXYGEN SATURATION: 85 % | WEIGHT: 159.81 LBS | DIASTOLIC BLOOD PRESSURE: 74 MMHG | SYSTOLIC BLOOD PRESSURE: 168 MMHG | HEART RATE: 62 BPM

## 2020-01-01 DIAGNOSIS — J44.1 CHRONIC OBSTRUCTIVE PULMONARY DISEASE WITH ACUTE EXACERBATION: Primary | ICD-10-CM

## 2020-01-01 DIAGNOSIS — J96.11 CHRONIC RESPIRATORY FAILURE WITH HYPOXIA: ICD-10-CM

## 2020-01-01 DIAGNOSIS — J44.1 COPD WITH EXACERBATION: ICD-10-CM

## 2020-01-01 DIAGNOSIS — J32.4 CHRONIC PANSINUSITIS: Primary | ICD-10-CM

## 2020-01-01 DIAGNOSIS — R09.02 HYPOXIA: ICD-10-CM

## 2020-01-01 DIAGNOSIS — Z79.82 ENCOUNTER FOR LONG-TERM (CURRENT) USE OF ASPIRIN: ICD-10-CM

## 2020-01-01 DIAGNOSIS — J44.9 COPD MIXED TYPE: ICD-10-CM

## 2020-01-01 DIAGNOSIS — I50.33 ACUTE ON CHRONIC DIASTOLIC HEART FAILURE: ICD-10-CM

## 2020-01-01 DIAGNOSIS — R05.9 COUGH: ICD-10-CM

## 2020-01-01 DIAGNOSIS — Z95.2 H/O AORTIC VALVE REPLACEMENT: ICD-10-CM

## 2020-01-01 DIAGNOSIS — E78.5 HYPERLIPEMIA: Primary | ICD-10-CM

## 2020-01-01 DIAGNOSIS — I49.9 ARRHYTHMIA: ICD-10-CM

## 2020-01-01 DIAGNOSIS — E78.5 HYPERLIPIDEMIA, UNSPECIFIED HYPERLIPIDEMIA TYPE: ICD-10-CM

## 2020-01-01 DIAGNOSIS — R26.2 IMPAIRED AMBULATION: ICD-10-CM

## 2020-01-01 DIAGNOSIS — J32.4 CHRONIC PANSINUSITIS: ICD-10-CM

## 2020-01-01 DIAGNOSIS — D72.828 OTHER ELEVATED WHITE BLOOD CELL (WBC) COUNT: ICD-10-CM

## 2020-01-01 DIAGNOSIS — I10 ESSENTIAL HYPERTENSION, MALIGNANT: ICD-10-CM

## 2020-01-01 DIAGNOSIS — R09.89 CHRONIC SINUS COMPLAINTS: ICD-10-CM

## 2020-01-01 DIAGNOSIS — I50.32 CHRONIC DIASTOLIC HEART FAILURE: ICD-10-CM

## 2020-01-01 DIAGNOSIS — I77.9 LEFT-SIDED CAROTID ARTERY DISEASE, UNSPECIFIED TYPE: ICD-10-CM

## 2020-01-01 DIAGNOSIS — R06.02 SOB (SHORTNESS OF BREATH): ICD-10-CM

## 2020-01-01 DIAGNOSIS — N17.9 ACUTE KIDNEY INJURY: ICD-10-CM

## 2020-01-01 DIAGNOSIS — I50.9 CHF (CONGESTIVE HEART FAILURE): ICD-10-CM

## 2020-01-01 DIAGNOSIS — Z79.899 ENCOUNTER FOR LONG-TERM (CURRENT) USE OF OTHER MEDICATIONS: ICD-10-CM

## 2020-01-01 DIAGNOSIS — I10 ESSENTIAL HYPERTENSION: Primary | ICD-10-CM

## 2020-01-01 DIAGNOSIS — R05.9 COUGH: Primary | ICD-10-CM

## 2020-01-01 DIAGNOSIS — J44.0 CHRONIC OBSTRUCTIVE PULMONARY DISEASE WITH ACUTE LOWER RESPIRATORY INFECTION: Primary | ICD-10-CM

## 2020-01-01 DIAGNOSIS — I50.31 ACUTE DIASTOLIC CONGESTIVE HEART FAILURE: Primary | ICD-10-CM

## 2020-01-01 DIAGNOSIS — I48.0 PAROXYSMAL ATRIAL FIBRILLATION: ICD-10-CM

## 2020-01-01 DIAGNOSIS — J44.0 CHRONIC OBSTRUCTIVE PULMONARY DISEASE WITH ACUTE LOWER RESPIRATORY INFECTION: ICD-10-CM

## 2020-01-01 DIAGNOSIS — J44.9 CHRONIC OBSTRUCTIVE PULMONARY DISEASE, UNSPECIFIED COPD TYPE: ICD-10-CM

## 2020-01-01 LAB
ALBUMIN SERPL BCP-MCNC: 2.6 G/DL (ref 3.5–5.2)
ALBUMIN SERPL BCP-MCNC: 2.7 G/DL (ref 3.5–5.2)
ALBUMIN SERPL BCP-MCNC: 2.8 G/DL (ref 3.5–5.2)
ALBUMIN SERPL BCP-MCNC: 2.9 G/DL (ref 3.5–5.2)
ALBUMIN SERPL BCP-MCNC: 2.9 G/DL (ref 3.5–5.2)
ALBUMIN SERPL BCP-MCNC: 3 G/DL (ref 3.5–5.2)
ALBUMIN SERPL BCP-MCNC: 3.6 G/DL (ref 3.5–5.2)
ALLENS TEST: ABNORMAL
ALP SERPL-CCNC: 100 U/L (ref 55–135)
ALP SERPL-CCNC: 70 U/L (ref 55–135)
ALP SERPL-CCNC: 79 U/L (ref 55–135)
ALP SERPL-CCNC: 81 U/L (ref 55–135)
ALP SERPL-CCNC: 86 U/L (ref 55–135)
ALP SERPL-CCNC: 87 U/L (ref 55–135)
ALP SERPL-CCNC: 87 U/L (ref 55–135)
ALP SERPL-CCNC: 89 U/L (ref 55–135)
ALP SERPL-CCNC: 89 U/L (ref 55–135)
ALP SERPL-CCNC: 92 U/L (ref 55–135)
ALP SERPL-CCNC: 93 U/L (ref 55–135)
ALP SERPL-CCNC: 93 U/L (ref 55–135)
ALP SERPL-CCNC: 96 U/L (ref 55–135)
ALT SERPL W/O P-5'-P-CCNC: 11 U/L (ref 10–44)
ALT SERPL W/O P-5'-P-CCNC: 11 U/L (ref 10–44)
ALT SERPL W/O P-5'-P-CCNC: 12 U/L (ref 10–44)
ALT SERPL W/O P-5'-P-CCNC: 15 U/L (ref 10–44)
ALT SERPL W/O P-5'-P-CCNC: 16 U/L (ref 10–44)
ALT SERPL W/O P-5'-P-CCNC: 17 U/L (ref 10–44)
ALT SERPL W/O P-5'-P-CCNC: 17 U/L (ref 10–44)
ANION GAP SERPL CALC-SCNC: 11 MMOL/L (ref 8–16)
ANION GAP SERPL CALC-SCNC: 11 MMOL/L (ref 8–16)
ANION GAP SERPL CALC-SCNC: 12 MMOL/L (ref 8–16)
ANION GAP SERPL CALC-SCNC: 13 MMOL/L (ref 8–16)
ANION GAP SERPL CALC-SCNC: 15 MMOL/L (ref 8–16)
ANION GAP SERPL CALC-SCNC: 15 MMOL/L (ref 8–16)
ANION GAP SERPL CALC-SCNC: 5 MMOL/L (ref 8–16)
ANION GAP SERPL CALC-SCNC: 9 MMOL/L (ref 8–16)
AORTIC ROOT ANNULUS: 2.98 CM
AORTIC VALVE CUSP SEPERATION: 1.85 CM
ASCENDING AORTA: 2.95 CM
AST SERPL-CCNC: 11 U/L (ref 10–40)
AST SERPL-CCNC: 12 U/L (ref 10–40)
AST SERPL-CCNC: 13 U/L (ref 10–40)
AST SERPL-CCNC: 14 U/L (ref 10–40)
AST SERPL-CCNC: 14 U/L (ref 10–40)
AST SERPL-CCNC: 16 U/L (ref 10–40)
AST SERPL-CCNC: 18 U/L (ref 10–40)
AV INDEX (PROSTH): 0.77
AV MEAN GRADIENT: 12 MMHG
AV PEAK GRADIENT: 22 MMHG
AV VALVE AREA: 2.3 CM2
AV VELOCITY RATIO: 0.75
BACTERIA BLD CULT: NORMAL
BACTERIA BLD CULT: NORMAL
BASOPHILS # BLD AUTO: 0.01 K/UL (ref 0–0.2)
BASOPHILS # BLD AUTO: 0.02 K/UL (ref 0–0.2)
BASOPHILS # BLD AUTO: 0.04 K/UL (ref 0–0.2)
BASOPHILS # BLD AUTO: 0.04 K/UL (ref 0–0.2)
BASOPHILS # BLD AUTO: 0.06 K/UL (ref 0–0.2)
BASOPHILS NFR BLD: 0.1 % (ref 0–1.9)
BASOPHILS NFR BLD: 0.2 % (ref 0–1.9)
BASOPHILS NFR BLD: 0.6 % (ref 0–1.9)
BILIRUB SERPL-MCNC: 0.3 MG/DL (ref 0.1–1)
BILIRUB SERPL-MCNC: 0.4 MG/DL (ref 0.1–1)
BILIRUB SERPL-MCNC: 1 MG/DL (ref 0.1–1)
BNP SERPL-MCNC: 235 PG/ML (ref 0–99)
BNP SERPL-MCNC: 379 PG/ML (ref 0–99)
BNP SERPL-MCNC: 917 PG/ML (ref 0–99)
BSA FOR ECHO PROCEDURE: 1.83 M2
BUN SERPL-MCNC: 27 MG/DL (ref 8–23)
BUN SERPL-MCNC: 43 MG/DL (ref 8–23)
BUN SERPL-MCNC: 46 MG/DL (ref 8–23)
BUN SERPL-MCNC: 54 MG/DL (ref 8–23)
BUN SERPL-MCNC: 56 MG/DL (ref 8–23)
BUN SERPL-MCNC: 58 MG/DL (ref 8–23)
BUN SERPL-MCNC: 61 MG/DL (ref 8–23)
BUN SERPL-MCNC: 62 MG/DL (ref 8–23)
BUN SERPL-MCNC: 62 MG/DL (ref 8–23)
BUN SERPL-MCNC: 63 MG/DL (ref 8–23)
BUN SERPL-MCNC: 64 MG/DL (ref 8–23)
BUN SERPL-MCNC: 64 MG/DL (ref 8–23)
BUN SERPL-MCNC: 78 MG/DL (ref 8–23)
CALCIUM SERPL-MCNC: 8.1 MG/DL (ref 8.7–10.5)
CALCIUM SERPL-MCNC: 8.2 MG/DL (ref 8.7–10.5)
CALCIUM SERPL-MCNC: 8.2 MG/DL (ref 8.7–10.5)
CALCIUM SERPL-MCNC: 8.3 MG/DL (ref 8.7–10.5)
CALCIUM SERPL-MCNC: 8.3 MG/DL (ref 8.7–10.5)
CALCIUM SERPL-MCNC: 8.4 MG/DL (ref 8.7–10.5)
CALCIUM SERPL-MCNC: 8.4 MG/DL (ref 8.7–10.5)
CALCIUM SERPL-MCNC: 8.5 MG/DL (ref 8.7–10.5)
CALCIUM SERPL-MCNC: 8.5 MG/DL (ref 8.7–10.5)
CALCIUM SERPL-MCNC: 8.6 MG/DL (ref 8.7–10.5)
CALCIUM SERPL-MCNC: 8.7 MG/DL (ref 8.7–10.5)
CALCIUM SERPL-MCNC: 8.8 MG/DL (ref 8.7–10.5)
CALCIUM SERPL-MCNC: 8.8 MG/DL (ref 8.7–10.5)
CHLORIDE SERPL-SCNC: 102 MMOL/L (ref 95–110)
CHLORIDE SERPL-SCNC: 103 MMOL/L (ref 95–110)
CHLORIDE SERPL-SCNC: 104 MMOL/L (ref 95–110)
CHLORIDE SERPL-SCNC: 104 MMOL/L (ref 95–110)
CHLORIDE SERPL-SCNC: 105 MMOL/L (ref 95–110)
CHLORIDE SERPL-SCNC: 105 MMOL/L (ref 95–110)
CHLORIDE SERPL-SCNC: 106 MMOL/L (ref 95–110)
CHLORIDE SERPL-SCNC: 107 MMOL/L (ref 95–110)
CHLORIDE SERPL-SCNC: 107 MMOL/L (ref 95–110)
CHLORIDE SERPL-SCNC: 108 MMOL/L (ref 95–110)
CHLORIDE SERPL-SCNC: 109 MMOL/L (ref 95–110)
CHLORIDE SERPL-SCNC: 110 MMOL/L (ref 95–110)
CHLORIDE SERPL-SCNC: 97 MMOL/L (ref 95–110)
CHOLEST SERPL-MCNC: 134 MG/DL (ref 120–199)
CHOLEST/HDLC SERPL: 3 {RATIO} (ref 2–5)
CO2 SERPL-SCNC: 21 MMOL/L (ref 23–29)
CO2 SERPL-SCNC: 22 MMOL/L (ref 23–29)
CO2 SERPL-SCNC: 23 MMOL/L (ref 23–29)
CO2 SERPL-SCNC: 24 MMOL/L (ref 23–29)
CO2 SERPL-SCNC: 25 MMOL/L (ref 23–29)
CO2 SERPL-SCNC: 26 MMOL/L (ref 23–29)
CO2 SERPL-SCNC: 27 MMOL/L (ref 23–29)
CO2 SERPL-SCNC: 27 MMOL/L (ref 23–29)
CO2 SERPL-SCNC: 28 MMOL/L (ref 23–29)
CO2 SERPL-SCNC: 29 MMOL/L (ref 23–29)
CO2 SERPL-SCNC: 32 MMOL/L (ref 23–29)
CO2 SERPL-SCNC: 36 MMOL/L (ref 23–29)
CREAT SERPL-MCNC: 1.3 MG/DL (ref 0.5–1.4)
CREAT SERPL-MCNC: 1.6 MG/DL (ref 0.5–1.4)
CREAT SERPL-MCNC: 1.7 MG/DL (ref 0.5–1.4)
CREAT SERPL-MCNC: 1.8 MG/DL (ref 0.5–1.4)
CREAT SERPL-MCNC: 1.9 MG/DL (ref 0.5–1.4)
CREAT SERPL-MCNC: 2 MG/DL (ref 0.5–1.4)
CREAT SERPL-MCNC: 2.4 MG/DL (ref 0.5–1.4)
CV ECHO LV RWT: 0.34 CM
DELSYS: ABNORMAL
DIFFERENTIAL METHOD: ABNORMAL
DOP CALC AO PEAK VEL: 2.33 M/S
DOP CALC AO VTI: 53.45 CM
DOP CALC LVOT AREA: 3 CM2
DOP CALC LVOT DIAMETER: 1.95 CM
DOP CALC LVOT PEAK VEL: 1.74 M/S
DOP CALC LVOT STROKE VOLUME: 122.71 CM3
DOP CALCLVOT PEAK VEL VTI: 41.11 CM
E WAVE DECELERATION TIME: 173.48 MSEC
E/E' RATIO: 20 M/S
ECHO LV POSTERIOR WALL: 0.97 CM (ref 0.6–1.1)
EOSINOPHIL # BLD AUTO: 0 K/UL (ref 0–0.5)
EOSINOPHIL # BLD AUTO: 0.1 K/UL (ref 0–0.5)
EOSINOPHIL # BLD AUTO: 0.5 K/UL (ref 0–0.5)
EOSINOPHIL NFR BLD: 0 % (ref 0–8)
EOSINOPHIL NFR BLD: 0.1 % (ref 0–8)
EOSINOPHIL NFR BLD: 0.2 % (ref 0–8)
EOSINOPHIL NFR BLD: 1.1 % (ref 0–8)
EOSINOPHIL NFR BLD: 4.5 % (ref 0–8)
EP: 10
EP: 10
EP: 8
ERYTHROCYTE [DISTWIDTH] IN BLOOD BY AUTOMATED COUNT: 13.3 % (ref 11.5–14.5)
ERYTHROCYTE [DISTWIDTH] IN BLOOD BY AUTOMATED COUNT: 13.5 % (ref 11.5–14.5)
ERYTHROCYTE [DISTWIDTH] IN BLOOD BY AUTOMATED COUNT: 13.8 % (ref 11.5–14.5)
ERYTHROCYTE [DISTWIDTH] IN BLOOD BY AUTOMATED COUNT: 14.3 % (ref 11.5–14.5)
ERYTHROCYTE [DISTWIDTH] IN BLOOD BY AUTOMATED COUNT: 14.4 % (ref 11.5–14.5)
ERYTHROCYTE [SEDIMENTATION RATE] IN BLOOD BY WESTERGREN METHOD: 16 MM/H
EST. GFR  (AFRICAN AMERICAN): 27 ML/MIN/1.73 M^2
EST. GFR  (AFRICAN AMERICAN): 34 ML/MIN/1.73 M^2
EST. GFR  (AFRICAN AMERICAN): 36 ML/MIN/1.73 M^2
EST. GFR  (AFRICAN AMERICAN): 39 ML/MIN/1.73 M^2
EST. GFR  (AFRICAN AMERICAN): 42 ML/MIN/1.73 M^2
EST. GFR  (AFRICAN AMERICAN): 45 ML/MIN/1.73 M^2
EST. GFR  (AFRICAN AMERICAN): 57.5 ML/MIN/1.73 M^2
EST. GFR  (NON AFRICAN AMERICAN): 24 ML/MIN/1.73 M^2
EST. GFR  (NON AFRICAN AMERICAN): 30 ML/MIN/1.73 M^2
EST. GFR  (NON AFRICAN AMERICAN): 31 ML/MIN/1.73 M^2
EST. GFR  (NON AFRICAN AMERICAN): 34 ML/MIN/1.73 M^2
EST. GFR  (NON AFRICAN AMERICAN): 36 ML/MIN/1.73 M^2
EST. GFR  (NON AFRICAN AMERICAN): 39 ML/MIN/1.73 M^2
EST. GFR  (NON AFRICAN AMERICAN): 49.8 ML/MIN/1.73 M^2
FIO2: 100
FIO2: 35
FIO2: 35
FRACTIONAL SHORTENING: 27 % (ref 28–44)
GLUCOSE SERPL-MCNC: 104 MG/DL (ref 70–110)
GLUCOSE SERPL-MCNC: 120 MG/DL (ref 70–110)
GLUCOSE SERPL-MCNC: 124 MG/DL (ref 70–110)
GLUCOSE SERPL-MCNC: 125 MG/DL (ref 70–110)
GLUCOSE SERPL-MCNC: 125 MG/DL (ref 70–110)
GLUCOSE SERPL-MCNC: 130 MG/DL (ref 70–110)
GLUCOSE SERPL-MCNC: 131 MG/DL (ref 70–110)
GLUCOSE SERPL-MCNC: 138 MG/DL (ref 70–110)
GLUCOSE SERPL-MCNC: 141 MG/DL (ref 70–110)
GLUCOSE SERPL-MCNC: 142 MG/DL (ref 70–110)
GLUCOSE SERPL-MCNC: 145 MG/DL (ref 70–110)
GLUCOSE SERPL-MCNC: 155 MG/DL (ref 70–110)
GLUCOSE SERPL-MCNC: 158 MG/DL (ref 70–110)
GLUCOSE SERPL-MCNC: 171 MG/DL (ref 70–110)
GLUCOSE SERPL-MCNC: 173 MG/DL (ref 70–110)
HCO3 UR-SCNC: 26 MMOL/L (ref 24–28)
HCO3 UR-SCNC: 26.4 MMOL/L (ref 24–28)
HCO3 UR-SCNC: 31.5 MMOL/L (ref 24–28)
HCT VFR BLD AUTO: 38.3 % (ref 40–54)
HCT VFR BLD AUTO: 40.8 % (ref 40–54)
HCT VFR BLD AUTO: 44.1 % (ref 40–54)
HCT VFR BLD AUTO: 44.2 % (ref 40–54)
HCT VFR BLD AUTO: 45.7 % (ref 40–54)
HDLC SERPL-MCNC: 44 MG/DL (ref 40–75)
HDLC SERPL: 32.8 % (ref 20–50)
HGB BLD-MCNC: 11.6 G/DL (ref 14–18)
HGB BLD-MCNC: 12.6 G/DL (ref 14–18)
HGB BLD-MCNC: 13.1 G/DL (ref 14–18)
HGB BLD-MCNC: 13.1 G/DL (ref 14–18)
HGB BLD-MCNC: 13.4 G/DL (ref 14–18)
IMM GRANULOCYTES # BLD AUTO: 0.04 K/UL (ref 0–0.04)
IMM GRANULOCYTES # BLD AUTO: 0.06 K/UL (ref 0–0.04)
IMM GRANULOCYTES # BLD AUTO: 0.11 K/UL (ref 0–0.04)
IMM GRANULOCYTES # BLD AUTO: 0.12 K/UL (ref 0–0.04)
IMM GRANULOCYTES # BLD AUTO: 0.18 K/UL (ref 0–0.04)
IMM GRANULOCYTES NFR BLD AUTO: 0.4 % (ref 0–0.5)
IMM GRANULOCYTES NFR BLD AUTO: 0.4 % (ref 0–0.5)
IMM GRANULOCYTES NFR BLD AUTO: 0.7 % (ref 0–0.5)
IMM GRANULOCYTES NFR BLD AUTO: 0.9 % (ref 0–0.5)
IMM GRANULOCYTES NFR BLD AUTO: 0.9 % (ref 0–0.5)
INTERVENTRICULAR SEPTUM: 0.82 CM (ref 0.6–1.1)
IP: 18
IP: 18
IP: 20
IVRT: 74.22 MSEC
LA MAJOR: 5.41 CM
LA MINOR: 4.62 CM
LA WIDTH: 4 CM
LACTATE SERPL-SCNC: 1.3 MMOL/L (ref 0.5–2.2)
LDLC SERPL CALC-MCNC: 75.4 MG/DL (ref 63–159)
LEFT ATRIUM SIZE: 3.35 CM
LEFT ATRIUM VOLUME INDEX: 31.2 ML/M2
LEFT ATRIUM VOLUME: 56.77 CM3
LEFT INTERNAL DIMENSION IN SYSTOLE: 4.22 CM (ref 2.1–4)
LEFT VENTRICLE DIASTOLIC VOLUME INDEX: 90.37 ML/M2
LEFT VENTRICLE DIASTOLIC VOLUME: 164.43 ML
LEFT VENTRICLE MASS INDEX: 110 G/M2
LEFT VENTRICLE SYSTOLIC VOLUME INDEX: 43.8 ML/M2
LEFT VENTRICLE SYSTOLIC VOLUME: 79.67 ML
LEFT VENTRICULAR INTERNAL DIMENSION IN DIASTOLE: 5.77 CM (ref 3.5–6)
LEFT VENTRICULAR MASS: 200.26 G
LV LATERAL E/E' RATIO: 16.67 M/S
LV SEPTAL E/E' RATIO: 25 M/S
LYMPHOCYTES # BLD AUTO: 0.5 K/UL (ref 1–4.8)
LYMPHOCYTES # BLD AUTO: 0.5 K/UL (ref 1–4.8)
LYMPHOCYTES # BLD AUTO: 0.6 K/UL (ref 1–4.8)
LYMPHOCYTES # BLD AUTO: 1.1 K/UL (ref 1–4.8)
LYMPHOCYTES # BLD AUTO: 1.3 K/UL (ref 1–4.8)
LYMPHOCYTES NFR BLD: 12.6 % (ref 18–48)
LYMPHOCYTES NFR BLD: 2.7 % (ref 18–48)
LYMPHOCYTES NFR BLD: 3.3 % (ref 18–48)
LYMPHOCYTES NFR BLD: 5 % (ref 18–48)
LYMPHOCYTES NFR BLD: 6.3 % (ref 18–48)
MAGNESIUM SERPL-MCNC: 2.6 MG/DL (ref 1.6–2.6)
MAGNESIUM SERPL-MCNC: 3 MG/DL (ref 1.6–2.6)
MCH RBC QN AUTO: 27.8 PG (ref 27–31)
MCH RBC QN AUTO: 28.1 PG (ref 27–31)
MCH RBC QN AUTO: 28.1 PG (ref 27–31)
MCH RBC QN AUTO: 28.2 PG (ref 27–31)
MCH RBC QN AUTO: 28.4 PG (ref 27–31)
MCHC RBC AUTO-ENTMCNC: 29.3 G/DL (ref 32–36)
MCHC RBC AUTO-ENTMCNC: 29.6 G/DL (ref 32–36)
MCHC RBC AUTO-ENTMCNC: 29.7 G/DL (ref 32–36)
MCHC RBC AUTO-ENTMCNC: 30.3 G/DL (ref 32–36)
MCHC RBC AUTO-ENTMCNC: 30.9 G/DL (ref 32–36)
MCV RBC AUTO: 90 FL (ref 82–98)
MCV RBC AUTO: 94 FL (ref 82–98)
MCV RBC AUTO: 94 FL (ref 82–98)
MCV RBC AUTO: 95 FL (ref 82–98)
MCV RBC AUTO: 96 FL (ref 82–98)
MIN VOL: 14.9
MIN VOL: 9.8
MODE: ABNORMAL
MONOCYTES # BLD AUTO: 0.6 K/UL (ref 0.3–1)
MONOCYTES # BLD AUTO: 0.7 K/UL (ref 0.3–1)
MONOCYTES # BLD AUTO: 1.3 K/UL (ref 0.3–1)
MONOCYTES # BLD AUTO: 1.4 K/UL (ref 0.3–1)
MONOCYTES # BLD AUTO: 1.6 K/UL (ref 0.3–1)
MONOCYTES NFR BLD: 10.8 % (ref 4–15)
MONOCYTES NFR BLD: 4.5 % (ref 4–15)
MONOCYTES NFR BLD: 7 % (ref 4–15)
MONOCYTES NFR BLD: 7.4 % (ref 4–15)
MONOCYTES NFR BLD: 9.1 % (ref 4–15)
MV MEAN GRADIENT: 11 MMHG
MV PEAK E VEL: 1.5 M/S
MV STENOSIS PRESSURE HALF TIME: 53.53 MS
MV VALVE AREA P 1/2 METHOD: 4.11 CM2
NEUTROPHILS # BLD AUTO: 12.5 K/UL (ref 1.8–7.7)
NEUTROPHILS # BLD AUTO: 14.8 K/UL (ref 1.8–7.7)
NEUTROPHILS # BLD AUTO: 17.3 K/UL (ref 1.8–7.7)
NEUTROPHILS # BLD AUTO: 7.4 K/UL (ref 1.8–7.7)
NEUTROPHILS # BLD AUTO: 9.8 K/UL (ref 1.8–7.7)
NEUTROPHILS NFR BLD: 74.5 % (ref 38–73)
NEUTROPHILS NFR BLD: 82 % (ref 38–73)
NEUTROPHILS NFR BLD: 83.5 % (ref 38–73)
NEUTROPHILS NFR BLD: 89.1 % (ref 38–73)
NEUTROPHILS NFR BLD: 91.7 % (ref 38–73)
NONHDLC SERPL-MCNC: 90 MG/DL
NRBC BLD-RTO: 0 /100 WBC
PCO2 BLDA: 48.4 MMHG (ref 35–45)
PCO2 BLDA: 61.4 MMHG (ref 35–45)
PCO2 BLDA: 76.8 MMHG (ref 35–45)
PH SMN: 7.22 [PH] (ref 7.35–7.45)
PH SMN: 7.24 [PH] (ref 7.35–7.45)
PH SMN: 7.34 [PH] (ref 7.35–7.45)
PISA MRMAX VEL: 0.05 M/S
PISA TR MAX VEL: 3.19 M/S
PLATELET # BLD AUTO: 324 K/UL (ref 150–350)
PLATELET # BLD AUTO: 398 K/UL (ref 150–350)
PLATELET # BLD AUTO: 423 K/UL (ref 150–350)
PLATELET # BLD AUTO: 478 K/UL (ref 150–350)
PLATELET # BLD AUTO: 485 K/UL (ref 150–350)
PMV BLD AUTO: 10.1 FL (ref 9.2–12.9)
PMV BLD AUTO: 10.2 FL (ref 9.2–12.9)
PMV BLD AUTO: 10.4 FL (ref 9.2–12.9)
PMV BLD AUTO: 9.7 FL (ref 9.2–12.9)
PMV BLD AUTO: 9.8 FL (ref 9.2–12.9)
PO2 BLDA: 35 MMHG (ref 80–100)
PO2 BLDA: 68 MMHG (ref 80–100)
PO2 BLDA: 69 MMHG (ref 80–100)
POC BE: -1 MMOL/L
POC BE: 0 MMOL/L
POC BE: 4 MMOL/L
POC SATURATED O2: 53 % (ref 95–100)
POC SATURATED O2: 89 % (ref 95–100)
POC SATURATED O2: 92 % (ref 95–100)
POC TCO2: 27 MMOL/L (ref 23–27)
POC TCO2: 28 MMOL/L (ref 23–27)
POC TCO2: 34 MMOL/L (ref 23–27)
POCT GLUCOSE: 112 MG/DL (ref 70–110)
POCT GLUCOSE: 138 MG/DL (ref 70–110)
POTASSIUM SERPL-SCNC: 3.6 MMOL/L (ref 3.5–5.1)
POTASSIUM SERPL-SCNC: 3.8 MMOL/L (ref 3.5–5.1)
POTASSIUM SERPL-SCNC: 3.9 MMOL/L (ref 3.5–5.1)
POTASSIUM SERPL-SCNC: 4 MMOL/L (ref 3.5–5.1)
POTASSIUM SERPL-SCNC: 4 MMOL/L (ref 3.5–5.1)
POTASSIUM SERPL-SCNC: 4.1 MMOL/L (ref 3.5–5.1)
POTASSIUM SERPL-SCNC: 4.2 MMOL/L (ref 3.5–5.1)
POTASSIUM SERPL-SCNC: 4.3 MMOL/L (ref 3.5–5.1)
POTASSIUM SERPL-SCNC: 4.4 MMOL/L (ref 3.5–5.1)
POTASSIUM SERPL-SCNC: 4.5 MMOL/L (ref 3.5–5.1)
POTASSIUM SERPL-SCNC: 4.6 MMOL/L (ref 3.5–5.1)
POTASSIUM SERPL-SCNC: 5 MMOL/L (ref 3.5–5.1)
POTASSIUM SERPL-SCNC: 5.4 MMOL/L (ref 3.5–5.1)
PROCALCITONIN SERPL IA-MCNC: 0.23 NG/ML
PROCALCITONIN SERPL IA-MCNC: 0.61 NG/ML
PROT SERPL-MCNC: 6 G/DL (ref 6–8.4)
PROT SERPL-MCNC: 6.1 G/DL (ref 6–8.4)
PROT SERPL-MCNC: 6.2 G/DL (ref 6–8.4)
PROT SERPL-MCNC: 6.4 G/DL (ref 6–8.4)
PROT SERPL-MCNC: 6.5 G/DL (ref 6–8.4)
PROT SERPL-MCNC: 6.5 G/DL (ref 6–8.4)
PROT SERPL-MCNC: 6.6 G/DL (ref 6–8.4)
PROT SERPL-MCNC: 6.7 G/DL (ref 6–8.4)
PROT SERPL-MCNC: 6.8 G/DL (ref 6–8.4)
PV PEAK VELOCITY: 1.19 CM/S
RA MAJOR: 4.61 CM
RA PRESSURE: 15 MMHG
RA WIDTH: 4.83 CM
RBC # BLD AUTO: 4.09 M/UL (ref 4.6–6.2)
RBC # BLD AUTO: 4.53 M/UL (ref 4.6–6.2)
RBC # BLD AUTO: 4.64 M/UL (ref 4.6–6.2)
RBC # BLD AUTO: 4.67 M/UL (ref 4.6–6.2)
RBC # BLD AUTO: 4.77 M/UL (ref 4.6–6.2)
RIGHT VENTRICULAR END-DIASTOLIC DIMENSION: 3.93 CM
SAMPLE: ABNORMAL
SARS-COV-2 RDRP RESP QL NAA+PROBE: NEGATIVE
SARS-COV-2 RNA RESP QL NAA+PROBE: NOT DETECTED
SINUS: 2.72 CM
SITE: ABNORMAL
SODIUM SERPL-SCNC: 141 MMOL/L (ref 136–145)
SODIUM SERPL-SCNC: 142 MMOL/L (ref 136–145)
SODIUM SERPL-SCNC: 143 MMOL/L (ref 136–145)
SODIUM SERPL-SCNC: 143 MMOL/L (ref 136–145)
SODIUM SERPL-SCNC: 144 MMOL/L (ref 136–145)
SODIUM SERPL-SCNC: 144 MMOL/L (ref 136–145)
SODIUM SERPL-SCNC: 145 MMOL/L (ref 136–145)
SODIUM SERPL-SCNC: 146 MMOL/L (ref 136–145)
SODIUM SERPL-SCNC: 148 MMOL/L (ref 136–145)
SP02: 100
SP02: 93
SPONT RATE: 21
SPONT RATE: 26
SPONT RATE: 35
STJ: 2.48 CM
TDI LATERAL: 0.09 M/S
TDI SEPTAL: 0.06 M/S
TDI: 0.08 M/S
TR MAX PG: 41 MMHG
TRICUSPID ANNULAR PLANE SYSTOLIC EXCURSION: 2.42 CM
TRIGL SERPL-MCNC: 73 MG/DL (ref 30–150)
TROPONIN I SERPL DL<=0.01 NG/ML-MCNC: 0.08 NG/ML (ref 0–0.03)
TV REST PULMONARY ARTERY PRESSURE: 56 MMHG
VIT B12 SERPL-MCNC: 285 PG/ML (ref 210–950)
WBC # BLD AUTO: 11.97 K/UL (ref 3.9–12.7)
WBC # BLD AUTO: 13.68 K/UL (ref 3.9–12.7)
WBC # BLD AUTO: 17.72 K/UL (ref 3.9–12.7)
WBC # BLD AUTO: 19.39 K/UL (ref 3.9–12.7)
WBC # BLD AUTO: 9.91 K/UL (ref 3.9–12.7)

## 2020-01-01 PROCEDURE — 36415 COLL VENOUS BLD VENIPUNCTURE: CPT

## 2020-01-01 PROCEDURE — 63600175 PHARM REV CODE 636 W HCPCS: Performed by: NURSE PRACTITIONER

## 2020-01-01 PROCEDURE — 25000003 PHARM REV CODE 250: Performed by: HOSPITALIST

## 2020-01-01 PROCEDURE — 12000002 HC ACUTE/MED SURGE SEMI-PRIVATE ROOM

## 2020-01-01 PROCEDURE — 94660 CPAP INITIATION&MGMT: CPT

## 2020-01-01 PROCEDURE — 27000190 HC CPAP FULL FACE MASK W/VALVE

## 2020-01-01 PROCEDURE — 83735 ASSAY OF MAGNESIUM: CPT

## 2020-01-01 PROCEDURE — 99900035 HC TECH TIME PER 15 MIN (STAT)

## 2020-01-01 PROCEDURE — 93010 ELECTROCARDIOGRAM REPORT: CPT | Mod: ,,, | Performed by: SPECIALIST

## 2020-01-01 PROCEDURE — 80053 COMPREHEN METABOLIC PANEL: CPT | Mod: 91

## 2020-01-01 PROCEDURE — 25000003 PHARM REV CODE 250: Performed by: NURSE PRACTITIONER

## 2020-01-01 PROCEDURE — 27000221 HC OXYGEN, UP TO 24 HOURS

## 2020-01-01 PROCEDURE — 51703 INSERT BLADDER CATH COMPLEX: CPT | Mod: ,,, | Performed by: UROLOGY

## 2020-01-01 PROCEDURE — 3075F SYST BP GE 130 - 139MM HG: CPT | Mod: CPTII,S$GLB,, | Performed by: INTERNAL MEDICINE

## 2020-01-01 PROCEDURE — U0003 INFECTIOUS AGENT DETECTION BY NUCLEIC ACID (DNA OR RNA); SEVERE ACUTE RESPIRATORY SYNDROME CORONAVIRUS 2 (SARS-COV-2) (CORONAVIRUS DISEASE [COVID-19]), AMPLIFIED PROBE TECHNIQUE, MAKING USE OF HIGH THROUGHPUT TECHNOLOGIES AS DESCRIBED BY CMS-2020-01-R: HCPCS

## 2020-01-01 PROCEDURE — 82803 BLOOD GASES ANY COMBINATION: CPT

## 2020-01-01 PROCEDURE — 97802 MEDICAL NUTRITION INDIV IN: CPT

## 2020-01-01 PROCEDURE — 80053 COMPREHEN METABOLIC PANEL: CPT

## 2020-01-01 PROCEDURE — 94640 AIRWAY INHALATION TREATMENT: CPT

## 2020-01-01 PROCEDURE — 99214 PR OFFICE/OUTPT VISIT, EST, LEVL IV, 30-39 MIN: ICD-10-PCS | Mod: S$GLB,,, | Performed by: NURSE PRACTITIONER

## 2020-01-01 PROCEDURE — 99999 PR PBB SHADOW E&M-EST. PATIENT-LVL IV: CPT | Mod: PBBFAC,,, | Performed by: NURSE PRACTITIONER

## 2020-01-01 PROCEDURE — 87040 BLOOD CULTURE FOR BACTERIA: CPT

## 2020-01-01 PROCEDURE — 63600175 PHARM REV CODE 636 W HCPCS: Performed by: SPECIALIST

## 2020-01-01 PROCEDURE — 63600175 PHARM REV CODE 636 W HCPCS: Performed by: PHYSICIAN ASSISTANT

## 2020-01-01 PROCEDURE — 3077F SYST BP >= 140 MM HG: CPT | Mod: CPTII,S$GLB,, | Performed by: NURSE PRACTITIONER

## 2020-01-01 PROCEDURE — 93010 EKG 12-LEAD: ICD-10-PCS | Mod: ,,, | Performed by: SPECIALIST

## 2020-01-01 PROCEDURE — 51703 PR INSERTION OF TEMPORARY INDWELLING BLADDER CATHETERIZATION, COMPLICA: ICD-10-PCS | Mod: ,,, | Performed by: UROLOGY

## 2020-01-01 PROCEDURE — 20000000 HC ICU ROOM

## 2020-01-01 PROCEDURE — 3077F PR MOST RECENT SYSTOLIC BLOOD PRESSURE >= 140 MM HG: ICD-10-PCS | Mod: CPTII,S$GLB,, | Performed by: NURSE PRACTITIONER

## 2020-01-01 PROCEDURE — 99223 1ST HOSP IP/OBS HIGH 75: CPT | Mod: 25,,, | Performed by: UROLOGY

## 2020-01-01 PROCEDURE — 85025 COMPLETE CBC W/AUTO DIFF WBC: CPT

## 2020-01-01 PROCEDURE — 3078F DIAST BP <80 MM HG: CPT | Mod: CPTII,S$GLB,, | Performed by: NURSE PRACTITIONER

## 2020-01-01 PROCEDURE — 1159F PR MEDICATION LIST DOCUMENTED IN MEDICAL RECORD: ICD-10-PCS | Mod: S$GLB,,, | Performed by: NURSE PRACTITIONER

## 2020-01-01 PROCEDURE — 99291 CRITICAL CARE FIRST HOUR: CPT | Mod: 25

## 2020-01-01 PROCEDURE — 63600175 PHARM REV CODE 636 W HCPCS: Performed by: HOSPITALIST

## 2020-01-01 PROCEDURE — 25000242 PHARM REV CODE 250 ALT 637 W/ HCPCS: Performed by: NURSE PRACTITIONER

## 2020-01-01 PROCEDURE — 99223 PR INITIAL HOSPITAL CARE,LEVL III: ICD-10-PCS | Mod: 25,,, | Performed by: UROLOGY

## 2020-01-01 PROCEDURE — 93005 ELECTROCARDIOGRAM TRACING: CPT

## 2020-01-01 PROCEDURE — 99213 PR OFFICE/OUTPT VISIT, EST, LEVL III, 20-29 MIN: ICD-10-PCS | Mod: S$GLB,,, | Performed by: NURSE PRACTITIONER

## 2020-01-01 PROCEDURE — 99999 PR PBB SHADOW E&M-EST. PATIENT-LVL IV: ICD-10-PCS | Mod: PBBFAC,,, | Performed by: NURSE PRACTITIONER

## 2020-01-01 PROCEDURE — 82607 VITAMIN B-12: CPT

## 2020-01-01 PROCEDURE — 94761 N-INVAS EAR/PLS OXIMETRY MLT: CPT

## 2020-01-01 PROCEDURE — 1101F PT FALLS ASSESS-DOCD LE1/YR: CPT | Mod: CPTII,S$GLB,, | Performed by: NURSE PRACTITIONER

## 2020-01-01 PROCEDURE — 1159F PR MEDICATION LIST DOCUMENTED IN MEDICAL RECORD: ICD-10-PCS | Mod: S$GLB,,, | Performed by: INTERNAL MEDICINE

## 2020-01-01 PROCEDURE — 80048 BASIC METABOLIC PNL TOTAL CA: CPT

## 2020-01-01 PROCEDURE — 71046 X-RAY EXAM CHEST 2 VIEWS: CPT | Mod: TC

## 2020-01-01 PROCEDURE — 70220 X-RAY EXAM OF SINUSES: CPT | Mod: TC,PO

## 2020-01-01 PROCEDURE — 83880 ASSAY OF NATRIURETIC PEPTIDE: CPT

## 2020-01-01 PROCEDURE — 1126F PR PAIN SEVERITY QUANTIFIED, NO PAIN PRESENT: ICD-10-PCS | Mod: S$GLB,,, | Performed by: NURSE PRACTITIONER

## 2020-01-01 PROCEDURE — U0002 COVID-19 LAB TEST NON-CDC: HCPCS

## 2020-01-01 PROCEDURE — 36600 WITHDRAWAL OF ARTERIAL BLOOD: CPT

## 2020-01-01 PROCEDURE — 99223 PR INITIAL HOSPITAL CARE,LEVL III: ICD-10-PCS | Mod: ,,, | Performed by: SPECIALIST

## 2020-01-01 PROCEDURE — 25000242 PHARM REV CODE 250 ALT 637 W/ HCPCS: Performed by: HOSPITALIST

## 2020-01-01 PROCEDURE — 25000003 PHARM REV CODE 250: Performed by: SPECIALIST

## 2020-01-01 PROCEDURE — 84484 ASSAY OF TROPONIN QUANT: CPT

## 2020-01-01 PROCEDURE — 94760 N-INVAS EAR/PLS OXIMETRY 1: CPT

## 2020-01-01 PROCEDURE — 99223 1ST HOSP IP/OBS HIGH 75: CPT | Mod: ,,, | Performed by: SPECIALIST

## 2020-01-01 PROCEDURE — 3075F PR MOST RECENT SYSTOLIC BLOOD PRESS GE 130-139MM HG: ICD-10-PCS | Mod: CPTII,S$GLB,, | Performed by: INTERNAL MEDICINE

## 2020-01-01 PROCEDURE — 25000003 PHARM REV CODE 250: Performed by: INTERNAL MEDICINE

## 2020-01-01 PROCEDURE — 71046 XR CHEST PA AND LATERAL: ICD-10-PCS | Mod: 26,,, | Performed by: RADIOLOGY

## 2020-01-01 PROCEDURE — 99214 OFFICE O/P EST MOD 30 MIN: CPT | Mod: S$GLB,,, | Performed by: NURSE PRACTITIONER

## 2020-01-01 PROCEDURE — 1126F AMNT PAIN NOTED NONE PRSNT: CPT | Mod: S$GLB,,, | Performed by: NURSE PRACTITIONER

## 2020-01-01 PROCEDURE — 3288F FALL RISK ASSESSMENT DOCD: CPT | Mod: CPTII,S$GLB,, | Performed by: INTERNAL MEDICINE

## 2020-01-01 PROCEDURE — 1159F MED LIST DOCD IN RCRD: CPT | Mod: S$GLB,,, | Performed by: NURSE PRACTITIONER

## 2020-01-01 PROCEDURE — 84145 PROCALCITONIN (PCT): CPT

## 2020-01-01 PROCEDURE — 94644 CONT INHLJ TX 1ST HOUR: CPT

## 2020-01-01 PROCEDURE — 1101F PR PT FALLS ASSESS DOC 0-1 FALLS W/OUT INJ PAST YR: ICD-10-PCS | Mod: CPTII,S$GLB,, | Performed by: NURSE PRACTITIONER

## 2020-01-01 PROCEDURE — 3288F PR FALLS RISK ASSESSMENT DOCUMENTED: ICD-10-PCS | Mod: CPTII,S$GLB,, | Performed by: INTERNAL MEDICINE

## 2020-01-01 PROCEDURE — 93010 ELECTROCARDIOGRAM REPORT: CPT | Mod: 76,,, | Performed by: SPECIALIST

## 2020-01-01 PROCEDURE — 99214 PR OFFICE/OUTPT VISIT, EST, LEVL IV, 30-39 MIN: ICD-10-PCS | Mod: S$GLB,,, | Performed by: INTERNAL MEDICINE

## 2020-01-01 PROCEDURE — 99233 PR SUBSEQUENT HOSPITAL CARE,LEVL III: ICD-10-PCS | Mod: ,,, | Performed by: SPECIALIST

## 2020-01-01 PROCEDURE — 1101F PR PT FALLS ASSESS DOC 0-1 FALLS W/OUT INJ PAST YR: ICD-10-PCS | Mod: CPTII,S$GLB,, | Performed by: INTERNAL MEDICINE

## 2020-01-01 PROCEDURE — 99213 OFFICE O/P EST LOW 20 MIN: CPT | Mod: S$GLB,,, | Performed by: NURSE PRACTITIONER

## 2020-01-01 PROCEDURE — 71046 X-RAY EXAM CHEST 2 VIEWS: CPT | Mod: TC,FY

## 2020-01-01 PROCEDURE — 99285 EMERGENCY DEPT VISIT HI MDM: CPT | Mod: 25

## 2020-01-01 PROCEDURE — 1126F AMNT PAIN NOTED NONE PRSNT: CPT | Mod: S$GLB,,, | Performed by: INTERNAL MEDICINE

## 2020-01-01 PROCEDURE — 1101F PT FALLS ASSESS-DOCD LE1/YR: CPT | Mod: CPTII,S$GLB,, | Performed by: INTERNAL MEDICINE

## 2020-01-01 PROCEDURE — 3078F PR MOST RECENT DIASTOLIC BLOOD PRESSURE < 80 MM HG: ICD-10-PCS | Mod: CPTII,S$GLB,, | Performed by: NURSE PRACTITIONER

## 2020-01-01 PROCEDURE — 25000242 PHARM REV CODE 250 ALT 637 W/ HCPCS: Performed by: EMERGENCY MEDICINE

## 2020-01-01 PROCEDURE — 80061 LIPID PANEL: CPT

## 2020-01-01 PROCEDURE — 83605 ASSAY OF LACTIC ACID: CPT

## 2020-01-01 PROCEDURE — 63600175 PHARM REV CODE 636 W HCPCS: Performed by: EMERGENCY MEDICINE

## 2020-01-01 PROCEDURE — 3288F FALL RISK ASSESSMENT DOCD: CPT | Mod: CPTII,S$GLB,, | Performed by: NURSE PRACTITIONER

## 2020-01-01 PROCEDURE — 99233 SBSQ HOSP IP/OBS HIGH 50: CPT | Mod: ,,, | Performed by: SPECIALIST

## 2020-01-01 PROCEDURE — 3078F PR MOST RECENT DIASTOLIC BLOOD PRESSURE < 80 MM HG: ICD-10-PCS | Mod: CPTII,S$GLB,, | Performed by: INTERNAL MEDICINE

## 2020-01-01 PROCEDURE — 3078F DIAST BP <80 MM HG: CPT | Mod: CPTII,S$GLB,, | Performed by: INTERNAL MEDICINE

## 2020-01-01 PROCEDURE — 71046 X-RAY EXAM CHEST 2 VIEWS: CPT | Mod: 26,,, | Performed by: RADIOLOGY

## 2020-01-01 PROCEDURE — 1159F MED LIST DOCD IN RCRD: CPT | Mod: S$GLB,,, | Performed by: INTERNAL MEDICINE

## 2020-01-01 PROCEDURE — 1126F PR PAIN SEVERITY QUANTIFIED, NO PAIN PRESENT: ICD-10-PCS | Mod: S$GLB,,, | Performed by: INTERNAL MEDICINE

## 2020-01-01 PROCEDURE — 25000003 PHARM REV CODE 250: Performed by: EMERGENCY MEDICINE

## 2020-01-01 PROCEDURE — 3288F PR FALLS RISK ASSESSMENT DOCUMENTED: ICD-10-PCS | Mod: CPTII,S$GLB,, | Performed by: NURSE PRACTITIONER

## 2020-01-01 PROCEDURE — 99214 OFFICE O/P EST MOD 30 MIN: CPT | Mod: S$GLB,,, | Performed by: INTERNAL MEDICINE

## 2020-01-01 RX ORDER — MONTELUKAST SODIUM 10 MG/1
10 TABLET ORAL NIGHTLY
Qty: 90 TABLET | Refills: 3 | Status: SHIPPED | OUTPATIENT
Start: 2020-01-01

## 2020-01-01 RX ORDER — POTASSIUM CHLORIDE 7.45 MG/ML
40 INJECTION INTRAVENOUS
Status: DISCONTINUED | OUTPATIENT
Start: 2020-01-01 | End: 2020-01-01

## 2020-01-01 RX ORDER — ACETAMINOPHEN 325 MG/1
650 TABLET ORAL EVERY 4 HOURS PRN
Status: DISCONTINUED | OUTPATIENT
Start: 2020-01-01 | End: 2020-01-01

## 2020-01-01 RX ORDER — MORPHINE SULFATE 2 MG/ML
2 INJECTION, SOLUTION INTRAMUSCULAR; INTRAVENOUS
Status: DISCONTINUED | OUTPATIENT
Start: 2020-01-01 | End: 2020-01-01

## 2020-01-01 RX ORDER — ALBUTEROL SULFATE 90 UG/1
2 AEROSOL, METERED RESPIRATORY (INHALATION) EVERY 6 HOURS PRN
Status: DISCONTINUED | OUTPATIENT
Start: 2020-01-01 | End: 2020-01-01

## 2020-01-01 RX ORDER — SCOLOPAMINE TRANSDERMAL SYSTEM 1 MG/1
1 PATCH, EXTENDED RELEASE TRANSDERMAL
Status: DISCONTINUED | OUTPATIENT
Start: 2020-01-01 | End: 2020-01-01 | Stop reason: HOSPADM

## 2020-01-01 RX ORDER — METOPROLOL SUCCINATE 25 MG/1
25 TABLET, EXTENDED RELEASE ORAL DAILY
Status: DISCONTINUED | OUTPATIENT
Start: 2020-01-01 | End: 2020-01-01

## 2020-01-01 RX ORDER — METOPROLOL SUCCINATE 50 MG/1
100 TABLET, EXTENDED RELEASE ORAL DAILY
Status: DISCONTINUED | OUTPATIENT
Start: 2020-01-01 | End: 2020-01-01

## 2020-01-01 RX ORDER — LOSARTAN POTASSIUM 25 MG/1
100 TABLET ORAL DAILY
Status: DISCONTINUED | OUTPATIENT
Start: 2020-01-01 | End: 2020-01-01

## 2020-01-01 RX ORDER — MAGNESIUM SULFATE HEPTAHYDRATE 40 MG/ML
2 INJECTION, SOLUTION INTRAVENOUS
Status: DISCONTINUED | OUTPATIENT
Start: 2020-01-01 | End: 2020-01-01

## 2020-01-01 RX ORDER — ENOXAPARIN SODIUM 100 MG/ML
60 INJECTION SUBCUTANEOUS ONCE
Status: COMPLETED | OUTPATIENT
Start: 2020-01-01 | End: 2020-01-01

## 2020-01-01 RX ORDER — CEFEPIME HYDROCHLORIDE 2 G/50ML
2 INJECTION, SOLUTION INTRAVENOUS
Status: DISCONTINUED | OUTPATIENT
Start: 2020-01-01 | End: 2020-01-01

## 2020-01-01 RX ORDER — TAMSULOSIN HYDROCHLORIDE 0.4 MG/1
0.4 CAPSULE ORAL
Qty: 90 CAPSULE | Refills: 3 | Status: SHIPPED | OUTPATIENT
Start: 2020-01-01 | End: 2020-01-01

## 2020-01-01 RX ORDER — ROFLUMILAST 500 UG/1
500 TABLET ORAL DAILY
Qty: 30 TABLET | Refills: 11 | Status: SHIPPED | OUTPATIENT
Start: 2020-01-01 | End: 2020-01-01 | Stop reason: SDUPTHER

## 2020-01-01 RX ORDER — MAGNESIUM SULFATE HEPTAHYDRATE 40 MG/ML
4 INJECTION, SOLUTION INTRAVENOUS
Status: DISCONTINUED | OUTPATIENT
Start: 2020-01-01 | End: 2020-01-01

## 2020-01-01 RX ORDER — MILRINONE LACTATE 0.2 MG/ML
0.25 INJECTION, SOLUTION INTRAVENOUS CONTINUOUS
Status: DISCONTINUED | OUTPATIENT
Start: 2020-01-01 | End: 2020-01-01

## 2020-01-01 RX ORDER — ONDANSETRON 2 MG/ML
4 INJECTION INTRAMUSCULAR; INTRAVENOUS EVERY 6 HOURS PRN
Status: DISCONTINUED | OUTPATIENT
Start: 2020-01-01 | End: 2020-01-01

## 2020-01-01 RX ORDER — ONDANSETRON 2 MG/ML
8 INJECTION INTRAMUSCULAR; INTRAVENOUS EVERY 8 HOURS PRN
Status: DISCONTINUED | OUTPATIENT
Start: 2020-01-01 | End: 2020-01-01 | Stop reason: HOSPADM

## 2020-01-01 RX ORDER — FUROSEMIDE 10 MG/ML
80 INJECTION INTRAMUSCULAR; INTRAVENOUS ONCE
Status: COMPLETED | OUTPATIENT
Start: 2020-01-01 | End: 2020-01-01

## 2020-01-01 RX ORDER — CHLOROTHIAZIDE SODIUM 500 MG/1
500 INJECTION INTRAVENOUS ONCE
Status: COMPLETED | OUTPATIENT
Start: 2020-01-01 | End: 2020-01-01

## 2020-01-01 RX ORDER — ALBUTEROL SULFATE 2.5 MG/.5ML
2.5 SOLUTION RESPIRATORY (INHALATION) EVERY 4 HOURS
Status: DISCONTINUED | OUTPATIENT
Start: 2020-01-01 | End: 2020-01-01

## 2020-01-01 RX ORDER — PREDNISONE 10 MG/1
TABLET ORAL
Qty: 35 TABLET | Refills: 0 | Status: SHIPPED | OUTPATIENT
Start: 2020-01-01 | End: 2020-01-01

## 2020-01-01 RX ORDER — HALOPERIDOL 5 MG/ML
5 INJECTION INTRAMUSCULAR ONCE
Status: DISCONTINUED | OUTPATIENT
Start: 2020-01-01 | End: 2020-01-01

## 2020-01-01 RX ORDER — IPRATROPIUM BROMIDE AND ALBUTEROL SULFATE 2.5; .5 MG/3ML; MG/3ML
9 SOLUTION RESPIRATORY (INHALATION) CONTINUOUS
Status: DISCONTINUED | OUTPATIENT
Start: 2020-01-01 | End: 2020-01-01

## 2020-01-01 RX ORDER — SODIUM CHLORIDE 450 MG/100ML
INJECTION, SOLUTION INTRAVENOUS ONCE
Status: COMPLETED | OUTPATIENT
Start: 2020-01-01 | End: 2020-01-01

## 2020-01-01 RX ORDER — METHYLPREDNISOLONE SOD SUCC 125 MG
125 VIAL (EA) INJECTION
Status: DISCONTINUED | OUTPATIENT
Start: 2020-01-01 | End: 2020-01-01

## 2020-01-01 RX ORDER — CLOPIDOGREL BISULFATE 75 MG/1
75 TABLET ORAL DAILY
Status: DISCONTINUED | OUTPATIENT
Start: 2020-01-01 | End: 2020-01-01

## 2020-01-01 RX ORDER — IPRATROPIUM BROMIDE AND ALBUTEROL SULFATE 2.5; .5 MG/3ML; MG/3ML
3 SOLUTION RESPIRATORY (INHALATION) EVERY 6 HOURS PRN
Status: DISCONTINUED | OUTPATIENT
Start: 2020-01-01 | End: 2020-01-01

## 2020-01-01 RX ORDER — IPRATROPIUM BROMIDE 42 UG/1
SPRAY, METERED NASAL
COMMUNITY
Start: 2020-01-01

## 2020-01-01 RX ORDER — ROFLUMILAST 500 UG/1
500 TABLET ORAL DAILY
Qty: 30 TABLET | Refills: 11 | Status: SHIPPED | OUTPATIENT
Start: 2020-01-01

## 2020-01-01 RX ORDER — MUPIROCIN 20 MG/G
OINTMENT TOPICAL 2 TIMES DAILY
Status: DISCONTINUED | OUTPATIENT
Start: 2020-01-01 | End: 2020-01-01

## 2020-01-01 RX ORDER — FLUTICASONE PROPIONATE 50 MCG
2 SPRAY, SUSPENSION (ML) NASAL DAILY
Status: DISCONTINUED | OUTPATIENT
Start: 2020-01-01 | End: 2020-01-01

## 2020-01-01 RX ORDER — BUDESONIDE 0.5 MG/2ML
0.5 INHALANT ORAL 2 TIMES DAILY
Status: DISCONTINUED | OUTPATIENT
Start: 2020-01-01 | End: 2020-01-01

## 2020-01-01 RX ORDER — HALOPERIDOL 5 MG/ML
2 INJECTION INTRAMUSCULAR ONCE
Status: COMPLETED | OUTPATIENT
Start: 2020-01-01 | End: 2020-01-01

## 2020-01-01 RX ORDER — HYDRALAZINE HYDROCHLORIDE 20 MG/ML
10 INJECTION INTRAMUSCULAR; INTRAVENOUS EVERY 6 HOURS PRN
Status: DISCONTINUED | OUTPATIENT
Start: 2020-01-01 | End: 2020-01-01

## 2020-01-01 RX ORDER — FUROSEMIDE 10 MG/ML
20 INJECTION INTRAMUSCULAR; INTRAVENOUS ONCE
Status: COMPLETED | OUTPATIENT
Start: 2020-01-01 | End: 2020-01-01

## 2020-01-01 RX ORDER — CODEINE PHOSPHATE AND GUAIFENESIN 10; 100 MG/5ML; MG/5ML
5 SOLUTION ORAL EVERY 4 HOURS PRN
Qty: 300 ML | Refills: 0 | Status: SHIPPED | OUTPATIENT
Start: 2020-01-01 | End: 2020-01-01

## 2020-01-01 RX ORDER — AMLODIPINE BESYLATE 5 MG/1
10 TABLET ORAL DAILY
Status: DISCONTINUED | OUTPATIENT
Start: 2020-01-01 | End: 2020-01-01

## 2020-01-01 RX ORDER — PREDNISONE 20 MG/1
TABLET ORAL
Qty: 18 TABLET | Refills: 1 | Status: SHIPPED | OUTPATIENT
Start: 2020-01-01

## 2020-01-01 RX ORDER — PREDNISONE 20 MG/1
TABLET ORAL
Qty: 18 TABLET | Refills: 0 | Status: SHIPPED | OUTPATIENT
Start: 2020-01-01 | End: 2020-01-01 | Stop reason: ALTCHOICE

## 2020-01-01 RX ORDER — ALBUTEROL SULFATE 0.83 MG/ML
2.5 SOLUTION RESPIRATORY (INHALATION) 4 TIMES DAILY PRN
Qty: 120 EACH | Refills: 11 | Status: SHIPPED | OUTPATIENT
Start: 2020-01-01

## 2020-01-01 RX ORDER — FAMOTIDINE 10 MG/ML
20 INJECTION INTRAVENOUS DAILY
Status: DISCONTINUED | OUTPATIENT
Start: 2020-01-01 | End: 2020-01-01

## 2020-01-01 RX ORDER — PREDNISONE 5 MG/1
5 TABLET ORAL DAILY
Qty: 30 TABLET | Refills: 0 | Status: SHIPPED | OUTPATIENT
Start: 2020-01-01 | End: 2020-01-01

## 2020-01-01 RX ORDER — ASPIRIN 81 MG/1
81 TABLET ORAL DAILY
Status: DISCONTINUED | OUTPATIENT
Start: 2020-01-01 | End: 2020-01-01

## 2020-01-01 RX ORDER — ENOXAPARIN SODIUM 100 MG/ML
30 INJECTION SUBCUTANEOUS EVERY 24 HOURS
Status: DISCONTINUED | OUTPATIENT
Start: 2020-01-01 | End: 2020-01-01

## 2020-01-01 RX ORDER — MONTELUKAST SODIUM 10 MG/1
10 TABLET ORAL NIGHTLY
Status: DISCONTINUED | OUTPATIENT
Start: 2020-01-01 | End: 2020-01-01

## 2020-01-01 RX ORDER — ROFLUMILAST 500 UG/1
500 TABLET ORAL DAILY
Status: DISCONTINUED | OUTPATIENT
Start: 2020-01-01 | End: 2020-01-01

## 2020-01-01 RX ORDER — SODIUM CHLORIDE 0.9 % (FLUSH) 0.9 %
10 SYRINGE (ML) INJECTION
Status: DISCONTINUED | OUTPATIENT
Start: 2020-01-01 | End: 2020-01-01

## 2020-01-01 RX ORDER — LOVASTATIN 20 MG/1
40 TABLET ORAL DAILY
Status: DISCONTINUED | OUTPATIENT
Start: 2020-01-01 | End: 2020-01-01

## 2020-01-01 RX ORDER — BUDESONIDE 0.5 MG/2ML
0.5 INHALANT ORAL 2 TIMES DAILY
Qty: 120 ML | Refills: 5 | Status: SHIPPED | OUTPATIENT
Start: 2020-01-01 | End: 2021-07-20

## 2020-01-01 RX ORDER — HALOPERIDOL 5 MG/ML
5 INJECTION INTRAMUSCULAR ONCE
Status: COMPLETED | OUTPATIENT
Start: 2020-01-01 | End: 2020-01-01

## 2020-01-01 RX ORDER — LORAZEPAM 2 MG/ML
1 INJECTION INTRAMUSCULAR EVERY 30 MIN PRN
Status: DISCONTINUED | OUTPATIENT
Start: 2020-01-01 | End: 2020-01-01 | Stop reason: HOSPADM

## 2020-01-01 RX ORDER — ALBUTEROL SULFATE 90 UG/1
2 AEROSOL, METERED RESPIRATORY (INHALATION) EVERY 6 HOURS PRN
Qty: 18 G | Refills: 11 | Status: SHIPPED | OUTPATIENT
Start: 2020-01-01

## 2020-01-01 RX ORDER — FUROSEMIDE 10 MG/ML
40 INJECTION INTRAMUSCULAR; INTRAVENOUS ONCE
Status: COMPLETED | OUTPATIENT
Start: 2020-01-01 | End: 2020-01-01

## 2020-01-01 RX ORDER — ENOXAPARIN SODIUM 100 MG/ML
40 INJECTION SUBCUTANEOUS EVERY 24 HOURS
Status: DISCONTINUED | OUTPATIENT
Start: 2020-01-01 | End: 2020-01-01

## 2020-01-01 RX ADMIN — FAMOTIDINE 20 MG: 10 INJECTION, SOLUTION INTRAVENOUS at 08:12

## 2020-01-01 RX ADMIN — QUETIAPINE FUMARATE 12.5 MG: 25 TABLET, FILM COATED ORAL at 09:12

## 2020-01-01 RX ADMIN — ALBUTEROL SULFATE 2.5 MG: 2.5 SOLUTION RESPIRATORY (INHALATION) at 11:12

## 2020-01-01 RX ADMIN — FUROSEMIDE 5 MG/HR: 10 INJECTION, SOLUTION INTRAMUSCULAR; INTRAVENOUS at 11:12

## 2020-01-01 RX ADMIN — AMLODIPINE BESYLATE 10 MG: 5 TABLET ORAL at 10:12

## 2020-01-01 RX ADMIN — AMLODIPINE BESYLATE 10 MG: 5 TABLET ORAL at 08:12

## 2020-01-01 RX ADMIN — MUPIROCIN: 20 OINTMENT TOPICAL at 08:12

## 2020-01-01 RX ADMIN — CEFTRIAXONE 1 G: 1 INJECTION, SOLUTION INTRAVENOUS at 01:12

## 2020-01-01 RX ADMIN — ASPIRIN 81 MG: 81 TABLET, COATED ORAL at 08:12

## 2020-01-01 RX ADMIN — ROFLUMILAST 500 MCG: 500 TABLET ORAL at 08:12

## 2020-01-01 RX ADMIN — MUPIROCIN: 20 OINTMENT TOPICAL at 09:12

## 2020-01-01 RX ADMIN — ENOXAPARIN SODIUM 60 MG: 60 INJECTION SUBCUTANEOUS at 08:12

## 2020-01-01 RX ADMIN — FUROSEMIDE 80 MG: 10 INJECTION, SOLUTION INTRAMUSCULAR; INTRAVENOUS at 03:12

## 2020-01-01 RX ADMIN — LOSARTAN POTASSIUM 100 MG: 25 TABLET, FILM COATED ORAL at 10:12

## 2020-01-01 RX ADMIN — MONTELUKAST 10 MG: 10 TABLET, FILM COATED ORAL at 09:12

## 2020-01-01 RX ADMIN — CEFTRIAXONE 1 G: 1 INJECTION, SOLUTION INTRAVENOUS at 02:12

## 2020-01-01 RX ADMIN — LORAZEPAM 1 MG: 2 INJECTION INTRAMUSCULAR; INTRAVENOUS at 10:12

## 2020-01-01 RX ADMIN — AZITHROMYCIN MONOHYDRATE 500 MG: 500 INJECTION, POWDER, LYOPHILIZED, FOR SOLUTION INTRAVENOUS at 02:12

## 2020-01-01 RX ADMIN — LOSARTAN POTASSIUM 100 MG: 25 TABLET, FILM COATED ORAL at 08:12

## 2020-01-01 RX ADMIN — FLUTICASONE PROPIONATE 100 MCG: 50 SPRAY, METERED NASAL at 08:12

## 2020-01-01 RX ADMIN — CLOPIDOGREL BISULFATE 75 MG: 75 TABLET, FILM COATED ORAL at 08:12

## 2020-01-01 RX ADMIN — LOVASTATIN 40 MG: 20 TABLET ORAL at 08:12

## 2020-01-01 RX ADMIN — ALBUTEROL SULFATE 2.5 MG: 2.5 SOLUTION RESPIRATORY (INHALATION) at 03:12

## 2020-01-01 RX ADMIN — ENOXAPARIN SODIUM 30 MG: 30 INJECTION SUBCUTANEOUS at 05:12

## 2020-01-01 RX ADMIN — FLUTICASONE FUROATE, UMECLIDINIUM BROMIDE AND VILANTEROL TRIFENATATE 1 PUFF: 100; 62.5; 25 POWDER RESPIRATORY (INHALATION) at 08:12

## 2020-01-01 RX ADMIN — ROFLUMILAST 500 MCG: 500 TABLET ORAL at 09:12

## 2020-01-01 RX ADMIN — BUDESONIDE 0.5 MG: 0.5 SUSPENSION RESPIRATORY (INHALATION) at 06:12

## 2020-01-01 RX ADMIN — MILRINONE LACTATE 0.25 MCG/KG/MIN: 200 INJECTION, SOLUTION INTRAVENOUS at 12:12

## 2020-01-01 RX ADMIN — METOPROLOL SUCCINATE 100 MG: 50 TABLET, EXTENDED RELEASE ORAL at 08:12

## 2020-01-01 RX ADMIN — ALBUTEROL SULFATE 2.5 MG: 2.5 SOLUTION RESPIRATORY (INHALATION) at 07:12

## 2020-01-01 RX ADMIN — AMLODIPINE BESYLATE 10 MG: 5 TABLET ORAL at 09:12

## 2020-01-01 RX ADMIN — CEFTRIAXONE 1 G: 1 INJECTION, SOLUTION INTRAVENOUS at 04:12

## 2020-01-01 RX ADMIN — AZITHROMYCIN MONOHYDRATE 500 MG: 500 INJECTION, POWDER, LYOPHILIZED, FOR SOLUTION INTRAVENOUS at 05:12

## 2020-01-01 RX ADMIN — AZITHROMYCIN MONOHYDRATE 500 MG: 500 INJECTION, POWDER, LYOPHILIZED, FOR SOLUTION INTRAVENOUS at 03:12

## 2020-01-01 RX ADMIN — CEFEPIME HYDROCHLORIDE 2 G: 2 INJECTION, SOLUTION INTRAVENOUS at 03:12

## 2020-01-01 RX ADMIN — QUETIAPINE FUMARATE 12.5 MG: 25 TABLET, FILM COATED ORAL at 10:12

## 2020-01-01 RX ADMIN — FUROSEMIDE 7.5 MG/HR: 10 INJECTION, SOLUTION INTRAMUSCULAR; INTRAVENOUS at 05:12

## 2020-01-01 RX ADMIN — ENOXAPARIN SODIUM 30 MG: 30 INJECTION SUBCUTANEOUS at 04:12

## 2020-01-01 RX ADMIN — IPRATROPIUM BROMIDE AND ALBUTEROL SULFATE 3 ML: .5; 2.5 SOLUTION RESPIRATORY (INHALATION) at 07:12

## 2020-01-01 RX ADMIN — CLOPIDOGREL BISULFATE 75 MG: 75 TABLET, FILM COATED ORAL at 09:12

## 2020-01-01 RX ADMIN — CHLOROTHIAZIDE SODIUM 500 MG: 500 INJECTION, POWDER, LYOPHILIZED, FOR SOLUTION INTRAVENOUS at 10:12

## 2020-01-01 RX ADMIN — ASPIRIN 81 MG: 81 TABLET, COATED ORAL at 09:12

## 2020-01-01 RX ADMIN — QUETIAPINE FUMARATE 12.5 MG: 25 TABLET, FILM COATED ORAL at 11:12

## 2020-01-01 RX ADMIN — HALOPERIDOL LACTATE 2 MG: 5 INJECTION, SOLUTION INTRAMUSCULAR at 01:12

## 2020-01-01 RX ADMIN — ALBUTEROL SULFATE 2.5 MG: 2.5 SOLUTION RESPIRATORY (INHALATION) at 08:12

## 2020-01-01 RX ADMIN — ROFLUMILAST 500 MCG: 500 TABLET ORAL at 10:12

## 2020-01-01 RX ADMIN — CLOPIDOGREL BISULFATE 75 MG: 75 TABLET, FILM COATED ORAL at 10:12

## 2020-01-01 RX ADMIN — CEFEPIME HYDROCHLORIDE 2 G: 2 INJECTION, SOLUTION INTRAVENOUS at 05:12

## 2020-01-01 RX ADMIN — ALBUTEROL SULFATE 2.5 MG: 2.5 SOLUTION RESPIRATORY (INHALATION) at 12:12

## 2020-01-01 RX ADMIN — FUROSEMIDE 40 MG: 10 INJECTION, SOLUTION INTRAMUSCULAR; INTRAVENOUS at 04:12

## 2020-01-01 RX ADMIN — MORPHINE SULFATE 1 MG/HR: 10 INJECTION INTRAVENOUS at 10:12

## 2020-01-01 RX ADMIN — ALBUTEROL SULFATE 2.5 MG: 2.5 SOLUTION RESPIRATORY (INHALATION) at 06:12

## 2020-01-01 RX ADMIN — FUROSEMIDE 20 MG: 10 INJECTION, SOLUTION INTRAMUSCULAR; INTRAVENOUS at 01:12

## 2020-01-01 RX ADMIN — ENOXAPARIN SODIUM 40 MG: 40 INJECTION SUBCUTANEOUS at 05:12

## 2020-01-01 RX ADMIN — MUPIROCIN: 20 OINTMENT TOPICAL at 10:12

## 2020-01-01 RX ADMIN — FAMOTIDINE 20 MG: 10 INJECTION, SOLUTION INTRAVENOUS at 09:12

## 2020-01-01 RX ADMIN — QUETIAPINE FUMARATE 12.5 MG: 25 TABLET, FILM COATED ORAL at 08:12

## 2020-01-01 RX ADMIN — IPRATROPIUM BROMIDE AND ALBUTEROL SULFATE 9 ML: .5; 2.5 SOLUTION RESPIRATORY (INHALATION) at 02:12

## 2020-01-01 RX ADMIN — MILRINONE LACTATE 0.25 MCG/KG/MIN: 200 INJECTION, SOLUTION INTRAVENOUS at 11:12

## 2020-01-01 RX ADMIN — FUROSEMIDE 20 MG: 10 INJECTION, SOLUTION INTRAMUSCULAR; INTRAVENOUS at 10:12

## 2020-01-01 RX ADMIN — METOPROLOL SUCCINATE 100 MG: 50 TABLET, EXTENDED RELEASE ORAL at 09:12

## 2020-01-01 RX ADMIN — SODIUM CHLORIDE: 0.45 INJECTION, SOLUTION INTRAVENOUS at 07:12

## 2020-01-01 RX ADMIN — FLUTICASONE FUROATE, UMECLIDINIUM BROMIDE AND VILANTEROL TRIFENATATE 1 PUFF: 100; 62.5; 25 POWDER RESPIRATORY (INHALATION) at 07:12

## 2020-01-01 RX ADMIN — FUROSEMIDE 10 MG/HR: 10 INJECTION, SOLUTION INTRAMUSCULAR; INTRAVENOUS at 03:12

## 2020-01-01 RX ADMIN — LOVASTATIN 40 MG: 20 TABLET ORAL at 09:12

## 2020-01-01 RX ADMIN — IPRATROPIUM BROMIDE AND ALBUTEROL SULFATE 3 ML: .5; 2.5 SOLUTION RESPIRATORY (INHALATION) at 02:12

## 2020-01-01 RX ADMIN — FUROSEMIDE 10 MG/HR: 10 INJECTION, SOLUTION INTRAMUSCULAR; INTRAVENOUS at 02:12

## 2020-01-01 RX ADMIN — ASPIRIN 81 MG: 81 TABLET, COATED ORAL at 10:12

## 2020-01-01 RX ADMIN — LOSARTAN POTASSIUM 100 MG: 25 TABLET, FILM COATED ORAL at 09:12

## 2020-01-01 RX ADMIN — LOVASTATIN 40 MG: 20 TABLET ORAL at 10:12

## 2020-01-01 RX ADMIN — HALOPERIDOL LACTATE 5 MG: 5 INJECTION, SOLUTION INTRAMUSCULAR at 12:12

## 2020-01-01 RX ADMIN — METOPROLOL SUCCINATE 25 MG: 25 TABLET, EXTENDED RELEASE ORAL at 10:12

## 2020-02-26 NOTE — TELEPHONE ENCOUNTER
Spoke to pt wife. Reviewed action plan from last visit. Patient is having relief from cough with current action plan. Trelegy inhaler refill request sent to provider No further action is needed at this time ----- Message from Kelsey Guaman sent at 2/26/2020 10:45 AM CST -----  Contact: patient spouse rafael mg # [phone]   patient spouse rafael mg # [phone] requesting same day appt.   Reason:  Cough and requesting TRELEGY to be refilled  Please call upon request     Patient will be using   [id number] - [place] - [address]  [address] [address]  Phone: [phone] Fax: [phone]    Thanks!

## 2020-07-13 PROBLEM — E53.8 VITAMIN B 12 DEFICIENCY: Status: ACTIVE | Noted: 2020-01-01

## 2020-07-13 PROBLEM — E78.5 HYPERLIPIDEMIA: Status: ACTIVE | Noted: 2020-01-01

## 2020-07-13 PROBLEM — R05.9 COUGH: Status: ACTIVE | Noted: 2020-01-01

## 2020-07-13 PROBLEM — Z79.82 ASPIRIN LONG-TERM USE: Status: ACTIVE | Noted: 2020-01-01

## 2020-07-13 PROBLEM — Z79.02 VISIT FOR MONITORING PLAVIX THERAPY: Status: ACTIVE | Noted: 2020-01-01

## 2020-07-13 PROBLEM — Z51.81 VISIT FOR MONITORING PLAVIX THERAPY: Status: ACTIVE | Noted: 2020-01-01

## 2020-07-13 PROBLEM — I50.32 CHRONIC DIASTOLIC HEART FAILURE: Status: ACTIVE | Noted: 2020-01-01

## 2020-07-15 PROBLEM — R41.89 COGNITIVE IMPAIRMENT: Status: ACTIVE | Noted: 2020-01-01

## 2020-07-20 NOTE — PROGRESS NOTES
7/20/2020    Frederick Kong Jr.  Office Note    Chief Complaint   Patient presents with    COPD       HPI:   7/20/2020- Cough- recurrent problem, onset URI February 2020 with no improvement ; tx by PCP with doxycyline, using nebulizer daily in morning, croup seal bark sound of cough, took prednisone 6 days total one day at time for severe SOB. States cough is worse with hot weather, nocturnal arousals nightly.   SOB- worsened in last 6 months, worse with hot weather, wearing oxygen at 2 L nightly.     12/10/2019- Breathing is stable, states pretty good, Supplemental oxygen at night. COPD exacerbation 2x in past 6 months, resolved with prednisone taper. Cough- occasionally, worse in early morning, wife states is usually when he forgets to take Singulair at night. Productive clear in color. Shortness of breath- only with exertion, not severe, resolves with rest. Fell previous month in yard. Went to ER.     6/10/19- breathing doing well, wearing oxygen at 2 L at night. Did not have CTA due to fear of IV dye allergy, SOB resolved with prednisone. Taken prednisone 2x in 3 months. On antithrombotics followed by cardiology. NO cough. States big improvement with allergies with daily singulair tx.    3/8/2019- SOB worsened onset 4 days, was wearing supplemental oxygen at night, now during day and night, not able to walk with out oxygen, Cardiology appt yesterday for antithrombolitic and diuretic therapy.   Cough onset 2 days- productive clear in color, in am only. Low grade fever, night prior. Albuterol nebulizer 1x daily.    12/7/18- Discharged [facility] 11/30/18 COPD exacerbation, upper respiratory infection. Started as sinus infection tx outpatient turned into SOB requiring hospitalization. Feeling better today, Cough daily- worse in AM clear color. Tx post discharge Doxycyline and prednisone 40 mg a day for 5 days.  States allergies a problem for years, has frequent sinus drainage    4/30/18-HPI: pt worked [employer] , had  valve surg in past and follow with Dr Gonzalez.  Pt was admitted with resp failure, extubated, went home and represented.  Had troptonin up and bnp up.  Cardiac role suspected.     Smoked 1.5 ppd for up to 30 yrs.     Still on abx.  Pt needed urology to [facility].          The chief compliant  problem varies with instablilty at time    PFSH:  Past Medical History:   Diagnosis Date    BPH with urinary obstruction     Cancer     CHF (congestive heart failure)     Coronary artery disease     Gout     Hyperlipemia     Hypertension     Personal history of urethral stricture          Past Surgical History:   Procedure Laterality Date    APPENDECTOMY      BRAIN SURGERY  1975    meningioma removed    CARDIAC SURGERY  2001    mechanical aortic valve replacement, Dr. Sanket Sanchez    CYSTOSCOPY N/A 6/11/2018    Procedure: CYSTOSCOPY and possible urethral dilation;  Surgeon: Kimmie Jerry MD;  Location: [facility] OR;  Service: Urology;  Laterality: N/A;    TONSILLECTOMY       Social History     Tobacco Use    Smoking status: Former Smoker   Substance Use Topics    Alcohol use: No    Drug use: Not on file     History reviewed. No pertinent family history.  Review of patient's allergies indicates:   Allergen Reactions    Iodinated contrast- oral and iv dye Rash     I have reviewed past medical, family, and social history. I have reviewed previous nurse notes.    Performance Status:The patient's activity level is functions out of house.          Review of Systems   Constitutional: Negative for activity change, appetite change, chills, diaphoresis, fatigue, fever, and unexpected weight change.   HENT: Negative for dental problem, postnasal drip, sinus pain, sneezing, sore throat, trouble swallowing and voice change. Positive for rhinorrhea, sinus pressure,  Respiratory: Negative for apnea,chest tightness,  wheezing and stridor.  Positive for cough and shortness of breath   Cardiovascular: Negative for  chest pain, palpitations and leg swelling.   Gastrointestinal: Negative for abdominal distention, abdominal pain, constipation and nausea.   Musculoskeletal: Negative for gait problem, myalgias and neck pain.   Skin: Negative for color change and pallor.   Allergic/Immunologic: Negative for environmental allergies and food allergies.   Neurological: Negative for dizziness, speech difficulty, weakness, light-headedness, numbness and headaches.   Hematological: Negative for adenopathy. Does not bruise/bleed easily.   Psychiatric/Behavioral: Negative for dysphoric mood, anxiety, and sleep disturbance.           Exam:Comprehensive exam done. BP (!) 165/60 (BP Location: Left arm, Patient Position: Sitting)   Pulse 65   Temp 97.5 °F (36.4 °C)   Wt 71.5 kg (157 lb 8.3 oz)   SpO2 (!) 93% Comment: on room air at rest  BMI 24.67 kg/m²   Exam included Vitals as listed  Constitutional: He is oriented to person, place, and time. He appears well-developed. No distress.   Nose: Nose normal.   Mouth/Throat: Uvula is midline, oropharynx is clear and moist and mucous membranes are normal.  No oropharyngeal exudate, posterior oropharyngeal edema, posterior oropharyngeal erythema or tonsillar abscesses.  Mallapatti (M) score 3  Eyes: Pupils are equal, round, and reactive to light.   Neck: No JVD present. No thyromegaly present.   Cardiovascular: Normal rate, regular rhythm and normal hear t sounds. Exam reveals no gallop and no friction rub.   No murmur heard.  Pulmonary/Chest: Effort normal and breath sounds abnormal: bilateral rales and rhonchi, percussion good, fremitus good. No accessory muscle usage or stridor. No apnea and no tachypnea. No respiratory distress, decreased breath sounds, wheezes, rhonchi, or tenderness.   Abdominal: Soft. He exhibits no mass. There is no tenderness. No hepatosplenomegaly, hernias and normoactive bowel sounds  Musculoskeletal: Normal range of motion. exhibits no edema  Neurological:  alert and  oriented to person, place, and time. not disoriented.   Skin: Skin is warm and dry. Capillary refill takes less 2 sec. No cyanosis or erythema. No pallor. Nails show no clubbing.   Psychiatric: normal mood and affect. behavior is normal. Judgment and thought content normal.       Radiographs (ct chest and cxr) reviewed: results reviewed by direct vision  X-Ray Chest PA And Lateral 7/13/2020   IMPRESSION: Mild emphysematous changes with small right pleural  effusion and right lower lobe atelectasis     Prior median sternotomy with prosthetic heart valve      XR CHEST PA AND LATERAL 03/08/2019   There is paucity of markings in the right upper lung field with diffuse interstitial changes in the right lower lung field and throughout the left lung.  There is calcified granuloma left midlung field.  There is slight blunting the right costophrenic sulcus.  The cardiac silhouette appears mildly enlarged the postoperative changes and prosthetic aortic valve.         Labs reviewed       Lab Results   Component Value Date    WBC 9.91 07/17/2020    RBC 4.67 07/17/2020    HGB 13.1 (L) 07/17/2020    HCT 44.1 07/17/2020    MCV 94 07/17/2020    MCH 28.1 07/17/2020    MCHC 29.7 (L) 07/17/2020    RDW 14.4 07/17/2020     07/17/2020    MPV 10.2 07/17/2020    GRAN 7.4 07/17/2020    GRAN 74.5 (H) 07/17/2020    LYMPH 1.3 07/17/2020    LYMPH 12.6 (L) 07/17/2020    MONO 0.7 07/17/2020    MONO 7.4 07/17/2020    EOS 0.5 07/17/2020    BASO 0.06 07/17/2020    EOSINOPHIL 4.5 07/17/2020    BASOPHIL 0.6 07/17/2020       PFT was not done        Plan:  Clinical impression is resonably certain and repeated evaluation prn +/- follow up will be needed as below.    Frederick was seen today for copd.    Diagnoses and all orders for this visit:    Chronic obstructive pulmonary disease with acute exacerbation  -     predniSONE (DELTASONE) 20 MG tablet; 3 pills 3 days, 2 pills 3 days, 1 pill 3 days  -     budesonide (PULMICORT) 0.5 mg/2 mL nebulizer  solution; Take 2 mLs (0.5 mg total) by nebulization 2 (two) times daily. Controller  -     roflumilast (DALIRESP) 500 mcg Tab; Take 1 tablet (500 mcg total) by mouth once daily.  -     guaifenesin-codeine 100-10 mg/5 ml (TUSSI-ORGANIDIN NR)  mg/5 mL syrup; Take 5 mLs by mouth every 4 (four) hours as needed for Cough or Congestion.  -     albuterol (PROVENTIL) 2.5 mg /3 mL (0.083 %) nebulizer solution; Take 3 mLs (2.5 mg total) by nebulization 4 (four) times daily as needed for Wheezing or Shortness of Breath.    Cough  -     guaifenesin-codeine 100-10 mg/5 ml (TUSSI-ORGANIDIN NR)  mg/5 mL syrup; Take 5 mLs by mouth every 4 (four) hours as needed for Cough or Congestion.    COPD mixed type  -     albuterol (PROVENTIL) 2.5 mg /3 mL (0.083 %) nebulizer solution; Take 3 mLs (2.5 mg total) by nebulization 4 (four) times daily as needed for Wheezing or Shortness of Breath.    Chronic respiratory failure with hypoxia  -     albuterol (PROVENTIL) 2.5 mg /3 mL (0.083 %) nebulizer solution; Take 3 mLs (2.5 mg total) by nebulization 4 (four) times daily as needed for Wheezing or Shortness of Breath.        Follow up in about 3 months (around 10/20/2020), or if symptoms worsen or fail to improve.    Discussed with patient above for education the following:      Patient Instructions   Since you declined the steroid shot, will start Prednisone 3 pills for 3 days, 2 pills for 3 days, 1 pill for 3 days    Percussion therapy instruction  Do breathing treatments 2 x a day followed by 10 minutes of laying face down on cough or bed with 2-3 pillows under stomach. Make sure someone else is home in case you become dizzy.  Have someone beat on upper back or use hand held massager on back      Cough syrup to help you sleep at night    Budesonide nebulizer 12 hours apart 2x daily every day until you feel better    Continue trelegy daily    Starting a new pill   Daliresp, can cause diarrhea and abdominal cramping. If this occurs  ok to stop medication.

## 2020-07-20 NOTE — PATIENT INSTRUCTIONS
Since you declined the steroid shot, will start Prednisone 3 pills for 3 days, 2 pills for 3 days, 1 pill for 3 days    Percussion therapy instruction  Do breathing treatments 2 x a day followed by 10 minutes of laying face down on cough or bed with 2-3 pillows under stomach. Make sure someone else is home in case you become dizzy.  Have someone beat on upper back or use hand held massager on back      Cough syrup to help you sleep at night    Budesonide nebulizer 12 hours apart 2x daily every day until you feel better    Continue trelegy daily    Starting a new pill   Daliresp, can cause diarrhea and abdominal cramping. If this occurs ok to stop medication.

## 2020-10-20 NOTE — PROGRESS NOTES
10/20/2020    Frederick Kong Jr.  Office Note    Chief Complaint   Patient presents with    Follow-up     still short of breath    Cough       HPI:   10/20/20- went to ENT for chronic cough with no change in therapy; cough is chronic problem, day/night, not able to cough up mucous. Using duo neb nebulizer 2-3x daily. Using budesonide every other day.   Started daliresp for 2 days but stopped. Did not have any side effects of gi upset.      States SOB worsened in past week. Previously needed oxygen at night only but currently needs daytime at 2L when he walks in home. Improves with prednisone but breathing worsened after stopping.     7/20/2020- Cough- recurrent problem, onset URI February 2020 with no improvement ; tx by PCP with doxycyline, using nebulizer daily in morning, croup seal bark sound of cough, took prednisone 6 days total one day at time for severe SOB. States cough is worse with hot weather, nocturnal arousals nightly.   SOB- worsened in last 6 months, worse with hot weather, wearing oxygen at 2 L nightly.     12/10/2019- Breathing is stable, states pretty good, Supplemental oxygen at night. COPD exacerbation 2x in past 6 months, resolved with prednisone taper. Cough- occasionally, worse in early morning, wife states is usually when he forgets to take Singulair at night. Productive clear in color. Shortness of breath- only with exertion, not severe, resolves with rest. Fell previous month in yard. Went to ER.     6/10/19- breathing doing well, wearing oxygen at 2 L at night. Did not have CTA due to fear of IV dye allergy, SOB resolved with prednisone. Taken prednisone 2x in 3 months. On antithrombotics followed by cardiology. NO cough. States big improvement with allergies with daily singulair tx.    3/8/2019- SOB worsened onset 4 days, was wearing supplemental oxygen at night, now during day and night, not able to walk with out oxygen, Cardiology appt yesterday for antithrombolitic and diuretic  therapy.   Cough onset 2 days- productive clear in color, in am only. Low grade fever, night prior. Albuterol nebulizer 1x daily.    12/7/18- Discharged [facility] 11/30/18 COPD exacerbation, upper respiratory infection. Started as sinus infection tx outpatient turned into SOB requiring hospitalization. Feeling better today, Cough daily- worse in AM clear color. Tx post discharge Doxycyline and prednisone 40 mg a day for 5 days.  States allergies a problem for years, has frequent sinus drainage    4/30/18-HPI: pt worked [employer] , had valve surg in past and follow with Dr Gonzalez.  Pt was admitted with resp failure, extubated, went home and represented.  Had troptonin up and bnp up.  Cardiac role suspected.     Smoked 1.5 ppd for up to 30 yrs.     Still on abx.  Pt needed urology to [facility].          The chief compliant  problem varies with instablilty at time    PFSH:  Past Medical History:   Diagnosis Date    BPH with urinary obstruction     Cancer     CHF (congestive heart failure)     Coronary artery disease     Gout     Hyperlipemia     Hypertension     Personal history of urethral stricture          Past Surgical History:   Procedure Laterality Date    APPENDECTOMY      BRAIN SURGERY  1975    meningioma removed    CARDIAC SURGERY  2001    mechanical aortic valve replacement, Dr. Sanket Sanchez    CYSTOSCOPY N/A 6/11/2018    Procedure: CYSTOSCOPY and possible urethral dilation;  Surgeon: Kimmie Jerry MD;  Location: [facility] OR;  Service: Urology;  Laterality: N/A;    TONSILLECTOMY       Social History     Tobacco Use    Smoking status: Former Smoker   Substance Use Topics    Alcohol use: No    Drug use: Not on file     No family history on file.  Review of patient's allergies indicates:   Allergen Reactions    Iodinated contrast- oral and iv dye Rash     I have reviewed past medical, family, and social history. I have reviewed previous nurse notes.    Performance Status:The patient's  activity level is functions out of house.          Review of Systems   Constitutional: Negative for activity change, appetite change, chills, diaphoresis, fatigue, fever, and unexpected weight change.   HENT: Negative for dental problem, postnasal drip, sinus pain, sneezing, sore throat, trouble swallowing and voice change. Positive for rhinorrhea, sinus pressure,  Respiratory: Negative for apnea,chest tightness,  wheezing and stridor.  Positive for cough and shortness of breath   Cardiovascular: Negative for chest pain, palpitations and leg swelling.   Gastrointestinal: Negative for abdominal distention, abdominal pain, constipation and nausea.   Musculoskeletal: Negative for gait problem, myalgias and neck pain.   Skin: Negative for color change and pallor.   Allergic/Immunologic: Negative for environmental allergies and food allergies.   Neurological: Negative for dizziness, speech difficulty, weakness, light-headedness, numbness and headaches.   Hematological: Negative for adenopathy. Does not bruise/bleed easily.   Psychiatric/Behavioral: Negative for dysphoric mood, anxiety, and sleep disturbance.           Exam:Comprehensive exam done. BP (!) 168/74 (BP Location: Right arm, Patient Position: Sitting)   Pulse 62   Wt 72.5 kg (159 lb 13.3 oz)   SpO2 (!) 85% Comment: on room air at rest  BMI 25.03 kg/m²   Exam included Vitals as listed  Constitutional: He is oriented to person, place, and time. He appears well-developed. No distress.   Nose: Nose normal.   Mouth/Throat: Uvula is midline, oropharynx is clear and moist and mucous membranes are normal.  No oropharyngeal exudate, posterior oropharyngeal edema, posterior oropharyngeal erythema or tonsillar abscesses.  Mallapatti (M) score 3  Eyes: Pupils are equal, round, and reactive to light.   Neck: No JVD present. No thyromegaly present.   Cardiovascular: Normal rate, regular rhythm and normal hear t sounds. Exam reveals no gallop and no friction rub.   No  murmur heard.  Pulmonary/Chest: Effort normal and breath sounds abnormal: bilateral rales and rhonchi, percussion good, fremitus good. No accessory muscle usage or stridor. No apnea and no tachypnea. No respiratory distress, decreased breath sounds, wheezes, rhonchi, or tenderness.   Abdominal: Soft. He exhibits no mass. There is no tenderness. No hepatosplenomegaly, hernias and normoactive bowel sounds  Musculoskeletal: Normal range of motion. exhibits no edema  Neurological:  alert and oriented to person, place, and time. not disoriented.   Skin: Skin is warm and dry. Capillary refill takes less 2 sec. No cyanosis or erythema. No pallor. Nails show no clubbing.   Psychiatric: normal mood and affect. behavior is normal. Judgment and thought content normal.       Radiographs (ct chest and cxr) reviewed: results reviewed by direct vision  X-Ray Chest PA And Lateral 7/13/2020   IMPRESSION: Mild emphysematous changes with small right pleural  effusion and right lower lobe atelectasis     Prior median sternotomy with prosthetic heart valve      XR CHEST PA AND LATERAL 03/08/2019   There is paucity of markings in the right upper lung field with diffuse interstitial changes in the right lower lung field and throughout the left lung.  There is calcified granuloma left midlung field.  There is slight blunting the right costophrenic sulcus.  The cardiac silhouette appears mildly enlarged the postoperative changes and prosthetic aortic valve.         Labs reviewed       Lab Results   Component Value Date    WBC 9.91 07/17/2020    RBC 4.67 07/17/2020    HGB 13.1 (L) 07/17/2020    HCT 44.1 07/17/2020    MCV 94 07/17/2020    MCH 28.1 07/17/2020    MCHC 29.7 (L) 07/17/2020    RDW 14.4 07/17/2020     07/17/2020    MPV 10.2 07/17/2020    GRAN 7.4 07/17/2020    GRAN 74.5 (H) 07/17/2020    LYMPH 1.3 07/17/2020    LYMPH 12.6 (L) 07/17/2020    MONO 0.7 07/17/2020    MONO 7.4 07/17/2020    EOS 0.5 07/17/2020    BASO 0.06  07/17/2020    EOSINOPHIL 4.5 07/17/2020    BASOPHIL 0.6 07/17/2020       PFT was not done        Plan:  Clinical impression is resonably certain and repeated evaluation prn +/- follow up will be needed as below.    Frederick was seen today for follow-up and cough.    Diagnoses and all orders for this visit:    Chronic obstructive pulmonary disease with acute exacerbation  -     predniSONE (DELTASONE) 10 MG tablet; 3 pills for 5 days, 2 pills for 5 days, 1 pill for 10 days  -     predniSONE (DELTASONE) 5 MG tablet; Take 1 tablet (5 mg total) by mouth once daily.    Chronic respiratory failure with hypoxia  -     predniSONE (DELTASONE) 10 MG tablet; 3 pills for 5 days, 2 pills for 5 days, 1 pill for 10 days  -     predniSONE (DELTASONE) 5 MG tablet; Take 1 tablet (5 mg total) by mouth once daily.        Follow up in about 2 months (around 12/20/2020), or if symptoms worsen or fail to improve.    Discussed with patient above for education the following:      Patient Instructions   Continue Duo neb and budesonide nebulizer treatments as needed    Will re attempt Daliresp to help dry up lungs from the excessive mucous    Prednisone taper after finishing start 5 mg a day every day until next appointment.

## 2020-10-20 NOTE — PATIENT INSTRUCTIONS
Continue Duo neb and budesonide nebulizer treatments as needed    Will re attempt Daliresp to help dry up lungs from the excessive mucous    Prednisone taper after finishing start 5 mg a day every day until next appointment.

## 2020-11-17 PROBLEM — R33.9 URINARY RETENTION: Status: RESOLVED | Noted: 2018-04-07 | Resolved: 2020-01-01

## 2020-11-17 PROBLEM — J96.02 ACUTE RESPIRATORY ACIDOSIS: Status: RESOLVED | Noted: 2018-04-14 | Resolved: 2020-01-01

## 2020-11-17 PROBLEM — N32.0 BLADDER NECK CONTRACTURE: Status: RESOLVED | Noted: 2018-06-11 | Resolved: 2020-01-01

## 2020-11-17 PROBLEM — I50.31 ACUTE DIASTOLIC CONGESTIVE HEART FAILURE: Status: RESOLVED | Noted: 2018-04-14 | Resolved: 2020-01-01

## 2020-11-17 PROBLEM — I77.9 CAROTID ARTERIAL DISEASE: Status: ACTIVE | Noted: 2020-01-01

## 2020-11-17 NOTE — ASSESSMENT & PLAN NOTE
Controlled currently with Losartan 100 mg daily, Metoprolol 100 daily and Amlodipine 10 mg daily, he does have some peripheral edema secondary to amlodipine

## 2020-11-17 NOTE — ASSESSMENT & PLAN NOTE
Mechanical Valve, Currently on ASA and Plavix secondary to high risk for intracranial bleed and falls. Last ECHO shows good valve function

## 2020-11-17 NOTE — PROGRESS NOTES
Subjective:    Patient ID:  Frederick Kong Jr. is a 85 y.o. male patient here for evaluation Coronary Artery Disease, Congestive Heart Failure, Hypertension, Chronic Kidney Disease, and Dyslipidemia      History of Present Illness:      is here today for his follow-up visit.  He denies any chest pain.  He does have some shortness of breath which is chronic and uses oxygen at nighttime.  He denies any lightheadedness or dizziness.  He has been more sedate lately.  He has developed increasing swelling in both lower extremities.  Moderate effort induced dyspnea note    Review of patient's allergies indicates:   Allergen Reactions    Iodinated contrast media Rash       Past Medical History:   Diagnosis Date    BPH with urinary obstruction     Cancer     CHF (congestive heart failure)     Coronary artery disease     Gout     Hyperlipemia     Hypertension     Personal history of urethral stricture      Past Surgical History:   Procedure Laterality Date    APPENDECTOMY      BRAIN SURGERY  1975    meningioma removed    CARDIAC SURGERY  2001    mechanical aortic valve replacement, Dr. Sanket Sanchez    CYSTOSCOPY N/A 6/11/2018    Procedure: CYSTOSCOPY and possible urethral dilation;  Surgeon: Kimmie Jerry MD;  [facility]  Service: Urology;  Laterality: N/A;    TONSILLECTOMY       Social History     Tobacco Use    Smoking status: Former Smoker   Substance Use Topics    Alcohol use: No    Drug use: Not on file        Review of Systems:    As noted in HPI in addition      REVIEW OF SYSTEMS  CARDIOVASCULAR: No recent chest pain, palpitations, arm, neck, or jaw pain  RESPIRATORY: No recent fever, cough chills, SOB or congestion  : No blood in the urine  GI: No Nausea, vomiting, constipation, diarrhea, blood, or reflux.  MUSCULOSKELETAL: No myalgias bilateral edema noted despite using diuretics.  NEURO: No lightheadedness or dizziness  EYES: No Double vision, blurry, vision or  headache              Objective:        Vitals:    11/17/20 1421   BP: 132/76   Pulse: 79       LIPIDS - LAST 2   Lab Results   Component Value Date    CHOL 134 07/17/2020    CHOL 133 05/31/2019    HDL 44 07/17/2020    HDL 50 05/31/2019    LDLCALC 75.4 07/17/2020    LDLCALC 63 05/31/2019    TRIG 73 07/17/2020    TRIG 113 05/31/2019    CHOLHDL 32.8 07/17/2020    CHOLHDL 2.7 05/31/2019       CBC - LAST 2  Lab Results   Component Value Date    WBC 9.91 07/17/2020    WBC 7.6 05/31/2019    RBC 4.67 07/17/2020    RBC 4.67 05/31/2019    HGB 13.1 (L) 07/17/2020    HGB 12.9 (L) 05/31/2019    HCT 44.1 07/17/2020    HCT 39.7 05/31/2019    MCV 94 07/17/2020    MCV 85.0 05/31/2019    MCH 28.1 07/17/2020    MCH 27.6 05/31/2019    MCHC 29.7 (L) 07/17/2020    MCHC 32.5 05/31/2019    RDW 14.4 07/17/2020    RDW 15.0 05/31/2019     07/17/2020     05/31/2019    MPV 10.2 07/17/2020    MPV 10.0 05/31/2019    GRAN 7.4 07/17/2020    GRAN 74.5 (H) 07/17/2020    LYMPH 1.3 07/17/2020    LYMPH 12.6 (L) 07/17/2020    MONO 0.7 07/17/2020    MONO 7.4 07/17/2020    BASO 0.06 07/17/2020    BASO 30 05/31/2019    NRBC 0 07/17/2020       CHEMISTRY & LIVER FUNCTION - LAST 2  Lab Results   Component Value Date     07/17/2020     05/31/2019    K 5.0 07/17/2020    K 4.3 05/31/2019     07/17/2020     05/31/2019    CO2 27 07/17/2020    CO2 28 05/31/2019    ANIONGAP 5 (L) 07/17/2020    ANIONGAP 9 04/19/2018    BUN 27 (H) 07/17/2020    BUN 19 05/31/2019    CREATININE 1.3 07/17/2020    CREATININE 1.15 (H) 05/31/2019     (H) 07/17/2020     (H) 05/31/2019    CALCIUM 8.8 07/17/2020    CALCIUM 9.1 05/31/2019    PH 7.493 (H) 04/15/2018    PH 7.418 04/14/2018    MG 2.2 04/19/2018    MG 2.1 04/18/2018    ALBUMIN 3.6 07/17/2020    ALBUMIN 4.0 05/31/2019    PROT 6.8 07/17/2020    PROT 7.1 05/31/2019    ALKPHOS 70 07/17/2020    ALKPHOS 81 05/31/2019    ALT 11 07/17/2020    ALT 6 (L) 05/31/2019    AST 14 07/17/2020     AST 12 05/31/2019    BILITOT 1.0 07/17/2020    BILITOT 0.6 05/31/2019        CARDIAC PROFILE - LAST 2  Lab Results   Component Value Date     (H) 07/17/2020     (H) 04/25/2018    TROPONINI 0.381 (H) 04/15/2018    TROPONINI 0.340 (H) 04/15/2018        COAGULATION - LAST 2  Lab Results   Component Value Date    INR 1.0 04/14/2018       ENDOCRINE & PSA - LAST 2  Lab Results   Component Value Date    HGBA1C 5.2 04/07/2018    PROCAL 0.08 03/08/2019    PROCAL 0.32 (H) 04/14/2018    PSA 1.7 05/23/2011        ECHOCARDIOGRAM RESULTS  Results for orders placed during the hospital encounter of 04/14/18   Transthoracic echo (TTE) limited    Narrative · Left ventricle ejection fraction is low normal.     Overall LVEF by visual estimate is ~50%         CURRENT/PREVIOUS VISIT EKG  Results for orders placed or performed during the hospital encounter of 04/07/18   EKG 12-lead    Collection Time: 04/07/18  6:39 AM    Narrative    Test Reason : R06.02,    Vent. Rate : 124 BPM     Atrial Rate : 065 BPM     P-R Int : 000 ms          QRS Dur : 098 ms      QT Int : 330 ms       P-R-T Axes : 000 083 024 degrees     QTc Int : 474 ms    Accelerated Junctional rhythm  Anterior infarct ,age undetermined  Abnormal ECG  No previous ECGs available  Confirmed by DANNIE KRAFT MD ([id number]) on 4/9/2018 12:24:37 PM    Referred By: [email or web address]   SELF           Confirmed By:DANNIE KRAFT MD         PHYSICAL EXAM  CONSTITUTIONAL: Well built, well nourished male in no apparent distress wearing glasses  NECK: no carotid bruit, no JVD  LUNGS: Scattered Rhonchi  CHEST WALL: no tenderness  HEART: regular rate and rhythm, soft systolic ejection murmur click  noted  ABDOMEN: soft, non-tender; bowel sounds normal; no masses,  no organomegaly  EXTREMITIES: Extremities normal, at least 2+ edema extending up to the mid calf   NEURO: AAO X 3    I HAVE REVIEWED :    The vital signs, nurses notes, and all the pertinent radiology and labs.          Current Outpatient Medications   Medication Instructions    albuterol (PROVENTIL) 2.5 mg, Nebulization, 4 times daily PRN    albuterol (PROVENTIL/VENTOLIN HFA) 90 mcg/actuation inhaler 2 puffs, Inhalation, Every 6 hours PRN, Rescue    amLODIPine (NORVASC) 10 mg, Oral, Daily    aspirin (ECOTRIN) 81 mg, Oral, Daily    budesonide (PULMICORT) 0.5 mg, Nebulization, 2 times daily, Controller    clopidogreL (PLAVIX) 75 mg, Oral, Daily    DALIRESP 500 mcg, Oral, Daily    fluticasone propionate (FLONASE) 100 mcg, Each Nostril, Daily    fluticasone-umeclidin-vilanter (TRELEGY ELLIPTA) 100-62.5-25 mcg DsDv 1 puff, Inhalation, Daily    furosemide (LASIX) 10 mg, Oral, Daily    ipratropium (ATROVENT) 42 mcg (0.06 %) nasal spray INSTILL ONE SPRAY IN EACH NOSTRIL THREE TIMES A DAY    losartan (COZAAR) 100 mg, Oral, Daily    lovastatin (MEVACOR) 40 mg, Oral, Daily    metoprolol succinate (TOPROL-XL) 100 mg, Oral, Daily    montelukast (SINGULAIR) 10 mg, Oral, Nightly    pantoprazole (PROTONIX) 40 mg, Oral, Daily PRN    potassium chloride SA (K-DUR,KLOR-CON) 10 MEQ tablet 10 mEq, Oral, Every other day          Assessment / Plan :   Frederick Kong was seen today for coronary artery disease, congestive heart failure, hypertension, chronic kidney disease and dyslipidemia.    Diagnoses and all orders for this visit:    Essential hypertension   Controlled currently with Losartan 100 mg daily, Metoprolol 100 daily and Amlodipine 10 mg daily, he does have some peripheral edema secondary to amlodipine    Chronic diastolic heart failure    Hyperlipidemia, unspecified hyperlipidemia type   Continue on lovastatin maintain low-fat low-cholesterol diet.  Chronic respiratory failure with hypoxia   Relatively stable management per primary care physician  Cough   Nonproductive cough appears stable probably secondary to COPD  SOB (shortness of breath)    Left-sided carotid artery disease, unspecified type   Relatively stable no  neurologic abnormality is noted.  Continue on antiplatelet therapy    H/O aortic valve replacement   Patient has remained fairly stable cardiac standpoint.  Continue on Plavix and aspirin 81 mg daily  Chronic diastolic heart failure   Currently compensated and doing well on Lasix 10 mg every other day  .  Chronic respiratory failure with hypoxia   Stable sees Dr. Kingston and uses Oxygen at night time            No follow-ups on file.

## 2020-11-30 NOTE — PROGRESS NOTES
11/30/2020    Frederick Kong Jr.  Office Note    Chief Complaint   Patient presents with    Breathing Problem    Shortness of Breath       HPI:   11/30/2020- Cough- chronic problem, worsened in last 4 days, productive clear yellow mucous occasionally, mostly unproductive, associated with chest tightness. SOB- worsened in week, supplemental oxygen at 2L during day which is worsened than previous. Needing wheelchair for transportation, states to weak to walk in home not able to ambulate more than few steps. Currently has walker but is scared due to worsening of balance control. Has fallen in home several times with no injury. Using nebulizer 4 times daily. Does not have aerobika and wife does not beat on back.  States he had covid test months ago that was negative. States no complaint of loss of smell or GI upset.       10/20/20- went to ENT for chronic cough with no change in therapy; cough is chronic problem, day/night, not able to cough up mucous. Using duo neb nebulizer 2-3x daily. Using budesonide every other day.   Started daliresp for 2 days but stopped. Did not have any side effects of gi upset.      States SOB worsened in past week. Previously needed oxygen at night only but currently needs daytime at 2L when he walks in home. Improves with prednisone but breathing worsened after stopping.     7/20/2020- Cough- recurrent problem, onset URI February 2020 with no improvement ; tx by PCP with doxycyline, using nebulizer daily in morning, croup seal bark sound of cough, took prednisone 6 days total one day at time for severe SOB. States cough is worse with hot weather, nocturnal arousals nightly.   SOB- worsened in last 6 months, worse with hot weather, wearing oxygen at 2 L nightly.     12/10/2019- Breathing is stable, states pretty good, Supplemental oxygen at night. COPD exacerbation 2x in past 6 months, resolved with prednisone taper. Cough- occasionally, worse in early morning, wife states is usually when  he forgets to take Singulair at night. Productive clear in color. Shortness of breath- only with exertion, not severe, resolves with rest. Fell previous month in yard. Went to ER.     6/10/19- breathing doing well, wearing oxygen at 2 L at night. Did not have CTA due to fear of IV dye allergy, SOB resolved with prednisone. Taken prednisone 2x in 3 months. On antithrombotics followed by cardiology. NO cough. States big improvement with allergies with daily singulair tx.    3/8/2019- SOB worsened onset 4 days, was wearing supplemental oxygen at night, now during day and night, not able to walk with out oxygen, Cardiology appt yesterday for antithrombolitic and diuretic therapy.   Cough onset 2 days- productive clear in color, in am only. Low grade fever, night prior. Albuterol nebulizer 1x daily.    12/7/18- Discharged [facility] 11/30/18 COPD exacerbation, upper respiratory infection. Started as sinus infection tx outpatient turned into SOB requiring hospitalization. Feeling better today, Cough daily- worse in AM clear color. Tx post discharge Doxycyline and prednisone 40 mg a day for 5 days.  States allergies a problem for years, has frequent sinus drainage    4/30/18-HPI: pt worked [employer] , had valve surg in past and follow with Dr Gonzalez.  Pt was admitted with resp failure, extubated, went home and represented.  Had troptonin up and bnp up.  Cardiac role suspected.     Smoked 1.5 ppd for up to 30 yrs.     Still on abx.  Pt needed urology to place henderson.          The chief compliant  problem is worsening    PFSH:  Past Medical History:   Diagnosis Date    BPH with urinary obstruction     Cancer     CHF (congestive heart failure)     Coronary artery disease     Gout     Hyperlipemia     Hypertension     Personal history of urethral stricture          Past Surgical History:   Procedure Laterality Date    APPENDECTOMY      BRAIN SURGERY  1975    meningioma removed    CARDIAC SURGERY  2001    mechanical aortic  valve replacement, Dr. Sanket Sanchez    CYSTOSCOPY N/A 6/11/2018    Procedure: CYSTOSCOPY and possible urethral dilation;  Surgeon: Kimmie Jerry MD;  Location: [facility] OR;  Service: Urology;  Laterality: N/A;    TONSILLECTOMY       Social History     Tobacco Use    Smoking status: Former Smoker   Substance Use Topics    Alcohol use: No    Drug use: Not on file     Family History   Problem Relation Age of Onset    Heart disease Mother     Heart disease Father     Heart disease Sister     Kidney disease Sister     Hypertension Sister     Hyperlipidemia Sister     Cancer Brother      Review of patient's allergies indicates:   Allergen Reactions    Iodinated contrast- oral and iv dye Rash     I have reviewed past medical, family, and social history. I have reviewed previous nurse notes.    Performance Status:The patient's activity level is functions out of house.          Review of Systems   Constitutional: Negative for activity change, appetite change, chills, diaphoresis, fatigue, fever, and unexpected weight change.   HENT: Negative for dental problem, postnasal drip, sinus pain, sneezing, sore throat, trouble swallowing and voice change. Positive for rhinorrhea, sinus pressure,  Respiratory: Negative for apnea, wheezing and stridor.  Positive for cough and shortness of breath, cough, chest tightness.   Cardiovascular: Negative for chest pain, palpitations and leg swelling.   Gastrointestinal: Negative for abdominal distention, abdominal pain, constipation and nausea.   Musculoskeletal: Negative for gait problem, myalgias and neck pain.   Skin: Negative for color change and pallor.   Allergic/Immunologic: Negative for environmental allergies and food allergies.   Neurological: Negative for dizziness, speech difficulty, weakness, light-headedness, numbness and headaches.   Hematological: Negative for adenopathy. Does not bruise/bleed easily.   Psychiatric/Behavioral: Negative for dysphoric  "mood, anxiety, and sleep disturbance.           Exam:Comprehensive exam done. BP (!) 170/63 (BP Location: Right arm, Patient Position: Sitting, BP Method: Medium (Automatic))   Pulse 71   Ht 5' 7" (1.702 m)   SpO2 (!) 80% Comment: on room air at rest  BMI 24.12 kg/m²   Exam included Vitals as listed  Constitutional: He is oriented to person, place, and time. He appears well-developed. No distress.   Nose: Nose normal.   Mouth/Throat: Uvula is midline, oropharynx is clear and moist and mucous membranes are normal.  No oropharyngeal exudate, posterior oropharyngeal edema, posterior oropharyngeal erythema or tonsillar abscesses.  Mallapatti (M) score 3  Eyes: Pupils are equal, round, and reactive to light.   Neck: No JVD present. No thyromegaly present.   Cardiovascular: Normal rate, regular rhythm and normal hear t sounds. Exam reveals no gallop and no friction rub.   No murmur heard.  Pulmonary/Chest: Effort normal and breath sounds abnormal: bilateral rales and rhonchi, percussion good, fremitus good. No accessory muscle usage or stridor. No apnea and no tachypnea. No respiratory distress, decreased breath sounds, wheezes, rhonchi, or tenderness.   Abdominal: Soft. He exhibits no mass. There is no tenderness. No hepatosplenomegaly, hernias and normoactive bowel sounds  Musculoskeletal: Normal range of motion. Exhibits bilateral lower extremity edema pitting +1. Bilateral muscular weakness  Musculoskeletal: Full Range of motion in extremities, Gait: slow, steady SOB with exertion, uses walker for balance  RUE: 5/5  LUE: 5/5  RLE: 3/5  LLE: 3/5  Neurological:  alert and oriented to person, place, and time. not disoriented.   Skin: Skin is warm and dry. Capillary refill takes less 2 sec. No cyanosis or erythema. No pallor. Nails show no clubbing.   Psychiatric: normal mood and affect. behavior is normal. Judgment and thought content normal.       Radiographs (ct chest and cxr) reviewed: results reviewed by direct " vision  X-Ray Chest PA And Lateral 7/13/2020   IMPRESSION: Mild emphysematous changes with small right pleural  effusion and right lower lobe atelectasis     Prior median sternotomy with prosthetic heart valve      XR CHEST PA AND LATERAL 03/08/2019   There is paucity of markings in the right upper lung field with diffuse interstitial changes in the right lower lung field and throughout the left lung.  There is calcified granuloma left midlung field.  There is slight blunting the right costophrenic sulcus.  The cardiac silhouette appears mildly enlarged the postoperative changes and prosthetic aortic valve.         Labs reviewed       Lab Results   Component Value Date    WBC 9.91 07/17/2020    RBC 4.67 07/17/2020    HGB 13.1 (L) 07/17/2020    HCT 44.1 07/17/2020    MCV 94 07/17/2020    MCH 28.1 07/17/2020    MCHC 29.7 (L) 07/17/2020    RDW 14.4 07/17/2020     07/17/2020    MPV 10.2 07/17/2020    GRAN 7.4 07/17/2020    GRAN 74.5 (H) 07/17/2020    LYMPH 1.3 07/17/2020    LYMPH 12.6 (L) 07/17/2020    MONO 0.7 07/17/2020    MONO 7.4 07/17/2020    EOS 0.5 07/17/2020    BASO 0.06 07/17/2020    EOSINOPHIL 4.5 07/17/2020    BASOPHIL 0.6 07/17/2020       PFT was not done        Plan:  Clinical impression is resonably certain and repeated evaluation prn +/- follow up will be needed as below.    Frederick was seen today for breathing problem and shortness of breath.    Diagnoses and all orders for this visit:    Chronic obstructive pulmonary disease with acute lower respiratory infection  -     X-Ray Chest PA And Lateral; Future  -     COVID-19 Routine Screening  -     Culture, Respiratory with Gram Stain; Future  -     predniSONE (DELTASONE) 20 MG tablet; 3 pills for 3 days, 2 pills for 3 days, 1 pill for 3 days    COPD mixed type  -     montelukast (SINGULAIR) 10 mg tablet; Take 1 tablet (10 mg total) by mouth every evening.    Chronic sinus complaints  -     montelukast (SINGULAIR) 10 mg tablet; Take 1 tablet (10 mg  total) by mouth every evening.    Chronic respiratory failure with hypoxia  -     albuterol (PROVENTIL/VENTOLIN HFA) 90 mcg/actuation inhaler; Inhale 2 puffs into the lungs every 6 (six) hours as needed for Wheezing or Shortness of Breath. Rescue  -     predniSONE (DELTASONE) 20 MG tablet; 3 pills for 3 days, 2 pills for 3 days, 1 pill for 3 days    Chronic obstructive pulmonary disease, unspecified COPD type  -     roflumilast (DALIRESP) 500 mcg Tab; Take 1 tablet (500 mcg total) by mouth once daily.    Impaired ambulation  -     WHEELCHAIR FOR HOME USE        Follow up in about 2 weeks (around 12/14/2020), or if symptoms worsen or fail to improve.    Discussed with patient above for education the following:      Patient Instructions   Percussion therapy instruction  Do breathing treatments 2-4 x a day with Aerobika device attached.   Have someone beat on upper back or use hand held massager on back    Have covid 19 and chest x-ray today at [facility]     Sending order for wheel chair to [facility]

## 2020-11-30 NOTE — PATIENT INSTRUCTIONS
Percussion therapy instruction  Do breathing treatments 2-4 x a day with Aerobika device attached.   Have someone beat on upper back or use hand held massager on back    Have covid 19 and chest x-ray today at [facility]     Sending order for wheel chair to [facility]

## 2020-12-01 PROBLEM — D72.829 LEUKOCYTOSIS: Status: ACTIVE | Noted: 2020-01-01

## 2020-12-01 PROBLEM — J96.22 ACUTE ON CHRONIC RESPIRATORY FAILURE WITH HYPOXIA AND HYPERCAPNIA: Status: ACTIVE | Noted: 2018-04-30

## 2020-12-01 PROBLEM — D75.839 THROMBOCYTOSIS: Status: ACTIVE | Noted: 2020-01-01

## 2020-12-01 PROBLEM — E46 HYPOALBUMINEMIA DUE TO PROTEIN-CALORIE MALNUTRITION: Status: ACTIVE | Noted: 2020-01-01

## 2020-12-01 PROBLEM — E87.29 RESPIRATORY ACIDOSIS: Status: ACTIVE | Noted: 2020-01-01

## 2020-12-01 PROBLEM — J96.21 ACUTE ON CHRONIC RESPIRATORY FAILURE WITH HYPOXIA AND HYPERCAPNIA: Status: ACTIVE | Noted: 2018-04-30

## 2020-12-01 PROBLEM — I50.33 ACUTE ON CHRONIC DIASTOLIC HEART FAILURE: Status: ACTIVE | Noted: 2020-01-01

## 2020-12-01 PROBLEM — E88.09 HYPOALBUMINEMIA DUE TO PROTEIN-CALORIE MALNUTRITION: Status: ACTIVE | Noted: 2020-01-01

## 2020-12-01 NOTE — PLAN OF CARE
Called spouse at [phone] and daughter at [phone] to assist with assessment.  No answer and no family at bedside.    Used medical record to complete assessment as pt went to urgent care earlier today.  Outpt pulmonology visit yesterday, Ileana Fabian NP.  PCP Dhiraj Dempsey.    Pharmacy [facility] #[id number]    Pt not able to do ADL's lately due to weakness and multiple falls.  Wheelchair ordered by Pulmonology today.  Sent to [facility].    Patient resides at home with his wife, utilizes a cane and walker for ambulatory assistance    Will continue to reach out to daughter and spouse for additional information.       12/01/20 1409   Discharge Assessment   Assessment Type Discharge Planning Assessment   Assessment information obtained from? Medical Record   Expected Length of Stay (days) 5   Communicated expected length of stay with patient/caregiver no   Prior to hospitilization cognitive status: Alert/Oriented   Prior to hospitalization functional status: Assistive Equipment  (W/C ordered 11/30/2020 from Pulmonary NP.  Sent to [facility])   Current cognitive status: Unable to Assess  (Stats dropped to 70's currently on bipap)   Current Functional Status: Assistive Equipment   Lives With spouse   Able to Return to Prior Arrangements other (see comments)  (To be determined.  My need temporary placement)   Readmission Within the Last 30 Days no previous admission in last 30 days   Is the patient taking medications as prescribed? no      12/01/20 1409   Discharge Assessment   Assessment Type Discharge Planning Assessment   Assessment information obtained from? Medical Record   Expected Length of Stay (days) 5   Communicated expected length of stay with patient/caregiver no   Prior to hospitilization cognitive status: Alert/Oriented   Prior to hospitalization functional status: Assistive Equipment  (W/C ordered 11/30/2020 from Pulmonary NP.  Sent to [facility])   Current cognitive status: Unable to  Assess  (Stats dropped to 70's currently on bipap)   Current Functional Status: Assistive Equipment   Lives With spouse   Able to Return to Prior Arrangements other (see comments)  (To be determined.  My need temporary placement)   Readmission Within the Last 30 Days no previous admission in last 30 days   Is the patient taking medications as prescribed? no

## 2020-12-01 NOTE — ASSESSMENT & PLAN NOTE
Noted, in the setting of mildly worsened renal function from baseline - will trend with CMP q 8 hours. Anticipate natural decrease in K level with IV Lasix and diuresis. Utilize continuous telemetry monitoring.

## 2020-12-01 NOTE — ASSESSMENT & PLAN NOTE
Chronic, controlled.  Latest blood pressure and vitals reviewed-   Temp:  [97.9 °F (36.6 °C)]   Pulse:  [61-74]   Resp:  [25-40]   BP: (155-177)/(63-72)   SpO2:  [76 %-96 %] .   Home meds for hypertension were reviewed and noted below. Hospital anti-hypertensive changes were made as shown below.  Hypertension Medications             amLODIPine (NORVASC) 10 MG tablet Take 1 tablet (10 mg total) by mouth once daily.    furosemide (LASIX) 20 MG tablet Take 0.5 tablets (10 mg total) by mouth once daily.    losartan (COZAAR) 100 MG tablet Take 1 tablet (100 mg total) by mouth once daily.    metoprolol succinate (TOPROL-XL) 100 MG 24 hr tablet Take 1 tablet (100 mg total) by mouth once daily.      Hospital Medications             furosemide injection 40 mg 40 mg, Intravenous, Once, Administer IV push at a max rate of 40 mg/min        Will treat with PRN antihypertensives, as needed, to maintain BP less than 180/110 or if patient becomes symptomatic.

## 2020-12-01 NOTE — PLAN OF CARE
POC for henderson catheter placement per Dr Cano. Pt weaned from Bipap to N/C at 4lpm. Weaned off Bipap to N/C at 4LPM.  Safety maintained.

## 2020-12-01 NOTE — RESPIRATORY THERAPY
12/01/20 0651   Patient Assessment/Suction   Level of Consciousness (AVPU) unresponsive   All Lung Fields Breath Sounds crackles;diminished   Cough Frequency no cough   PRE-TX-O2   O2 Device (Oxygen Therapy) BiPAP   $ Is the patient on Low Flow Oxygen? Yes   Oxygen Concentration (%) 35   SpO2 97 %   Pulse Oximetry Type Continuous   $ Pulse Oximetry - Multiple Charge Pulse Oximetry - Multiple   Pulse 65   Resp (!) 21   Aerosol Therapy   $ Aerosol Therapy Charges Aerosol Treatment   Respiratory Treatment Status (SVN) given   Treatment Route (SVN) in-line   Patient Position (SVN) semi-Valladares's   Post Treatment Assessment (SVN) breath sounds unchanged   Signs of Intolerance (SVN) none   Breath Sounds Post-Respiratory Treatment   Post-treatment Heart Rate (beats/min) 58   Post-treatment Resp Rate (breaths/min) 20   Wound Care   $ Wound Care Tech Time 15 min   Area of Concern Bridge of nose   Skin Color/Characteristics without discoloration   Skin Temperature warm   Ready to Wean/Extubation Screen   FIO2<=50 (chart decimal) 0.35   Preset CPAP/BiPAP Settings   Mode Of Delivery BiPAP   $ Initial CPAP/BiPAP Setup? No   $ Is patient using? Yes   Equipment Type V60   Airway Device Type total face mask   Ipap 20   EPAP (cm H2O) 8   Pressure Support (cm H2O) 12   Set Rate (Breaths/Min) 16   ITime (sec) 1   Rise Time (sec) 2   Patient CPAP/BiPAP Settings   RR Total (Breaths/Min) 21   Tidal Volume (mL) 561   VE Minute Ventilation (L/min) 11.3 L/min   Peak Inspiratory Pressure (cm H2O) 21   TiTOT (%) 27   Total Leak (L/Min) 2   Patient Trigger - ST Mode Only (%) 95   CPAP/BiPAP Backup Settings   Backup Rate 16 breaths per minute (bpm)   CPAP/BiPAP Alarms   High Pressure (cm H2O) 25   Low Pressure (cm H2O) 10   Minute Ventilation (L/Min) 3   High RR (breaths/min) 45   Low RR (breaths/min) 10

## 2020-12-01 NOTE — ASSESSMENT & PLAN NOTE
Patient with noted mild elevation in creatinine upon admission.  Patient was given 40 mg IV Lasix in emergency room.  BMP reviewed- noted Estimated Creatinine Clearance: 31.6 mL/min (A) (based on SCr of 1.6 mg/dL (H)). according to latest data. Monitor UOP and serial CMP q.8 hours and adjust therapy as needed. Renally dose meds.

## 2020-12-01 NOTE — ASSESSMENT & PLAN NOTE
CHF currently uncontrolled due to Pulmonary edema. Latest ECHO shows ejection fraction of 40%. Continue BB and Lasix and monitor clinical status closely. Monitor on telemetry. Patient is off CHF pathway due to initial ICU admission.  Monitor strict Is&Os and daily weights.  NPO upon admission in the setting of bipap therapy, consider fluid restricted diet once clinically appropriate. Continue to stress to patient importance of self efficacy and  on diet for CHF. Last BNP reviewed- and noted below. Will consult cardiology - follows with Dr. Gonzalez as outpatient. Repeat echo in the AM. Was given 40 mg IV lasix in the ER. Repeat chest xray in the AM.  Recent Labs   Lab 12/01/20  0150   *   .    Results for orders placed during the hospital encounter of 04/07/18   Transthoracic echo (TTE) complete    Narrative 1. Technically difficult study.  2. LVEF is ~40% by visual estimate with global hypokinesis.   3. Normally functioning mechanical aortic valve prosthesis.  4. Left ventricular diastolic dysfunction consistent with impaired   relaxation..

## 2020-12-01 NOTE — HPI
Frederick Kong Jr. is an 85-year-old male with a past medical history of COPD, chronic hypoxemia requiring supplemental oxygen nightly, aortic valve replacement, hypertension, hyperlipidemia, diastolic congestive heart failure, and chronic kidney disease [facility].  Information for HPI obtained from review of EMR and patient's wife who is at bedside during my initial interview and physical exam.  Patient is a poor historian due to respiratory acidosis with retained CO2 and BiPAP therapy in the setting of acute respiratory distress.  Patient's wife states that patient has been on nightly oxygen for approximately 2 years, states he utilizes 2 L via nasal cannula nightly.  Friday patient began to experience shortness of breath that has increased in severity since onset.  Patient requiring utilization of supplemental oxygen throughout the entire day since Friday.  Patient was seen by pulmonology NP, Rui today in clinic who ordered a chest x-ray and COVID screen.  Patient's wife states they went to [place] urgent care for the COVID screen and returned home.  She states upon arrival to home patient was Exhausted.  She states patient with worsening shortness of breath and extreme fatigue this evening.  Patient also with reported decreased p.o. intake today. Patient's wife states that for approximately 6 months patient has been experiencing a cough, has been followed with pulmonology - states he was seen in pulmonology office in October 2020 and was given breathing treatments and prednisone.  Patient's wife states that approximately 2 weeks ago patient was seen by his cardiologist, Dr. Gonzalez, in which patient was noted to have lower extremity swelling, states that Lasix was initially prescribed every other day, however in the setting of bilateral lower extremity swelling cardiology recommended increase in Lasix to every day - in which patient reports  compliance.  Patient is followed by PCP, Dr. Dempsey.  In the emergency room patient immediately placed on BiPAP therapy for acute respiratory distress, ABG was obtained revealing acute respiratory acidosis with hypercapnia and hypoxemia.  Patient was initially initiated on IV Rocephin and azithromycin for suspected COVID versus community-acquired pneumonia.  BMP consistent with CHF exacerbation with correlating chest x-ray revealing bilateral pleural effusions.  Patient was given 40 mg IV Lasix.  Patient admitted to the ICU on 12/01/2020 at approximately 4:00 a.m..  Patient resides at home with his wife, utilizes a cane and walker for ambulatory assistance.  Partial code status verified upon admission with patient's wife, who verifies that patient does not wish to be intubated in the setting of acute pulmonary arrest.

## 2020-12-01 NOTE — TELEPHONE ENCOUNTER
Called patient to discuss chest xray results. No answer. Left voice. ----- Message from Ileana Fabian NP sent at 12/1/2020  2:23 PM CST -----  Chest x-ray does not show pneumonia.

## 2020-12-01 NOTE — ASSESSMENT & PLAN NOTE
Noted - unable to determine neurological status from baseline upon admission as patient with acute resp distress, on bipap. Patient also with possible metabolic encephalopathy secondary to retained CO2. Neuro checks q 1 hour. Fall precautions.

## 2020-12-01 NOTE — ASSESSMENT & PLAN NOTE
Chronic, controlled. Will continue home medication.    Lab Results   Component Value Date    CHOL 134 07/17/2020    CHOL 133 05/31/2019    CHOL 145 01/08/2019     Lab Results   Component Value Date    HDL 44 07/17/2020    HDL 50 05/31/2019    HDL 57 01/08/2019     Lab Results   Component Value Date    LDLCALC 75.4 07/17/2020    LDLCALC 63 05/31/2019    LDLCALC 72 01/08/2019     Lab Results   Component Value Date    TRIG 73 07/17/2020    TRIG 113 05/31/2019    TRIG 79 01/08/2019     Lab Results   Component Value Date    CHOLHDL 32.8 07/17/2020    CHOLHDL 2.7 05/31/2019    CHOLHDL 2.5 01/08/2019

## 2020-12-01 NOTE — ED NOTES
Attempted henderson cath x 3 (16F non-latex, 16F coude, and 14 F coude) unsuccessful.  Patient with history of urethral stricture and BPH.  Notified ISH Villalta NP and made aware that a condom cath will be placed on patient.

## 2020-12-01 NOTE — ASSESSMENT & PLAN NOTE
Noted on initial ABG upon admission - repeat ABG obtained after Bipap initiation with improvement. Continue Bipap therapy, diuresis. Continuous tele and pulse oximetry monitoring.

## 2020-12-01 NOTE — RESPIRATORY THERAPY
12/01/20 0201   Patient Assessment/Suction   Level of Consciousness (AVPU) responds to voice   Respiratory Effort Severe;Labored;Mouth breathing   All Lung Fields Breath Sounds crackles;wheezes, expiratory;wheezes, inspiratory   PRE-TX-O2   O2 Device (Oxygen Therapy) BiPAP   Oxygen Concentration (%) 100   SpO2 96 %   Pulse 74   Resp (!) 27   Aerosol Therapy   $ Aerosol Therapy Charges Initial Continuous   Respiratory Treatment Status (SVN) continuous   Treatment Route (SVN) in-line   Patient Position (SVN) HOB elevated   Ready to Wean/Extubation Screen   FIO2<=50 (chart decimal) (!) 1   Preset CPAP/BiPAP Settings   Mode Of Delivery BiPAP S/T   $ CPAP/BiPAP Daily Charge BiPAP/CPAP Daily   $ Initial CPAP/BiPAP Setup? Yes   $ Is patient using? Yes   Size of Mask Large   Sized Appropriately? Yes   Equipment Type V60   Airway Device Type large full face mask   Ipap 18   EPAP (cm H2O) 10   Pressure Support (cm H2O) 8   Set Rate (Breaths/Min) 16   ITime (sec) 1   Rise Time (sec) 0.2   Patient CPAP/BiPAP Settings   Timed Inspiration (Sec) 1   IPAP Rise Time (sec) 0.2   RR Total (Breaths/Min) 35   Tidal Volume (mL) 411   VE Minute Ventilation (L/min) 14.4 L/min   Peak Inspiratory Pressure (cm H2O) 20   TiTOT (%) 30   Total Leak (L/Min) 26   Patient Trigger - ST Mode Only (%) 99   CPAP/BiPAP Alarms   High Pressure (cm H2O) 25   Low Pressure (cm H2O) 10   Minute Ventilation (L/Min) 3   High RR (breaths/min) 45   Low RR (breaths/min) 10

## 2020-12-01 NOTE — ED PROVIDER NOTES
Encounter Date: 12/1/2020       History     Chief Complaint   Patient presents with    Shortness of Breath     Chief complaint:  Shortness of breath    HPI:  85-year-old male with a history of COPD who was reported diagnosed with COVID developed severe shortness of breath today which is progressively worsen.  He was given steroids.  Documented oxygen saturation at his clinic visit today was 80%.  He is usually on 2 L of home oxygen but was found to have an oxygen saturation of 77% on 2 L.  He was admitted 2 years ago for diastolic heart failure requiring intubation.  He has a history of coronary artery disease status post stent placement.  He also has a history of a mechanical aortic valve replacement        Review of patient's allergies indicates:   Allergen Reactions    Iodinated contrast media Rash     Past Medical History:   Diagnosis Date    BPH with urinary obstruction     Cancer     CHF (congestive heart failure)     Coronary artery disease     Gout     Hyperlipemia     Hypertension     Personal history of urethral stricture      Past Surgical History:   Procedure Laterality Date    APPENDECTOMY      BRAIN SURGERY  1975    meningioma removed    CARDIAC SURGERY  2001    mechanical aortic valve replacement, Dr. Sanket Sanchez    CYSTOSCOPY N/A 6/11/2018    Procedure: CYSTOSCOPY and possible urethral dilation;  Surgeon: Kimmie Jerry MD;  Location: [facility];  Service: Urology;  Laterality: N/A;    TONSILLECTOMY       Family History   Problem Relation Age of Onset    Heart disease Mother     Heart disease Father     Heart disease Sister     Kidney disease Sister     Hypertension Sister     Hyperlipidemia Sister     Cancer Brother      Social History     Tobacco Use    Smoking status: Former Smoker   Substance Use Topics    Alcohol use: No    Drug use: Not on file     Review of Systems   Constitutional: Negative for activity change, appetite change, chills, fatigue and fever.    Eyes: Negative for visual disturbance.   Respiratory: Positive for shortness of breath and wheezing. Negative for apnea.    Cardiovascular: Negative for chest pain and palpitations.   Gastrointestinal: Negative for abdominal distention and abdominal pain.   Genitourinary: Negative for difficulty urinating.   Musculoskeletal: Negative for neck pain.   Skin: Negative for pallor and rash.   Neurological: Negative for headaches.   Hematological: Does not bruise/bleed easily.   Psychiatric/Behavioral: Negative for agitation.       Physical Exam     Initial Vitals   BP Pulse Resp Temp SpO2   12/01/20 0149 12/01/20 0149 12/01/20 0149 12/01/20 0205 12/01/20 0148   (!) 177/72 73 (!) 40 97.9 °F (36.6 °C) (!) 76 %      MAP       --                Physical Exam    Nursing note and vitals reviewed.  Constitutional: He appears well-developed and well-nourished.   HENT:   Head: Normocephalic and atraumatic.   Eyes: Conjunctivae are normal.   Neck: Normal range of motion. Neck supple.   Cardiovascular: Normal rate, regular rhythm and normal heart sounds. Exam reveals no gallop and no friction rub.    No murmur heard.  Pulmonary/Chest: He is in respiratory distress. He has wheezes. He has no rhonchi. He has no rales.   Abdominal: Soft. He exhibits no distension. There is no abdominal tenderness.   Musculoskeletal: Normal range of motion.   Neurological: He is alert and oriented to person, place, and time. GCS score is 15. GCS eye subscore is 4. GCS verbal subscore is 5. GCS motor subscore is 6.   Skin: Skin is warm and dry.   Psychiatric: He has a normal mood and affect.         ED Course   Critical Care    Date/Time: 12/1/2020 4:09 AM  Performed by: Gustavo Balbuena III, MD  Authorized by: Gustavo Balbuena III, MD   Direct patient critical care time: 60 minutes  Total critical care time (exclusive of procedural time) : 60 minutes  Critical care was necessary to treat or prevent imminent or life-threatening deterioration of the  following conditions: cardiac failure.  Critical care was time spent personally by me on the following activities: review of old charts, pulse oximetry, ordering and review of laboratory studies, obtaining history from patient or surrogate, evaluation of patient's response to treatment, development of treatment plan with patient or surrogate, discussions with primary provider, examination of patient, ordering and performing treatments and interventions, ordering and review of radiographic studies and re-evaluation of patient's condition.  Subsequent provider of critical care: I assumed direction of critical care for this patient from another provider of my specialty.        Labs Reviewed   CBC W/ AUTO DIFFERENTIAL - Abnormal; Notable for the following components:       Result Value    WBC 17.72 (*)     Hemoglobin 13.1 (*)     MCHC 29.6 (*)     Platelets 485 (*)     Immature Granulocytes 0.7 (*)     Gran # (ANC) 14.8 (*)     Immature Grans (Abs) 0.12 (*)     Mono # 1.6 (*)     Gran % 83.5 (*)     Lymph % 6.3 (*)     All other components within normal limits   B-TYPE NATRIURETIC PEPTIDE - Abnormal; Notable for the following components:     (*)     All other components within normal limits   COMPREHENSIVE METABOLIC PANEL - Abnormal; Notable for the following components:    Potassium 5.4 (*)     CO2 21 (*)     Glucose 142 (*)     BUN 43 (*)     Creatinine 1.6 (*)     Albumin 2.8 (*)     eGFR if  45 (*)     eGFR if non  39 (*)     All other components within normal limits   POCT GLUCOSE - Abnormal; Notable for the following components:    POCT Glucose 138 (*)     All other components within normal limits   ISTAT PROCEDURE - Abnormal; Notable for the following components:    POC PH 7.221 (*)     POC PCO2 76.8 (*)     POC PO2 35 (*)     POC HCO3 31.5 (*)     POC SATURATED O2 53 (*)     POC TCO2 34 (*)     All other components within normal limits   ISTAT PROCEDURE - Abnormal; Notable  for the following components:    POC PH 7.242 (*)     POC PCO2 61.4 (*)     POC PO2 69 (*)     POC SATURATED O2 89 (*)     POC TCO2 28 (*)     All other components within normal limits   CULTURE, BLOOD   CULTURE, BLOOD   SARS-COV-2 RNA AMPLIFICATION, QUAL   LACTIC ACID, PLASMA   TROPONIN I     EKG Readings: (Independently Interpreted)   Rhythm: Atrial Fibrillation. Heart Rate: 67. Ectopy: Frequent PVCs. Conduction: Normal. ST Segments: Non-Specific ST Segment Depression. Axis: Normal. Clinical Impression: Atrial Fibrillation       Imaging Results          X-Ray Chest AP Portable (In process)                  Medical Decision Making:   ED Management:  85-year-old male presents with a 1 day history of worsening shortness of breath.  EMS reports positive COVID testing clinic; however, COVID test is negative.  He has no fever to suggest COVID pneumonia.  Chest x-ray demonstrates patchy infiltrates with the elevated BNP most likely representing CHF.  He is given IV Lasix.  He also has wheezing with respiratory acidosis and hypoxia consistent with a COPD exacerbation.  He is given IV steroids and DuoNeb with improved symptoms.  He has previous diastolic heart failure with a systolic ejection fraction of 50%.  I do not see a history of atrial fibrillation; however, he is currently in atrial fibrillation with controlled ventricular response.  Other:   I have discussed this case with another health care provider.                             Clinical Impression:       ICD-10-CM ICD-9-CM   1. Acute diastolic congestive heart failure  I50.31 428.31     428.0   2. SOB (shortness of breath)  R06.02 786.05   3. Paroxysmal atrial fibrillation  I48.0 427.31   4. Hypoxia  R09.02 799.02   5. COPD with exacerbation  J44.1 491.21   6. Other elevated white blood cell (WBC) count  D72.828 288.69   7. Acute kidney injury  N17.9 584.9                          ED Disposition Condition    Admit                             Gustavo Balbuena  III, MD  12/01/20 0411

## 2020-12-01 NOTE — ASSESSMENT & PLAN NOTE
Noted - acute respiratory failure upon admission secondary to COPD vs CHF exacerbation. Continue home medications, bipap therapy, duo nebs PRN.

## 2020-12-01 NOTE — CONSULTS
[facility] Urology New Patient/H&P:    Frederick Kong Jr. is a 85 y.o. male who presents for urethral stricture complicated by difficult henderson placement.    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 Kyle and then Dr. Jerry. Per his wife, patient was catheterizing with a 16 Libyan catheter once per month, but has not in the past several months.     He presented to the emergency department on 12/1/20 with shortness of breath. He was subsequently admitted to the ICU for CHF and COPD exacerbation. While in the ED, 3 attempts were made to place a catheter - 14 Libyan coude, 16 Libyan coude, 16 Libyan straight catheter. Urology was consulted once in the ICU.     He denies any gross hematuria, significant lower urinary tract symptoms prior to admission,  trauma or recent urinary tract infection.     Of note, he has not followed up with Dr. Jerry in over a year.       Urologic History per Dr. Jerry:    Bladder neck contacture/bphe  -initially seen by me on 4/7/18 in consult in the hospital as they had difficult placing henderson. He was a previous pt of  and had not seen him for 5 years. Had a h/o greenlight laser turp for bph. Denied any sx prior to admission. When they tried to place henderson they could not and so I did a bedside urethral dilation over wire for suspected scar tissue. Eventually underwent voiding trial and learned CIC on 5/2/18. Had some difficulty.   -Uroflow results (date: 05/31/2018) with urethral stricture  : Voiding time: 45.3s, Flow time: 41.7s, TTP flow: 12.0s, Peak flowrate: 10.3 mL/s, Average flowrate: 5.0mL/s, Intervals: 3,  Voided volume: 209 mL,  Pvr by bladder scan: 118. Pattern of curve:bell with multiple decreasing spikes  -cystoscopy at asc to eval stricture on 6/11/18 showed moderate bilobar hypertrophy with high riding bladder neck with bladder neck contracture. Areas of previous false passage seen  in posterior prostate. Otherwise no urethral stricture seen.   -seen 8/3/18 and could only place 16fr orange tip coude catheter. Could not advance lofric. he's been urinating normally with ok stream      I last saw them December 21, 2018 and plan was to catheterize every 2 weeks with a 16fr col[place] orange tip.  However  they have cut down to once a month.  No issues at all no resistance.  She is able to hub the catheter.  He was also started on Flomax for high riding bladder neck.  He is tolerating this without any side effects.  He is voiding 4 to 5 times a day and was previously voiding 8-10 times a day.  No longer having any dribbling.  Still waking up 1-2 times a night.     They want to continue Flomax.      psa  5/23/11            1.7 (DUC 5/15/18 45g without nodules)     Urinalysis void (circ): neg  Urine history:  1/21/19            E.faecalis, void: tr bld/30 prot/small wbc- tx w aug  9/18/18            Ng, void: tr leuk/30 prot- no sx of uti  6/4/18              Ng, void: 1+ protein  5/22/18            No cx, void: 1+ protein/tr blood  5/15/18            Ng, void: 3+ blood  4/14/18          Ng, cath: 3+blood/1+leuk  4/9/18            Ng, cath: 3+blood, no leuk  4/8/18            ng      Past Medical History:   Diagnosis Date    BPH with urinary obstruction     Cancer     CHF (congestive heart failure)     Coronary artery disease     Gout     Hyperlipemia     Hypertension     Personal history of urethral stricture        Past Surgical History:   Procedure Laterality Date    APPENDECTOMY      BRAIN SURGERY  1975    meningioma removed    CARDIAC SURGERY  2001    mechanical aortic valve replacement, Dr. Sanket Sanchez    CYSTOSCOPY N/A 6/11/2018    Procedure: CYSTOSCOPY and possible urethral dilation;  Surgeon: Kimmie Jerry MD;  Location: [facility] OR;  Service: Urology;  Laterality: N/A;    TONSILLECTOMY         Family History   Problem Relation Age of Onset    Heart disease Mother      Heart disease Father     Heart disease Sister     Kidney disease Sister     Hypertension Sister     Hyperlipidemia Sister     Cancer Brother        Social History     Socioeconomic History    Marital status:      Spouse name: Not on file    Number of children: Not on file    Years of education: Not on file    Highest education level: Not on file   Occupational History    Not on file   Social Needs    Financial resource strain: Not on file    Food insecurity     Worry: Not on file     Inability: Not on file    Transportation needs     Medical: Not on file     Non-medical: Not on file   Tobacco Use    Smoking status: Former Smoker    Tobacco comment: quit 2000   Substance and Sexual Activity    Alcohol use: No    Drug use: Not on file    Sexual activity: Not on file   Lifestyle    Physical activity     Days per week: Not on file     Minutes per session: Not on file    Stress: Not on file   Relationships    Social connections     Talks on phone: Not on file     Gets together: Not on file     Attends Synagogue service: Not on file     Active member of club or organization: Not on file     Attends meetings of clubs or organizations: Not on file     Relationship status: Not on file   Other Topics Concern    Not on file   Social History Narrative    Not on file       Review of patient's allergies indicates:   Allergen Reactions    Iodinated contrast media Rash       Medications Reviewed: see MAR    ROS:    Constitutional: denies fevers, chills, night sweats, fatigue, malaise  Respiratory: negative for cough, shortness of breath, wheezing, dyspnea.  Cardiovascular: negative for chest pain, varicose veins, ankle swelling, palpitations, syncope.  GI: negative for abdominal pain, heartburn, indigestion, nausea, vomiting, constipation, diarrhea, blood in stool.   Urology: as noted above in HPI  Endocrinology: negative for cold intolerance, excessive thirst, not feeling tired/sluggish, no heat  intolerance.   Hematology/Lymph: negative for easy bleeding, easy bruising, swollen glands.  Musculoskeletal: negative for back pain, joint pain, joint swelling, neck pain.  Allergy-Immunology: negative for seasonal allergies, negative for unusual infections.   Skin: negative for boils, breast lumps, hives, itching, rash.   Neurology: negative for, dizziness, headache, tingling/numbness, tremors.   Psych: satisfied with life; negative for, anxiety, depression, suicidal thoughts.     PHYSICAL EXAM:    Vitals:    12/01/20 1505   BP:    Pulse: 62   Resp: (!) 24   Temp:      Body mass index is 24.43 kg/m².     General: Alert, cooperative, no distress, appears stated age  Head: Normocephalic, without obvious abnormality, atraumatic  Neck: no masses, no thyromegaly, no lymphadenopathy  Eyes: PERRL, conjunctiva/corneas clear  Lungs: Respirations unlabored, normal effort, no accessory muscle use  CV: Warm and well perfused extremities  Abdomen: Soft, non-tender, no CVA tenderness, no hepatosplenomegaly, no hernia  Penis: phallus normal, circumcised, well cared for, no plaques or lesions.   Extremities: Extremities normal, atraumatic, no cyanosis or edema  Skin: Normal color, texture, and turgor, no rashes or lesions  Psych: Appropriate, well oriented, normal affect, normal mood  Neuro: Non-focal    Lab Results   Component Value Date    PSA 1.7 05/23/2011       LABS:    Recent Results (from the past 336 hour(s))   Culture, Respiratory with Gram Stain    Collection Time: 11/30/20  5:53 PM    Specimen: Sputum, Expectorated; Respiratory   Result Value Ref Range    Gram Stain (Respiratory) <10 epithelial cells per low power field.     Gram Stain (Respiratory) Rare WBC's     Gram Stain (Respiratory) Rare Gram positive cocci     Gram Stain (Respiratory) Rare Gram negative rods     Gram Stain (Respiratory) Rare Gram positive rods    COVID-19 Rapid Screening    Collection Time: 12/01/20  1:45 AM   Result Value Ref Range     SARS-CoV-2 RNA, Amplification, Qual Negative Negative   CBC Auto Differential    Collection Time: 12/01/20  1:50 AM   Result Value Ref Range    WBC 17.72 (H) 3.90 - 12.70 K/uL    RBC 4.64 4.60 - 6.20 M/uL    Hemoglobin 13.1 (L) 14.0 - 18.0 g/dL    Hematocrit 44.2 40.0 - 54.0 %    MCV 95 82 - 98 fL    MCH 28.2 27.0 - 31.0 pg    MCHC 29.6 (L) 32.0 - 36.0 g/dL    RDW 14.3 11.5 - 14.5 %    Platelets 485 (H) 150 - 350 K/uL    MPV 9.8 9.2 - 12.9 fL    Immature Granulocytes 0.7 (H) 0.0 - 0.5 %    Gran # (ANC) 14.8 (H) 1.8 - 7.7 K/uL    Immature Grans (Abs) 0.12 (H) 0.00 - 0.04 K/uL    Lymph # 1.1 1.0 - 4.8 K/uL    Mono # 1.6 (H) 0.3 - 1.0 K/uL    Eos # 0.0 0.0 - 0.5 K/uL    Baso # 0.04 0.00 - 0.20 K/uL    nRBC 0 0 /100 WBC    Gran % 83.5 (H) 38.0 - 73.0 %    Lymph % 6.3 (L) 18.0 - 48.0 %    Mono % 9.1 4.0 - 15.0 %    Eosinophil % 0.2 0.0 - 8.0 %    Basophil % 0.2 0.0 - 1.9 %    Differential Method Automated    BNP    Collection Time: 12/01/20  1:50 AM   Result Value Ref Range     (H) 0 - 99 pg/mL   ISTAT PROCEDURE    Collection Time: 12/01/20  2:00 AM   Result Value Ref Range    POC PH 7.221 (LL) 7.35 - 7.45    POC PCO2 76.8 (HH) 35 - 45 mmHg    POC PO2 35 (LL) 80 - 100 mmHg    POC HCO3 31.5 (H) 24 - 28 mmol/L    POC BE 4 -2 to 2 mmol/L    POC SATURATED O2 53 (L) 95 - 100 %    POC TCO2 34 (H) 23 - 27 mmol/L    Rate 16     Sample ARTERIAL     Site LR     Allens Test Pass     DelSys CPAP/BiPAP     Mode BiPAP     FiO2 100     Spont Rate 35     Min Vol 14.9     Sp02 100     IP 18     EP 10    POCT glucose    Collection Time: 12/01/20  2:05 AM   Result Value Ref Range    POCT Glucose 138 (H) 70 - 110 mg/dL   Comprehensive Metabolic Panel    Collection Time: 12/01/20  3:02 AM   Result Value Ref Range    Sodium 142 136 - 145 mmol/L    Potassium 5.4 (H) 3.5 - 5.1 mmol/L    Chloride 109 95 - 110 mmol/L    CO2 21 (L) 23 - 29 mmol/L    Glucose 142 (H) 70 - 110 mg/dL    BUN 43 (H) 8 - 23 mg/dL    Creatinine 1.6 (H) 0.5 - 1.4  mg/dL    Calcium 8.7 8.7 - 10.5 mg/dL    Total Protein 6.7 6.0 - 8.4 g/dL    Albumin 2.8 (L) 3.5 - 5.2 g/dL    Total Bilirubin 0.4 0.1 - 1.0 mg/dL    Alkaline Phosphatase 93 55 - 135 U/L    AST 18 10 - 40 U/L    ALT 17 10 - 44 U/L    Anion Gap 12 8 - 16 mmol/L    eGFR if African American 45 (A) >60 mL/min/1.73 m^2    eGFR if non African American 39 (A) >60 mL/min/1.73 m^2   Lactic Acid, Plasma    Collection Time: 12/01/20  3:02 AM   Result Value Ref Range    Lactate (Lactic Acid) 1.3 0.5 - 2.2 mmol/L   Troponin I    Collection Time: 12/01/20  3:02 AM   Result Value Ref Range    Troponin I 0.078 (H) 0.000 - 0.026 ng/mL   Procalcitonin    Collection Time: 12/01/20  3:02 AM   Result Value Ref Range    Procalcitonin 0.61 (H) <0.25 ng/mL   Magnesium    Collection Time: 12/01/20  3:02 AM   Result Value Ref Range    Magnesium 2.6 1.6 - 2.6 mg/dL   ISTAT PROCEDURE    Collection Time: 12/01/20  3:54 AM   Result Value Ref Range    POC PH 7.242 (LL) 7.35 - 7.45    POC PCO2 61.4 (HH) 35 - 45 mmHg    POC PO2 69 (L) 80 - 100 mmHg    POC HCO3 26.4 24 - 28 mmol/L    POC BE -1 -2 to 2 mmol/L    POC SATURATED O2 89 (L) 95 - 100 %    POC TCO2 28 (H) 23 - 27 mmol/L    Rate 16     Sample ARTERIAL     Site RR     Allens Test Pass     DelSys CPAP/BiPAP     Mode BiPAP     FiO2 35     Spont Rate 26     Min Vol 9.8     Sp02 93     IP 18     EP 10    Echo Color Flow Doppler? Yes    Collection Time: 12/01/20 11:33 AM   Result Value Ref Range    Ascending aorta 2.95 cm    STJ 2.48 cm    AV mean gradient 12 mmHg    Ao peak kehinde 2.33 m/s    Ao VTI 53.45 cm    IVRT 74.22 msec    IVS 0.82 0.6 - 1.1 cm    LA size 3.35 cm    Left Atrium Major Axis 5.41 cm    Left Atrium Minor Axis 4.62 cm    LVIDd 5.77 3.5 - 6.0 cm    LVIDs 4.22 (A) 2.1 - 4.0 cm    LVOT diameter 1.95 cm    LVOT peak VTI 41.11 cm    Posterior Wall 0.97 0.6 - 1.1 cm    E wave decelartion time 173.48 msec    MV Peak E Kehinde 1.50 m/s    RA Major Axis 4.61 cm    RA Width 4.83 cm    RVDD 3.93  cm    Sinus 2.72 cm    TAPSE 2.42 cm    TR Max Kehinde 3.19 m/s    LA WIDTH 4.00 cm    Ao root annulus 2.98 cm    AORTIC VALVE CUSP SEPERATION 1.85 cm    PV PEAK VELOCITY 1.19 cm/s    MV stenosis pressure 1/2 time 53.53 ms    LV Diastolic Volume 164.43 mL    LV Systolic Volume 79.67 mL    LVOT peak kehinde 1.74 m/s    Mr max kehinde 0.05 m/s    FS 27 %    LA volume 56.77 cm3    LV mass 200.26 g    Left Ventricle Relative Wall Thickness 0.34 cm    AV valve area 2.30 cm2    AV Velocity Ratio 0.75     AV index (prosthetic) 0.77     MV valve area p 1/2 method 4.11 cm2    LVOT area 3.0 cm2    LVOT stroke volume 122.71 cm3    AV peak gradient 22 mmHg    LV Systolic Volume Index 43.8 mL/m2    LV Diastolic Volume Index 90.37 mL/m2    LA Volume Index 31.2 mL/m2    LV Mass Index 110 g/m2    Triscuspid Valve Regurgitation Peak Gradient 41 mmHg    BSA 1.83 m2    TDI SEPTAL 0.06 m/s    LV LATERAL E/E' RATIO 16.67 m/s    LV SEPTAL E/E' RATIO 25.00 m/s    TDI LATERAL 0.09 m/s    MV mean gradient 11 mmHg    Mean e' 0.08 m/s    E/E' ratio 20.00 m/s    Right Atrial Pressure (from IVC) 15 mmHg    TV rest pulmonary artery pressure 56 mmHg   Comprehensive metabolic panel    Collection Time: 12/01/20 11:49 AM   Result Value Ref Range    Sodium 143 136 - 145 mmol/L    Potassium 4.6 3.5 - 5.1 mmol/L    Chloride 108 95 - 110 mmol/L    CO2 23 23 - 29 mmol/L    Glucose 173 (H) 70 - 110 mg/dL    BUN 46 (H) 8 - 23 mg/dL    Creatinine 1.7 (H) 0.5 - 1.4 mg/dL    Calcium 8.7 8.7 - 10.5 mg/dL    Total Protein 6.5 6.0 - 8.4 g/dL    Albumin 2.7 (L) 3.5 - 5.2 g/dL    Total Bilirubin 0.4 0.1 - 1.0 mg/dL    Alkaline Phosphatase 89 55 - 135 U/L    AST 11 10 - 40 U/L    ALT 15 10 - 44 U/L    Anion Gap 12 8 - 16 mmol/L    eGFR if African American 42 (A) >60 mL/min/1.73 m^2    eGFR if non African American 36 (A) >60 mL/min/1.73 m^2   ISTAT PROCEDURE    Collection Time: 12/01/20 11:51 AM   Result Value Ref Range    POC PH 7.339 (L) 7.35 - 7.45    POC PCO2 48.4 (H) 35 -  45 mmHg    POC PO2 68 (L) 80 - 100 mmHg    POC HCO3 26.0 24 - 28 mmol/L    POC BE 0 -2 to 2 mmol/L    POC SATURATED O2 92 (L) 95 - 100 %    POC TCO2 27 23 - 27 mmol/L    Rate 16     Sample ARTERIAL     Site RB     Allens Test N/A     DelSys CPAP/BiPAP     Mode BiPAP     FiO2 35     Spont Rate 21     IP 20     EP 8        IMAGING:    No recent urologic studies      Assessment/Diagnosis:    1. Urinary retention  2. History of urethral stricture    Plans:    - Several attempts at placing a filiform from the [facility] with no success - several false passages felt while passing. Subsequently placed a 0.035 motion wire into the bladder after multiple attempts. 18 [place] tip henderson then placed into the bladder. There was return of yellow urine. The balloon was inflated with 10 cc sterile water and connected to drainage bag.   - Recommend discharge with the henderson catheter in place due to his false passages from attempts at catheter placement in the ED. Will need to follow up with Dr. Jerry for outpatient management and voiding trial.

## 2020-12-01 NOTE — ED NOTES
Patient identifiers for Frederick Kong Jr. checked and correct.  LOC:  Frederick Kong Jr. is awake, alert, and aware of environment with an appropriate affect. He is oriented x 3 and speaking appropriately.  APPEARANCE:  He is mildly distressed. He is clean and well groomed, patient's clothing is properly fastened.  SKIN:  The skin is warm, dry and flaky. He has normal skin turgor and moist mucus membranes. Patient is pale. Skin is intact; no breakdown noted. Bruising noted to BUE.  MUSCULOSKELETAL:  He is moving all extremities well, no obvious deformities noted. Pulses intact.   RESPIRATORY:  Airway is open and patent. Respirations are spontaneous but labored.   CARDIAC:  He has an irregularly irregular rhythm. No peripheral edema noted.   ABDOMEN:  No distention noted.  Soft and non-tender upon palpation.  NEUROLOGICAL:  PERRL. Facial expression is symmetrical. Hand grasps are equal bilaterally. Normal sensation in all extremities when touched with finger.  Allergies reported:    Review of patient's allergies indicates:   Allergen Reactions    Iodinated contrast media Rash     OTHER NOTES:      Frederick Kong Jr. presents to the ED with c/o increasing SOB since Thursday.  Patient was seen by pulmonology on 11/30 and began treatment for pneumonia.  Upon arrival, patient is 89% on non-rebreather mask.  Pale.  Labored breathing.  Patient placed on BiPap upon arrival.

## 2020-12-01 NOTE — ASSESSMENT & PLAN NOTE
Noted - secondary to steroid administration? Will trend with daily CBC. Patient without fever, productive cough, COVID negative. Will check procalcitonin. Repeat chest xray in the AM once patient has been diuresed. Patient was given one dose of IV rocephin and Azithromycin in the ER - however in the setting of CHF exacerbation with low suspicion for infectious etiology, will hold upon admission. Please reintroduce abx therapy if clinically indicated.

## 2020-12-01 NOTE — H&P
[facility] Medicine  History & Physical    Patient Name: Frederick Kong Jr.  MRN: [id number]  Admission Date: 12/1/2020  Attending Physician: Gustavo Balbuena III, MD   Primary Care Provider: Dhiraj Dempsey III, MD         Patient information was obtained from spouse/SO, past medical records and ER records.     Subjective:     Principal Problem:Acute on chronic diastolic heart failure    Chief Complaint:   Chief Complaint   Patient presents with    Shortness of Breath        HPI: Frederick Kong Jr. is an 85-year-old male with a past medical history of COPD, chronic hypoxemia requiring supplemental oxygen nightly, aortic valve replacement, hypertension, hyperlipidemia, diastolic congestive heart failure, and chronic kidney disease who presents to the emergency room tonight with reports of shortness of breath.  Information for HPI obtained from review of EMR and patient's wife who is at bedside during my initial interview and physical exam.  Patient is a poor historian due to respiratory acidosis with retained CO2 and BiPAP therapy in the setting of acute respiratory distress.  Patient's wife states that patient has been on nightly oxygen for approximately 2 years, states he utilizes 2 L via nasal cannula nightly.  Friday patient began to experience shortness of breath that has increased in severity since onset.  Patient requiring utilization of supplemental oxygen throughout the entire day since Friday.  Patient was seen by pulmonology NPRui today in clinic who ordered a chest x-ray and COVID screen.  Patient's wife states they went to [facility] for the COVID screen and returned home.  She states upon arrival to home patient was Exhausted.  She states patient with worsening shortness of breath and extreme fatigue this evening.  Patient also with reported decreased p.o. intake today. Patient's wife states that for approximately 6 months patient has been experiencing a cough,  has been followed with pulmonology - states he was seen in pulmonology office in October 2020 and was given breathing treatments and prednisone.  Patient's wife states that approximately 2 weeks ago patient was seen by his cardiologist, Dr. Gonzalez, in which patient was noted to have lower extremity swelling, states that Lasix was initially prescribed every other day, however in the setting of bilateral lower extremity swelling cardiology recommended increase in Lasix to every day - in which patient reports compliance.  Patient is followed by PCP, Dr. Dempsey.  In the emergency room patient immediately placed on BiPAP therapy for acute respiratory distress, ABG was obtained revealing acute respiratory acidosis with hypercapnia and hypoxemia.  Patient was initially initiated on IV Rocephin and azithromycin for suspected COVID versus community-acquired pneumonia.  BMP consistent with CHF exacerbation with correlating chest x-ray revealing bilateral pleural effusions.  Patient was given 40 mg IV Lasix.  Patient admitted to the ICU on 12/01/2020 at approximately 4:00 a.m..  Patient resides at home with his wife, utilizes a cane and walker for ambulatory assistance.  Partial code status verified upon admission with patient's wife, who verifies that patient does not wish to be intubated in the setting of acute pulmonary arrest.    Past Medical History:   Diagnosis Date    BPH with urinary obstruction     Cancer     CHF (congestive heart failure)     Coronary artery disease     Gout     Hyperlipemia     Hypertension     Personal history of urethral stricture        Past Surgical History:   Procedure Laterality Date    APPENDECTOMY      BRAIN SURGERY  1975    meningioma removed    CARDIAC SURGERY  2001    mechanical aortic valve replacement, Dr. Sanket Sanchez    CYSTOSCOPY N/A 6/11/2018    Procedure: CYSTOSCOPY and possible urethral dilation;  Surgeon: Kimmie Jerry MD;  Location: [facility] OR;  Service:  Urology;  Laterality: N/A;    TONSILLECTOMY         Review of patient's allergies indicates:   Allergen Reactions    Iodinated contrast media Rash       No current facility-administered medications on file prior to encounter.      Current Outpatient Medications on File Prior to Encounter   Medication Sig    albuterol (PROVENTIL) 2.5 mg /3 mL (0.083 %) nebulizer solution Take 3 mLs (2.5 mg total) by nebulization 4 (four) times daily as needed for Wheezing or Shortness of Breath.    albuterol (PROVENTIL/VENTOLIN HFA) 90 mcg/actuation inhaler Inhale 2 puffs into the lungs every 6 (six) hours as needed for Wheezing or Shortness of Breath. Rescue    amLODIPine (NORVASC) 10 MG tablet Take 1 tablet (10 mg total) by mouth once daily.    aspirin (ECOTRIN) 81 MG EC tablet Take 81 mg by mouth once daily.    budesonide (PULMICORT) 0.5 mg/2 mL nebulizer solution Take 2 mLs (0.5 mg total) by nebulization 2 (two) times daily. Controller    clopidogreL (PLAVIX) 75 mg tablet Take 1 tablet (75 mg total) by mouth once daily.    fluticasone propionate (FLONASE) 50 mcg/actuation nasal spray 2 sprays (100 mcg total) by Each Nostril route once daily.    fluticasone-umeclidin-vilanter (TRELEGY ELLIPTA) 100-62.5-25 mcg DsDv Inhale 1 puff into the lungs once daily.    furosemide (LASIX) 20 MG tablet Take 0.5 tablets (10 mg total) by mouth once daily. (Patient taking differently: Take 10 mg by mouth once daily. 1/2 tablet every other day)    ipratropium (ATROVENT) 42 mcg (0.06 %) nasal spray INSTILL ONE SPRAY IN EACH NOSTRIL THREE TIMES A DAY    losartan (COZAAR) 100 MG tablet Take 1 tablet (100 mg total) by mouth once daily.    lovastatin (MEVACOR) 40 MG tablet Take 1 tablet (40 mg total) by mouth once daily.    metoprolol succinate (TOPROL-XL) 100 MG 24 hr tablet Take 1 tablet (100 mg total) by mouth once daily.    montelukast (SINGULAIR) 10 mg tablet Take 1 tablet (10 mg total) by mouth every evening.    pantoprazole  (PROTONIX) 40 MG tablet Take 40 mg by mouth daily as needed.     potassium chloride SA (K-DUR,KLOR-CON) 10 MEQ tablet Take 10 mEq by mouth every other day.    predniSONE (DELTASONE) 20 MG tablet 3 pills for 3 days, 2 pills for 3 days, 1 pill for 3 days    roflumilast (DALIRESP) 500 mcg Tab Take 1 tablet (500 mcg total) by mouth once daily.     Family History     Problem Relation (Age of Onset)    Cancer Brother    Heart disease Mother, Father, Sister    Hyperlipidemia Sister    Hypertension Sister    Kidney disease Sister        Tobacco Use    Smoking status: Former Smoker    Tobacco comment: quit 2000   Substance and Sexual Activity    Alcohol use: No    Drug use: Not on file    Sexual activity: Not on file     Review of Systems   Unable to perform ROS: Acuity of condition (Patient on Bipap during initial interview and physical exam - patient shakes head no when asked if he is having chest pain.)     Objective:     Vital Signs (Most Recent):  Temp: 97.9 °F (36.6 °C) (12/01/20 0205)  Pulse: 61 (12/01/20 0354)  Resp: (!) 25 (12/01/20 0354)  BP: (!) 155/69 (12/01/20 0246)  SpO2: (!) 94 % (12/01/20 0354) Vital Signs (24h Range):  Temp:  [97.9 °F (36.6 °C)] 97.9 °F (36.6 °C)  Pulse:  [61-74] 61  Resp:  [25-40] 25  SpO2:  [76 %-96 %] 94 %  BP: (155-177)/(63-72) 155/69     Weight: 69.9 kg (154 lb)  Body mass index is 24.12 kg/m².    Physical Exam  Vitals signs and nursing note reviewed.   Constitutional:       General: He is not in acute distress.     Appearance: He is well-developed. He is not diaphoretic.      Comments: Chronically ill appearing, elderly male   HENT:      Head: Normocephalic and atraumatic.   Eyes:      General:         Right eye: No discharge.         Left eye: No discharge.   Neck:      Musculoskeletal: Normal range of motion.      Vascular: No JVD.   Cardiovascular:      Rate and Rhythm: Normal rate. Rhythm irregular.      Heart sounds: Murmur present.      Comments: Frequent PVCs noted on  bedside continuous cardiac monitor.  Pulmonary:      Effort: No respiratory distress.      Breath sounds: Rales present. No wheezing.      Comments: Patient on BiPAP therapy, tachypneic.  Rales noted upon auscultation of bilateral upper and lower lung fields.  Chest:      Chest wall: No tenderness.   Abdominal:      General: Bowel sounds are normal. There is no distension.      Palpations: Abdomen is soft.      Tenderness: There is no abdominal tenderness. There is no guarding or rebound.   Genitourinary:     Comments: Exam Deferred     Musculoskeletal: Normal range of motion.         General: Swelling present. No tenderness or deformity.      Comments: Plus four pitting edema noted to bilateral lower extremities.   Skin:     General: Skin is warm and dry.      Capillary Refill: Capillary refill takes 2 to 3 seconds.      Findings: Bruising present. No erythema or rash.      Comments: Generalized bruising noted throughout bilateral upper extremities.   Neurological:      Mental Status: He is alert.      Comments: Patient awake, alert.  Patient oriented to self and location.  Neurological exam limited as patient on BiPAP therapy, in mild acute respiratory distress.  Follows commands appropriately.   Psychiatric:         Behavior: Behavior normal.      Comments: Psychiatric assessment limited as patient on BiPAP, mild respiratory distress.             Significant Labs:   CBC:   Recent Labs   Lab 12/01/20  0150   WBC 17.72*   HGB 13.1*   HCT 44.2   *     CMP:   Recent Labs   Lab 12/01/20  0302      K 5.4*      CO2 21*   *   BUN 43*   CREATININE 1.6*   CALCIUM 8.7   PROT 6.7   ALBUMIN 2.8*   BILITOT 0.4   ALKPHOS 93   AST 18   ALT 17   ANIONGAP 12   EGFRNONAA 39*     Cardiac Markers:   Recent Labs   Lab 12/01/20  0150   *     Lactic Acid:   Recent Labs   Lab 12/01/20  0302   LACTATE 1.3     COVID-negative    ABG  Recent Labs   Lab 12/01/20  0354   PH 7.242*   PO2 69*   PCO2 61.4*   HCO3  26.4   BE -1       All pertinent labs within the past 24 hours have been reviewed.    Significant Imaging: I have reviewed all pertinent imaging results/findings within the past 24 hours.     Chest x-ray:  IMPRESSION:  Redemonstration findings related to CHF superimposed on a background of COPD. The bilateral  pleural effusions have progressively enlarged and lung base atelectasis has increased.    EKG obtained on 12/01/2020, impression as below:  Undetermined rhythm  Possible Inferior infarct (cited on or before 01-DEC-2020)  Abnormal ECG  When compared with ECG of 01-DEC-2020 01:52,  Current undetermined rhythm precludes rhythm comparison, needs review  Nonspecific T wave abnormality no longer evident in Inferior leads    Assessment/Plan:     * Acute on chronic diastolic heart failure  CHF currently uncontrolled due to Pulmonary edema. Latest ECHO shows ejection fraction of 40%. Continue BB and Lasix and monitor clinical status closely. Monitor on telemetry. Patient is off CHF pathway due to initial ICU admission.  Monitor strict Is&Os and daily weights.  NPO upon admission in the setting of bipap therapy, consider fluid restricted diet once clinically appropriate. Continue to stress to patient importance of self efficacy and  on diet for CHF. Last BNP reviewed- and noted below. Will consult cardiology - follows with Dr. Gonzalez as outpatient. Repeat echo in the AM. Was given 40 mg IV lasix in the ER. Repeat chest xray in the AM.  Recent Labs   Lab 12/01/20  0150   *   .    Results for orders placed during the hospital encounter of 04/07/18   Transthoracic echo (TTE) complete    Narrative 1. Technically difficult study.  2. LVEF is ~40% by visual estimate with global hypokinesis.   3. Normally functioning mechanical aortic valve prosthesis.  4. Left ventricular diastolic dysfunction consistent with impaired   relaxation..                Acute on chronic respiratory failure with hypoxia and  hypercapnia  Etiology likely secondary to CHF exacerbation versus COPD exacerbation.  Patient was given 40 mg IV Lasix in emergency room, monitor strict intake and output.  Patient on BiPAP therapy.  ABG reviewed revealing hypercapnia and hypoxemia, improvement noted in repeat ABG after BiPAP initiation.  Utilize continuous telemetry and pulse oximetry monitoring.  Repeat chest x-ray in the morning.  Duo nebs as needed.  Continue home Roflumilast. Wean Bipap therapy as tolerated and as clinically appropriate. Low suspicion for pneumonia - afebrile, leukocytosis likely secondary to steroid administration. Will check procalcitonin and repeat chest xray once patient diuresed. PARTIAL CODE status verified upon admission, NO INTUBATION.        Hypoalbuminemia due to protein-calorie malnutrition  Noted, encourage PO intake once clinically appropriate with boost supplementation.        Respiratory acidosis  Noted on initial ABG upon admission - repeat ABG obtained after Bipap initiation with improvement. Continue Bipap therapy, diuresis. Continuous tele and pulse oximetry monitoring.        Thrombocytosis  Noted, trend with daily CBC.      Leukocytosis  Noted - secondary to steroid administration? Will trend with daily CBC. Patient without fever, productive cough, COVID negative. Will check procalcitonin. Repeat chest xray in the AM once patient has been diuresed. Patient was given one dose of IV rocephin and Azithromycin in the ER - however in the setting of CHF exacerbation with low suspicion for infectious etiology, will hold upon admission. Please reintroduce abx therapy if clinically indicated.        Cognitive impairment  Noted - unable to determine neurological status from baseline upon admission as patient with acute resp distress, on bipap. Patient also with possible metabolic encephalopathy secondary to retained CO2. Neuro checks q 1 hour. Fall precautions.        Hyperlipidemia  Chronic, controlled. Will  continue home medication.    Lab Results   Component Value Date    CHOL 134 07/17/2020    CHOL 133 05/31/2019    CHOL 145 01/08/2019     Lab Results   Component Value Date    HDL 44 07/17/2020    HDL 50 05/31/2019    HDL 57 01/08/2019     Lab Results   Component Value Date    LDLCALC 75.4 07/17/2020    LDLCALC 63 05/31/2019    LDLCALC 72 01/08/2019     Lab Results   Component Value Date    TRIG 73 07/17/2020    TRIG 113 05/31/2019    TRIG 79 01/08/2019     Lab Results   Component Value Date    CHOLHDL 32.8 07/17/2020    CHOLHDL 2.7 05/31/2019    CHOLHDL 2.5 01/08/2019               Hyperkalemia  Noted, in the setting of mildly worsened renal function from baseline - will trend with CMP q 8 hours. Anticipate natural decrease in K level with IV Lasix and diuresis. Utilize continuous telemetry monitoring.        COPD mixed type  Noted - acute respiratory failure upon admission secondary to COPD vs CHF exacerbation. Continue home medications, bipap therapy, duo nebs PRN.       Essential hypertension  Chronic, controlled.  Latest blood pressure and vitals reviewed-   Temp:  [97.9 °F (36.6 °C)]   Pulse:  [61-74]   Resp:  [25-40]   BP: (155-177)/(63-72)   SpO2:  [76 %-96 %] .   Home meds for hypertension were reviewed and noted below. Hospital anti-hypertensive changes were made as shown below.  Hypertension Medications             amLODIPine (NORVASC) 10 MG tablet Take 1 tablet (10 mg total) by mouth once daily.    furosemide (LASIX) 20 MG tablet Take 0.5 tablets (10 mg total) by mouth once daily.    losartan (COZAAR) 100 MG tablet Take 1 tablet (100 mg total) by mouth once daily.    metoprolol succinate (TOPROL-XL) 100 MG 24 hr tablet Take 1 tablet (100 mg total) by mouth once daily.      Hospital Medications             furosemide injection 40 mg 40 mg, Intravenous, Once, Administer IV push at a max rate of 40 mg/min        Will treat with PRN antihypertensives, as needed, to maintain BP less than 180/110 or if patient  becomes symptomatic.          H/O aortic valve replacement  Noted      Chronic renal insufficiency, stage 3 (moderate)  Patient with noted mild elevation in creatinine upon admission.  Patient was given 40 mg IV Lasix in emergency room.  BMP reviewed- noted Estimated Creatinine Clearance: 31.6 mL/min (A) (based on SCr of 1.6 mg/dL (H)). according to latest data. Monitor UOP and serial CMP q.8 hours and adjust therapy as needed. Renally dose meds.              VTE Risk Mitigation (From admission, onward)    None         Critical Care time spent - 85 minutes     Ivelisse Villalta NP  Department of Hospital Medicine   [facility]

## 2020-12-01 NOTE — SUBJECTIVE & OBJECTIVE
Past Medical History:   Diagnosis Date    BPH with urinary obstruction     Cancer     CHF (congestive heart failure)     Coronary artery disease     Gout     Hyperlipemia     Hypertension     Personal history of urethral stricture        Past Surgical History:   Procedure Laterality Date    APPENDECTOMY      BRAIN SURGERY  1975    meningioma removed    CARDIAC SURGERY  2001    mechanical aortic valve replacement, Dr. Sanket Sanchez    CYSTOSCOPY N/A 6/11/2018    Procedure: CYSTOSCOPY and possible urethral dilation;  Surgeon: Kimmie Jerry MD;  [facility];  Service: Urology;  Laterality: N/A;    TONSILLECTOMY         Review of patient's allergies indicates:   Allergen Reactions    Iodinated contrast media Rash       No current facility-administered medications on file prior to encounter.      Current Outpatient Medications on File Prior to Encounter   Medication Sig    albuterol (PROVENTIL) 2.5 mg /3 mL (0.083 %) nebulizer solution Take 3 mLs (2.5 mg total) by nebulization 4 (four) times daily as needed for Wheezing or Shortness of Breath.    albuterol (PROVENTIL/VENTOLIN HFA) 90 mcg/actuation inhaler Inhale 2 puffs into the lungs every 6 (six) hours as needed for Wheezing or Shortness of Breath. Rescue    amLODIPine (NORVASC) 10 MG tablet Take 1 tablet (10 mg total) by mouth once daily.    aspirin (ECOTRIN) 81 MG EC tablet Take 81 mg by mouth once daily.    budesonide (PULMICORT) 0.5 mg/2 mL nebulizer solution Take 2 mLs (0.5 mg total) by nebulization 2 (two) times daily. Controller    clopidogreL (PLAVIX) 75 mg tablet Take 1 tablet (75 mg total) by mouth once daily.    fluticasone propionate (FLONASE) 50 mcg/actuation nasal spray 2 sprays (100 mcg total) by Each Nostril route once daily.    fluticasone-umeclidin-vilanter (TRELEGY ELLIPTA) 100-62.5-25 mcg DsDv Inhale 1 puff into the lungs once daily.    furosemide (LASIX) 20 MG tablet Take 0.5 tablets (10 mg total) by mouth  once daily. (Patient taking differently: Take 10 mg by mouth once daily. 1/2 tablet every other day)    ipratropium (ATROVENT) 42 mcg (0.06 %) nasal spray INSTILL ONE SPRAY IN EACH NOSTRIL THREE TIMES A DAY    losartan (COZAAR) 100 MG tablet Take 1 tablet (100 mg total) by mouth once daily.    lovastatin (MEVACOR) 40 MG tablet Take 1 tablet (40 mg total) by mouth once daily.    metoprolol succinate (TOPROL-XL) 100 MG 24 hr tablet Take 1 tablet (100 mg total) by mouth once daily.    montelukast (SINGULAIR) 10 mg tablet Take 1 tablet (10 mg total) by mouth every evening.    pantoprazole (PROTONIX) 40 MG tablet Take 40 mg by mouth daily as needed.     potassium chloride SA (K-DUR,KLOR-CON) 10 MEQ tablet Take 10 mEq by mouth every other day.    predniSONE (DELTASONE) 20 MG tablet 3 pills for 3 days, 2 pills for 3 days, 1 pill for 3 days    roflumilast (DALIRESP) 500 mcg Tab Take 1 tablet (500 mcg total) by mouth once daily.     Family History     Problem Relation (Age of Onset)    Cancer Brother    Heart disease Mother, Father, Sister    Hyperlipidemia Sister    Hypertension Sister    Kidney disease Sister        Tobacco Use    Smoking status: Former Smoker    Tobacco comment: quit 2000   Substance and Sexual Activity    Alcohol use: No    Drug use: Not on file    Sexual activity: Not on file     Review of Systems   Unable to perform ROS: Acuity of condition (Patient on Bipap during initial interview and physical exam - patient shakes head no when asked if he is having chest pain.)     Objective:     Vital Signs (Most Recent):  Temp: 97.9 °F (36.6 °C) (12/01/20 0205)  Pulse: 61 (12/01/20 0354)  Resp: (!) 25 (12/01/20 0354)  BP: (!) 155/69 (12/01/20 0246)  SpO2: (!) 94 % (12/01/20 0354) Vital Signs (24h Range):  Temp:  [97.9 °F (36.6 °C)] 97.9 °F (36.6 °C)  Pulse:  [61-74] 61  Resp:  [25-40] 25  SpO2:  [76 %-96 %] 94 %  BP: (155-177)/(63-72) 155/69     Weight: 69.9 kg (154 lb)  Body mass index is 24.12  kg/m².    Physical Exam  Vitals signs and nursing note reviewed.   Constitutional:       General: He is not in acute distress.     Appearance: He is well-developed. He is not diaphoretic.      Comments: Chronically ill appearing, elderly male   HENT:      Head: Normocephalic and atraumatic.   Eyes:      General:         Right eye: No discharge.         Left eye: No discharge.   Neck:      Musculoskeletal: Normal range of motion.      Vascular: No JVD.   Cardiovascular:      Rate and Rhythm: Normal rate. Rhythm irregular.      Heart sounds: Murmur present.      Comments: Frequent PVCs noted on bedside continuous cardiac monitor.  Pulmonary:      Effort: No respiratory distress.      Breath sounds: Rales present. No wheezing.      Comments: Patient on BiPAP therapy, tachypneic.  Rales noted upon auscultation of bilateral upper and lower lung fields.  Chest:      Chest wall: No tenderness.   Abdominal:      General: Bowel sounds are normal. There is no distension.      Palpations: Abdomen is soft.      Tenderness: There is no abdominal tenderness. There is no guarding or rebound.   Genitourinary:     Comments: Exam Deferred     Musculoskeletal: Normal range of motion.         General: Swelling present. No tenderness or deformity.      Comments: Plus four pitting edema noted to bilateral lower extremities.   Skin:     General: Skin is warm and dry.      Capillary Refill: Capillary refill takes 2 to 3 seconds.      Findings: Bruising present. No erythema or rash.      Comments: Generalized bruising noted throughout bilateral upper extremities.   Neurological:      Mental Status: He is alert.      Comments: Patient awake, alert.  Patient oriented to self and location.  Neurological exam limited as patient on BiPAP therapy, in mild acute respiratory distress.  Follows commands appropriately.   Psychiatric:         Behavior: Behavior normal.      Comments: Psychiatric assessment limited as patient on BiPAP, mild respiratory  distress.             Significant Labs:   CBC:   Recent Labs   Lab 12/01/20  0150   WBC 17.72*   HGB 13.1*   HCT 44.2   *     CMP:   Recent Labs   Lab 12/01/20  0302      K 5.4*      CO2 21*   *   BUN 43*   CREATININE 1.6*   CALCIUM 8.7   PROT 6.7   ALBUMIN 2.8*   BILITOT 0.4   ALKPHOS 93   AST 18   ALT 17   ANIONGAP 12   EGFRNONAA 39*     Cardiac Markers:   Recent Labs   Lab 12/01/20  0150   *     Lactic Acid:   Recent Labs   Lab 12/01/20  0302   LACTATE 1.3     COVID-negative    ABG  Recent Labs   Lab 12/01/20  0354   PH 7.242*   PO2 69*   PCO2 61.4*   HCO3 26.4   BE -1       All pertinent labs within the past 24 hours have been reviewed.    Significant Imaging: I have reviewed all pertinent imaging results/findings within the past 24 hours.     Chest x-ray:  IMPRESSION:  Redemonstration findings related to CHF superimposed on a background of COPD. The bilateral  pleural effusions have progressively enlarged and lung base atelectasis has increased.    EKG obtained on 12/01/2020, impression as below:  Undetermined rhythm  Possible Inferior infarct (cited on or before 01-DEC-2020)  Abnormal ECG  When compared with ECG of 01-DEC-2020 01:52,  Current undetermined rhythm precludes rhythm comparison, needs review  Nonspecific T wave abnormality no longer evident in Inferior leads

## 2020-12-01 NOTE — PLAN OF CARE
12/01/20 1424   Discharge Assessment   Assessment Type Discharge Planning Reassessment   Equipment Currently Used at Home walker, rolling;cane, straight   Is the patient taking medications as prescribed? yes   Does the patient receive services at the [facility]? No   Discharge Plan A Home with family   Discharge Plan B Skilled Nursing Facility  (To early to determine)     Pt's daughter called back and stated pt is on home O2.  She will tried to contact her mom to return call.  We do not know the O2 company at this time but daughter thinks it is [employer].

## 2020-12-02 PROBLEM — G93.41 METABOLIC ENCEPHALOPATHY: Status: ACTIVE | Noted: 2020-01-01

## 2020-12-02 PROBLEM — E87.5 HYPERKALEMIA: Status: RESOLVED | Noted: 2018-04-14 | Resolved: 2020-01-01

## 2020-12-02 NOTE — CONSULTS
[facility]  Cardiology  Consult Note    Patient Name: Frederick Kong Jr.  MRN: [id number]  Admission Date: 12/1/2020  Hospital Length of Stay: 1 days  Code Status: Partial Code   Attending Provider:hospitalist  Consulting Provider: Flynn Rosas MD  Primary Care Physician: Dhiraj Dempsey III, MD  Principal Problem:Acute on chronic diastolic heart failure    Patient information was obtained from patient, spouse/SO, past medical records and ER records.     Consults  Subjective:     Chief Complaint:  Shortness of breath  HPI:  Patient admitted with possible COVID, respiratory distress, and heart failure  .  He has a history of heart failure reduced ejection fraction, status post CAB, bioprosthetic aortic valve  He has not been having chest pain running fever  He does have a history of chronic kidney disease and high blood pressure\  He has not had a history of atrial fibrillation and has been on aspirin and Plavix of blood thinners but no Coumadin or NOAC  He has COPD and is on prednisone     Past Medical History:   Diagnosis Date    BPH with urinary obstruction     Cancer     CHF (congestive heart failure)     Coronary artery disease     Gout     Hyperlipemia     Hypertension     Personal history of urethral stricture        Past Surgical History:   Procedure Laterality Date    APPENDECTOMY      BRAIN SURGERY  1975    meningioma removed    CARDIAC SURGERY  2001    mechanical aortic valve replacement, Dr. Sanket Sanchez    CYSTOSCOPY N/A 6/11/2018    Procedure: CYSTOSCOPY and possible urethral dilation;  Surgeon: Kimmie Jerry MD;  Location: [facility] OR;  Service: Urology;  Laterality: N/A;    TONSILLECTOMY         Review of patient's allergies indicates:   Allergen Reactions    Iodinated contrast media Rash       No current facility-administered medications on file prior to encounter.      Current Outpatient Medications on File Prior to Encounter   Medication Sig    albuterol  (PROVENTIL) 2.5 mg /3 mL (0.083 %) nebulizer solution Take 3 mLs (2.5 mg total) by nebulization 4 (four) times daily as needed for Wheezing or Shortness of Breath.    albuterol (PROVENTIL/VENTOLIN HFA) 90 mcg/actuation inhaler Inhale 2 puffs into the lungs every 6 (six) hours as needed for Wheezing or Shortness of Breath. Rescue    amLODIPine (NORVASC) 10 MG tablet Take 1 tablet (10 mg total) by mouth once daily.    aspirin (ECOTRIN) 81 MG EC tablet Take 81 mg by mouth once daily.    budesonide (PULMICORT) 0.5 mg/2 mL nebulizer solution Take 2 mLs (0.5 mg total) by nebulization 2 (two) times daily. Controller    clopidogreL (PLAVIX) 75 mg tablet Take 1 tablet (75 mg total) by mouth once daily.    fluticasone propionate (FLONASE) 50 mcg/actuation nasal spray 2 sprays (100 mcg total) by Each Nostril route once daily.    fluticasone-umeclidin-vilanter (TRELEGY ELLIPTA) 100-62.5-25 mcg DsDv Inhale 1 puff into the lungs once daily.    furosemide (LASIX) 20 MG tablet Take 0.5 tablets (10 mg total) by mouth once daily. (Patient taking differently: Take 10 mg by mouth once daily. 1/2 tablet every other day)    ipratropium (ATROVENT) 42 mcg (0.06 %) nasal spray INSTILL ONE SPRAY IN EACH NOSTRIL THREE TIMES A DAY    losartan (COZAAR) 100 MG tablet Take 1 tablet (100 mg total) by mouth once daily.    lovastatin (MEVACOR) 40 MG tablet Take 1 tablet (40 mg total) by mouth once daily.    metoprolol succinate (TOPROL-XL) 100 MG 24 hr tablet Take 1 tablet (100 mg total) by mouth once daily.    montelukast (SINGULAIR) 10 mg tablet Take 1 tablet (10 mg total) by mouth every evening.    pantoprazole (PROTONIX) 40 MG tablet Take 40 mg by mouth daily as needed.     potassium chloride SA (K-DUR,KLOR-CON) 10 MEQ tablet Take 10 mEq by mouth every other day.    predniSONE (DELTASONE) 20 MG tablet 3 pills for 3 days, 2 pills for 3 days, 1 pill for 3 days    roflumilast (DALIRESP) 500 mcg Tab Take 1 tablet (500 mcg total) by  mouth once daily.     Family History     Problem Relation (Age of Onset)    Cancer Brother    Heart disease Mother, Father, Sister    Hyperlipidemia Sister    Hypertension Sister    Kidney disease Sister        Tobacco Use    Smoking status: Former Smoker    Tobacco comment: quit 2000   Substance and Sexual Activity    Alcohol use: No    Drug use: Not on file    Sexual activity: Not on file     Review of Systems   Constitution: Positive for weight gain.   HENT: Positive for congestion and hearing loss.    Eyes: Positive for blurred vision.   Cardiovascular: Negative for chest pain and irregular heartbeat.   Respiratory: Positive for cough and shortness of breath.    Gastrointestinal: Positive for constipation. Negative for nausea and vomiting.   Psychiatric/Behavioral: Positive for altered mental status and hallucinations. The patient has insomnia.      Objective:     Vital Signs (Most Recent):  Temp: 98.5 °F (36.9 °C) (12/02/20 0800)  Pulse: 72 (12/02/20 1143)  Resp: (!) 25 (12/02/20 1143)  BP: (!) 171/74 (12/02/20 1000)  SpO2: 100 % (12/02/20 1143) Vital Signs (24h Range):  Temp:  [97.7 °F (36.5 °C)-98.7 °F (37.1 °C)] 98.5 °F (36.9 °C)  Pulse:  [56-80] 72  Resp:  [17-40] 25  SpO2:  [91 %-100 %] 100 %  BP: (142-181)/(64-95) 171/74     Weight: 69.4 kg (153 lb)  Body mass index is 23.96 kg/m².    SpO2: 100 %  O2 Device (Oxygen Therapy): nasal cannula      Intake/Output Summary (Last 24 hours) at 12/2/2020 1158  Last data filed at 12/2/2020 1000  Gross per 24 hour   Intake 325.58 ml   Output 1659 ml   Net -1333.42 ml       Lines/Drains/Airways     Drain                 Urethral Catheter 12/01/20 1305 Straight-tip 16 Fr. less than 1 day          Peripheral Intravenous Line                 Peripheral IV - Single Lumen 12/01/20 0216 20 G Right Forearm 1 day         Peripheral IV - Single Lumen 12/02/20 0855 20 G;1 in Anterior;Distal;Right Antecubital less than 1 day                Physical Exam confused elderly  male  Blood pressure 171/74  Pulse is 80 and irregular  Lungs few crackles  Cardiac is irregular regular no definitive S3  Cannot auscultate murmur  Abdomen no masses  Extremities without flatus edema    Significant Labs:   CMP   Recent Labs   Lab 12/01/20  1149 12/01/20  2017 12/02/20  0419    145 145   K 4.6 4.5 4.4    109 110   CO2 23 25 22*   * 141* 145*   BUN 46* 54* 56*   CREATININE 1.7* 1.8* 1.7*   CALCIUM 8.7 8.5* 8.8   PROT 6.5 6.1 6.5   ALBUMIN 2.7* 2.6* 2.8*   BILITOT 0.4 0.4 0.4   ALKPHOS 89 93 96   AST 11 11 11   ALT 15 16 15   ANIONGAP 12 11 13   ESTGFRAFRICA 42* 39* 42*   EGFRNONAA 36* 34* 36*   , CBC   Recent Labs   Lab 12/01/20  0150 12/02/20  0419   WBC 17.72* 13.68*   HGB 13.1* 11.6*   HCT 44.2 38.3*   * 423*    and Troponin   Recent Labs   Lab 12/01/20  0302   TROPONINI 0.078*       · Significant Imaging:The left ventricle is mildly enlarged with mildly decreased systolic function. The estimated ejection fraction is 52%.  · Atrial fibrillation observed with restrictive filling pattern present.  · Normal right ventricular size with low normal right ventricular systolic function.  · There is mild pulmonary hypertension.  · Mild to moderate tricuspid regurgitation.  · Mild pulmonic regurgitation.  · There is abnormal septal wall motion consistent with post-operative status.  · Elevated central venous pressure (15 mmHg).  · The estimated PA systolic pressure is 56 mmHg.  · There is a porcine bioprosthetic aortic valve present. There is mild central aortic insufficiency present. Prosthetic aortic valve is normal.     Mean left atrial pressure is 15 mm Hg  Bioprosthetic aortic valve is present with minimal stenosis and mild aortic regurgitation  Of pulmonary hypertension is present  Patient is in atrial fibrillatio  Assessment and Plan:   A new onset atrial fibrillation  Heart failure reduced ejection fraction in a patient with status post bypass x2 and bioprosthetic aortic  valve  Pulmonary hypertension  Elevation mean left atrial pressure  Recommend 1 continue IV Lasix and had low-dose Milrinone for 24-36 hours  2.  The patient less confused start Eliquis 2.5 b.i.d. and DC Plavix but continue aspirin  Three consider cardioversion 1 pacer in stable  Active Diagnoses:    Diagnosis Date Noted POA    PRINCIPAL PROBLEM:  Acute on chronic diastolic heart failure [I50.33] 07/13/2020 Yes    Leukocytosis [D72.829] 12/01/2020 Yes    Thrombocytosis [D47.3] 12/01/2020 Yes    Respiratory acidosis [E87.2] 12/01/2020 Yes    Hypoalbuminemia due to protein-calorie malnutrition [E88.09, E46] 12/01/2020 Yes    Cognitive impairment [R41.89] 07/15/2020 Yes    Hyperlipidemia [E78.5] 07/13/2020 Yes    Acute on chronic respiratory failure with hypoxia and hypercapnia [J96.21, J96.22] 04/30/2018 Yes    Hyperkalemia [E87.5] 04/14/2018 Yes    COPD mixed type [J44.9] 04/12/2018 Yes    Essential hypertension [I10] 04/09/2018 Yes    Chronic renal insufficiency, stage 3 (moderate) [N18.30] 04/07/2018 Yes    H/O aortic valve replacement [Z95.2] 04/07/2018 Not Applicable      Problems Resolved During this Admission:       VTE Risk Mitigation (From admission, onward)         Ordered     enoxaparin injection 40 mg  Every 24 hours      12/01/20 1005     IP VTE HIGH RISK PATIENT  Once      12/01/20 0546     Place sequential compression device  Until discontinued      12/01/20 0546     Place CJ hose  Until discontinued      12/01/20 0546                Thank you for your consult.     Flynn Rosas MD  Cardiology   [facility]

## 2020-12-02 NOTE — PLAN OF CARE
Cauti care reviewed. Informed on seroquel dose and primacore infusion prior to dosing. Lasix continues at 7.5mg/hr with good U/O thhus far. Improved Bilat feet swelling.  Pt sat to EOB for lunch tray then return to lying position for rest. Remains tachypneic with congested cough. N/C at 4LPM continues. Safety maintained.

## 2020-12-02 NOTE — RESPIRATORY THERAPY
12/02/20 0706   Patient Assessment/Suction   Level of Consciousness (AVPU) alert   All Lung Fields Breath Sounds diminished;crackles, fine   PRE-TX-O2   O2 Device (Oxygen Therapy) nasal cannula   $ Is the patient on Low Flow Oxygen? Yes   Flow (L/min) 4   Oxygen Concentration (%) 36   SpO2 98 %   Pulse Oximetry Type Continuous   $ Pulse Oximetry - Multiple Charge Pulse Oximetry - Multiple   Pulse 78   Resp (!) 24   Aerosol Therapy   $ Aerosol Therapy Charges Aerosol Treatment   Respiratory Treatment Status (SVN) given   Treatment Route (SVN) mask   Patient Position (SVN) HOB elevated   Post Treatment Assessment (SVN) breath sounds unchanged   Signs of Intolerance (SVN) none   Breath Sounds Post-Respiratory Treatment   Throughout All Fields Post-Treatment All Fields   Throughout All Fields Post-Treatment no change   Post-treatment Heart Rate (beats/min) 82   Post-treatment Resp Rate (breaths/min) 18   Ready to Wean/Extubation Screen   FIO2<=50 (chart decimal) 0.36

## 2020-12-02 NOTE — TELEPHONE ENCOUNTER
Phoned patient no answer left message on voicemail to give the office a call back. Patient is currently int [facility].

## 2020-12-02 NOTE — PLAN OF CARE
Plan of care reviewed with pt.  Pt alert, [place], wears hearing aids but not at bedside. Pt with lasix gtt, @ 7.5mg, UO over 75ml/hr.  Lesile draining over 800cc yellow urine. Bathed pt, mark linen, oral care.  Pt NPO since midnight per Dr. Rosas's order.  Safety maintained with frequent checks, no falls or injuries this shift.

## 2020-12-02 NOTE — TELEPHONE ENCOUNTER
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 Consulted for difficult henderson placement. Was able to get it in over a motion wire, but felt quite a few false passages in the urethra from the ED. Told them to dc with catheter, but he'll need follow up for voiding trial. Thanks.

## 2020-12-02 NOTE — CARE UPDATE
12/01/20 1917   Patient Assessment/Suction   Level of Consciousness (AVPU) alert   Respiratory Effort Slight;Labored   Expansion/Accessory Muscles/Retractions no use of accessory muscles;accessory muscle use   All Lung Fields Breath Sounds crackles   LLL Breath Sounds crackles, fine   RLL Breath Sounds crackles, fine   Rhythm/Pattern, Respiratory shortness of breath  ('a little' 'not too bad')   Cough Frequency infrequent   Cough Type congested;nonproductive   PRE-TX-O2   O2 Device (Oxygen Therapy) nasal cannula   $ Is the patient on Low Flow Oxygen? Yes   Flow (L/min) 4   SpO2 97 %   Pulse Oximetry Type Continuous   $ Pulse Oximetry - Multiple Charge Pulse Oximetry - Multiple   Oximetry Probe Site No Change Needed   Pulse 75   Resp (!) 24   Aerosol Therapy   $ Aerosol Therapy Charges Aerosol Treatment   Respiratory Treatment Status (SVN) given   Treatment Route (SVN) mask   Patient Position (SVN) semi-Valladares's   Post Treatment Assessment (SVN) breath sounds unchanged   Signs of Intolerance (SVN) none

## 2020-12-03 PROBLEM — J18.9 RIGHT LOWER LOBE PNEUMONIA: Status: ACTIVE | Noted: 2020-01-01

## 2020-12-03 NOTE — ASSESSMENT & PLAN NOTE
Chronic, controlled.  Latest blood pressure and vitals reviewed-   Temp:  [97.7 °F (36.5 °C)-99 °F (37.2 °C)]   Pulse:  [61-86]   Resp:  [18-54]   BP: (129-181)/()   SpO2:  [89 %-100 %] .   Hypertension Medications at home            amLODIPine (NORVASC) 10 MG tablet Take 1 tablet (10 mg total) by mouth once daily.    furosemide (LASIX) 20 MG tablet Take 0.5 tablets (10 mg total) by mouth once daily.    losartan (COZAAR) 100 MG tablet Take 1 tablet (100 mg total) by mouth once daily.    metoprolol succinate (TOPROL-XL) 100 MG 24 hr tablet Take 1 tablet (100 mg total) by mouth once daily.      Hospital Medications             amLODIPine tablet 10 mg 10 mg, Oral, Daily    furosemide (LASIX) 200 mg in sodium chloride 0.9% 100 mL continuous infusion (conc: 2 mg/mL) 0-60 mg/hr (0-60 mg/hr), Intravenous, Continuous @ 0-30 mL/hr    losartan tablet 100 mg 100 mg, Oral, Daily    metoprolol succinate (TOPROL-XL) 24 hr tablet 100 mg 100 mg, Oral, Daily, DO NOT CRUSH OR CHEW; SWALLOW WHOLE.

## 2020-12-03 NOTE — TELEPHONE ENCOUNTER
----- Message from Ileana Fabian NP sent at 12/3/2020  9:58 AM CST -----  Sputum sample does not show any abnormal bacteria. Chest x-ray did not show pneumonia.

## 2020-12-03 NOTE — ASSESSMENT & PLAN NOTE
Does not have COPD exacerbation.  Continue his usual controller medications.  Rescue treatment PRN.    COPD Medications at home            albuterol (PROVENTIL) 2.5 mg /3 mL (0.083 %) nebulizer solution Take 3 mLs (2.5 mg total) by nebulization 4 (four) times daily as needed for Wheezing or Shortness of Breath.    albuterol (PROVENTIL/VENTOLIN HFA) 90 mcg/actuation inhaler Inhale 2 puffs into the lungs every 6 (six) hours as needed for Wheezing or Shortness of Breath. Rescue    budesonide (PULMICORT) 0.5 mg/2 mL nebulizer solution Take 2 mLs (0.5 mg total) by nebulization 2 (two) times daily. Controller    fluticasone-umeclidin-vilanter (TRELEGY ELLIPTA) 100-62.5-25 mcg DsDv Inhale 1 puff into the lungs once daily.      Hospital Medications             albuterol inhaler 2 puff 2 puffs, Inhalation, Every 6 hours PRN    albuterol sulfate nebulizer solution 2.5 mg 2.5 mg, Nebulization, Every 4 hours    albuterol-ipratropium 2.5 mg-0.5 mg/3 mL nebulizer solution 9 mL 9 mLs, Nebulization, Continuous    fluticasone-umeclidin-vilanter 100-62.5-25 mcg DsDv 1 puff 1 puff, Inhalation, Daily, NON-FORMULARY..PATIENT SUPPLIED OWN HOME MED

## 2020-12-03 NOTE — PROGRESS NOTES
The enoxaparin dose has been adjusted for Frederick Kong Jr. [id number] according to the pharmacy practice protocol.      Based on the patient's Estimated Creatinine Clearance: 25.2 mL/min (A) (based on SCr of 2 mg/dL (H))., Body mass index is 23.55 kg/m²., and weight= 68.2 kg (150 lb 5.7 oz), the enoxaparin dose has been adjusted 30 mg every 24 hours for CrCl <30.    Thank you,  Joshua Roque

## 2020-12-03 NOTE — PROGRESS NOTES
[facility] Hospital Medicine  Progress Note    Patient Name: Frederick Kong Jr.  MRN: [id number]  Patient Class: IP- Inpatient   Admission Date: 12/1/2020  Length of Stay: 1 days  Attending Physician: Harsha Dan MD  Primary Care Provider: Dhiraj Dempsey III, MD        Subjective:     Principal Problem:Acute on chronic diastolic heart failure        HPI:  Frederick Kong Jr. is an 85-year-old male with a past medical history of COPD, chronic hypoxemia requiring supplemental oxygen nightly, aortic valve replacement, hypertension, hyperlipidemia, diastolic congestive heart failure, and chronic kidney disease who presents to the emergency room tonight with reports of shortness of breath.  Information for HPI obtained from review of EMR and patient's wife who is at bedside during my initial interview and physical exam.  Patient is a poor historian due to respiratory acidosis with retained CO2 and BiPAP therapy in the setting of acute respiratory distress.  Patient's wife states that patient has been on nightly oxygen for approximately 2 years, states he utilizes 2 L via nasal cannula nightly.  Friday patient began to experience shortness of breath that has increased in severity since onset.  Patient requiring utilization of supplemental oxygen throughout the entire day since Friday.  Patient was seen by pulmonology NP, Rui today in clinic who ordered a chest x-ray and COVID screen.  Patient's wife states they went to [facility] urgent care for the COVID screen and returned home.  She states upon arrival to home patient was Exhausted.  She states patient with worsening shortness of breath and extreme fatigue this evening.  Patient also with reported decreased p.o. intake today. Patient's wife states that for approximately 6 months patient has been experiencing a cough, has been followed with pulmonology - states he was seen in pulmonology office in October 2020 and was given breathing treatments and  prednisone.  Patient's wife states that approximately 2 weeks ago patient was seen by his cardiologist, Dr. Gonzalez, in which patient was noted to have lower extremity swelling, states that Lasix was initially prescribed every other day, however in the setting of bilateral lower extremity swelling cardiology recommended increase in Lasix to every day - in which patient reports compliance.  Patient is followed by PCP, Dr. Dempsey.  In the emergency room patient immediately placed on BiPAP therapy for acute respiratory distress, ABG was obtained revealing acute respiratory acidosis with hypercapnia and hypoxemia.  Patient was initially initiated on IV Rocephin and azithromycin for suspected COVID versus community-acquired pneumonia.  BMP consistent with CHF exacerbation with correlating chest x-ray revealing bilateral pleural effusions.  Patient was given 40 mg IV Lasix.  Patient admitted to the ICU on 12/01/2020 at approximately 4:00 a.m..  Patient resides at home with his wife, utilizes a cane and walker for ambulatory assistance.  Partial code status verified upon admission with patient's wife, who verifies that patient does not wish to be intubated in the setting of acute pulmonary arrest.    Overview/Hospital Course:  No notes on file    Interval History:  Diuresing well.  Much less lower extremity edema.  Off of NIPPV.  Cardiology started milrinone.    Review of Systems   Constitutional: Positive for fatigue. Negative for chills and fever.   Respiratory: Positive for cough and shortness of breath. Negative for wheezing.    Cardiovascular: Positive for leg swelling. Negative for chest pain.   Gastrointestinal: Negative for abdominal pain and nausea.     Objective:     Vital Signs (Most Recent):  Temp: 99 °F (37.2 °C) (12/02/20 2000)  Pulse: 76 (12/02/20 2230)  Resp: (!) 29 (12/02/20 2230)  BP: (!) 167/67 (12/02/20 2230)  SpO2: 100 % (12/02/20 2230) Vital Signs (24h Range):  Temp:  [97.7 °F (36.5 °C)-99 °F (37.2  °C)] 99 °F (37.2 °C)  Pulse:  [61-86] 76  Resp:  [18-54] 29  SpO2:  [89 %-100 %] 100 %  BP: (129-181)/() 167/67     Weight: 69.4 kg (153 lb)  Body mass index is 23.96 kg/m².    Intake/Output Summary (Last 24 hours) at 2020 2240  Last data filed at 2020 2100  Gross per 24 hour   Intake 695.01 ml   Output 2164 ml   Net -1468.99 ml      Physical Exam  Vitals signs reviewed.   Constitutional:       General: He is not in acute distress.     Appearance: He is ill-appearing. He is not diaphoretic.      Interventions: Nasal cannula in place.   HENT:      Mouth/Throat:      Pharynx: No oropharyngeal exudate.   Eyes:      General: No scleral icterus.        Right eye: No discharge.         Left eye: No discharge.   Neck:      Vascular: No JVD.   Cardiovascular:      Rate and Rhythm: Normal rate. Rhythm irregular.   Pulmonary:      Effort: Pulmonary effort is normal. Tachypnea present.      Breath sounds: Rales present.   Abdominal:      General: Bowel sounds are normal. There is no distension.      Palpations: Abdomen is soft.      Tenderness: There is no abdominal tenderness.   Musculoskeletal:      Right lower le+ Edema present.      Left lower le+ Edema present.   Skin:     General: Skin is warm.      Findings: No rash.   Neurological:      Mental Status: He is alert. He is confused.         Significant Labs: All pertinent labs within the past 24 hours have been reviewed.    Significant Imaging: I have reviewed all pertinent imaging results/findings within the past 24 hours.      Assessment/Plan:      * Acute on chronic diastolic heart failure  Continue diuresis.  On Lasix drip.  On milrinone.  Monitor ins and outs.  Cardiology consulting.    Thrombocytosis  Monitor.  No need to intervene.    Lab Results   Component Value Date     (H) 2020           Metabolic encephalopathy  Noted.  Due to acute illness and to hypercarbia.  Seems to be improving.  Monitor.    Hyperlipidemia  Chronic,  controlled. Will continue home medication.    Lab Results   Component Value Date    CHOL 134 07/17/2020    CHOL 133 05/31/2019    CHOL 145 01/08/2019     Lab Results   Component Value Date    HDL 44 07/17/2020    HDL 50 05/31/2019    HDL 57 01/08/2019     Lab Results   Component Value Date    LDLCALC 75.4 07/17/2020    LDLCALC 63 05/31/2019    LDLCALC 72 01/08/2019     Lab Results   Component Value Date    TRIG 73 07/17/2020    TRIG 113 05/31/2019    TRIG 79 01/08/2019     Lab Results   Component Value Date    CHOLHDL 32.8 07/17/2020    CHOLHDL 2.7 05/31/2019    CHOLHDL 2.5 01/08/2019               Acute on chronic respiratory failure with hypoxia and hypercapnia  Weaned off of NIPPV.  Continue supplementing oxygen via NC.  Monitor oximetry.      Hyperkalemia  Resolved.        COPD mixed type  Does not have COPD exacerbation.  Continue his usual controller medications.  Rescue treatment PRN.    COPD Medications at home            albuterol (PROVENTIL) 2.5 mg /3 mL (0.083 %) nebulizer solution Take 3 mLs (2.5 mg total) by nebulization 4 (four) times daily as needed for Wheezing or Shortness of Breath.    albuterol (PROVENTIL/VENTOLIN HFA) 90 mcg/actuation inhaler Inhale 2 puffs into the lungs every 6 (six) hours as needed for Wheezing or Shortness of Breath. Rescue    budesonide (PULMICORT) 0.5 mg/2 mL nebulizer solution Take 2 mLs (0.5 mg total) by nebulization 2 (two) times daily. Controller    fluticasone-umeclidin-vilanter (TRELEGY ELLIPTA) 100-62.5-25 mcg DsDv Inhale 1 puff into the lungs once daily.      Hospital Medications             albuterol inhaler 2 puff 2 puffs, Inhalation, Every 6 hours PRN    albuterol sulfate nebulizer solution 2.5 mg 2.5 mg, Nebulization, Every 4 hours    albuterol-ipratropium 2.5 mg-0.5 mg/3 mL nebulizer solution 9 mL 9 mLs, Nebulization, Continuous    fluticasone-umeclidin-vilanter 100-62.5-25 mcg DsDv 1 puff 1 puff, Inhalation, Daily, NON-FORMULARY..PATIENT SUPPLIED OWN HOME MED             Essential hypertension  Chronic, controlled.  Latest blood pressure and vitals reviewed-   Temp:  [97.7 °F (36.5 °C)-99 °F (37.2 °C)]   Pulse:  [61-86]   Resp:  [18-54]   BP: (129-181)/()   SpO2:  [89 %-100 %] .   Hypertension Medications at home            amLODIPine (NORVASC) 10 MG tablet Take 1 tablet (10 mg total) by mouth once daily.    furosemide (LASIX) 20 MG tablet Take 0.5 tablets (10 mg total) by mouth once daily.    losartan (COZAAR) 100 MG tablet Take 1 tablet (100 mg total) by mouth once daily.    metoprolol succinate (TOPROL-XL) 100 MG 24 hr tablet Take 1 tablet (100 mg total) by mouth once daily.      Hospital Medications             amLODIPine tablet 10 mg 10 mg, Oral, Daily    furosemide (LASIX) 200 mg in sodium chloride 0.9% 100 mL continuous infusion (conc: 2 mg/mL) 0-60 mg/hr (0-60 mg/hr), Intravenous, Continuous @ 0-30 mL/hr    losartan tablet 100 mg 100 mg, Oral, Daily    metoprolol succinate (TOPROL-XL) 24 hr tablet 100 mg 100 mg, Oral, Daily, DO NOT CRUSH OR CHEW; SWALLOW WHOLE.            H/O aortic valve replacement  Noted      Chronic renal insufficiency, stage 3 (moderate)  Monitor creatinine while he gets diuresed.    Lab Results   Component Value Date    CREATININE 1.9 (H) 12/02/2020             VTE Risk Mitigation (From admission, onward)         Ordered     enoxaparin injection 40 mg  Every 24 hours      12/01/20 1005     IP VTE HIGH RISK PATIENT  Once      12/01/20 0546     Place sequential compression device  Until discontinued      12/01/20 0546     Place CJ hose  Until discontinued      12/01/20 0546                Discharge Planning   MARY GRACE:      Code Status: Partial Code   Is the patient medically ready for discharge?:     Reason for patient still in hospital (select all that apply): Patient trending condition and Treatment  Discharge Plan A: Home with family          Harsha Dan MD  Department of Hospital Medicine   [facility]

## 2020-12-03 NOTE — NURSING
DR Rosas had called. Had talked to him about pt's condition and urine output including labs. With order to stop the lasix and milrinone drip /49 MAP 69. Order to give 1/2NS 500ml over 2 hours.

## 2020-12-03 NOTE — PLAN OF CARE
12/02/20 1940   Patient Assessment/Suction   Level of Consciousness (AVPU) responds to voice   Respiratory Effort Mild   Expansion/Accessory Muscles/Retractions no retractions;no use of accessory muscles   All Lung Fields Breath Sounds Anterior:;Lateral:   ANASTASIA Breath Sounds coarse   LLL Breath Sounds diminished   RUL Breath Sounds coarse   RML Breath Sounds coarse   RLL Breath Sounds diminished   Rhythm/Pattern, Respiratory depth regular;pattern regular;unlabored   Cough Frequency infrequent   Cough Type congested   PRE-TX-O2   O2 Device (Oxygen Therapy) nasal cannula   $ Is the patient on Low Flow Oxygen? Yes   Flow (L/min) 4   SpO2 96 %   Pulse Oximetry Type Continuous   $ Pulse Oximetry - Multiple Charge Pulse Oximetry - Multiple   Pulse 83   Resp 18   Aerosol Therapy   $ Aerosol Therapy Charges Aerosol Treatment   Respiratory Treatment Status (SVN) given   Treatment Route (SVN) mask   Patient Position (SVN) HOB elevated   Post Treatment Assessment (SVN) breath sounds unchanged   Signs of Intolerance (SVN) none   Breath Sounds Post-Respiratory Treatment   Throughout All Fields Post-Treatment All Fields   Throughout All Fields Post-Treatment no change   Post-treatment Heart Rate (beats/min) 80   Post-treatment Resp Rate (breaths/min) 18

## 2020-12-03 NOTE — PLAN OF CARE
12/03/20 0845   Patient Assessment/Suction   Respiratory Effort Unlabored   Expansion/Accessory Muscles/Retractions no retractions;expansion symmetric   All Lung Fields Breath Sounds Anterior:;Lateral:;coarse   Rhythm/Pattern, Respiratory tachypneic   Cough Frequency infrequent   PRE-TX-O2   O2 Device (Oxygen Therapy) nasal cannula   $ Is the patient on Low Flow Oxygen? Yes   Flow (L/min) 4  (reduced to 3)   SpO2 100 %   Pulse Oximetry Type Continuous   Pulse 95   Resp (!) 25   Aerosol Therapy   $ Aerosol Therapy Charges Aerosol Treatment   Respiratory Treatment Status (SVN) given   Treatment Route (SVN) mask   Patient Position (SVN) HOB elevated   Post Treatment Assessment (SVN) breath sounds unchanged   Signs of Intolerance (SVN) none   Inhaler   $ Inhaler Charges MDI (Metered Dose Inahler) Treatment   Respiratory Treatment Status (Inhaler) given   Treatment Route (Inhaler) mouthpiece   Patient Position (Inhaler) HOB elevated   Post Treatment Assessment (Inhaler) breath sounds unchanged   Signs of Intolerance (Inhaler) none   Breath Sounds Post-Respiratory Treatment   Throughout All Fields Post-Treatment All Fields   Throughout All Fields Post-Treatment no change   Post-treatment Heart Rate (beats/min) 95   Post-treatment Resp Rate (breaths/min) 32

## 2020-12-03 NOTE — ASSESSMENT & PLAN NOTE
Monitor creatinine while he gets diuresed.    Lab Results   Component Value Date    CREATININE 1.9 (H) 12/02/2020

## 2020-12-03 NOTE — SUBJECTIVE & OBJECTIVE
Interval History:  Diuresing well.  Much less lower extremity edema.  Off of NIPPV.  Cardiology started milrinone.    Review of Systems   Constitutional: Positive for fatigue. Negative for chills and fever.   Respiratory: Positive for cough and shortness of breath. Negative for wheezing.    Cardiovascular: Positive for leg swelling. Negative for chest pain.   Gastrointestinal: Negative for abdominal pain and nausea.     Objective:     Vital Signs (Most Recent):  Temp: 99 °F (37.2 °C) (20)  Pulse: 76 (20)  Resp: (!) 29 (20)  BP: (!) 167/67 (20)  SpO2: 100 % (20) Vital Signs (24h Range):  Temp:  [97.7 °F (36.5 °C)-99 °F (37.2 °C)] 99 °F (37.2 °C)  Pulse:  [61-86] 76  Resp:  [18-54] 29  SpO2:  [89 %-100 %] 100 %  BP: (129-181)/() 167/     Weight: 69.4 kg (153 lb)  Body mass index is 23.96 kg/m².    Intake/Output Summary (Last 24 hours) at 2020 2240  Last data filed at 2020 2100  Gross per 24 hour   Intake 695.01 ml   Output 2164 ml   Net -1468.99 ml      Physical Exam  Vitals signs reviewed.   Constitutional:       General: He is not in acute distress.     Appearance: He is ill-appearing. He is not diaphoretic.      Interventions: Nasal cannula in place.   HENT:      Mouth/Throat:      Pharynx: No oropharyngeal exudate.   Eyes:      General: No scleral icterus.        Right eye: No discharge.         Left eye: No discharge.   Neck:      Vascular: No JVD.   Cardiovascular:      Rate and Rhythm: Normal rate. Rhythm irregular.   Pulmonary:      Effort: Pulmonary effort is normal. Tachypnea present.      Breath sounds: Rales present.   Abdominal:      General: Bowel sounds are normal. There is no distension.      Palpations: Abdomen is soft.      Tenderness: There is no abdominal tenderness.   Musculoskeletal:      Right lower le+ Edema present.      Left lower le+ Edema present.   Skin:     General: Skin is warm.      Findings: No rash.    Neurological:      Mental Status: He is alert. He is confused.         Significant Labs: All pertinent labs within the past 24 hours have been reviewed.    Significant Imaging: I have reviewed all pertinent imaging results/findings within the past 24 hours.

## 2020-12-03 NOTE — NURSING
Secure chat with Ivan Barahona NP. Informed of pt being restless and confuse. Trying to get OOB, looking for his wife. Looking for his clothes and wallet. Attempt to reorient several times. Remain confuse. Still Short of breath and with congested cough. HOB up. Haldol 5 mg IM given at 0040

## 2020-12-03 NOTE — PLAN OF CARE
Was very confuse and restless during the night. Secure chat with ISH Barahona NP. With order. Haldol 5mg IM given. Did settled down. HOB elevated O2 4L NC Sat 100% this time. Still short of breath, shallow breathing. Will occasional have congested cough , Sat will drop to 87% . Have to wake pt up. Need to be orally suction, thick yellowish secretion. Urine output slowed down at 0400. Lasix drip up to 10mg/hour. Milrinone drip at 0.25mcg/kg/min  BP monitor. At fib/ flutter HR . Multifocal PVC noted. 0600 urine output 43ml. Lasix drip up to 12.5mg/hour. Lungs sound decrease BS.  K3.6 BUN 64/creatinine 2.0. Remain asleep. Bed alarm on. Call light with in reach. Safety and fall prevention maintain.

## 2020-12-04 NOTE — ASSESSMENT & PLAN NOTE
Chronic, controlled.  Latest blood pressure and vitals reviewed-   Temp:  [96.6 °F (35.9 °C)-98.1 °F (36.7 °C)]   Pulse:  []   Resp:  [16-46]   BP: ()/()   SpO2:  [88 %-100 %] .   Hypertension Medications at home            amLODIPine (NORVASC) 10 MG tablet Take 1 tablet (10 mg total) by mouth once daily.    furosemide (LASIX) 20 MG tablet Take 0.5 tablets (10 mg total) by mouth once daily.    losartan (COZAAR) 100 MG tablet Take 1 tablet (100 mg total) by mouth once daily.    metoprolol succinate (TOPROL-XL) 100 MG 24 hr tablet Take 1 tablet (100 mg total) by mouth once daily.      Hospital Medications             amLODIPine tablet 10 mg 10 mg, Oral, Daily    losartan tablet 100 mg 100 mg, Oral, Daily    metoprolol succinate (TOPROL-XL) 24 hr tablet 100 mg 100 mg, Oral, Daily, DO NOT CRUSH OR CHEW; SWALLOW WHOLE.

## 2020-12-04 NOTE — ASSESSMENT & PLAN NOTE
Chest radiographs reviewed.  Was present on day 1 of admission.  Give IVAB.  Monitor procalcitonin level.  Respiratory culture with normal marcelo.    Procalcitonin   Date Value Ref Range Status   12/01/2020 0.61 (H) <0.25 ng/mL Final     Comment:     A concentration < 0.25 ng/mL represents a low risk bacterial   infection.  Procalcitonin may not be accurate among patients with localized   infection, recent trauma or major surgery, immunosuppressed state,   invasive fungal infection, renal dysfunction. Decisions regarding   initiation or continuation of antibiotic therapy should not be based   solely on procalcitonin levels.         Antibiotics (From admission, onward)    Start     Stop Route Frequency Ordered    12/02/20 0200  azithromycin 500 mg in dextrose 5 % 250 mL IVPB (ready to mix system)      -- IV Every 24 hours (non-standard times) 12/01/20 1005    12/02/20 0200  cefTRIAXone (ROCEPHIN) 1 g/50 mL D5W IVPB      -- IV Every 24 hours (non-standard times) 12/01/20 1005             Subjective


Progress Note for:: 02/20/17


Subjective:: 


Interval notes and orthopod input appreciated. Patient reported fairly 

controlled pain on current medication management. Her INR is currently 

subtherapeutic. She denied chest pain, difficulty with breathing, fever or 

chills. No nausea or vomiting. Tolerating oral feeding.





Physical Exam


Vital Signs: 


 











Temp Pulse Resp BP Pulse Ox


 


 98.9 F   74   15   119/48 L  96 


 


 02/20/17 04:41  02/20/17 07:00  02/20/17 04:41  02/20/17 04:41  02/20/17 04:41








 Intake & Output











 02/19/17 02/20/17 02/21/17





 06:59 06:59 06:59


 


Intake Total 1030 1280 


 


Output Total 1000 1100 


 


Balance 30 180 











Physical Exam: 


General appearance: PRESENT: no acute distress, cooperative, obese


Eye exam: PRESENT: conjunctiva pink, EOMI, PERRLA


Mouth exam: PRESENT: moist


Neck exam: PRESENT: full ROM.  ABSENT: carotid bruit, JVD, lymphadenopathy, 

thyromegaly


Respiratory exam: ABSENT: accessory muscle use, chest wall tenderness, clear to 

auscultation conrad, crackles, decreased breath sounds, prolonged expiratory phase

, rales, retraction, rhonchi, stridor, symmetrical, tachypnea, unlabored, 

wheezes, other


Cardiovascular exam: PRESENT: RRR, systolic murmur.  ABSENT: bradycardia, clicks

, diastolic murmur, gallop, irregular rhythm, rubs, +S1, +S2, tachycardia, 

other Murmur grade: 3


GI/Abdominal exam: PRESENT: normal bowel sounds, soft.  ABSENT: distended, 

guarding, mass, organomegaly, rebound, tenderness


Extremities exam: PRESENT: full ROM


Musculoskeletal exam: PRESENT: deformity - from joint involved arthritis


Neurological exam: PRESENT: alert, awake, oriented to person, oriented to place

, oriented to time, oriented to situation, CN II-XII grossly intact.  ABSENT: 

motor sensory deficit


Psychiatric exam: PRESENT: appropriate affect, normal mood.  ABSENT: homicidal 

ideation, suicidal ideation


Skin exam: PRESENT: dry, intact, warm.  ABSENT: cyanosis, rash








Murmur grade: 3





Results


Laboratory Results: 


 





 02/17/17 03:41 





 02/17/17 03:41 








Impressions: 


 





Pelvis X-Ray  02/16/17 11:18


IMPRESSION:  No acute fracture or dislocation identified.  Severe degenerative 

changes noted in both hips.  Degenerative changes noted in the pubic symphysis 

and lumbar spine


 








Lumbar Spine MRI  02/16/17 12:39


IMPRESSION:  Acute/early subacute fractures of the sacrum with  edema and 

hemorrhage in the adjacent tissues and muscles as above.  Report discussed with 

the emergency room attending physician


 














Assessment & Plan





- Diagnosis


(1) Chronic prescription opiate use


Is this a current diagnosis for this admission?: YesPlan: 





see attending physician orders.








(2) Excessive anticoagulation


Is this a current diagnosis for this admission?: YesPlan: 








Resolved. I will restart her on warfarin at 5 mg p.o daily hs. see attending 

physician orders.








(3) Sacral insufficiency fracture


Qualifiers: 


     Encounter type: initial encounter     Qualified Code(s): M84.48XA - 

Pathological fracture, other site, initial encounter for fracture  


Is this a current diagnosis for this admission?: YesPlan: 








See attending physician orders. I will continue minimal ambulatory activity as 

per orthopod recommendation. I will discontinue IV Morphine sulfate. Restart on 

oral opiate therapy.








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


Qualifiers: 


     Laterality: unspecified laterality        Qualified Code(s): I82.5Z9 - 

Chronic embolism and thrombosis of unspecified deep veins of unspecified distal 

lower extremity  


Is this a current diagnosis for this admission?: YesPlan: 





see attending physician orders.








(5) Chronic pain syndrome


Is this a current diagnosis for this admission?: YesPlan: 





see attending physician orders.








(6) GERD (gastroesophageal reflux disease)


Qualifiers: 


     Esophagitis presence: without esophagitis        Qualified Code(s): K21.9 

- Gastro-esophageal reflux disease without esophagitis  


Is this a current diagnosis for this admission?: Yes





(7) HLD (hyperlipidemia)


Qualifiers: 


     Hyperlipidemia type: pure hypercholesterolemia        Qualified Code(s): 

E78.00 - Pure hypercholesterolemia, unspecified; E78.0 - Pure 

hypercholesterolemia  


Is this a current diagnosis for this admission?: Yes





(8) HTN (hypertension)


Qualifiers: 


     Hypertension type: essential hypertension        Qualified Code(s): I10 - 

Essential (primary) hypertension  


Is this a current diagnosis for this admission?: YesPlan: 





see attending physician orders.








(9) Leg DVT (deep venous thromboembolism), chronic


Qualifiers: 


     Laterality: unspecified laterality        Qualified Code(s): I82.509 - 

Chronic embolism and thrombosis of unspecified deep veins of unspecified lower 

extremity  


Is this a current diagnosis for this admission?: YesPlan: 





see attending physician orders.








(10) Fall at home


Qualifiers: 


     Encounter type: initial encounter        Qualified Code(s): W19.XXXA - 

Unspecified fall, initial encounter; Y92.099 - Unspecified place in other non-

institutional residence as the place of occurrence of the external cause  


Is this a current diagnosis for this admission?: YesPlan: 





see attending physician orders. I will request PT evaluation for ambulatory 

safety. 








(11) Diabetes mellitus type 2 in obese


Is this a current diagnosis for this admission?: YesPlan: 


see attending physician orders.











- Time


Time Spent with patient: 25-34 minutes


Medications reviewed and adjusted accordingly: Yes


Anticipated discharge: Home with Homehealth


Within: within 48 hours





- Inpatient Certification


Post Hospital Care: D/C Planner Documentation





- Plan Summary


Plan Summary: 


see attending physician note.

## 2020-12-04 NOTE — TELEPHONE ENCOUNTER
Patient currently in the hospital. Please advise advise when to place patient on schedule since still admitted

## 2020-12-04 NOTE — CARE UPDATE
12/04/20 0713   Patient Assessment/Suction   Respiratory Effort Unlabored   All Lung Fields Breath Sounds coarse   PRE-TX-O2   O2 Device (Oxygen Therapy) BiPAP   $ Is the patient on Low Flow Oxygen? Yes   Oxygen Concentration (%) 50   SpO2 99 %   Pulse Oximetry Type Intermittent   $ Pulse Oximetry - Multiple Charge Pulse Oximetry - Multiple   Pulse 78   Resp 20   Aerosol Therapy   $ Aerosol Therapy Charges Aerosol Treatment   Respiratory Treatment Status (SVN) given   Treatment Route (SVN) in-line   Patient Position (SVN) Valladares's   Post Treatment Assessment (SVN) breath sounds unchanged   Signs of Intolerance (SVN) none   Breath Sounds Post-Respiratory Treatment   Throughout All Fields Post-Treatment All Fields   Throughout All Fields Post-Treatment no change   Post-treatment Heart Rate (beats/min) 70   Post-treatment Resp Rate (breaths/min) 17   Wound Care   $ Wound Care Tech Time 15 min   Area of Concern Cheek;Bridge of nose;Chin;Forehead   Skin Color/Characteristics without discoloration   Skin Temperature warm   Preset CPAP/BiPAP Settings   Mode Of Delivery BiPAP S/T   $ CPAP/BiPAP Daily Charge BiPAP/CPAP Daily   $ Initial CPAP/BiPAP Setup? No   $ Is patient using? Yes   Size of Mask Medium/Large   Sized Appropriately? Yes   Equipment Type V60   Airway Device Type medium full face mask   Ipap 12   EPAP (cm H2O) 6   Pressure Support (cm H2O) 6   Set Rate (Breaths/Min) 14   ITime (sec) 1   Rise Time (sec) 3   Patient CPAP/BiPAP Settings   Timed Inspiration (Sec) 1   IPAP Rise Time (sec) 3   RR Total (Breaths/Min) 20   Tidal Volume (mL) 360   VE Minute Ventilation (L/min) 7.1 L/min   Peak Inspiratory Pressure (cm H2O) 11   TiTOT (%) 24   Total Leak (L/Min) 8   Patient Trigger - ST Mode Only (%) 97   Bipap, Duo Q6prn, Breo QD, prn treatment given this am, breo will try to give at later time when patient in better respiratory status and alert, tolerated tx inline with bipap well, vitals as charted.

## 2020-12-04 NOTE — CODE/ RAPID DOCUMENTATION
Responded to RRT to room 202. Patient with decreased LOC, oxygen saturation 73%. Patient on nasal cannula. Placed on 50% BiPAP. Patient oxygen saturation increased to 94-95 %. Patient more responsive. Decision made for patient to remain on floor with BiPAP. Attending RRT CARLOS Bianchi NP ,Kimmie Lowe RN ,Spencer Boni Resp therapist, Juanita HERNANDEZ, ICU. Manny Sabillon RN ,ICU.

## 2020-12-04 NOTE — CARE UPDATE
12/04/20 1447   Patient Assessment/Suction   Level of Consciousness (AVPU) alert   Respiratory Effort Mild   Expansion/Accessory Muscles/Retractions abdominal muscle use   All Lung Fields Breath Sounds coarse   PRE-TX-O2   O2 Device (Oxygen Therapy) BiPAP   SpO2 (!) 92 %   Pulse Oximetry Type Intermittent   Pulse 75   Resp (!) 30   Aerosol Therapy   $ Aerosol Therapy Charges Aerosol Treatment   Respiratory Treatment Status (SVN) given   Treatment Route (SVN) in-line   Patient Position (SVN) Valladares's   Post Treatment Assessment (SVN) breath sounds unchanged   Signs of Intolerance (SVN) none   Breath Sounds Post-Respiratory Treatment   Throughout All Fields Post-Treatment All Fields   Throughout All Fields Post-Treatment no change   Post-treatment Heart Rate (beats/min) 74   Post-treatment Resp Rate (breaths/min) 28   Preset CPAP/BiPAP Settings   Mode Of Delivery BiPAP S/T   $ CPAP/BiPAP Daily Charge BiPAP/CPAP Daily   $ Initial CPAP/BiPAP Setup? No   $ Is patient using? Yes   Size of Mask Large   Sized Appropriately? Yes   Equipment Type V60   Ipap 12   EPAP (cm H2O) 6   Pressure Support (cm H2O) 6   Set Rate (Breaths/Min) 14   ITime (sec) 1   Rise Time (sec) 3   Patient CPAP/BiPAP Settings   Timed Inspiration (Sec) 1   IPAP Rise Time (sec) 3   RR Total (Breaths/Min) 28   Tidal Volume (mL) 579   VE Minute Ventilation (L/min) 17.9 L/min   Peak Inspiratory Pressure (cm H2O) 14   TiTOT (%) 26   Total Leak (L/Min) 17   Patient Trigger - ST Mode Only (%) 100   Called to assess patient because SpO2 was in mid 80's on the bipap. After entering the room patient wearing bipap and working properly, noticed patient respirations have increased since last saw patient earlier today and WOB increased as well. Breath sounds still very coarse. Increased the FIO2 from 50% to 65% and gave PRN breathing treatment. Before leaving his SpO2 increased to 94% and respirations around 28. Dr Dan was on the floor so we had him see him,  he stated that he would increase his antibiotics and order morphine, he spoke to the wife about the uncertainty of the patients condition, recovery and outcome.

## 2020-12-04 NOTE — SUBJECTIVE & OBJECTIVE
Interval History:  Diuretic infusion discontinued this morning.  Milrinone drip stoppped.  No new complaints.  Less SOB.  Last night, was agitated.  Responded well to haloperidol.    Review of Systems   Constitutional: Negative for chills and fever.   Respiratory: Positive for cough and shortness of breath. Negative for wheezing.    Cardiovascular: Positive for leg swelling. Negative for chest pain.   Gastrointestinal: Negative for abdominal pain and nausea.   Psychiatric/Behavioral: Positive for confusion.     Objective:     Vital Signs (Most Recent):  Temp: 96.6 °F (35.9 °C) (12/03/20 1919)  Pulse: 94 (12/03/20 1919)  Resp: (!) 24 (12/03/20 1919)  BP: (!) 146/63 (12/03/20 1919)  SpO2: (!) 93 % (12/03/20 1919) Vital Signs (24h Range):  Temp:  [96.6 °F (35.9 °C)-98.1 °F (36.7 °C)] 96.6 °F (35.9 °C)  Pulse:  [] 94  Resp:  [16-46] 24  SpO2:  [88 %-100 %] 93 %  BP: ()/() 146/63     Weight: 68.2 kg (150 lb 5.7 oz)  Body mass index is 23.55 kg/m².    Intake/Output Summary (Last 24 hours) at 12/3/2020 2215  Last data filed at 12/3/2020 2200  Gross per 24 hour   Intake 1159.9 ml   Output 1133 ml   Net 26.9 ml      Physical Exam  Vitals signs reviewed.   Constitutional:       General: He is not in acute distress.     Appearance: He is ill-appearing. He is not diaphoretic.      Interventions: Nasal cannula in place.   HENT:      Mouth/Throat:      Pharynx: No oropharyngeal exudate.   Eyes:      General: No scleral icterus.        Right eye: No discharge.         Left eye: No discharge.   Neck:      Vascular: No JVD.   Cardiovascular:      Rate and Rhythm: Normal rate. Rhythm irregular.   Pulmonary:      Effort: Pulmonary effort is normal. Tachypnea present.      Breath sounds: Examination of the right-lower field reveals rhonchi and rales. Rhonchi and rales present.   Abdominal:      General: Bowel sounds are normal. There is no distension.      Palpations: Abdomen is soft.      Tenderness: There is no  abdominal tenderness.   Musculoskeletal:      Right lower le+ Pitting Edema present.      Left lower le+ Pitting Edema present.   Skin:     General: Skin is warm.      Findings: No rash.   Neurological:      Mental Status: He is alert. He is confused.         Significant Labs: All pertinent labs within the past 24 hours have been reviewed.    Significant Imaging: I have reviewed all pertinent imaging results/findings within the past 24 hours.

## 2020-12-04 NOTE — PLAN OF CARE
Intervention: nutrition supplement therapy, general healthful diet, and collaboration with care providers    Recommendation:   1) Continue liberlaized diet to pormote PO intakes- no salt packets and cardiac when eating better   2) Add Boost plus daily + boost puddig BID   3) weigh pt daily or as needed   4) review diet for CHF at f/u    Goals: 1) PO intakes 50% of meals at f/u  Nutrition Goal Status: new  Communication of RD Recs: reviewed with physician(POC, sticky note, second sign)

## 2020-12-04 NOTE — PT/OT/SLP PROGRESS
Physical Therapy      Patient Name:  Frederick Kong    MRN:  [id number]    Patient not seen today secondary to RN hold x2 attempts with patient on Bipap. Will follow-up 12/05/20.    Beatrice Johnson PT

## 2020-12-04 NOTE — PROGRESS NOTES
[facility] Hospital Medicine  Progress Note    Patient Name: Frederick Kong Jr.  MRN: [id number]  Patient Class: IP- Inpatient   Admission Date: 12/1/2020  Length of Stay: 2 days  Attending Physician: Harsha Dan MD  Primary Care Provider: Dhiraj Dempsey III, MD        Subjective:     Principal Problem:Acute on chronic diastolic heart failure        HPI:  Frederick Kong Jr. is an 85-year-old male with a past medical history of COPD, chronic hypoxemia requiring supplemental oxygen nightly, aortic valve replacement, hypertension, hyperlipidemia, diastolic congestive heart failure, and chronic kidney disease who presents to the emergency room tonight with reports of shortness of breath.  Information for HPI obtained from review of EMR and patient's wife who is at bedside during my initial interview and physical exam.  Patient is a poor historian due to respiratory acidosis with retained CO2 and BiPAP therapy in the setting of acute respiratory distress.  Patient's wife states that patient has been on nightly oxygen for approximately 2 years, states he utilizes 2 L via nasal cannula nightly.  Friday patient began to experience shortness of breath that has increased in severity since onset.  Patient requiring utilization of supplemental oxygen throughout the entire day since Friday.  Patient was seen by pulmonology NP, Rui today in clinic who ordered a chest x-ray and COVID screen.  Patient's wife states they went to [facility] urgent [facility] for the COVID screen and returned home.  She states upon arrival to home patient was Exhausted.  She states patient with worsening shortness of breath and extreme fatigue this evening.  Patient also with reported decreased p.o. intake today. Patient's wife states that for approximately 6 months patient has been experiencing a cough, has been followed with pulmonology - states he was seen in pulmonology office in October 2020 and was given breathing treatments and  prednisone.  Patient's wife states that approximately 2 weeks ago patient was seen by his cardiologist, Dr. Gonzalez, in which patient was noted to have lower extremity swelling, states that Lasix was initially prescribed every other day, however in the setting of bilateral lower extremity swelling cardiology recommended increase in Lasix to every day - in which patient reports compliance.  Patient is followed by PCP, Dr. Dempsey.  In the emergency room patient immediately placed on BiPAP therapy for acute respiratory distress, ABG was obtained revealing acute respiratory acidosis with hypercapnia and hypoxemia.  Patient was initially initiated on IV Rocephin and azithromycin for suspected COVID versus community-acquired pneumonia.  BMP consistent with CHF exacerbation with correlating chest x-ray revealing bilateral pleural effusions.  Patient was given 40 mg IV Lasix.  Patient admitted to the ICU on 12/01/2020 at approximately 4:00 a.m..  Patient resides at home with his wife, utilizes a cane and walker for ambulatory assistance.  Partial code status verified upon admission with patient's wife, who verifies that patient does not wish to be intubated in the setting of acute pulmonary arrest.    Overview/Hospital Course:  No notes on file    Interval History:  Diuretic infusion discontinued this morning.  Milrinone drip stoppped.  No new complaints.  Less SOB.  Last night, was agitated.  Responded well to haloperidol.    Review of Systems   Constitutional: Negative for chills and fever.   Respiratory: Positive for cough and shortness of breath. Negative for wheezing.    Cardiovascular: Positive for leg swelling. Negative for chest pain.   Gastrointestinal: Negative for abdominal pain and nausea.   Psychiatric/Behavioral: Positive for confusion.     Objective:     Vital Signs (Most Recent):  Temp: 96.6 °F (35.9 °C) (12/03/20 1919)  Pulse: 94 (12/03/20 1919)  Resp: (!) 24 (12/03/20 1919)  BP: (!) 146/63 (12/03/20  )  SpO2: (!) 93 % (20) Vital Signs (24h Range):  Temp:  [96.6 °F (35.9 °C)-98.1 °F (36.7 °C)] 96.6 °F (35.9 °C)  Pulse:  [] 94  Resp:  [16-46] 24  SpO2:  [88 %-100 %] 93 %  BP: ()/() 146/63     Weight: 68.2 kg (150 lb 5.7 oz)  Body mass index is 23.55 kg/m².    Intake/Output Summary (Last 24 hours) at 12/3/2020 2215  Last data filed at 12/3/2020 2200  Gross per 24 hour   Intake 1159.9 ml   Output 1133 ml   Net 26.9 ml      Physical Exam  Vitals signs reviewed.   Constitutional:       General: He is not in acute distress.     Appearance: He is ill-appearing. He is not diaphoretic.      Interventions: Nasal cannula in place.   HENT:      Mouth/Throat:      Pharynx: No oropharyngeal exudate.   Eyes:      General: No scleral icterus.        Right eye: No discharge.         Left eye: No discharge.   Neck:      Vascular: No JVD.   Cardiovascular:      Rate and Rhythm: Normal rate. Rhythm irregular.   Pulmonary:      Effort: Pulmonary effort is normal. Tachypnea present.      Breath sounds: Examination of the right-lower field reveals rhonchi and rales. Rhonchi and rales present.   Abdominal:      General: Bowel sounds are normal. There is no distension.      Palpations: Abdomen is soft.      Tenderness: There is no abdominal tenderness.   Musculoskeletal:      Right lower le+ Pitting Edema present.      Left lower le+ Pitting Edema present.   Skin:     General: Skin is warm.      Findings: No rash.   Neurological:      Mental Status: He is alert. He is confused.         Significant Labs: All pertinent labs within the past 24 hours have been reviewed.    Significant Imaging: I have reviewed all pertinent imaging results/findings within the past 24 hours.      Assessment/Plan:      * Acute on chronic diastolic heart failure  Much better compensated now after Lasix and milrinone.  Both drips discontinued.  On BB and ARB.  Monitor ins and outs.  Cardiology consulting.    Right lower  lobe pneumonia  Chest radiographs reviewed.  Was present on day 1 of admission.  Give IVAB.  Monitor procalcitonin level.  Respiratory culture with normal marcelo.    Procalcitonin   Date Value Ref Range Status   12/01/2020 0.61 (H) <0.25 ng/mL Final     Comment:     A concentration < 0.25 ng/mL represents a low risk bacterial   infection.  Procalcitonin may not be accurate among patients with localized   infection, recent trauma or major surgery, immunosuppressed state,   invasive fungal infection, renal dysfunction. Decisions regarding   initiation or continuation of antibiotic therapy should not be based   solely on procalcitonin levels.         Antibiotics (From admission, onward)    Start     Stop Route Frequency Ordered    12/02/20 0200  azithromycin 500 mg in dextrose 5 % 250 mL IVPB (ready to mix system)      -- IV Every 24 hours (non-standard times) 12/01/20 1005    12/02/20 0200  cefTRIAXone (ROCEPHIN) 1 g/50 mL D5W IVPB      -- IV Every 24 hours (non-standard times) 12/01/20 1005              Thrombocytosis  Monitor.  No need to intervene.    Lab Results   Component Value Date     (H) 12/02/2020           Metabolic encephalopathy  Noted.  Due to acute illness and to hypercarbia.  Seems to be improving.  Monitor.    Hyperlipidemia  Chronic, controlled. Will continue home medication.    Lab Results   Component Value Date    CHOL 134 07/17/2020    CHOL 133 05/31/2019    CHOL 145 01/08/2019     Lab Results   Component Value Date    HDL 44 07/17/2020    HDL 50 05/31/2019    HDL 57 01/08/2019     Lab Results   Component Value Date    LDLCALC 75.4 07/17/2020    LDLCALC 63 05/31/2019    LDLCALC 72 01/08/2019     Lab Results   Component Value Date    TRIG 73 07/17/2020    TRIG 113 05/31/2019    TRIG 79 01/08/2019     Lab Results   Component Value Date    CHOLHDL 32.8 07/17/2020    CHOLHDL 2.7 05/31/2019    CHOLHDL 2.5 01/08/2019               Acute on chronic respiratory failure with hypoxia and  hypercapnia  Weaned off of NIPPV.  Continue supplementing oxygen via NC.  Monitor oximetry.      COPD mixed type  Does not have COPD exacerbation.  Continue his usual controller medications.  Rescue treatment PRN.    COPD Medications at home            albuterol (PROVENTIL) 2.5 mg /3 mL (0.083 %) nebulizer solution Take 3 mLs (2.5 mg total) by nebulization 4 (four) times daily as needed for Wheezing or Shortness of Breath.    albuterol (PROVENTIL/VENTOLIN HFA) 90 mcg/actuation inhaler Inhale 2 puffs into the lungs every 6 (six) hours as needed for Wheezing or Shortness of Breath. Rescue    budesonide (PULMICORT) 0.5 mg/2 mL nebulizer solution Take 2 mLs (0.5 mg total) by nebulization 2 (two) times daily. Controller    fluticasone-umeclidin-vilanter (TRELEGY ELLIPTA) 100-62.5-25 mcg DsDv Inhale 1 puff into the lungs once daily.      Hospital Medications             albuterol inhaler 2 puff 2 puffs, Inhalation, Every 6 hours PRN    albuterol sulfate nebulizer solution 2.5 mg 2.5 mg, Nebulization, Every 4 hours    albuterol-ipratropium 2.5 mg-0.5 mg/3 mL nebulizer solution 9 mL 9 mLs, Nebulization, Continuous    fluticasone-umeclidin-vilanter 100-62.5-25 mcg DsDv 1 puff 1 puff, Inhalation, Daily, NON-FORMULARY..PATIENT SUPPLIED OWN HOME MED            Essential hypertension  Chronic, controlled.  Latest blood pressure and vitals reviewed-   Temp:  [96.6 °F (35.9 °C)-98.1 °F (36.7 °C)]   Pulse:  []   Resp:  [16-46]   BP: ()/()   SpO2:  [88 %-100 %] .   Hypertension Medications at home            amLODIPine (NORVASC) 10 MG tablet Take 1 tablet (10 mg total) by mouth once daily.    furosemide (LASIX) 20 MG tablet Take 0.5 tablets (10 mg total) by mouth once daily.    losartan (COZAAR) 100 MG tablet Take 1 tablet (100 mg total) by mouth once daily.    metoprolol succinate (TOPROL-XL) 100 MG 24 hr tablet Take 1 tablet (100 mg total) by mouth once daily.      Hospital Medications             amLODIPine  tablet 10 mg 10 mg, Oral, Daily    losartan tablet 100 mg 100 mg, Oral, Daily    metoprolol succinate (TOPROL-XL) 24 hr tablet 100 mg 100 mg, Oral, Daily, DO NOT CRUSH OR CHEW; SWALLOW WHOLE.            H/O aortic valve replacement  Noted      Chronic renal insufficiency, stage 3 (moderate)  Monitor creatinine while he gets diuresed.    Lab Results   Component Value Date    CREATININE 2.0 (H) 12/03/2020             VTE Risk Mitigation (From admission, onward)         Ordered     enoxaparin injection 30 mg  Every 24 hours      12/03/20 0958     IP VTE HIGH RISK PATIENT  Once      12/01/20 0546     Place sequential compression device  Until discontinued      12/01/20 0546     Place CJ hose  Until discontinued      12/01/20 0546                Discharge Planning   MARY GRACE:      Code Status: Partial Code   Is the patient medically ready for discharge?:     Reason for patient still in hospital (select all that apply): Patient new problem, Patient trending condition and Treatment  Discharge Plan A: Home with family                  Harsha Dan MD  Department of Hospital Medicine   [facility]

## 2020-12-04 NOTE — PROGRESS NOTES
"[facility]  Adult Nutrition  Progress Note    SUMMARY    Intervention: nutrition supplement therapy, general healthful diet, and collaboration with care providers    Recommendation:   1) Continue liberlaized diet to pormote PO intakes- no salt packets and cardiac when eating better   2) Add Boost plus daily + boost puddig BID   3) weigh pt daily or as needed   4) review diet for CHF at f/u    Goals: 1) PO intakes 50% of meals at f/u  Nutrition Goal Status: new  Communication of RD Recs: reviewed with physician(POC, sticky note, second sign)    Reason for Assessment    Reason For Assessment: identified at risk by screening criteria([facility])  Diagnosis: (acute on chronic heart failure)  Relevant Medical History: COPD, HTN, HLD, CHF, CKD  Interdisciplinary Rounds: attended    General Information Comments: 84 y/o male admitted with CHF and pneumonia, s/p diuresis. Noted to have had cough x 6 months and decreased appetite for a day PTA, eating with poor appetite as well this admit. NFPE not done as providers in room at time of visit, will perform at f/u. Noted with encephalopathy, which is improving, not appropriate for nutrition education at this time, will leave nutrition education materials in pt discharge packet. No significant wt loss per chart review.    Nutrition Discharge Planning: to be determined- cardiac diet + boost plus if needed    Nutrition Risk Screen    Nutrition Risk Screen: no indicators present    Nutrition/Diet History    Patient Reported Diet/Restrictions/Preferences: general  Spiritual, Cultural Beliefs, Restorationism Practices, Values that Affect Care: no  Food Allergies: NKFA  Factors Affecting Nutritional Intake: impaired cognitive status/motor control, decreased appetite    Anthropometrics  Height Method: Measured(office 11/30/20)  Height: 5' 7"  Height (inches): 67 in  Weight Method: Bed Scale  Weight: 68.2 kg (150 lb 5.7 oz)  Weight (lb): 150.36 lb  Ideal Body Weight (IBW), male: " 148 lb  BMI (Calculated): 23.5  BMI Grade: 18.5-24.9 - normal  Weight Loss: intentional(diuresing)  Usual Body Weight (UBW), k.3 kg(12/10/19)  % Usual Body Weight: 94.53  % Weight Change From Usual Weight: -5.67 %       Lab/Procedures/Meds    Pertinent Labs Reviewed: reviewed  Pertinent Medications Reviewed: reviewed  Pertinent Medications Comments: lasix, zofran    Estimated/Assessed Needs    Weight Used For Calorie Calculations: 68.2 kg (150 lb 5.7 oz)  Energy Calorie Requirements (kcal): 25-30 kcal/kg ( CKD) = 6074-7379 kcal  Energy Need Method: Kcal/kg  Protein Requirements: 1.0-1.2 g protein/kg ( CKD/age) = 68-82 g protein  Weight Used For Protein Calculations: 68.2 kg (150 lb 5.7 oz)  Fluid Requirements (mL): 8174-6751 ml or per MD  Estimated Fluid Requirement Method: RDA Method  RDA Method (mL): 25  CHO Requirement: N/A      Nutrition Prescription Ordered    Current Diet Order: regular    Evaluation of Received Nutrient/Fluid Intake    Energy Calories Required: not meeting needs  Protein Required: not meeting needs  Fluid Required: not meeting needs  Tolerance: tolerating  % Intake of Estimated Energy Needs: 0-25%   % Meal Intake: 0-25% x 4-5 days  Nutrition Risk    Level of Risk/Frequency of Follow-up: moderate 2 x weekly    Assessment and Plan    Inadequate energy intake  R/t encephalopathy  As evidenced by PO intakes < 50% EEN x 4-5 days  Intervention: above  New       Monitor and Evaluation    Food and Nutrient Intake: energy intake, food and beverage intake  Food and Nutrient Adminstration: diet order  Anthropometric Measurements: weight  Biochemical Data, Medical Tests and Procedures: electrolyte and renal panel  Nutrition-Focused Physical Findings: overall appearance     Malnutrition Assessment  Malnutrition Type: chronic illness  Skin (Micronutrient): ([place] = 14)  Teeth (Micronutrient): (missing some)   Micronutrient Evaluation: suspected deficiency  Micronutrient Evaluation Comments: check  iron   Energy Intake (Malnutrition): less than or equal to 50% for greater than or equal to 5 days           Edema (Fluid Accumulation): 1-->trace             Nutrition Follow-Up    RD Follow-up?: Yes

## 2020-12-04 NOTE — ASSESSMENT & PLAN NOTE
Much better compensated now after Lasix and milrinone.  Both drips discontinued.  On BB and ARB.  Monitor ins and outs.  Cardiology consulting.

## 2020-12-04 NOTE — PLAN OF CARE
Problem: Fall Injury Risk  Goal: Absence of Fall and Fall-Related Injury  Outcome: Ongoing, Progressing  Bed alarm on, bed in low/locked position, call light in reach.     Problem: Adult Inpatient Plan of Care  Goal: Plan of Care Review  Outcome: Ongoing, Progressing  Updated patient on plan of care.     Problem: Respiratory Compromise (Pneumonia)  Goal: Effective Oxygenation and Ventilation  Outcome: Ongoing, Progressing  Patient on nasal canula and BiPAP as needed.     Problem: Adult Inpatient Plan of Care  Goal: Optimal Comfort and Wellbeing  Outcome: Ongoing, Progressing  Repositioned patient in bed for comfort.

## 2020-12-04 NOTE — CONSULTS
[facility]  Cardiology  Progress Note    Patient Name: Frederick Kong Jr.  MRN: [id number]  Admission Date: 12/1/2020  Hospital Length of Stay: 3 days  Code Status: Partial Code   Attending Physician: Harsha Dan MD   Primary Care Physician: Dhiraj Dempsey III, MD  Expected Discharge Date:   Principal Problem:Acute on chronic diastolic heart failure    Subjective:     Hospital Course:  Patient is still on BiPAP  Interval History:  He has a long history of lung disease.  He smokes cigarettes at least 30 year and he worked [employer] [place] where [place] was discovered.  In addition reviewed chest x-rays have demonstrated chronic interstitial lung disease with small nodules over the years; he has been short of breath in the past several months and has seen several doctors for evaluation of this.    Milrinone was discontinued the other day and Lasix was reduced because of deteriorating renal function  BNP is actually better  ROS  Objective:     Vital Signs (Most Recent):  Temp: 97.8 °F (36.6 °C) (12/04/20 0709)  Pulse: 78 (12/04/20 0713)  Resp: 20 (12/04/20 0713)  BP: (!) 149/66 (12/04/20 0709)  SpO2: 99 % (12/04/20 0713) Vital Signs (24h Range):  Temp:  [96.6 °F (35.9 °C)-97.8 °F (36.6 °C)] 97.8 °F (36.6 °C)  Pulse:  [66-94] 78  Resp:  [14-46] 20  SpO2:  [70 %-100 %] 99 %  BP: (133-189)/(60-87) 149/66     Weight: 68.2 kg (150 lb 5.7 oz)  Body mass index is 23.55 kg/m².    SpO2: 99 %  O2 Device (Oxygen Therapy): BiPAP      Intake/Output Summary (Last 24 hours) at 12/4/2020 0940  Last data filed at 12/4/2020 0435  Gross per 24 hour   Intake 460 ml   Output 1155 ml   Net -695 ml       Lines/Drains/Airways     Drain                 Urethral Catheter 12/01/20 1305 Straight-tip 16 Fr. 2 days          Peripheral Intravenous Line                 Peripheral IV - Single Lumen 12/01/20 0216 20 G Right Forearm 3 days         Peripheral IV - Single Lumen 12/02/20 0855 20 G;1 in  Anterior;Distal;Right Antecubital 2 days         Peripheral IV - Single Lumen 12/03/20 0410 22 G Anterior;Left Forearm 1 day                Physical Exam 140/96  Pulse is 70  Lungs slightly diminished breath sounds and mild expiratory crackles  Cardiac irregular regular no S3  Abdomen is okay  Legs no edema    Significant Labs:   CMP   Recent Labs   Lab 12/03/20  1253 12/03/20 2016 12/04/20  0208    144 146*   K 3.9 4.1 4.1    103 105   CO2 28 28 28   * 124* 104   BUN 64* 63* 61*   CREATININE 2.0* 2.0* 1.9*   CALCIUM 8.1* 8.3* 8.3*   PROT 6.0 6.4 6.4   ALBUMIN 2.7* 2.9* 2.9*   BILITOT 0.4 0.4 0.4   ALKPHOS 81 87 87   AST 13 13 13   ALT 12 12 12   ANIONGAP 11 13 13   ESTGFRAFRICA 34* 34* 36*   EGFRNONAA 30* 30* 31*    and CBC No results for input(s): WBC, HGB, HCT, PLT in the last 48 hours.    Significant Imaging:  Chest x-ray demonstrates opacification lower lung field  Echoes demonstrate atrial fib ejection fraction lower limits normal  And restrictive diastolic function with slight elevation of mean left atrial pressure  Assessment and Plan:   1.  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  Heart failure reduced ejection fraction with diastolic dysfunction  3.  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] 04/12/2018 Yes    Essential hypertension [I10]  04/09/2018 Yes    Chronic renal insufficiency, stage 3 (moderate) [N18.30] 04/07/2018 Yes    H/O aortic valve replacement [Z95.2] 04/07/2018 Not Applicable      Problems Resolved During this Admission:    Diagnosis Date Noted Date Resolved POA    Hyperkalemia [E87.5] 04/14/2018 12/02/2020 Yes       VTE Risk Mitigation (From admission, onward)         Ordered     enoxaparin injection 30 mg  Every 24 hours      12/03/20 0958     IP VTE HIGH RISK PATIENT  Once      12/01/20 0546     Place sequential compression device  Until discontinued      12/01/20 0546     Place CJ hose  Until discontinued      12/01/20 0546                Flynn Rosas MD  Cardiology  [facility]

## 2020-12-04 NOTE — CARE UPDATE
Changed Bipap mask from medium to large, previous medium mask was causing patient nose redness and  to be distorted and pressed to the side severely. Large mask made much better fit for patients face.

## 2020-12-05 NOTE — ASSESSMENT & PLAN NOTE
Chest radiographs reviewed.  Was present on day 1 of admission.  Continue cefepime and AZT.  Monitor procalcitonin level.  It's lower than previous level.  Respiratory culture with normal marcelo.    Procalcitonin   Date Value Ref Range Status   12/04/2020 0.23 <0.25 ng/mL Final     Comment:     A concentration < 0.25 ng/mL represents a low risk bacterial   infection.  Procalcitonin may not be accurate among patients with localized   infection, recent trauma or major surgery, immunosuppressed state,   invasive fungal infection, renal dysfunction. Decisions regarding   initiation or continuation of antibiotic therapy should not be based   solely on procalcitonin levels.     12/01/2020 0.61 (H) <0.25 ng/mL Final     Comment:     A concentration < 0.25 ng/mL represents a low risk bacterial   infection.  Procalcitonin may not be accurate among patients with localized   infection, recent trauma or major surgery, immunosuppressed state,   invasive fungal infection, renal dysfunction. Decisions regarding   initiation or continuation of antibiotic therapy should not be based   solely on procalcitonin levels.         Antibiotics (From admission, onward)    Start     Stop Route Frequency Ordered    12/02/20 0200  azithromycin 500 mg in dextrose 5 % 250 mL IVPB (ready to mix system)      -- IV Every 24 hours (non-standard times) 12/01/20 1005    12/02/20 0200  cefTRIAXone (ROCEPHIN) 1 g/50 mL D5W IVPB      -- IV Every 24 hours (non-standard times) 12/01/20 1005

## 2020-12-05 NOTE — PT/OT/SLP PROGRESS
Physical Therapy      Patient Name:  Frederick Kong Jr.   MRN:  [id number]    Patient not seen today secondary to Patient unwilling to participate(Pt on BiPap and restrained so pt's wife asked to be able to talk with the doctor prior to PT eval.). Will follow-up 12/6/20.    Linda Rich, PT

## 2020-12-05 NOTE — ASSESSMENT & PLAN NOTE
Worse.  Placed back on NIPPV 12/4.  Continue supplementing oxygen.  Monitor oximetry.  Patient is do not intubate.

## 2020-12-05 NOTE — CARE UPDATE
12/05/20 0835   Patient Assessment/Suction   Level of Consciousness (AVPU) alert   All Lung Fields Breath Sounds diminished   PRE-TX-O2   O2 Device (Oxygen Therapy) BiPAP   $ Is the patient on Low Flow Oxygen? Yes   Oxygen Concentration (%) 60   SpO2 100 %   Pulse Oximetry Type Intermittent   $ Pulse Oximetry - Multiple Charge Pulse Oximetry - Multiple   Pulse 77   Resp (!) 24   Aerosol Therapy   $ Aerosol Therapy Charges PRN treatment not required   Respiratory Treatment Status (SVN) PRN treatment not required   Wound Care   $ Wound Care Tech Time 15 min   Area of Concern Cheek;Bridge of nose;Chin;Forehead   Skin Color/Characteristics without discoloration   Skin Temperature warm   Preset CPAP/BiPAP Settings   Mode Of Delivery BiPAP S/T   $ CPAP/BiPAP Daily Charge BiPAP/CPAP Daily   $ Initial CPAP/BiPAP Setup? No   $ Is patient using? Yes   Size of Mask Large   Sized Appropriately? Yes   Equipment Type V60   Airway Device Type large full face mask   Ipap 12   EPAP (cm H2O) 6   Pressure Support (cm H2O) 6   Set Rate (Breaths/Min) 14   ITime (sec) 1   Rise Time (sec) 0.3   Patient CPAP/BiPAP Settings   Timed Inspiration (Sec) 1   IPAP Rise Time (sec) 0.3   RR Total (Breaths/Min) 24   Tidal Volume (mL) 540   VE Minute Ventilation (L/min) 15 L/min   Peak Inspiratory Pressure (cm H2O) 13   TiTOT (%) 30   Total Leak (L/Min) 14   Patient Trigger - ST Mode Only (%) 100

## 2020-12-05 NOTE — SUBJECTIVE & OBJECTIVE
Interval History:  More SOB.  More tachypnea.  Breath sounds are more coarse.  No fever.  Escalated from NC to NIPPV.    Review of Systems   Constitutional: Negative for chills and fever.   Respiratory: Positive for cough and shortness of breath. Negative for wheezing.    Cardiovascular: Positive for leg swelling. Negative for chest pain.   Gastrointestinal: Negative for abdominal pain and nausea.   Psychiatric/Behavioral: Positive for confusion.     Objective:     Vital Signs (Most Recent):  Temp: 98.5 °F (36.9 °C) (12/04/20 1124)  Pulse: 76 (12/04/20 2015)  Resp: 18 (12/04/20 2015)  BP: 134/87 (12/04/20 2015)  SpO2: 98 % (12/04/20 2015) Vital Signs (24h Range):  Temp:  [97.8 °F (36.6 °C)-98.5 °F (36.9 °C)] 98.5 °F (36.9 °C)  Pulse:  [66-79] 76  Resp:  [14-31] 18  SpO2:  [70 %-100 %] 98 %  BP: (133-189)/(60-87) 134/87     Weight: 68.2 kg (150 lb 5.7 oz)  Body mass index is 23.55 kg/m².    Intake/Output Summary (Last 24 hours) at 12/4/2020 2114  Last data filed at 12/4/2020 1000  Gross per 24 hour   Intake 460 ml   Output 1450 ml   Net -990 ml      Physical Exam  Vitals signs reviewed.   Constitutional:       General: He is not in acute distress.     Appearance: He is ill-appearing. He is not diaphoretic.      Interventions: Nasal cannula in place.   HENT:      Mouth/Throat:      Pharynx: No oropharyngeal exudate.   Eyes:      General: No scleral icterus.        Right eye: No discharge.         Left eye: No discharge.   Neck:      Vascular: No JVD.   Cardiovascular:      Rate and Rhythm: Normal rate. Rhythm irregular.   Pulmonary:      Effort: Pulmonary effort is normal. Tachypnea present.      Breath sounds: Examination of the right-lower field reveals rhonchi and rales. Rhonchi and rales present.      Comments: Coarse-sounding  Abdominal:      General: Bowel sounds are normal. There is no distension.      Palpations: Abdomen is soft.      Tenderness: There is no abdominal tenderness.   Musculoskeletal:      Right  lower le+ Pitting Edema present.      Left lower le+ Pitting Edema present.   Skin:     General: Skin is warm.      Findings: No rash.   Neurological:      Mental Status: He is alert. He is confused.         Significant Labs: All pertinent labs within the past 24 hours have been reviewed.    Significant Imaging: I have reviewed all pertinent imaging results/findings within the past 24 hours.

## 2020-12-05 NOTE — ASSESSMENT & PLAN NOTE
Respiratory status doing worse.  Lasix drip restarted today.  Diuril x 1.  On BB and ARB.  Monitor ins and outs.  Cardiology consulting.

## 2020-12-05 NOTE — ASSESSMENT & PLAN NOTE
Does not have COPD exacerbation.  Continue his usual controller medications.  Rescue treatment PRN.    COPD Medications at home            albuterol (PROVENTIL) 2.5 mg /3 mL (0.083 %) nebulizer solution Take 3 mLs (2.5 mg total) by nebulization 4 (four) times daily as needed for Wheezing or Shortness of Breath.    albuterol (PROVENTIL/VENTOLIN HFA) 90 mcg/actuation inhaler Inhale 2 puffs into the lungs every 6 (six) hours as needed for Wheezing or Shortness of Breath. Rescue    budesonide (PULMICORT) 0.5 mg/2 mL nebulizer solution Take 2 mLs (0.5 mg total) by nebulization 2 (two) times daily. Controller    fluticasone-umeclidin-vilanter (TRELEGY ELLIPTA) 100-62.5-25 mcg DsDv Inhale 1 puff into the lungs once daily.      Hospital Medications             albuterol-ipratropium 2.5 mg-0.5 mg/3 mL nebulizer solution 3 mL 3 mLs, Nebulization, Every 6 hours PRN    albuterol-ipratropium 2.5 mg-0.5 mg/3 mL nebulizer solution 9 mL 9 mLs, Nebulization, Continuous    fluticasone-umeclidin-vilanter 100-62.5-25 mcg DsDv 1 puff 1 puff, Inhalation, Daily, NON-FORMULARY..PATIENT SUPPLIED OWN HOME MED

## 2020-12-05 NOTE — ASSESSMENT & PLAN NOTE
Chronic, controlled.  Latest blood pressure and vitals reviewed-   Temp:  [97.8 °F (36.6 °C)-98.5 °F (36.9 °C)]   Pulse:  [66-79]   Resp:  [14-31]   BP: (133-189)/(60-87)   SpO2:  [70 %-100 %] .   Hypertension Medications at home            amLODIPine (NORVASC) 10 MG tablet Take 1 tablet (10 mg total) by mouth once daily.    furosemide (LASIX) 20 MG tablet Take 0.5 tablets (10 mg total) by mouth once daily.    losartan (COZAAR) 100 MG tablet Take 1 tablet (100 mg total) by mouth once daily.    metoprolol succinate (TOPROL-XL) 100 MG 24 hr tablet Take 1 tablet (100 mg total) by mouth once daily.      Hospital Medications             amLODIPine tablet 10 mg 10 mg, Oral, Daily    chlorothiazide injection 500 mg 500 mg, Intravenous, Once    furosemide (LASIX) 200 mg in dextrose 5 % 100 mL continuous infusion (conc: 2 mg/mL) 10 mg/hr (10 mg/hr), Intravenous, Continuous @ 5 mL/hr    furosemide injection 20 mg (Completed) 20 mg, Intravenous, Once, Administer IV push at a max rate of 40 mg/min    furosemide injection 20 mg (Completed) 20 mg, Intravenous, Once, Administer IV push at a max rate of 40 mg/min    furosemide injection 80 mg (Completed) 80 mg, Intravenous, Once, Administer IV push at a max rate of 40 mg/min    losartan tablet 100 mg 100 mg, Oral, Daily    metoprolol succinate (TOPROL-XL) 24 hr tablet 25 mg Starting on 12/5/2020. 25 mg, Oral, Daily, DO NOT CRUSH OR CHEW; SWALLOW WHOLE.

## 2020-12-05 NOTE — PLAN OF CARE
POC reveiwed with patient and spouse, patient is confused, unable to assess  understanding, spouse verbalized understanding, AAOX1, disoriented to time, place, situation, VSS, continuous Bipap, no S&S of sob or pain, Lasix gtt maintained, incontinent care maintained, henderson maintained, , code status maintained, Q2T maintained, tele and safety maintained, patient visualized, appears sleep, eyes closed, respirations even and unlabored, bed in lowest position, side rails up X2, call light and table within reach, bed alarm on and audible, NAD, will continue to monitor.

## 2020-12-05 NOTE — NURSING
Patient family (spouse), asked that patient not be disturbed a lot, allow him to rest, so I did not wake the patient for midnight vitals and 0400, pt is appears sleep.

## 2020-12-05 NOTE — SIGNIFICANT EVENT
Artificial Intelligence Notification      Admit Date: 2020  LOS: 3  Code Status: Partial Code   Date of Consult: 2020  : 1935  Age: 85 y.o.  Weight:   Wt Readings from Last 1 Encounters:   20 68.2 kg (150 lb 5.7 oz)     Sex: male  Bed:  A:   MRN: [id number]  Attending Physician: Harsha Dan MD  Primary Service: Networked reference to record PCT   Time AI Alert Received:   Time at Bedside:              Called by RN regarding patient appearing SOB on 3L O2 via NC. Presented to bedside to find patient laying in bed, in NAD. Queried patient on how he was feeling, and he had no specific concerns at that time. Advised patient that O2 sats were low, and that given his diagnosis of acute on chronic CHF, and recent discontinuation of lasix GTT 20 mg lasix ordered w/ PRN BiPap as patient states he wears CPAP at home to me. Sats improved to 95% by . At , patient was pulling off Bipap mask, and was more confused. Nursing called RRT. Arrived to find patient with BiPap mask off, no supplemental O2 in place. O2 sats were in the 70s!!! Advised nursing staff to replaced BiPap mask, and to secure patient's arms with soft restraints. Monitored patient on BiPap until sats were again in the upper 90s. Since lasix UOP had increased, and there was about 200 cc yellow urine in henderson bag at bedside. Monitored throughout remainder of shift without calls/reports of further desaturations.         This encounter was triggered by an Artificial Intelligence Notification.     Artificial Intelligence alert discussed with Primary team:  Luciana Cruz NP

## 2020-12-05 NOTE — PROGRESS NOTES
[facility] Hospital Medicine  Progress Note    Patient Name: Frederick Kong Jr.  MRN: [id number]  Patient Class: IP- Inpatient   Admission Date: 12/1/2020  Length of Stay: 4 days  Attending Physician: Tayler Mendoza MD  Primary Care Provider: Dhiraj Dempsey III, MD        Subjective:     Principal Problem:Acute on chronic diastolic heart failure        HPI:  Frederick Kong Jr. is an 85-year-old male with a past medical history of COPD, chronic hypoxemia requiring supplemental oxygen nightly, aortic valve replacement, hypertension, hyperlipidemia, diastolic congestive heart failure, and chronic kidney disease who presents to the emergency room tonight with reports of shortness of breath.  Information for HPI obtained from review of EMR and patient's wife who is at bedside during my initial interview and physical exam.  Patient is a poor historian due to respiratory acidosis with retained CO2 and BiPAP therapy in the setting of acute respiratory distress.  Patient's wife states that patient has been on nightly oxygen for approximately 2 years, states he utilizes 2 L via nasal cannula nightly.  Friday patient began to experience shortness of breath that has increased in severity since onset.  Patient requiring utilization of supplemental oxygen throughout the entire day since Friday.  Patient was seen by pulmonology NP, Rui today in clinic who ordered a chest x-ray and COVID screen.  Patient's wife states they went to [place] urgent care for the COVID screen and returned home.  She states upon arrival to home patient was Exhausted.  She states patient with worsening shortness of breath and extreme fatigue this evening.  Patient also with reported decreased p.o. intake today. Patient's wife states that for approximately 6 months patient has been experiencing a cough, has been followed with pulmonology - states he was seen in pulmonology office in October 2020 and was given breathing treatments and  prednisone.  Patient's wife states that approximately 2 weeks ago patient was seen by his cardiologist, Dr. Gonzalez, in which patient was noted to have lower extremity swelling, states that Lasix was initially prescribed every other day, however in the setting of bilateral lower extremity swelling cardiology recommended increase in Lasix to every day - in which patient reports compliance.  Patient is followed by PCP, Dr. Dempsey.  In the emergency room patient immediately placed on BiPAP therapy for acute respiratory distress, ABG was obtained revealing acute respiratory acidosis with hypercapnia and hypoxemia.  Patient was initially initiated on IV Rocephin and azithromycin for suspected COVID versus community-acquired pneumonia.  BMP consistent with CHF exacerbation with correlating chest x-ray revealing bilateral pleural effusions.  Patient was given 40 mg IV Lasix.  Patient admitted to the ICU on 12/01/2020 at approximately 4:00 a.m..  Patient resides at home with his wife, utilizes a cane and walker for ambulatory assistance.  Partial code status verified upon admission with patient's wife, who verifies that patient does not wish to be intubated in the setting of acute pulmonary arrest.    Overview/Hospital Course:  Admitted with CHF.  Given IV furosemide, at first as pushes and then later as continuous infusion.  He diuresed well.  Creatinine was monitored.  He also was diagnosed with pneumonia.  For resp support, he was put on NIPPV and then later weaned to nasal oxygen.  He improved and did well for about a day or two; then, he began having respiratory distress and tachypnea.  Ceftriaxone was switched to cefepime.  Azithromycin was continued.  Last was given as a continuous drip again.  Echo showed preserved EF and well-functioning bioprosthetic aortic valve.  Estimated PAP was 56 mmHg.    Interval History:  Patient seen and examined.  Remains on BiPAP.  Per nurse sats dropped to the 70s/80s off BiPAP  yesterday.  She is going  to take him off BiPAP to take his pills and eat breakfast.  Multiple family members at bedside wife, daughter, son.  All questions answered about treatment plan.  Patient unable to talk while on BiPAP but does nod his head yes when I ask if he is breathing better today than yesterday.  Remains on Lasix drip.  DNI status confirmed with wife.    Review of Systems   Unable to perform ROS: Acuity of condition     Objective:     Vital Signs (Most Recent):  Temp: 96.7 °F (35.9 °C) (12/05/20 0739)  Pulse: 77 (12/05/20 0835)  Resp: (!) 24 (12/05/20 0835)  BP: (!) 158/74 (12/05/20 0739)  SpO2: 100 % (12/05/20 0835) Vital Signs (24h Range):  Temp:  [96.7 °F (35.9 °C)-98.5 °F (36.9 °C)] 96.7 °F (35.9 °C)  Pulse:  [75-77] 77  Resp:  [18-30] 24  SpO2:  [90 %-100 %] 100 %  BP: (134-158)/(67-87) 158/74     Weight: 67.9 kg (149 lb 11.1 oz)  Body mass index is 23.45 kg/m².    Intake/Output Summary (Last 24 hours) at 12/5/2020 1014  Last data filed at 12/5/2020 0600  Gross per 24 hour   Intake 300 ml   Output 1725 ml   Net -1425 ml      Physical Exam  Vitals signs reviewed.   Constitutional:       General: He is not in acute distress.     Appearance: He is ill-appearing. He is not diaphoretic.      Interventions: Nasal cannula in place.   HENT:      Mouth/Throat:      Pharynx: No oropharyngeal exudate.   Eyes:      General: No scleral icterus.        Right eye: No discharge.         Left eye: No discharge.   Neck:      Vascular: No JVD.   Cardiovascular:      Rate and Rhythm: Normal rate. Rhythm irregular.   Pulmonary:      Effort: Pulmonary effort is normal. Tachypnea present.      Breath sounds: Examination of the right-lower field reveals rhonchi and rales. Rhonchi and rales present.      Comments: Coarse-sounding  On BiPAP  Abdominal:      General: Bowel sounds are normal. There is no distension.      Palpations: Abdomen is soft.      Tenderness: There is no abdominal tenderness.   Musculoskeletal:       Right lower le+ Pitting Edema present.      Left lower le+ Pitting Edema present.   Skin:     General: Skin is warm.      Findings: No rash.   Neurological:      Mental Status: He is alert. He is confused.         Significant Labs: All pertinent labs within the past 24 hours have been reviewed.    Significant Imaging: I have reviewed and interpreted all pertinent imaging results/findings within the past 24 hours.      Assessment/Plan:      * Acute on chronic diastolic heart failure  Continue Lasix drip  On BB and ARB.  Monitor ins and outs.  Cardiology following    Right lower lobe pneumonia  Chest radiographs reviewed.  Was present on day 1 of admission.  Continue cefepime and AZT.  Monitor procalcitonin level.  It's lower than previous level.  Respiratory culture with normal marcelo.    Procalcitonin   Date Value Ref Range Status   2020 0.23 <0.25 ng/mL Final     Comment:     A concentration < 0.25 ng/mL represents a low risk bacterial   infection.  Procalcitonin may not be accurate among patients with localized   infection, recent trauma or major surgery, immunosuppressed state,   invasive fungal infection, renal dysfunction. Decisions regarding   initiation or continuation of antibiotic therapy should not be based   solely on procalcitonin levels.     2020 0.61 (H) <0.25 ng/mL Final     Comment:     A concentration < 0.25 ng/mL represents a low risk bacterial   infection.  Procalcitonin may not be accurate among patients with localized   infection, recent trauma or major surgery, immunosuppressed state,   invasive fungal infection, renal dysfunction. Decisions regarding   initiation or continuation of antibiotic therapy should not be based   solely on procalcitonin levels.         Antibiotics (From admission, onward)    Start     Stop Route Frequency Ordered    20 0200  azithromycin 500 mg in dextrose 5 % 250 mL IVPB (ready to mix system)      -- IV Every 24 hours (non-standard times)  12/01/20 1005    12/02/20 0200  cefTRIAXone (ROCEPHIN) 1 g/50 mL D5W IVPB      -- IV Every 24 hours (non-standard times) 12/01/20 1005              Thrombocytosis  Monitor.  No need to intervene.    Lab Results   Component Value Date     (H) 12/05/2020           Metabolic encephalopathy  Noted.  Due to acute illness and to hypercarbia.  Improving.  Monitor.    Hyperlipidemia  Chronic, controlled. Will continue home medication.    Lab Results   Component Value Date    CHOL 134 07/17/2020    CHOL 133 05/31/2019    CHOL 145 01/08/2019     Lab Results   Component Value Date    HDL 44 07/17/2020    HDL 50 05/31/2019    HDL 57 01/08/2019     Lab Results   Component Value Date    LDLCALC 75.4 07/17/2020    LDLCALC 63 05/31/2019    LDLCALC 72 01/08/2019     Lab Results   Component Value Date    TRIG 73 07/17/2020    TRIG 113 05/31/2019    TRIG 79 01/08/2019     Lab Results   Component Value Date    CHOLHDL 32.8 07/17/2020    CHOLHDL 2.7 05/31/2019    CHOLHDL 2.5 01/08/2019               Acute on chronic respiratory failure with hypoxia and hypercapnia  Worse.  Placed back on NIPPV 12/4.  Continue supplementing oxygen.  Monitor oximetry.  Patient is do not intubate.    COPD mixed type  Does not have COPD exacerbation.  Continue his usual controller medications.  Rescue treatment PRN.    COPD Medications at home            albuterol (PROVENTIL) 2.5 mg /3 mL (0.083 %) nebulizer solution Take 3 mLs (2.5 mg total) by nebulization 4 (four) times daily as needed for Wheezing or Shortness of Breath.    albuterol (PROVENTIL/VENTOLIN HFA) 90 mcg/actuation inhaler Inhale 2 puffs into the lungs every 6 (six) hours as needed for Wheezing or Shortness of Breath. Rescue    budesonide (PULMICORT) 0.5 mg/2 mL nebulizer solution Take 2 mLs (0.5 mg total) by nebulization 2 (two) times daily. Controller    fluticasone-umeclidin-vilanter (TRELEGY ELLIPTA) 100-62.5-25 mcg DsDv Inhale 1 puff into the lungs once daily.      Hospital  Medications             albuterol-ipratropium 2.5 mg-0.5 mg/3 mL nebulizer solution 3 mL 3 mLs, Nebulization, Every 6 hours PRN    albuterol-ipratropium 2.5 mg-0.5 mg/3 mL nebulizer solution 9 mL 9 mLs, Nebulization, Continuous    fluticasone-umeclidin-vilanter 100-62.5-25 mcg DsDv 1 puff 1 puff, Inhalation, Daily, NON-FORMULARY..PATIENT SUPPLIED OWN HOME MED            Essential hypertension  Chronic, controlled.  Latest blood pressure and vitals reviewed-   Temp:  [96.7 °F (35.9 °C)-98.5 °F (36.9 °C)]   Pulse:  [75-77]   Resp:  [18-30]   BP: (134-158)/(67-87)   SpO2:  [90 %-100 %] .   Home meds for hypertension were reviewed and noted below. Hospital anti-hypertensive changes were made as shown below.  Hypertension Medications             amLODIPine (NORVASC) 10 MG tablet Take 1 tablet (10 mg total) by mouth once daily.    furosemide (LASIX) 20 MG tablet Take 0.5 tablets (10 mg total) by mouth once daily.    losartan (COZAAR) 100 MG tablet Take 1 tablet (100 mg total) by mouth once daily.    metoprolol succinate (TOPROL-XL) 100 MG 24 hr tablet Take 1 tablet (100 mg total) by mouth once daily.      Hospital Medications             amLODIPine tablet 10 mg 10 mg, Oral, Daily    chlorothiazide injection 500 mg (Completed) 500 mg, Intravenous, Once    furosemide (LASIX) 200 mg in dextrose 5 % 100 mL continuous infusion (conc: 2 mg/mL) 10 mg/hr (10 mg/hr), Intravenous, Continuous @ 5 mL/hr    furosemide injection 80 mg (Completed) 80 mg, Intravenous, Once, Administer IV push at a max rate of 40 mg/min    losartan tablet 100 mg 100 mg, Oral, Daily    metoprolol succinate (TOPROL-XL) 24 hr tablet 25 mg 25 mg, Oral, Daily, DO NOT CRUSH OR CHEW; SWALLOW WHOLE.        Will utilize p.r.n. blood pressure medication only if patient's blood pressure greater than  180/110 and he develops symptoms such as worsening chest pain or shortness of breath.      H/O aortic valve replacement  Noted.  Looks ok on echo      Chronic renal  insufficiency, stage 3 (moderate)  Monitor creatinine while he gets diuresed.    Lab Results   Component Value Date    CREATININE 1.9 (H) 12/05/2020             VTE Risk Mitigation (From admission, onward)         Ordered     enoxaparin injection 30 mg  Every 24 hours      12/03/20 0958     IP VTE HIGH RISK PATIENT  Once      12/01/20 0546     Place sequential compression device  Until discontinued      12/01/20 0546     Place CJ hose  Until discontinued      12/01/20 0546                Discharge Planning   MARY GRACE: 12/7/2020     Code Status: Partial Code   Is the patient medically ready for discharge?:     Reason for patient still in hospital (select all that apply): Patient trending condition  Discharge Plan A: Home with family                  Tayler Mendoza MD  Department of Hospital Medicine   [facility]

## 2020-12-05 NOTE — ASSESSMENT & PLAN NOTE
Monitor creatinine while he gets diuresed.    Lab Results   Component Value Date    CREATININE 1.9 (H) 12/05/2020

## 2020-12-05 NOTE — HOSPITAL COURSE
Admitted with CHF exacerbation.  Given IV furosemide, at first as pushes and then later as continuous infusion.  He diuresed well.  Creatinine was monitored.  He also was diagnosed with pneumonia and placed on IV antibiotics.  For resp support, he was put on NIPPV and then later weaned to nasal oxygen.  He improved and did well for about a day or two; then, he began having respiratory distress and tachypnea.  Ceftriaxone was switched to cefepime.  Azithromycin was continued.  Lasix was given as a continuous drip again.  Cardiology followed.  Echo showed preserved EF and well-functioning bioprosthetic aortic valve.  Estimated PAP was 56 mmHg.  Patient with worsening of oxygen requirements and began to require BiPAP at 100% FiO2 continuously.  His mental status worsened.  His wife and children shows to transition him to comfort measures only and he was placed on morphine drip with Ativan p.r.n. and transitioned from BiPAP to nasal cannula for comfort per their request.  Patient had a peaceful death and was pronounced dead by myself at 11:40 a.m..  Family at bedside at the time.

## 2020-12-05 NOTE — SUBJECTIVE & OBJECTIVE
Interval History:  Patient seen and examined.  Remains on BiPAP.  Per nurse sats dropped to the 70s/80s off BiPAP yesterday.  She is going  to take him off BiPAP to take his pills and eat breakfast.  Multiple family members at bedside wife, daughter, son.  All questions answered about treatment plan.  Patient unable to talk while on BiPAP but does nod his head yes when I ask if he is breathing better today than yesterday.  Remains on Lasix drip.  DNI status confirmed with wife.    Review of Systems   Unable to perform ROS: Acuity of condition     Objective:     Vital Signs (Most Recent):  Temp: 96.7 °F (35.9 °C) (12/05/20 0739)  Pulse: 77 (12/05/20 0835)  Resp: (!) 24 (12/05/20 0835)  BP: (!) 158/74 (12/05/20 0739)  SpO2: 100 % (12/05/20 0835) Vital Signs (24h Range):  Temp:  [96.7 °F (35.9 °C)-98.5 °F (36.9 °C)] 96.7 °F (35.9 °C)  Pulse:  [75-77] 77  Resp:  [18-30] 24  SpO2:  [90 %-100 %] 100 %  BP: (134-158)/(67-87) 158/74     Weight: 67.9 kg (149 lb 11.1 oz)  Body mass index is 23.45 kg/m².    Intake/Output Summary (Last 24 hours) at 12/5/2020 1014  Last data filed at 12/5/2020 0600  Gross per 24 hour   Intake 300 ml   Output 1725 ml   Net -1425 ml      Physical Exam  Vitals signs reviewed.   Constitutional:       General: He is not in acute distress.     Appearance: He is ill-appearing. He is not diaphoretic.      Interventions: Nasal cannula in place.   HENT:      Mouth/Throat:      Pharynx: No oropharyngeal exudate.   Eyes:      General: No scleral icterus.        Right eye: No discharge.         Left eye: No discharge.   Neck:      Vascular: No JVD.   Cardiovascular:      Rate and Rhythm: Normal rate. Rhythm irregular.   Pulmonary:      Effort: Pulmonary effort is normal. Tachypnea present.      Breath sounds: Examination of the right-lower field reveals rhonchi and rales. Rhonchi and rales present.      Comments: Coarse-sounding  On BiPAP  Abdominal:      General: Bowel sounds are normal. There is no  distension.      Palpations: Abdomen is soft.      Tenderness: There is no abdominal tenderness.   Musculoskeletal:      Right lower le+ Pitting Edema present.      Left lower le+ Pitting Edema present.   Skin:     General: Skin is warm.      Findings: No rash.   Neurological:      Mental Status: He is alert. He is confused.         Significant Labs: All pertinent labs within the past 24 hours have been reviewed.    Significant Imaging: I have reviewed and interpreted all pertinent imaging results/findings within the past 24 hours.

## 2020-12-05 NOTE — ASSESSMENT & PLAN NOTE
Chest radiographs reviewed.  Was present on day 1 of admission.  Breathing is worse today.  Change ceftriaxone to cefepime.  Keep AZT.  Monitor procalcitonin level.  It's lower than previous level.  Respiratory culture with normal marcelo.    Procalcitonin   Date Value Ref Range Status   12/04/2020 0.23 <0.25 ng/mL Final     Comment:     A concentration < 0.25 ng/mL represents a low risk bacterial   infection.  Procalcitonin may not be accurate among patients with localized   infection, recent trauma or major surgery, immunosuppressed state,   invasive fungal infection, renal dysfunction. Decisions regarding   initiation or continuation of antibiotic therapy should not be based   solely on procalcitonin levels.     12/01/2020 0.61 (H) <0.25 ng/mL Final     Comment:     A concentration < 0.25 ng/mL represents a low risk bacterial   infection.  Procalcitonin may not be accurate among patients with localized   infection, recent trauma or major surgery, immunosuppressed state,   invasive fungal infection, renal dysfunction. Decisions regarding   initiation or continuation of antibiotic therapy should not be based   solely on procalcitonin levels.         Antibiotics (From admission, onward)    Start     Stop Route Frequency Ordered    12/02/20 0200  azithromycin 500 mg in dextrose 5 % 250 mL IVPB (ready to mix system)      -- IV Every 24 hours (non-standard times) 12/01/20 1005    12/02/20 0200  cefTRIAXone (ROCEPHIN) 1 g/50 mL D5W IVPB      -- IV Every 24 hours (non-standard times) 12/01/20 1005

## 2020-12-05 NOTE — ASSESSMENT & PLAN NOTE
Worse.  Placed back on NIPPV.  Continue supplementing oxygen.  Monitor oximetry.  Patient is do not intubate.

## 2020-12-05 NOTE — CARE UPDATE
Artificial Intelligence Notification      Admit Date: 2020  LOS: 3  Code Status: Partial Code   Date of Consult: 2020  : 1935  Age: 85 y.o.  Weight:   Wt Readings from Last 1 Encounters:   20 68.2 kg (150 lb 5.7 oz)     Sex: male  Bed:  A:   MRN: [id number]  Attending Physician: Harsha Dan MD  Primary Service: Networked reference to record PCT   Time AI Alert Received:   Time at Bedside:            Arrived to the patient's room and spoke to the bedside nurse, Merlin. Per her report the family is considering comfort measures. Multiple family members are sitting with the patient. Patient found to be resting with his eyes closed on BiPAP in no acute respiratory distress currently. Heart rate noted to be in the 70s on the monitor. Nursing reports his oxygen saturation is currently in the mid 90s. Instructed nursing to notify me of any changes in the patient's condition.      Vital Signs (Most Recent):  Temp: 98.5 °F (36.9 °C) (20 1124)  Pulse: 75 (20 1447)  Resp: (!) 30 (20 1447)  BP: (!) 143/67 (20 1124)  SpO2: (!) 92 % (20 1447) Vital Signs (24h Range):  Temp:  [97.8 °F (36.6 °C)-98.5 °F (36.9 °C)] 98.5 °F (36.9 °C)  Pulse:  [66-79] 75  Resp:  [14-31] 30  SpO2:  [70 %-100 %] 92 %  BP: (133-189)/(60-87) 143/67         This encounter was triggered by an Artificial Intelligence Notification.     Artificial Intelligence alert discussed with Primary Nurse:  Merlin

## 2020-12-05 NOTE — PROGRESS NOTES
[facility] Medicine  Progress Note    Patient Name: Frederick Kong Jr.  MRN: [id number]  Patient Class: IP- Inpatient   Admission Date: 12/1/2020  Length of Stay: 3 days  Attending Physician: Harsha Dan MD  Primary Care Provider: Dhiraj Dempsey III, MD        Subjective:     Principal Problem:Acute on chronic diastolic heart failure        HPI:  Frederick Kong Jr. is an 85-year-old male with a past medical history of COPD, chronic hypoxemia requiring supplemental oxygen nightly, aortic valve replacement, hypertension, hyperlipidemia, diastolic congestive heart failure, and chronic kidney disease who presents to the emergency room tonight with reports of shortness of breath.  Information for HPI obtained from review of EMR and patient's wife who is at bedside during my initial interview and physical exam.  Patient is a poor historian due to respiratory acidosis with retained CO2 and BiPAP therapy in the setting of acute respiratory distress.  Patient's wife states that patient has been on nightly oxygen for approximately 2 years, states he utilizes 2 L via nasal cannula nightly.  Friday patient began to experience shortness of breath that has increased in severity since onset.  Patient requiring utilization of supplemental oxygen throughout the entire day since Friday.  Patient was seen by pulmonology NP, Rui today in clinic who ordered a chest x-ray and COVID screen.  Patient's wife states they went to [place] urgent care for the COVID screen and returned home.  She states upon arrival to home patient was Exhausted.  She states patient with worsening shortness of breath and extreme fatigue this evening.  Patient also with reported decreased p.o. intake today. Patient's wife states that for approximately 6 months patient has been experiencing a cough, has been followed with pulmonology - states he was seen in pulmonology office in October 2020 and was given breathing treatments and  prednisone.  Patient's wife states that approximately 2 weeks ago patient was seen by his cardiologist, Dr. Gonzalez, in which patient was noted to have lower extremity swelling, states that Lasix was initially prescribed every other day, however in the setting of bilateral lower extremity swelling cardiology recommended increase in Lasix to every day - in which patient reports compliance.  Patient is followed by PCP, Dr. Dempsey.  In the emergency room patient immediately placed on BiPAP therapy for acute respiratory distress, ABG was obtained revealing acute respiratory acidosis with hypercapnia and hypoxemia.  Patient was initially initiated on IV Rocephin and azithromycin for suspected COVID versus community-acquired pneumonia.  BMP consistent with CHF exacerbation with correlating chest x-ray revealing bilateral pleural effusions.  Patient was given 40 mg IV Lasix.  Patient admitted to the ICU on 12/01/2020 at approximately 4:00 a.m..  Patient resides at home with his wife, utilizes a cane and walker for ambulatory assistance.  Partial code status verified upon admission with patient's wife, who verifies that patient does not wish to be intubated in the setting of acute pulmonary arrest.    Overview/Hospital Course:  Admitted with CHF.  Given IV furosemide, at first as pushes and then later as continuous infusion.  He diuresed well.  Creatinine was monitored.  He also was diagnosed with pneumonia.  For resp support, he was put on NIPPV and then later weaned to nasal oxygen.  He improved and did well for about a day or two; then, he began having respiratory distress and tachypnea.  Ceftriaxone was switched to cefepime.  Azithromycin was continued.  Last was given as a continuous drip again.  Echo showed preserved EF and well-functioning bioprosthetic aortic valve.  Estimated PAP was 56 mmHg.    Interval History:  More SOB.  More tachypnea.  Breath sounds are more coarse.  No fever.  Escalated from NC to  NIPPV.    Review of Systems   Constitutional: Negative for chills and fever.   Respiratory: Positive for cough and shortness of breath. Negative for wheezing.    Cardiovascular: Positive for leg swelling. Negative for chest pain.   Gastrointestinal: Negative for abdominal pain and nausea.   Psychiatric/Behavioral: Positive for confusion.     Objective:     Vital Signs (Most Recent):  Temp: 98.5 °F (36.9 °C) (20 1124)  Pulse: 76 (20)  Resp: 18 (20)  BP: 134/87 (20)  SpO2: 98 % (20) Vital Signs (24h Range):  Temp:  [97.8 °F (36.6 °C)-98.5 °F (36.9 °C)] 98.5 °F (36.9 °C)  Pulse:  [66-79] 76  Resp:  [14-31] 18  SpO2:  [70 %-100 %] 98 %  BP: (133-189)/(60-87) 134/87     Weight: 68.2 kg (150 lb 5.7 oz)  Body mass index is 23.55 kg/m².    Intake/Output Summary (Last 24 hours) at 2020  Last data filed at 2020 1000  Gross per 24 hour   Intake 460 ml   Output 1450 ml   Net -990 ml      Physical Exam  Vitals signs reviewed.   Constitutional:       General: He is not in acute distress.     Appearance: He is ill-appearing. He is not diaphoretic.      Interventions: Nasal cannula in place.   HENT:      Mouth/Throat:      Pharynx: No oropharyngeal exudate.   Eyes:      General: No scleral icterus.        Right eye: No discharge.         Left eye: No discharge.   Neck:      Vascular: No JVD.   Cardiovascular:      Rate and Rhythm: Normal rate. Rhythm irregular.   Pulmonary:      Effort: Pulmonary effort is normal. Tachypnea present.      Breath sounds: Examination of the right-lower field reveals rhonchi and rales. Rhonchi and rales present.      Comments: Coarse-sounding  Abdominal:      General: Bowel sounds are normal. There is no distension.      Palpations: Abdomen is soft.      Tenderness: There is no abdominal tenderness.   Musculoskeletal:      Right lower le+ Pitting Edema present.      Left lower le+ Pitting Edema present.   Skin:     General: Skin  is warm.      Findings: No rash.   Neurological:      Mental Status: He is alert. He is confused.         Significant Labs: All pertinent labs within the past 24 hours have been reviewed.    Significant Imaging: I have reviewed all pertinent imaging results/findings within the past 24 hours.      Assessment/Plan:      * Acute on chronic diastolic heart failure  Respiratory status doing worse.  Lasix drip restarted today.  Diuril x 1.  On BB and ARB.  Monitor ins and outs.  Cardiology consulting.    Right lower lobe pneumonia  Chest radiographs reviewed.  Was present on day 1 of admission.  Breathing is worse today.  Change ceftriaxone to cefepime.  Keep AZT.  Monitor procalcitonin level.  It's lower than previous level.  Respiratory culture with normal marcelo.    Procalcitonin   Date Value Ref Range Status   12/04/2020 0.23 <0.25 ng/mL Final     Comment:     A concentration < 0.25 ng/mL represents a low risk bacterial   infection.  Procalcitonin may not be accurate among patients with localized   infection, recent trauma or major surgery, immunosuppressed state,   invasive fungal infection, renal dysfunction. Decisions regarding   initiation or continuation of antibiotic therapy should not be based   solely on procalcitonin levels.     12/01/2020 0.61 (H) <0.25 ng/mL Final     Comment:     A concentration < 0.25 ng/mL represents a low risk bacterial   infection.  Procalcitonin may not be accurate among patients with localized   infection, recent trauma or major surgery, immunosuppressed state,   invasive fungal infection, renal dysfunction. Decisions regarding   initiation or continuation of antibiotic therapy should not be based   solely on procalcitonin levels.         Antibiotics (From admission, onward)    Start     Stop Route Frequency Ordered    12/02/20 0200  azithromycin 500 mg in dextrose 5 % 250 mL IVPB (ready to mix system)      -- IV Every 24 hours (non-standard times) 12/01/20 1005    12/02/20 0200   cefTRIAXone (ROCEPHIN) 1 g/50 mL D5W IVPB      -- IV Every 24 hours (non-standard times) 12/01/20 1005              Thrombocytosis  Monitor.  No need to intervene.    Lab Results   Component Value Date     (H) 12/02/2020           Metabolic encephalopathy  Noted.  Due to acute illness and to hypercarbia.  Improving.  Monitor.    Hyperlipidemia  Chronic, controlled. Will continue home medication.    Lab Results   Component Value Date    CHOL 134 07/17/2020    CHOL 133 05/31/2019    CHOL 145 01/08/2019     Lab Results   Component Value Date    HDL 44 07/17/2020    HDL 50 05/31/2019    HDL 57 01/08/2019     Lab Results   Component Value Date    LDLCALC 75.4 07/17/2020    LDLCALC 63 05/31/2019    LDLCALC 72 01/08/2019     Lab Results   Component Value Date    TRIG 73 07/17/2020    TRIG 113 05/31/2019    TRIG 79 01/08/2019     Lab Results   Component Value Date    CHOLHDL 32.8 07/17/2020    CHOLHDL 2.7 05/31/2019    CHOLHDL 2.5 01/08/2019               Acute on chronic respiratory failure with hypoxia and hypercapnia  Worse.  Placed back on NIPPV.  Continue supplementing oxygen.  Monitor oximetry.  Patient is do not intubate.    COPD mixed type  Does not have COPD exacerbation.  Continue his usual controller medications.  Rescue treatment PRN.    COPD Medications at home            albuterol (PROVENTIL) 2.5 mg /3 mL (0.083 %) nebulizer solution Take 3 mLs (2.5 mg total) by nebulization 4 (four) times daily as needed for Wheezing or Shortness of Breath.    albuterol (PROVENTIL/VENTOLIN HFA) 90 mcg/actuation inhaler Inhale 2 puffs into the lungs every 6 (six) hours as needed for Wheezing or Shortness of Breath. Rescue    budesonide (PULMICORT) 0.5 mg/2 mL nebulizer solution Take 2 mLs (0.5 mg total) by nebulization 2 (two) times daily. Controller    fluticasone-umeclidin-vilanter (TRELEGY ELLIPTA) 100-62.5-25 mcg DsDv Inhale 1 puff into the lungs once daily.      Hospital Medications              albuterol-ipratropium 2.5 mg-0.5 mg/3 mL nebulizer solution 3 mL 3 mLs, Nebulization, Every 6 hours PRN    albuterol-ipratropium 2.5 mg-0.5 mg/3 mL nebulizer solution 9 mL 9 mLs, Nebulization, Continuous    fluticasone-umeclidin-vilanter 100-62.5-25 mcg DsDv 1 puff 1 puff, Inhalation, Daily, NON-FORMULARY..PATIENT SUPPLIED OWN HOME MED            Essential hypertension  Chronic, controlled.  Latest blood pressure and vitals reviewed-   Temp:  [97.8 °F (36.6 °C)-98.5 °F (36.9 °C)]   Pulse:  [66-79]   Resp:  [14-31]   BP: (133-189)/(60-87)   SpO2:  [70 %-100 %] .   Hypertension Medications at home            amLODIPine (NORVASC) 10 MG tablet Take 1 tablet (10 mg total) by mouth once daily.    furosemide (LASIX) 20 MG tablet Take 0.5 tablets (10 mg total) by mouth once daily.    losartan (COZAAR) 100 MG tablet Take 1 tablet (100 mg total) by mouth once daily.    metoprolol succinate (TOPROL-XL) 100 MG 24 hr tablet Take 1 tablet (100 mg total) by mouth once daily.      Hospital Medications             amLODIPine tablet 10 mg 10 mg, Oral, Daily    chlorothiazide injection 500 mg 500 mg, Intravenous, Once    furosemide (LASIX) 200 mg in dextrose 5 % 100 mL continuous infusion (conc: 2 mg/mL) 10 mg/hr (10 mg/hr), Intravenous, Continuous @ 5 mL/hr    furosemide injection 20 mg (Completed) 20 mg, Intravenous, Once, Administer IV push at a max rate of 40 mg/min    furosemide injection 20 mg (Completed) 20 mg, Intravenous, Once, Administer IV push at a max rate of 40 mg/min    furosemide injection 80 mg (Completed) 80 mg, Intravenous, Once, Administer IV push at a max rate of 40 mg/min    losartan tablet 100 mg 100 mg, Oral, Daily    metoprolol succinate (TOPROL-XL) 24 hr tablet 25 mg Starting on 12/5/2020. 25 mg, Oral, Daily, DO NOT CRUSH OR CHEW; SWALLOW WHOLE.          H/O aortic valve replacement  Noted.  Looks ok on echo      Chronic renal insufficiency, stage 3 (moderate)  Monitor creatinine while he gets  riley.    Lab Results   Component Value Date    CREATININE 1.9 (H) 12/04/2020             VTE Risk Mitigation (From admission, onward)         Ordered     enoxaparin injection 30 mg  Every 24 hours      12/03/20 0958     IP VTE HIGH RISK PATIENT  Once      12/01/20 0546     Place sequential compression device  Until discontinued      12/01/20 0546     Place CJ hose  Until discontinued      12/01/20 0546                Discharge Planning   MARY GRACE: 12/7/2020     Code Status: Partial Code   Is the patient medically ready for discharge?:     Reason for patient still in hospital (select all that apply): Patient trending condition and Treatment  Discharge Plan A: Home with family                  Harsha Dan MD  Department of Hospital Medicine   [facility]

## 2020-12-05 NOTE — ASSESSMENT & PLAN NOTE
Monitor creatinine while he gets diuresed.    Lab Results   Component Value Date    CREATININE 1.9 (H) 12/04/2020

## 2020-12-06 PROBLEM — Z51.5 COMFORT MEASURES ONLY STATUS: Status: ACTIVE | Noted: 2020-01-01

## 2020-12-06 NOTE — SIGNIFICANT EVENT
Advance Care Planning     [facility]  I engaged the family in a conversation about advance care planning and we specifically addressed what the goals of care would be moving forward, in light of the patient's change in clinical status, specifically increasing oxygen requirements and worsening mental status.  We did specifically address the patient's likely prognosis, which is poor.  We explored the patient's values and preferences for future care.  The family endorses that what is most important right now is to focus on quality of life, even if it means sacrificing a little time    Accordingly, we have decided that the best plan to meet the patient's goals includes discontinuing treatment    I did briefly explain the role for hospice care at this stage of the patient's illness, including its ability to help the patient live with the best quality of life possible.  We will not be making a hospice referral, but may later in day.    Family wished for patient to be DNR.    Family present at discussion- wife and multiple (3) children    I spent a total of 25 minutes engaging the patient in this advance care planning discussion.

## 2020-12-06 NOTE — CARE UPDATE
12/06/20 0821   Patient Assessment/Suction   Level of Consciousness (AVPU) unresponsive   All Lung Fields Breath Sounds coarse   PRE-TX-O2   O2 Device (Oxygen Therapy) BiPAP   $ Is the patient on Low Flow Oxygen? Yes   SpO2 100 %   Pulse Oximetry Type Intermittent   $ Pulse Oximetry - Multiple Charge Pulse Oximetry - Multiple   Pulse 80   Resp (!) 21   Aerosol Therapy   $ Aerosol Therapy Charges PRN treatment not required   Respiratory Treatment Status (SVN) PRN treatment not required   Inhaler   $ Inhaler Charges Other (see comments)  (unable to perform)   Wound Care   $ Wound Care Tech Time 15 min   Area of Concern Cheek;Bridge of nose;Chin;Forehead   Skin Color/Characteristics without discoloration   Skin Temperature warm   Preset CPAP/BiPAP Settings   Mode Of Delivery BiPAP S/T   $ CPAP/BiPAP Daily Charge BiPAP/CPAP Daily   $ Initial CPAP/BiPAP Setup? No   $ Is patient using? Yes   Size of Mask Large   Sized Appropriately? Yes   Equipment Type V60   Airway Device Type large full face mask   Ipap 12   EPAP (cm H2O) 6   Pressure Support (cm H2O) 6   Set Rate (Breaths/Min) 14   ITime (sec) 1   Rise Time (sec) 0.3   Patient CPAP/BiPAP Settings   Timed Inspiration (Sec) 1   IPAP Rise Time (sec) 0.3   RR Total (Breaths/Min) 21   Tidal Volume (mL) 460   VE Minute Ventilation (L/min) 10 L/min   Peak Inspiratory Pressure (cm H2O) 14   TiTOT (%) 18   Total Leak (L/Min) 22   Patient Trigger - ST Mode Only (%) 98   Patient unresponsive, wife at bedside.

## 2020-12-06 NOTE — SIGNIFICANT EVENT
Artificial Intelligence Notification      Admit Date: 2020  LOS: 5  Code Status: Partial Code   Date of Consult: 2020  : 1935  Age: 85 y.o.  Weight:   Wt Readings from Last 1 Encounters:   20 67.9 kg (149 lb 11.1 oz)     Sex: male  Bed:  A:   MRN: [id number]  Attending Physician: Tayler Mendoza MD  Primary Service: Networked reference to record PCT   Time AI Alert Received:347  Time at Bedside: 347           Patient found to be minimally responsive on BiPAP.  His vitals are stable but he is requiring 100% FiO2, it appears that his respiratory status has continued to decline over the last 24 hours.  Diminished breath sounds bilaterally with shallow breaths noted.  His pulse ox currently 99% with a respiratory rate of 22 and heart rate is noted to be in the 70s on the monitor.  I spoke to the bedside nurse about the patient's condition. Yesterday evening I responded to a patient deterioration alert for this patient and family members were coming into town to possibly discuss the transition to more palliative measures.  I will discuss this with the daytime provider.  Nursing to update me with any changes in the patient's condition.      Vital Signs (Most Recent):  Temp: 97.2 °F (36.2 °C) (20)  Pulse: 75 (20)  Resp: (!) 22 (20)  BP: (!) 143/67 (20)  SpO2: 100 % (20) Vital Signs (24h Range):  Temp:  [96.4 °F (35.8 °C)-97.8 °F (36.6 °C)] 97.2 °F (36.2 °C)  Pulse:  [75-87] 75  Resp:  [17-26] 22  SpO2:  [87 %-100 %] 100 %  BP: (143-198)/(67-92) 143/67         This encounter was triggered by an Artificial Intelligence Notification.

## 2020-12-06 NOTE — SIGNIFICANT EVENT
Called to pronounce patient dead  Patient in room with family at bedside, patient is unresponsive to physical and verbal stimulation.  No heart tones, no spontaneous respirations, pupils fixed and dilated  Patient pronounced dead at 11:40 am

## 2020-12-06 NOTE — DISCHARGE SUMMARY
[facility] Medicine  Discharge Summary      Patient Name: Frederick Kong Jr.  MRN: [id number]  Admission Date: 12/1/2020  Hospital Length of Stay: 5 days  Discharge Date and Time: 12/6/2020  1:00 PM  Attending Physician: Ruth att. providers found   Discharging Provider: Taylre Mendoza MD  Primary Care Provider: Dhiraj Dempsey III, MD      HPI:   Frederick Kong Jr. is an 85-year-old male with a past medical history of COPD, chronic hypoxemia requiring supplemental oxygen nightly, aortic valve replacement, hypertension, hyperlipidemia, diastolic congestive heart failure, and chronic kidney disease who presents to the emergency room tonight with reports of shortness of breath.  Information for HPI obtained from review of EMR and patient's wife who is at bedside during my initial interview and physical exam.  Patient is a poor historian due to respiratory acidosis with retained CO2 and BiPAP therapy in the setting of acute respiratory distress.  Patient's wife states that patient has been on nightly oxygen for approximately 2 years, states he utilizes 2 L via nasal cannula nightly.  Friday patient began to experience shortness of breath that has increased in severity since onset.  Patient requiring utilization of supplemental oxygen throughout the entire day since Friday.  Patient was seen by pulmonology NP, Rui today in clinic who ordered a chest x-ray and COVID screen.  Patient's wife states they went to [place] urgent [facility] for the COVID screen and returned home.  She states upon arrival to home patient was Exhausted.  She states patient with worsening shortness of breath and extreme fatigue this evening.  Patient also with reported decreased p.o. intake today. Patient's wife states that for approximately 6 months patient has been experiencing a cough, has been followed with pulmonology - states he was seen in pulmonology office in October 2020 and was given breathing treatments and  prednisone.  Patient's wife states that approximately 2 weeks ago patient was seen by his cardiologist, Dr. Gonzalez, in which patient was noted to have lower extremity swelling, states that Lasix was initially prescribed every other day, however in the setting of bilateral lower extremity swelling cardiology recommended increase in Lasix to every day - in which patient reports compliance.  Patient is followed by PCP, Dr. Dempsey.  In the emergency room patient immediately placed on BiPAP therapy for acute respiratory distress, ABG was obtained revealing acute respiratory acidosis with hypercapnia and hypoxemia.  Patient was initially initiated on IV Rocephin and azithromycin for suspected COVID versus community-acquired pneumonia.  BMP consistent with CHF exacerbation with correlating chest x-ray revealing bilateral pleural effusions.  Patient was given 40 mg IV Lasix.  Patient admitted to the ICU on 12/01/2020 at approximately 4:00 a.m..  Patient resides at home with his wife, utilizes a cane and walker for ambulatory assistance.  Partial code status verified upon admission with patient's wife, who verifies that patient does not wish to be intubated in the setting of acute pulmonary arrest.    * No surgery found *      Hospital Course:   Admitted with CHF exacerbation.  Given IV furosemide, at first as pushes and then later as continuous infusion.  He diuresed well.  Creatinine was monitored.  He also was diagnosed with pneumonia and placed on IV antibiotics.  For resp support, he was put on NIPPV and then later weaned to nasal oxygen.  He improved and did well for about a day or two; then, he began having respiratory distress and tachypnea.  Ceftriaxone was switched to cefepime.  Azithromycin was continued.  Lasix was given as a continuous drip again.  Cardiology followed.  Echo showed preserved EF and well-functioning bioprosthetic aortic valve.  Estimated PAP was 56 mmHg.  Patient with worsening of oxygen  requirements and began to require BiPAP at 100% FiO2 continuously.  His mental status worsened.  His wife and children shows to transition him to comfort measures only and he was placed on morphine drip with Ativan p.r.n. and transitioned from BiPAP to nasal cannula for comfort per their request.  Patient had a peaceful death and was pronounced dead by myself at 11:40 a.m..  Family at bedside at the time.     Consults:   Consults (From admission, onward)        Status Ordering Provider     Inpatient consult to Urology  Once     Provider:  Betsy Cano Jr., MD    Completed REGGIE DAVALOS     IP consult to case management  Once     Provider:  (Not yet assigned)    Completed GER SOMERS          No new Assessment & Plan notes have been filed under this hospital service since the last note was generated.  Service: Hospital Medicine    Final Active Diagnoses:    Diagnosis Date Noted POA    PRINCIPAL PROBLEM:  Acute on chronic diastolic heart failure [I50.33] 2020 Yes    Comfort measures only status [Z51.5] 2020 Not Applicable    Right lower lobe pneumonia [J18.9] 2020 Yes    Thrombocytosis [D47.3] 2020 Yes    Metabolic encephalopathy [G93.41] 07/15/2020 Yes    Hyperlipidemia [E78.5] 2020 Yes    Acute on chronic respiratory failure with hypoxia and hypercapnia [J96.21, J96.22] 2018 Yes    COPD mixed type [J44.9] 2018 Yes    Essential hypertension [I10] 2018 Yes    Chronic renal insufficiency, stage 3 (moderate) [N18.30] 2018 Yes    H/O aortic valve replacement [Z95.2] 2018 Not Applicable      Problems Resolved During this Admission:    Diagnosis Date Noted Date Resolved POA    Hyperkalemia [E87.5] 2018 Yes       Discharged Condition:     Disposition:     Follow Up:    Patient Instructions:   No discharge procedures on file.    Significant Diagnostic Studies: Labs:   BMP:   Recent Labs   Lab 20  1950  12/05/20  0434 12/06/20  0550   * 125* 171*   * 146* 148*   K 4.1 3.8 4.2    102 97   CO2 32* 29 36*   BUN 62* 63* 78*   CREATININE 1.9* 1.9* 2.4*   CALCIUM 8.2* 8.4* 8.7   MG  --  2.6 3.0*    and CBC   Recent Labs   Lab 12/05/20 0434 12/06/20  0550   WBC 11.97 19.39*   HGB 12.6* 13.4*   HCT 40.8 45.7   * 478*   Radiology Results (last 7 days)    Procedure Component Value Units Date/Time   X-Ray Chest 1 View [[id number]] Resulted: 12/04/20 0806   Order Status: Completed Updated: 12/04/20 0808   Narrative:     EXAMINATION:   XR CHEST 1 VIEW     CLINICAL HISTORY:   sob;     TECHNIQUE:   Single frontal view of the chest was performed.     COMPARISON:   12/01/2020     FINDINGS:   There has been a prior sternotomy.  The heart size is normal.  There are small bilateral pleural effusions which are unchanged.  Moderate perihilar and basilar airspace disease is present which is also without significant change.    Impression:       Unchanged moderate bilateral pulmonary infiltrates and small pleural effusions.       Electronically signed by: Ivan Gabriel MD   Date: 12/04/2020   Time: 08:06   RADIOLOGY REPORT [[id number]] Resulted: 12/01/20 0000   Order Status: Completed Updated: 12/02/20 1647   X-ray chest AP portable [[id number]] Resulted: 12/01/20 0839   Order Status: Completed Updated: 12/01/20 0841   Narrative:     EXAMINATION:   XR CHEST AP PORTABLE     CLINICAL HISTORY:   CHF;     TECHNIQUE:   Single frontal view of the chest was performed.     COMPARISON:   12/01/2020     FINDINGS:   Cardiomediastinal silhouette is stable.  Continued bibasilar infiltrates with probable also small right pleural effusion.  Infiltrates are decreased compared to the prior exam.    Impression:       Mild decrease of the lung infiltrate compared to the prior exam particularly on the right.  Findings suggesting improving CHF       Electronically signed by: Lillian Cuevas MD   Date: 12/01/2020   Time: 08:39   X-Ray  Chest AP Portable [[id number]] Resulted: 12/01/20 0804   Order Status: Completed Updated: 12/01/20 0806   Narrative:     EXAMINATION:   XR CHEST AP PORTABLE     CLINICAL HISTORY:   Shortness of breath     TECHNIQUE:   Single frontal view of the chest was performed.     COMPARISON:   11/30/2020     FINDINGS:   Continued bilateral bibasilar infiltrates, small right and likely also small left pleural effusion.  Appearance suggest CHF.  Cardiomediastinal silhouette is stable.    Impression:       Findings consistent with CHF with mild worsening compared to the prior exam     Final read       Electronically signed by: Lillian Cuevas MD   Date: 12/01/2020   Time: 08:04     Microbiology Results (Last 90 Days)    Procedure Component Value Units Date/Time   Blood culture [[id number]] Collected: 12/01/20 0302   Order Status: Completed Specimen: Blood Updated: 12/06/20 1412    Blood Culture, Routine No growth after 5 days.   Blood culture [[id number]] Collected: 12/01/20 0302   Order Status: Completed Specimen: Blood Updated: 12/06/20 1412    Blood Culture, Routine No growth after 5 days.   Culture, Respiratory with Gram Stain [[id number]] Collected: 11/30/20 1753   Order Status: Completed Specimen: Respiratory from Sputum, Expectorated Updated: 12/03/20 0913    Respiratory Culture Normal respiratory marcelo     No S aureus or Pseudomonas isolated.    Gram Stain (Respiratory) <10 epithelial cells per low power field.    Gram Stain (Respiratory) Rare WBC's    Gram Stain (Respiratory) Rare Gram positive cocci    Gram Stain (Respiratory) Rare Gram negative rods    Gram Stain (Respiratory) Rare Gram positive rods       TTE 12/01  · The mean diastolic gradient across the mitral valve is 11 mmHg at a heart rate of bpm.  · The left ventricle is mildly enlarged with mildly decreased systolic function. The estimated ejection fraction is 52%.  · Atrial fibrillation observed with restrictive filling pattern present.  · Normal right ventricular  size with low normal right ventricular systolic function.  · There is mild pulmonary hypertension.  · Mild to moderate tricuspid regurgitation.  · Mild pulmonic regurgitation.  · There is abnormal septal wall motion consistent with post-operative status.  · Elevated central venous pressure (15 mmHg).  · The estimated PA systolic pressure is 56 mmHg.  · There is a porcine bioprosthetic aortic valve present. There is mild central aortic insufficiency present. Prosthetic aortic valve is normal.     Mean left atrial pressure is 15 mm Hg  Bioprosthetic aortic valve is present with minimal stenosis and mild aortic regurgitation  Of pulmonary hypertension is present  Patient is in atrial fibrillation      Pending Diagnostic Studies:     None         Medications:  None (patient  at medical facility)    Indwelling Lines/Drains at time of discharge:   Lines/Drains/Airways     Drain                 Urethral Catheter 20 1305 Straight-tip 16 Fr. 5 days                Time spent on the discharge of patient: 40 minutes           Tayler Mendoza MD  Department of Hospital Medicine  [facility]

## 2020-12-06 NOTE — PLAN OF CARE
Pt in bed resting  with eyes closed. Pt very lethargic. Pt answering question with a nod of the head at beginning of shift. Pt now not as responsive. Pt on continuous bipap. Pt noted to have apneic, shallow breathing. Leslie clean, intact, and patent.  VSS. Bed low, call light in reach, free of falls,safety maintained, will continue to monitor.

## 2020-12-07 NOTE — PLAN OF CARE
20 0817   Final Note   Assessment Type Final Discharge Note   Anticipated Discharge Disposition

## 2021-01-07 NOTE — ASSESSMENT & PLAN NOTE
Has mechanical AV.  Only on antiplatelets; not anticoagulants.  He has history of bleeding.  High risk of intracranial bleeding.   Is This A New Presentation, Or A Follow-Up?: Skin Lesion How Severe Is Your Skin Lesion?: mild Have Your Skin Lesions Been Treated?: not been treated

## 2022-12-16 NOTE — ASSESSMENT & PLAN NOTE
Frederick Kong Jr. is a 82 y.o. male who presents to the Emergency Department and was admitted to ICU.      This patient does have evidence of infective focus  My overall impression is sepsis.  Antibiotics given-   Antibiotics     Start     Stop Route Frequency Ordered    04/18/18 0900  mupirocin 2 % ointment      -- Top 2 times daily 04/17/18 2330    04/17/18 2100  amoxicillin-clavulanate 875-125mg per tablet 1 tablet      -- Oral 2 times daily 04/17/18 0945          Severe Sepsis only-  Latest labs reviewed, they are-    No results for input(s): LACTATE in the last 24 hours.    Recent Labs  Lab 04/15/18  0603   PH 7.493*   PO2 134*   PCO2 37.1   HCO3 28.4*   BE 5       Organ dysfunction indicated by acute respiratory failure requiring intubation-is now extubated.    Shock with decreased perfusion noted, Fluid challenge was not given due to pulmonary edema.    Obtain Blood cultures x 2, sputum culture and urine [id number]-4/16 ALL NGTD   Pt called office to discuss symptoms. Pt tested positive for covid today. Pt has chills, sinus pressure, headache,sore throat,neck pain, cold symptoms. Pt is requesting paxlovid medication. Patient will call back office if symptoms worsen or new problems develop. Isolate today, then strict mask wearing  for additional 5 days. Notify contacts. Hydrate with pedialyte or gatorade. Rest. Robitussin DM for cough. Go to [facility] ER or call 911 for shortness of breath.

## 2024-01-24 NOTE — PROGRESS NOTES
[facility] Note  Staff: MIHAELA Ferro-C    PCP: Dr. Dhiraj Dempsey, III    Chief Complaint: F/UP: BPH with LUTS, urethral stricture    Subjective:        HPI: Frederick Kong Jr. is a 83 y.o. male presents today for follow-up.  Pt was last seen by Dr. Kimmie Jerry on 12/21/2018 with hx of urethral stricture, BPH with lower urinary tract symptoms.    BPH with LUTS:  Pt is currently taking Flomax 0.4 mg one tablet daily in the evenings.  Wife states he only started the medication one week ago since last office visit and so far he is tolerating the med with no problems.  -had b lobe hypertrophy with  High riding bladder neck on cysto     Urethral stricture  Continue catheterization 16cc orange tip ginna catheter every 2 weeks  Discussed doing monthly but concerned they may forget   Doing well with this     Pt's  HX:  Bladder neck contacture/bphe  -initially seen by me on 4/7/18 in consult in the hospital as they had difficult placing henderson. He was a previous pt of  and had not seen him for 5 years. Had a h/o greenlight laser turp for bph. Denied any sx prior to admission. When they tried to place henderson they could not and so I did a bedside urethral dilation over wire for suspected scar tissue. Eventually underwent voiding trial and learned CIC on 5/2/18. Had some difficulty.   -Uroflow results (date: 05/31/2018) with urethral stricture  : Voiding time: 45.3s, Flow time: 41.7s, TTP flow: 12.0s, Peak flowrate: 10.3 mL/s, Average flowrate: 5.0mL/s, Intervals: 3,  Voided volume: 209 mL,  Pvr by bladder scan: 118. Pattern of curve:bell with multiple decreasing spikes  -cystoscopy at asc to eval stricture on 6/11/18 showed moderate bilobar hypertrophy with high riding bladder neck with bladder neck contracture. Areas of previous false passage seen in posterior prostate. Otherwise no urethral stricture seen.   -seen 8/3/18 and could only place 16fr orange tip coude catheter.  Could not advance lofric. he's been urinating normally with ok stream      Doing well, catheterizing every 2 weeks with 16fr colton orange tip. No more bleeding. Tolerating. Denies any straining. occ weak stream and Frequency 8-10x. occ dribbling that is not that bothersome.  Nocturia 1-2x a night. Did have cysto showing enlarged prostate      REVIEW OF SYSTEMS:  A comprehensive 10 system review was performed and is negative except as noted above in HPI    PMHx:  Past Medical History:   Diagnosis Date    BPH with urinary obstruction     Cancer     CHF (congestive heart failure)     Coronary artery disease     Gout     Hyperlipemia     Hypertension     Personal history of urethral stricture      PSHx:  Past Surgical History:   Procedure Laterality Date    APPENDECTOMY      BRAIN SURGERY  1975    meningioma removed    CARDIAC SURGERY  2001    mechanical aortic valve replacement, Dr. Sanket Sanchez    CYSTOSCOPY and possible urethral dilation N/A 6/11/2018    [facility]    TONSILLECTOMY       Allergies:  Iodinated contrast- oral and iv dye    Medications: reviewed   Objective:     Vitals:    01/21/19 0838   BP: (!) 162/60   Pulse: 73   Resp: 18   Temp: 98.2 °F (36.8 °C)     General:WDWN in NAD  Eyes: PERRLA, normal conjunctiva  Respiratory: no increased work on breathing, clear to auscultation  Cardiovascular: regular rate and rhythm. No obvious extremity edema.  GI: palpation of masses. No tenderness. No hepatosplenomegaly to palpation.  Musculoskeletal: normal range of motion of bilateral upper extremities. Normal muscle strength and tone.  Skin: no obvious rashes or lesions. No tightening of skin noted.  Neurologic: CN grossly normal. Normal sensation.   Psychiatric: awake, alert and oriented x 3. Mood and affect normal. Cooperative.    PVR by bladder scan performed by MA today: 124 mL*    LABS REVIEW:  UA today:  Color:Clear, Yellow  Spec. Grav.  1.015  PH   6.0  Small amt leukocytes  30 Protein  Trace of Blood    Cr:   Lab Results   Component Value Date    CREATININE 1.15 (H) 01/08/2019     Assessment:       1. Benign prostatic hyperplasia with lower urinary tract symptoms, symptom details unspecified    2. Hx of urethral stricture          Plan:   BPH with LUTS, hx of urethral stricture:    1.  Urine culture to be performed.  2.  After thorough discussion with pt and wife during office visit today, they would like to give the Flomax more time to see how truly effective it will be for this pt before further discussing treatment options such as Urolift or Rezum procedures.    F/u with Dr. Jerry in 4-6 months.    [facility]: Active    Lin Vasquez, TOMASAP-C   no hematuria/no flank pain L/no flank pain R/no bladder infections/normal urinary frequency

## 2025-07-01 NOTE — ASSESSMENT & PLAN NOTE
Etiology likely secondary to CHF exacerbation versus COPD exacerbation.  Patient was given 40 mg IV Lasix in emergency room, monitor strict intake and output.  Patient on BiPAP therapy.  ABG reviewed revealing hypercapnia and hypoxemia, improvement noted in repeat ABG after BiPAP initiation.  Utilize continuous telemetry and pulse oximetry monitoring.  Repeat chest x-ray in the morning.  Duo nebs as needed.  Continue home Roflumilast. Wean Bipap therapy as tolerated and as clinically appropriate. Low suspicion for pneumonia - afebrile, leukocytosis likely secondary to steroid administration. Will check procalcitonin and repeat chest xray once patient diuresed. PARTIAL CODE status verified upon admission, NO INTUBATION.       Unknown

## (undated) DEVICE — CONTAINER SPECIMEN STRL 4OZ

## (undated) DEVICE — UNDERGLOVES BIOGEL PI SZ 6 LF

## (undated) DEVICE — SEE MEDLINE ITEM 152651

## (undated) DEVICE — SET CYSTO IRRIGATION UNIV SPIK

## (undated) DEVICE — SOLN BETADINE SWAB AIDS

## (undated) DEVICE — APRON DISPOSP PLASTIC 24INX42

## (undated) DEVICE — WATER STERILE INJ 500ML BAG

## (undated) DEVICE — JELLY LUBRICANT STERILE 4 OZ

## (undated) DEVICE — SHEET DRAPE MEDIUM